# Patient Record
Sex: FEMALE | Race: WHITE | NOT HISPANIC OR LATINO | Employment: PART TIME | URBAN - METROPOLITAN AREA
[De-identification: names, ages, dates, MRNs, and addresses within clinical notes are randomized per-mention and may not be internally consistent; named-entity substitution may affect disease eponyms.]

---

## 2017-10-09 ENCOUNTER — HOSPITAL ENCOUNTER (OUTPATIENT)
Dept: MAMMOGRAPHY | Facility: HOSPITAL | Age: 77
Discharge: HOME | End: 2017-10-09

## 2017-10-09 DIAGNOSIS — Z12.31 VISIT FOR SCREENING MAMMOGRAM: ICD-10-CM

## 2019-01-12 ENCOUNTER — HOSPITAL ENCOUNTER (OUTPATIENT)
Dept: MAMMOGRAPHY | Facility: CLINIC | Age: 79
Discharge: HOME | End: 2019-01-12

## 2019-01-12 DIAGNOSIS — Z12.31 VISIT FOR SCREENING MAMMOGRAM: ICD-10-CM

## 2020-03-02 ENCOUNTER — HOSPITAL ENCOUNTER (OUTPATIENT)
Dept: MAMMOGRAPHY | Facility: CLINIC | Age: 80
Discharge: HOME | End: 2020-03-02

## 2020-03-02 DIAGNOSIS — Z12.31 VISIT FOR SCREENING MAMMOGRAM: ICD-10-CM

## 2021-04-08 ENCOUNTER — HOSPITAL ENCOUNTER (OUTPATIENT)
Dept: MAMMOGRAPHY | Facility: CLINIC | Age: 81
Discharge: HOME | End: 2021-04-08

## 2021-04-08 DIAGNOSIS — Z12.31 VISIT FOR SCREENING MAMMOGRAM: ICD-10-CM

## 2021-05-25 ENCOUNTER — RECORDS - HEALTHEAST (OUTPATIENT)
Dept: ADMINISTRATIVE | Facility: CLINIC | Age: 81
End: 2021-05-25

## 2021-05-26 ENCOUNTER — RECORDS - HEALTHEAST (OUTPATIENT)
Dept: ADMINISTRATIVE | Facility: CLINIC | Age: 81
End: 2021-05-26

## 2021-07-13 ENCOUNTER — RECORDS - HEALTHEAST (OUTPATIENT)
Dept: ADMINISTRATIVE | Facility: CLINIC | Age: 81
End: 2021-07-13

## 2021-07-21 ENCOUNTER — RECORDS - HEALTHEAST (OUTPATIENT)
Dept: ADMINISTRATIVE | Facility: CLINIC | Age: 81
End: 2021-07-21

## 2021-07-22 ENCOUNTER — RECORDS - HEALTHEAST (OUTPATIENT)
Dept: SCHEDULING | Facility: CLINIC | Age: 81
End: 2021-07-22

## 2021-07-22 DIAGNOSIS — Z12.31 OTHER SCREENING MAMMOGRAM: ICD-10-CM

## 2022-04-15 ENCOUNTER — ANCILLARY PROCEDURE (OUTPATIENT)
Dept: MAMMOGRAPHY | Facility: CLINIC | Age: 82
End: 2022-04-15
Attending: FAMILY MEDICINE
Payer: COMMERCIAL

## 2022-04-15 DIAGNOSIS — Z12.31 VISIT FOR SCREENING MAMMOGRAM: ICD-10-CM

## 2022-04-15 PROCEDURE — 77067 SCR MAMMO BI INCL CAD: CPT

## 2022-04-19 ENCOUNTER — ANCILLARY PROCEDURE (OUTPATIENT)
Dept: MAMMOGRAPHY | Facility: CLINIC | Age: 82
End: 2022-04-19
Attending: OBSTETRICS & GYNECOLOGY
Payer: COMMERCIAL

## 2022-04-19 DIAGNOSIS — N64.89 BREAST ASYMMETRY: ICD-10-CM

## 2022-04-19 PROCEDURE — 76642 ULTRASOUND BREAST LIMITED: CPT | Mod: RT

## 2022-04-19 PROCEDURE — 77061 BREAST TOMOSYNTHESIS UNI: CPT | Mod: RT

## 2022-04-20 ENCOUNTER — ANCILLARY PROCEDURE (OUTPATIENT)
Dept: MAMMOGRAPHY | Facility: CLINIC | Age: 82
End: 2022-04-20
Attending: OBSTETRICS & GYNECOLOGY
Payer: COMMERCIAL

## 2022-04-20 DIAGNOSIS — R92.8 OTHER ABNORMAL AND INCONCLUSIVE FINDINGS ON DIAGNOSTIC IMAGING OF BREAST: ICD-10-CM

## 2022-04-20 DIAGNOSIS — N63.10 BREAST MASS, RIGHT: ICD-10-CM

## 2022-04-20 DIAGNOSIS — N64.89 BREAST ASYMMETRY: ICD-10-CM

## 2022-04-20 PROCEDURE — 272N000431 US BREAST BIOPSY CORE NEEDLE RIGHT

## 2022-04-20 PROCEDURE — 999N000065 MA POST PROCEDURE RIGHT

## 2022-04-20 PROCEDURE — 88374 M/PHMTRC ALYS ISHQUANT/SEMIQ: CPT | Performed by: PATHOLOGY

## 2022-04-20 PROCEDURE — 88360 TUMOR IMMUNOHISTOCHEM/MANUAL: CPT | Mod: 26 | Performed by: PATHOLOGY

## 2022-04-20 PROCEDURE — 19083 BX BREAST 1ST LESION US IMAG: CPT | Mod: RT

## 2022-04-20 PROCEDURE — 88305 TISSUE EXAM BY PATHOLOGIST: CPT | Mod: TC | Performed by: OBSTETRICS & GYNECOLOGY

## 2022-04-20 PROCEDURE — 250N000009 HC RX 250: Performed by: OBSTETRICS & GYNECOLOGY

## 2022-04-20 PROCEDURE — 84999 UNLISTED CHEMISTRY PROCEDURE: CPT | Performed by: PATHOLOGY

## 2022-04-20 PROCEDURE — 88305 TISSUE EXAM BY PATHOLOGIST: CPT | Mod: 26 | Performed by: PATHOLOGY

## 2022-04-20 RX ADMIN — LIDOCAINE HYDROCHLORIDE 10 ML: 10 INJECTION, SOLUTION EPIDURAL; INFILTRATION; INTRACAUDAL; PERINEURAL at 14:32

## 2022-04-21 ENCOUNTER — TELEPHONE (OUTPATIENT)
Dept: MAMMOGRAPHY | Facility: CLINIC | Age: 82
End: 2022-04-21
Payer: COMMERCIAL

## 2022-04-21 NOTE — TELEPHONE ENCOUNTER
Pathology report was reviewed with our Breast Center Radiologist, Dr. Coats, who confirmed the recent breast imaging is concordant with the final surgical pathology results.    I phoned patient, confirmed her full name, date of birth, and notified patient of the following breast biopsy results:      BREAST, RIGHT, 9:00, 2 CM FROM NIPPLE, ULTRASOUND-GUIDED CORE BIOPSIES OF MASS:  1. INVASIVE DUCTAL CARCINOMA:  a. GRADE: STEPHANY GRADE I (of III)  b. SCORE: 5 (OF 9)  c. TUMOR LENGTH: AT LEAST 9 mm  2. DUCTAL CARCINOMA IN SITU (DCIS):        - NOT IDENTIFIED  3. ADDITIONAL FINDINGS: FIBROCYSTIC CHANGES  4. ER/[place]/HER2 IMMUNOHISTOCHEMISTRY IS PENDING AND WILL BE REPORTED IN AN ADDENDUM      Per Breast Center Radiologist, I have assisted scheduling patient for the following:    Breast Surgeon Consult:  Dr. Mitchell 4/25/22 @ 10:50   [facility]  [address], [address]   [place] [address] [phone]      Questions were answered and I explained my role as the Breast Center  Nurse Coordinator in assisting her with appointments and resources.  New diagnosis information packet will be available for patient at surgical consult.  She has my phone number if she has further questions.  Patient verbalized understanding and agrees with the plan of care.    Ordering provider  has been notified of the results, and recommendations of scheduled surgical consultation.       Eufemia Jay RN, BSN, PHN, CBCN  Breast [employer] [facility] Nurse Coordinator  [facility]  [phone]

## 2022-04-22 NOTE — H&P (VIEW-ONLY)
History:  Nataliya is a 81 year old female who I'm asked to see by Dr. Coats/Dr. Silverio for evaluation of a right breast cancer.  She presents by herself.  This was picked up by screening mammogram as a new asymmetry compared to mammograms from 2021 in 2020.  She denies any new breast symptoms including masses, skin changes, or nipple discharge.  She then underwent diagnostic work-up.  A needle biopsy was done which shows a grade I invasive ductal carcinoma.  It is estrogen receptor positive, progesterone receptor negative, and HER-2 pending FISH.  Since the needle biopsy, she has had a significant ecchymosis.    Past medical history:  HTN  Osteoporosis  Asthma    Past surgical history:  Left breast excisional biopsy  Open appendectomy    Medications:     vitamin D3 (CHOLECALCIFEROL) 10 MCG (400 UNIT) capsule, Take 1 capsule by mouth daily, Disp: , Rfl:      albuterol (PROAIR HFA/PROVENTIL HFA/VENTOLIN HFA) 108 (90 Base) MCG/ACT inhaler, INHALE 2 PUFFS BY MOUTH EVERY 4 HOURS AS NEEDED, Disp: , Rfl:      alendronate (FOSAMAX) 70 MG tablet, TAKE 1 TABLET BY MOUTH WEEKLY 30 MINUTES BEFORE FIRST FOOD/DRINK/MEDICATION. AVOID LYING DOWN FOR 30 MINUTES, Disp: , Rfl:      lisinopril (ZESTRIL) 5 MG tablet, Take 5 mg by mouth daily, Disp: , Rfl:      montelukast (SINGULAIR) 10 MG tablet, TAKE 1 TABLET BY MOUTH DAILY AT BEDTIME, Disp: , Rfl:     Allergies:  Amoxicillin, losartan, ACE inhibitors    Social History:  Has 5 daughters.  Occasionally consumes alcohol.  Denies tobacco and illicit drug use.  Continues to work part-time.  Occasionally watches her 3-year-old granddaughter.  Is extremely physically active including skiing, biking, and hiking.    Family History:  She has a daughter who had breast cancer in her 40s.  Genetic testing was negative.  Denies any other cancers in the family.    Review of systems:  General ROS: No complaints or constitutional symptoms  Skin: No complaints or symptoms    Hematologic/Lymphatic: No symptoms or complaints  Psychiatric: No symptoms or complaints  Endocrine: No excessive fatigue, no hypermetabolic symptoms reported  Respiratory ROS: No cough, shortness of breath, or wheezing  Cardiovascular ROS: No chest pain or dyspnea on exertion  Breast ROS: Ecchymosis after biopsy  Gastrointestinal ROS: No abdominal pain, nausea, diarrhea, or constipation  Musculoskeletal ROS: No recent injuries reported  Neurological ROS: No focal neurologic defects reported.      Physical exam:  Wt 57.2 kg (126 lb)   General: Alert, cooperative, appears stated age   Skin: Skin color, texture, turgor normal, no rashes or lesions   Lymphatic: No obvious adenopathy, no swelling   Eyes: No scleral icterus, pupils equal  HENT: No traumatic injury to the head or face, no gross abnormalities  Lungs: Normal respiratory effort, breath sounds equal bilaterally  Heart: Regular rate and rhythm  Breasts: Significant ecchymosis to the entire outer right breast.  Left side larger and slightly more pendulous.  No palpable masses or lymphadenopathy bilaterally.  Abdomen: Soft, non-distended and non-tender to palpation  Neurologic: Grossly intact    Imaging:  Pertinent images personally reviewed by myself and discussed with the patient.    Radiology reports:  EXAM: MA DIAGNOSTIC RIGHT W LAZARO, US BREAST RIGHT LIMITED 1-3 QUADRANTS  LOCATION: [facility]  DATE/TIME: 4/19/2022 9:09 AM     INDICATION:  Breast asymmetry  COMPARISON: 4/15/2022, 4/8/2021, 3/2/2020, 1/12/2019, 10/9/2017, 10/5/2016     MAMMOGRAPHIC FINDINGS: Right full-field digital diagnostic mammogram performed. The breasts are heterogeneously dense, which may obscure small masses.Breast tomosynthesis was used in interpretation. There are benign-appearing rodlike secretory calcifications. In the 9:00 position in the mid depth asymmetry and distortion are again noted.     ULTRASOUND FINDINGS: Targeted right breast ultrasound was  performed by both ultrasonographer and the radiologist. In the 9:00 position 2 cm from the nipple there was a hypoechoic mass with ill-defined margins and posterior acoustic shadowing measuring 7 x 8 x 7 mm correlating with the mammographic abnormality and highly suspicious for malignancy. The right axilla was examined as well with no evidence of lymphadenopathy.                                                                    IMPRESSION:  1.  Right breast mass is highly suspicious for malignancy both on mammography and ultrasound. Ultrasound-guided core biopsy for tissue diagnosis as well as surgical consultation are recommended. The findings and the recommendations were discussed with the patient at the time of exam. The biopsy will be performed tomorrow. We are helping her with scheduling an appointment with the [facility] breast surgeons.     ACR BI-RADS Category 5: Highly Suggestive of Malignancy.    My interpretation:  Irregular appearing mass seen on ultrasound in saved images    Pathology:  BREAST, RIGHT, 9:00, 2 CM FROM NIPPLE, ULTRASOUND-GUIDED CORE BIOPSIES OF MASS:  1. INVASIVE DUCTAL CARCINOMA:  a. GRADE: STEPHANY GRADE I (of III)  b. SCORE: 5 (OF 9)  c. TUMOR LENGTH: AT LEAST 9 mm  2. DUCTAL CARCINOMA IN SITU (DCIS):        - NOT IDENTIFIED  3. ADDITIONAL FINDINGS: FIBROCYSTIC CHANGES  HORMONE RECEPTOR ANDHER2/HENRI ANALYSIS BY IMMUNOHISTOCHEMISTRY, RIGHT BREAST TUMOR, CORE BIOPSIES:         1.  ESTROGEN RECEPTOR: STRONGLY POSITIVE (GREATER THAN 95%; 3+)         2.  PROGESTERONE RECEPTOR: NEGATIVE (0%)         3.  HER2/HENRI: EQUIVOCAL (1-2+); PARAFFIN-EMBEDDED TISSUE WILL BE SUBMITTED FOR REFLEX HER2/HENRI ANALYSIS               BY FISH  (SEE SUPPLEMENTAL REPORT)    IMPRESSION:  Right invasive ductal carcinoma    - Grade I, T1, N0, ER+, [place]-, HER2 pending FISH    PLAN:   Discussed the surgical options for treatment of breast cancer which generally are a lumpectomy (partial mastectomy)  combined with radiation versus a mastectomy.  Explained that the survival benefit is the same for both.  The difference is in local recurrence risk.  The patient is a good candidate for a lumpectomy.  It would require preoperative wire localization.  Discussed SLN biopsy.  The procedure and rationale were explained.   Discussed that at this point we do not know yet whether or not she will need chemotherapy and we may not know until we get all of the results of surgery back.  Sometimes the need for chemotherapy is dependent upon an Oncotype score.  Since the tumor is estrogen receptor positive, she will be a candidate for endocrine therapy.    After our discussion, all questions were answered to satisfaction.  She is interested in pursuing breast preservation therapy.  Therefore, we will plan to schedule a right wire localized lumpectomy with sentinel lymph node biopsy.  She has a camping and hiking trip coming up.  She would like for surgery to be after May 21.  This procedure is typically an outpatient procedure under local MAC anesthetic at the outpatient surgery center.  The risks and benefits of surgery were explained.  She will need a preoperative physical with her PCP.  We would schedule her for a preoperative physical.  Also talked about expected recovery time and restrictions.  She would then follow-up with myself about 1 week after surgery to review final pathology and next steps.    Nida Mitchell DO  General Surgeon  [facility]  Breast [facility]  [address] [place] [id number]  Office: [phone]  Employed by - [facility] [employer]

## 2022-04-22 NOTE — PROGRESS NOTES
History:  Nataliya is a 81 year old female who I'm asked to see by Dr. Coats/Dr. Silverio for evaluation of a right breast cancer.  She presents by herself.  This was picked up by screening mammogram as a new asymmetry compared to mammograms from 2021 in 2020.  She denies any new breast symptoms including masses, skin changes, or nipple discharge.  She then underwent diagnostic work-up.  A needle biopsy was done which shows a grade I invasive ductal carcinoma.  It is estrogen receptor positive, progesterone receptor negative, and HER-2 pending FISH.  Since the needle biopsy, she has had a significant ecchymosis.    Past medical history:  HTN  Osteoporosis  Asthma    Past surgical history:  Left breast excisional biopsy  Open appendectomy    Medications:     vitamin D3 (CHOLECALCIFEROL) 10 MCG (400 UNIT) capsule, Take 1 capsule by mouth daily, Disp: , Rfl:      albuterol (PROAIR HFA/PROVENTIL HFA/VENTOLIN HFA) 108 (90 Base) MCG/ACT inhaler, INHALE 2 PUFFS BY MOUTH EVERY 4 HOURS AS NEEDED, Disp: , Rfl:      alendronate (FOSAMAX) 70 MG tablet, TAKE 1 TABLET BY MOUTH WEEKLY 30 MINUTES BEFORE FIRST FOOD/DRINK/MEDICATION. AVOID LYING DOWN FOR 30 MINUTES, Disp: , Rfl:      lisinopril (ZESTRIL) 5 MG tablet, Take 5 mg by mouth daily, Disp: , Rfl:      montelukast (SINGULAIR) 10 MG tablet, TAKE 1 TABLET BY MOUTH DAILY AT BEDTIME, Disp: , Rfl:     Allergies:  Amoxicillin, losartan, ACE inhibitors    Social History:  Has 5 daughters.  Occasionally consumes alcohol.  Denies tobacco and illicit drug use.  Continues to work part-time.  Occasionally watches her 3-year-old granddaughter.  Is extremely physically active including skiing, biking, and hiking.    Family History:  She has a daughter who had breast cancer in her 40s.  Genetic testing was negative.  Denies any other cancers in the family.    Review of systems:  General ROS: No complaints or constitutional symptoms  Skin: No complaints or symptoms    Hematologic/Lymphatic: No symptoms or complaints  Psychiatric: No symptoms or complaints  Endocrine: No excessive fatigue, no hypermetabolic symptoms reported  Respiratory ROS: No cough, shortness of breath, or wheezing  Cardiovascular ROS: No chest pain or dyspnea on exertion  Breast ROS: Ecchymosis after biopsy  Gastrointestinal ROS: No abdominal pain, nausea, diarrhea, or constipation  Musculoskeletal ROS: No recent injuries reported  Neurological ROS: No focal neurologic defects reported.      Physical exam:  Wt 57.2 kg (126 lb)   General: Alert, cooperative, appears stated age   Skin: Skin color, texture, turgor normal, no rashes or lesions   Lymphatic: No obvious adenopathy, no swelling   Eyes: No scleral icterus, pupils equal  HENT: No traumatic injury to the head or face, no gross abnormalities  Lungs: Normal respiratory effort, breath sounds equal bilaterally  Heart: Regular rate and rhythm  Breasts: Significant ecchymosis to the entire outer right breast.  Left side larger and slightly more pendulous.  No palpable masses or lymphadenopathy bilaterally.  Abdomen: Soft, non-distended and non-tender to palpation  Neurologic: Grossly intact    Imaging:  Pertinent images personally reviewed by myself and discussed with the patient.    Radiology reports:  EXAM: MA DIAGNOSTIC RIGHT W LAZARO, US BREAST RIGHT LIMITED 1-3 QUADRANTS  LOCATION: [facility]  DATE/TIME: 4/19/2022 9:09 AM     INDICATION:  Breast asymmetry  COMPARISON: 4/15/2022, 4/8/2021, 3/2/2020, 1/12/2019, 10/9/2017, 10/5/2016     MAMMOGRAPHIC FINDINGS: Right full-field digital diagnostic mammogram performed. The breasts are heterogeneously dense, which may obscure small masses.Breast tomosynthesis was used in interpretation. There are benign-appearing rodlike secretory calcifications. In the 9:00 position in the mid depth asymmetry and distortion are again noted.     ULTRASOUND FINDINGS: Targeted right breast ultrasound was  performed by both ultrasonographer and the radiologist. In the 9:00 position 2 cm from the nipple there was a hypoechoic mass with ill-defined margins and posterior acoustic shadowing measuring 7 x 8 x 7 mm correlating with the mammographic abnormality and highly suspicious for malignancy. The right axilla was examined as well with no evidence of lymphadenopathy.                                                                    IMPRESSION:  1.  Right breast mass is highly suspicious for malignancy both on mammography and ultrasound. Ultrasound-guided core biopsy for tissue diagnosis as well as surgical consultation are recommended. The findings and the recommendations were discussed with the patient at the time of exam. The biopsy will be performed tomorrow. We are helping her with scheduling an appointment with the [facility] breast surgeons.     ACR BI-RADS Category 5: Highly Suggestive of Malignancy.    My interpretation:  Irregular appearing mass seen on ultrasound in saved images    Pathology:  BREAST, RIGHT, 9:00, 2 CM FROM NIPPLE, ULTRASOUND-GUIDED CORE BIOPSIES OF MASS:  1. INVASIVE DUCTAL CARCINOMA:  a. GRADE: STEPHANY GRADE I (of III)  b. SCORE: 5 (OF 9)  c. TUMOR LENGTH: AT LEAST 9 mm  2. DUCTAL CARCINOMA IN SITU (DCIS):        - NOT IDENTIFIED  3. ADDITIONAL FINDINGS: FIBROCYSTIC CHANGES  HORMONE RECEPTOR ANDHER2/HENRI ANALYSIS BY IMMUNOHISTOCHEMISTRY, RIGHT BREAST TUMOR, CORE BIOPSIES:         1.  ESTROGEN RECEPTOR: STRONGLY POSITIVE (GREATER THAN 95%; 3+)         2.  PROGESTERONE RECEPTOR: NEGATIVE (0%)         3.  HER2/HENRI: EQUIVOCAL (1-2+); PARAFFIN-EMBEDDED TISSUE WILL BE SUBMITTED FOR REFLEX HER2/HENRI ANALYSIS               BY FISH  (SEE SUPPLEMENTAL REPORT)    IMPRESSION:  Right invasive ductal carcinoma    - Grade I, T1, N0, ER+, [place]-, HER2 pending FISH    PLAN:   Discussed the surgical options for treatment of breast cancer which generally are a lumpectomy (partial mastectomy)  combined with radiation versus a mastectomy.  Explained that the survival benefit is the same for both.  The difference is in local recurrence risk.  The patient is a good candidate for a lumpectomy.  It would require preoperative wire localization.  Discussed SLN biopsy.  The procedure and rationale were explained.   Discussed that at this point we do not know yet whether or not she will need chemotherapy and we may not know until we get all of the results of surgery back.  Sometimes the need for chemotherapy is dependent upon an Oncotype score.  Since the tumor is estrogen receptor positive, she will be a candidate for endocrine therapy.    After our discussion, all questions were answered to satisfaction.  She is interested in pursuing breast preservation therapy.  Therefore, we will plan to schedule a right wire localized lumpectomy with sentinel lymph node biopsy.  She has a camping and hiking trip coming up.  She would like for surgery to be after May 21.  This procedure is typically an outpatient procedure under local MAC anesthetic at the outpatient surgery center.  The risks and benefits of surgery were explained.  She will need a preoperative physical with her PCP.  We would schedule her for a preoperative physical.  Also talked about expected recovery time and restrictions.  She would then follow-up with myself about 1 week after surgery to review final pathology and next steps.    Nida Mitchell DO  General Surgeon  [facility]  Breast [facility]  [address] [place] [id number]  Office: [phone]  Employed by - [employer]

## 2022-04-25 ENCOUNTER — OFFICE VISIT (OUTPATIENT)
Dept: SURGERY | Facility: CLINIC | Age: 82
End: 2022-04-25
Attending: FAMILY MEDICINE
Payer: COMMERCIAL

## 2022-04-25 VITALS — WEIGHT: 126 LBS

## 2022-04-25 DIAGNOSIS — C50.911 INVASIVE DUCTAL CARCINOMA OF BREAST, RIGHT (H): Primary | ICD-10-CM

## 2022-04-25 PROCEDURE — 99204 OFFICE O/P NEW MOD 45 MIN: CPT | Performed by: SURGERY

## 2022-04-25 RX ORDER — LISINOPRIL 5 MG/1
5 TABLET ORAL DAILY
COMMUNITY
Start: 2022-03-23

## 2022-04-25 RX ORDER — SWAB
1 SWAB, NON-MEDICATED MISCELLANEOUS DAILY
COMMUNITY

## 2022-04-25 RX ORDER — ALENDRONATE SODIUM 70 MG/1
TABLET ORAL
COMMUNITY
Start: 2022-04-18

## 2022-04-25 RX ORDER — MONTELUKAST SODIUM 10 MG/1
TABLET ORAL
COMMUNITY
Start: 2022-03-23

## 2022-04-25 RX ORDER — ALBUTEROL SULFATE 90 UG/1
AEROSOL, METERED RESPIRATORY (INHALATION)
COMMUNITY
Start: 2021-09-28

## 2022-04-25 NOTE — LETTER
4/25/2022         RE: Fang Estes  [address]  [facility] [address]        Dear Colleague,    Thank you for referring your patient, Fang Estes, to the [facility] [place]. Please see a copy of my visit note below.    History:  Nataliya is a 81 year old female who I'm asked to see by Dr. Coats/Dr. Silverio for evaluation of a right breast cancer.  She presents by herself.  This was picked up by screening mammogram as a new asymmetry compared to mammograms from 2021 in 2020.  She denies any new breast symptoms including masses, skin changes, or nipple discharge.  She then underwent diagnostic work-up.  A needle biopsy was done which shows a grade I invasive ductal carcinoma.  It is estrogen receptor positive, progesterone receptor negative, and HER-2 pending FISH.  Since the needle biopsy, she has had a significant ecchymosis.    Past medical history:  HTN  Osteoporosis  Asthma    Past surgical history:  Left breast excisional biopsy  Open appendectomy    Medications:     vitamin D3 (CHOLECALCIFEROL) 10 MCG (400 UNIT) capsule, Take 1 capsule by mouth daily, Disp: , Rfl:      albuterol (PROAIR HFA/PROVENTIL HFA/VENTOLIN HFA) 108 (90 Base) MCG/ACT inhaler, INHALE 2 PUFFS BY MOUTH EVERY 4 HOURS AS NEEDED, Disp: , Rfl:      alendronate (FOSAMAX) 70 MG tablet, TAKE 1 TABLET BY MOUTH WEEKLY 30 MINUTES BEFORE FIRST FOOD/DRINK/MEDICATION. AVOID LYING DOWN FOR 30 MINUTES, Disp: , Rfl:      lisinopril (ZESTRIL) 5 MG tablet, Take 5 mg by mouth daily, Disp: , Rfl:      montelukast (SINGULAIR) 10 MG tablet, TAKE 1 TABLET BY MOUTH DAILY AT BEDTIME, Disp: , Rfl:     Allergies:  Amoxicillin, losartan, ACE inhibitors    Social History:  Has 5 daughters.  Occasionally consumes alcohol.  Denies tobacco and illicit drug use.  Continues to work part-time.  Occasionally watches her 3-year-old granddaughter.  Is extremely physically active including skiing, biking, and hiking.    Family  History:  She has a daughter who had breast cancer in her 40s.  Genetic testing was negative.  Denies any other cancers in the family.    Review of systems:  General ROS: No complaints or constitutional symptoms  Skin: No complaints or symptoms   Hematologic/Lymphatic: No symptoms or complaints  Psychiatric: No symptoms or complaints  Endocrine: No excessive fatigue, no hypermetabolic symptoms reported  Respiratory ROS: No cough, shortness of breath, or wheezing  Cardiovascular ROS: No chest pain or dyspnea on exertion  Breast ROS: Ecchymosis after biopsy  Gastrointestinal ROS: No abdominal pain, nausea, diarrhea, or constipation  Musculoskeletal ROS: No recent injuries reported  Neurological ROS: No focal neurologic defects reported.      Physical exam:  Wt 57.2 kg (126 lb)   General: Alert, cooperative, appears stated age   Skin: Skin color, texture, turgor normal, no rashes or lesions   Lymphatic: No obvious adenopathy, no swelling   Eyes: No scleral icterus, pupils equal  HENT: No traumatic injury to the head or face, no gross abnormalities  Lungs: Normal respiratory effort, breath sounds equal bilaterally  Heart: Regular rate and rhythm  Breasts: Significant ecchymosis to the entire outer right breast.  Left side larger and slightly more pendulous.  No palpable masses or lymphadenopathy bilaterally.  Abdomen: Soft, non-distended and non-tender to palpation  Neurologic: Grossly intact    Imaging:  Pertinent images personally reviewed by myself and discussed with the patient.    Radiology reports:  EXAM: MA DIAGNOSTIC RIGHT W LAZARO, US BREAST RIGHT LIMITED 1-3 QUADRANTS  LOCATION: [facility]  DATE/TIME: 4/19/2022 9:09 AM     INDICATION:  Breast asymmetry  COMPARISON: 4/15/2022, 4/8/2021, 3/2/2020, 1/12/2019, 10/9/2017, 10/5/2016     MAMMOGRAPHIC FINDINGS: Right full-field digital diagnostic mammogram performed. The breasts are heterogeneously dense, which may obscure small masses.Breast  tomosynthesis was used in interpretation. There are benign-appearing rodlike secretory calcifications. In the 9:00 position in the mid depth asymmetry and distortion are again noted.     ULTRASOUND FINDINGS: Targeted right breast ultrasound was performed by both ultrasonographer and the radiologist. In the 9:00 position 2 cm from the nipple there was a hypoechoic mass with ill-defined margins and posterior acoustic shadowing measuring 7 x 8 x 7 mm correlating with the mammographic abnormality and highly suspicious for malignancy. The right axilla was examined as well with no evidence of lymphadenopathy.                                                                    IMPRESSION:  1.  Right breast mass is highly suspicious for malignancy both on mammography and ultrasound. Ultrasound-guided core biopsy for tissue diagnosis as well as surgical consultation are recommended. The findings and the recommendations were discussed with the patient at the time of exam. The biopsy will be performed tomorrow. We are helping her with scheduling an appointment with the [facility] breast surgeons.     ACR BI-RADS Category 5: Highly Suggestive of Malignancy.    My interpretation:  Irregular appearing mass seen on ultrasound in saved images    Pathology:  BREAST, RIGHT, 9:00, 2 CM FROM NIPPLE, ULTRASOUND-GUIDED CORE BIOPSIES OF MASS:  1. INVASIVE DUCTAL CARCINOMA:  a. GRADE: STEPHANY GRADE I (of III)  b. SCORE: 5 (OF 9)  c. TUMOR LENGTH: AT LEAST 9 mm  2. DUCTAL CARCINOMA IN SITU (DCIS):        - NOT IDENTIFIED  3. ADDITIONAL FINDINGS: FIBROCYSTIC CHANGES  HORMONE RECEPTOR ANDHER2/HENRI ANALYSIS BY IMMUNOHISTOCHEMISTRY, RIGHT BREAST TUMOR, CORE BIOPSIES:         1.  ESTROGEN RECEPTOR: STRONGLY POSITIVE (GREATER THAN 95%; 3+)         2.  PROGESTERONE RECEPTOR: NEGATIVE (0%)         3.  HER2/HENRI: EQUIVOCAL (1-2+); PARAFFIN-EMBEDDED TISSUE WILL BE SUBMITTED FOR REFLEX HER2/HENRI ANALYSIS               BY FISH  (SEE  SUPPLEMENTAL REPORT)    IMPRESSION:  Right invasive ductal carcinoma    - Grade I, T1, N0, ER+, AL-, HER2 pending FISH    PLAN:   Discussed the surgical options for treatment of breast cancer which generally are a lumpectomy (partial mastectomy) combined with radiation versus a mastectomy.  Explained that the survival benefit is the same for both.  The difference is in local recurrence risk.  The patient is a good candidate for a lumpectomy.  It would require preoperative wire localization.  Discussed SLN biopsy.  The procedure and rationale were explained.   Discussed that at this point we do not know yet whether or not she will need chemotherapy and we may not know until we get all of the results of surgery back.  Sometimes the need for chemotherapy is dependent upon an Oncotype score.  Since the tumor is estrogen receptor positive, she will be a candidate for endocrine therapy.    After our discussion, all questions were answered to satisfaction.  She is interested in pursuing breast preservation therapy.  Therefore, we will plan to schedule a right wire localized lumpectomy with sentinel lymph node biopsy.  She has a camping and hiking trip coming up.  She would like for surgery to be after May 21.  This procedure is typically an outpatient procedure under local MAC anesthetic at the outpatient surgery center.  The risks and benefits of surgery were explained.  She will need a preoperative physical with her PCP.  We would schedule her for a preoperative physical.  Also talked about expected recovery time and restrictions.  She would then follow-up with myself about 1 week after surgery to review final pathology and next steps.    Nida Mitchell DO  General Surgeon  [address] [address]  Office: [phone]  Employed by - [employer]        Fang is here for a new breast cancer right side. She is aware of her  pathology and is here for a surgical consult. She is otherwise feeling well. Carey RN, OCN, CBCN      Again, thank you for allowing me to participate in the care of your patient.        Sincerely,        Nida Mitchell, DO

## 2022-04-25 NOTE — PROGRESS NOTES
Fang is here for a new breast cancer right side. She is aware of her pathology and is here for a surgical consult. She is otherwise feeling well. TAYA Patino, OCN, CBCN

## 2022-04-28 DIAGNOSIS — Z11.59 ENCOUNTER FOR SCREENING FOR OTHER VIRAL DISEASES: Primary | ICD-10-CM

## 2022-04-28 PROBLEM — C50.911 INVASIVE DUCTAL CARCINOMA OF BREAST, RIGHT (H): Status: ACTIVE | Noted: 2022-04-28

## 2022-05-09 LAB
PATH REPORT.ADDENDUM SPEC: ABNORMAL
PATH REPORT.ADDENDUM SPEC: ABNORMAL
PATH REPORT.COMMENTS IMP SPEC: ABNORMAL
PATH REPORT.COMMENTS IMP SPEC: YES
PATH REPORT.FINAL DX SPEC: ABNORMAL
PATH REPORT.GROSS SPEC: ABNORMAL
PATH REPORT.MICROSCOPIC SPEC OTHER STN: ABNORMAL
PATH REPORT.RELEVANT HX SPEC: ABNORMAL
PATHOLOGY SYNOPTIC REPORT: ABNORMAL
PHOTO IMAGE: ABNORMAL
SPECIMEN STATUS: NORMAL

## 2022-05-10 ENCOUNTER — TELEPHONE (OUTPATIENT)
Dept: SURGERY | Facility: CLINIC | Age: 82
End: 2022-05-10
Payer: COMMERCIAL

## 2022-05-10 NOTE — TELEPHONE ENCOUNTER
Called patient with Her 2 by FISH, Negative, Results.  LMOM for her, invited calls with questions.

## 2022-05-20 ENCOUNTER — LAB (OUTPATIENT)
Dept: FAMILY MEDICINE | Facility: CLINIC | Age: 82
End: 2022-05-20
Payer: COMMERCIAL

## 2022-05-20 DIAGNOSIS — Z11.59 ENCOUNTER FOR SCREENING FOR OTHER VIRAL DISEASES: ICD-10-CM

## 2022-05-20 LAB — SARS-COV-2 RNA RESP QL NAA+PROBE: NEGATIVE

## 2022-05-20 PROCEDURE — U0003 INFECTIOUS AGENT DETECTION BY NUCLEIC ACID (DNA OR RNA); SEVERE ACUTE RESPIRATORY SYNDROME CORONAVIRUS 2 (SARS-COV-2) (CORONAVIRUS DISEASE [COVID-19]), AMPLIFIED PROBE TECHNIQUE, MAKING USE OF HIGH THROUGHPUT TECHNOLOGIES AS DESCRIBED BY CMS-2020-01-R: HCPCS

## 2022-05-20 PROCEDURE — U0005 INFEC AGEN DETEC AMPLI PROBE: HCPCS

## 2022-05-23 ENCOUNTER — ANESTHESIA EVENT (OUTPATIENT)
Dept: SURGERY | Facility: AMBULATORY SURGERY CENTER | Age: 82
End: 2022-05-23
Payer: COMMERCIAL

## 2022-05-23 RX ORDER — MAGNESIUM SULFATE HEPTAHYDRATE 40 MG/ML
4 INJECTION, SOLUTION INTRAVENOUS ONCE
Status: CANCELLED | OUTPATIENT
Start: 2022-05-23 | End: 2022-05-23

## 2022-05-24 ENCOUNTER — ANCILLARY PROCEDURE (OUTPATIENT)
Dept: MAMMOGRAPHY | Facility: CLINIC | Age: 82
End: 2022-05-24
Attending: SURGERY
Payer: COMMERCIAL

## 2022-05-24 ENCOUNTER — HOSPITAL ENCOUNTER (OUTPATIENT)
Facility: AMBULATORY SURGERY CENTER | Age: 82
Discharge: HOME | End: 2022-05-24
Attending: SURGERY
Payer: COMMERCIAL

## 2022-05-24 ENCOUNTER — HOSPITAL ENCOUNTER (OUTPATIENT)
Dept: MAMMOGRAPHY | Facility: CLINIC | Age: 82
Discharge: HOME | End: 2022-05-24
Attending: SURGERY
Payer: COMMERCIAL

## 2022-05-24 ENCOUNTER — ANESTHESIA (OUTPATIENT)
Dept: SURGERY | Facility: AMBULATORY SURGERY CENTER | Age: 82
End: 2022-05-24
Payer: COMMERCIAL

## 2022-05-24 VITALS
RESPIRATION RATE: 16 BRPM | TEMPERATURE: 97.5 F | SYSTOLIC BLOOD PRESSURE: 176 MMHG | HEART RATE: 62 BPM | OXYGEN SATURATION: 98 % | DIASTOLIC BLOOD PRESSURE: 91 MMHG | BODY MASS INDEX: 21.26 KG/M2 | WEIGHT: 120 LBS | HEIGHT: 63 IN

## 2022-05-24 DIAGNOSIS — C50.911 INVASIVE DUCTAL CARCINOMA OF BREAST, RIGHT (H): ICD-10-CM

## 2022-05-24 PROCEDURE — 19125 EXCISION BREAST LESION: CPT | Mod: RT | Performed by: SURGERY

## 2022-05-24 PROCEDURE — 76998 US GUIDE INTRAOP: CPT | Mod: 26 | Performed by: SURGERY

## 2022-05-24 PROCEDURE — 38525 BIOPSY/REMOVAL LYMPH NODES: CPT | Mod: RT | Performed by: SURGERY

## 2022-05-24 PROCEDURE — 250N000009 HC RX 250: Performed by: SURGERY

## 2022-05-24 PROCEDURE — A9520 TC99 TILMANOCEPT DIAG 0.5MCI: HCPCS | Performed by: SURGERY

## 2022-05-24 PROCEDURE — 343N000001 HC RX 343: Performed by: SURGERY

## 2022-05-24 PROCEDURE — 999N000065 MA POST PROCEDURE RIGHT

## 2022-05-24 PROCEDURE — 272N000431 IMAGE GUIDED BREAST LOCALIZATION W SENT NODE INJ RIGHT

## 2022-05-24 PROCEDURE — 76098 X-RAY EXAM SURGICAL SPECIMEN: CPT

## 2022-05-24 RX ORDER — OXYCODONE HYDROCHLORIDE 5 MG/1
5 TABLET ORAL EVERY 4 HOURS PRN
Status: DISCONTINUED | OUTPATIENT
Start: 2022-05-24 | End: 2022-05-25 | Stop reason: HOSPADM

## 2022-05-24 RX ORDER — SODIUM CHLORIDE, SODIUM LACTATE, POTASSIUM CHLORIDE, CALCIUM CHLORIDE 600; 310; 30; 20 MG/100ML; MG/100ML; MG/100ML; MG/100ML
INJECTION, SOLUTION INTRAVENOUS CONTINUOUS
Status: DISCONTINUED | OUTPATIENT
Start: 2022-05-24 | End: 2022-05-25 | Stop reason: HOSPADM

## 2022-05-24 RX ORDER — HYDROMORPHONE HCL IN WATER/PF 6 MG/30 ML
0.2 PATIENT CONTROLLED ANALGESIA SYRINGE INTRAVENOUS EVERY 5 MIN PRN
Status: DISCONTINUED | OUTPATIENT
Start: 2022-05-24 | End: 2022-05-25 | Stop reason: HOSPADM

## 2022-05-24 RX ORDER — FENTANYL CITRATE 50 UG/ML
INJECTION, SOLUTION INTRAMUSCULAR; INTRAVENOUS PRN
Status: DISCONTINUED | OUTPATIENT
Start: 2022-05-24 | End: 2022-05-24

## 2022-05-24 RX ORDER — PROPOFOL 10 MG/ML
INJECTION, EMULSION INTRAVENOUS CONTINUOUS PRN
Status: DISCONTINUED | OUTPATIENT
Start: 2022-05-24 | End: 2022-05-24

## 2022-05-24 RX ORDER — ONDANSETRON 2 MG/ML
INJECTION INTRAMUSCULAR; INTRAVENOUS PRN
Status: DISCONTINUED | OUTPATIENT
Start: 2022-05-24 | End: 2022-05-24

## 2022-05-24 RX ORDER — DEXAMETHASONE SODIUM PHOSPHATE 4 MG/ML
INJECTION, SOLUTION INTRA-ARTICULAR; INTRALESIONAL; INTRAMUSCULAR; INTRAVENOUS; SOFT TISSUE PRN
Status: DISCONTINUED | OUTPATIENT
Start: 2022-05-24 | End: 2022-05-24

## 2022-05-24 RX ORDER — ONDANSETRON 4 MG/1
4 TABLET, ORALLY DISINTEGRATING ORAL EVERY 30 MIN PRN
Status: DISCONTINUED | OUTPATIENT
Start: 2022-05-24 | End: 2022-05-25 | Stop reason: HOSPADM

## 2022-05-24 RX ORDER — PROPOFOL 10 MG/ML
INJECTION, EMULSION INTRAVENOUS PRN
Status: DISCONTINUED | OUTPATIENT
Start: 2022-05-24 | End: 2022-05-24

## 2022-05-24 RX ORDER — ONDANSETRON 2 MG/ML
4 INJECTION INTRAMUSCULAR; INTRAVENOUS EVERY 30 MIN PRN
Status: DISCONTINUED | OUTPATIENT
Start: 2022-05-24 | End: 2022-05-25 | Stop reason: HOSPADM

## 2022-05-24 RX ORDER — LIDOCAINE HYDROCHLORIDE 20 MG/ML
INJECTION, SOLUTION INFILTRATION; PERINEURAL PRN
Status: DISCONTINUED | OUTPATIENT
Start: 2022-05-24 | End: 2022-05-24

## 2022-05-24 RX ORDER — FENTANYL CITRATE 0.05 MG/ML
25 INJECTION, SOLUTION INTRAMUSCULAR; INTRAVENOUS EVERY 5 MIN PRN
Status: DISCONTINUED | OUTPATIENT
Start: 2022-05-24 | End: 2022-05-25 | Stop reason: HOSPADM

## 2022-05-24 RX ORDER — ACETAMINOPHEN 325 MG/1
975 TABLET ORAL ONCE
Status: COMPLETED | OUTPATIENT
Start: 2022-05-24 | End: 2022-05-24

## 2022-05-24 RX ORDER — FENTANYL CITRATE 0.05 MG/ML
25 INJECTION, SOLUTION INTRAMUSCULAR; INTRAVENOUS
Status: DISCONTINUED | OUTPATIENT
Start: 2022-05-24 | End: 2022-05-25 | Stop reason: HOSPADM

## 2022-05-24 RX ORDER — LIDOCAINE 40 MG/G
CREAM TOPICAL
Status: DISCONTINUED | OUTPATIENT
Start: 2022-05-24 | End: 2022-05-25 | Stop reason: HOSPADM

## 2022-05-24 RX ORDER — MEPERIDINE HYDROCHLORIDE 25 MG/ML
12.5 INJECTION INTRAMUSCULAR; INTRAVENOUS; SUBCUTANEOUS
Status: DISCONTINUED | OUTPATIENT
Start: 2022-05-24 | End: 2022-05-25 | Stop reason: HOSPADM

## 2022-05-24 RX ORDER — CEFAZOLIN SODIUM 2 G/100ML
2 INJECTION, SOLUTION INTRAVENOUS
Status: COMPLETED | OUTPATIENT
Start: 2022-05-24 | End: 2022-05-24

## 2022-05-24 RX ADMIN — DEXAMETHASONE SODIUM PHOSPHATE 4 MG: 4 INJECTION, SOLUTION INTRA-ARTICULAR; INTRALESIONAL; INTRAMUSCULAR; INTRAVENOUS; SOFT TISSUE at 09:29

## 2022-05-24 RX ADMIN — PROPOFOL 20 MG: 10 INJECTION, EMULSION INTRAVENOUS at 10:26

## 2022-05-24 RX ADMIN — TILMANOCEPT 0.56 MILLICURIE: KIT at 07:21

## 2022-05-24 RX ADMIN — CEFAZOLIN SODIUM 2 G: 2 INJECTION, SOLUTION INTRAVENOUS at 09:23

## 2022-05-24 RX ADMIN — LIDOCAINE HYDROCHLORIDE 60 MG: 20 INJECTION, SOLUTION INFILTRATION; PERINEURAL at 09:25

## 2022-05-24 RX ADMIN — LIDOCAINE HYDROCHLORIDE 10 ML: 10 INJECTION, SOLUTION EPIDURAL; INFILTRATION; INTRACAUDAL; PERINEURAL at 07:53

## 2022-05-24 RX ADMIN — ONDANSETRON 4 MG: 2 INJECTION INTRAMUSCULAR; INTRAVENOUS at 09:29

## 2022-05-24 RX ADMIN — FENTANYL CITRATE 25 MCG: 50 INJECTION, SOLUTION INTRAMUSCULAR; INTRAVENOUS at 09:26

## 2022-05-24 RX ADMIN — PROPOFOL 30 MG: 10 INJECTION, EMULSION INTRAVENOUS at 09:26

## 2022-05-24 RX ADMIN — SODIUM CHLORIDE, SODIUM LACTATE, POTASSIUM CHLORIDE, CALCIUM CHLORIDE: 600; 310; 30; 20 INJECTION, SOLUTION INTRAVENOUS at 08:55

## 2022-05-24 RX ADMIN — PROPOFOL 100 MCG/KG/MIN: 10 INJECTION, EMULSION INTRAVENOUS at 09:25

## 2022-05-24 RX ADMIN — PROPOFOL 30 MG: 10 INJECTION, EMULSION INTRAVENOUS at 09:51

## 2022-05-24 RX ADMIN — ACETAMINOPHEN 975 MG: 325 TABLET ORAL at 08:48

## 2022-05-24 RX ADMIN — Medication 100 MCG: at 10:08

## 2022-05-24 RX ADMIN — FENTANYL CITRATE 25 MCG: 50 INJECTION, SOLUTION INTRAMUSCULAR; INTRAVENOUS at 09:46

## 2022-05-24 NOTE — ANESTHESIA CARE TRANSFER NOTE
Patient: Fang Estes    Procedure: Procedure(s):  WIRE LOCALIZED LUMPECTOMY WITH SENTINEL LYMPH NODE BIOPSY       Diagnosis: Invasive ductal carcinoma of breast, right (H) [C50.911]  Diagnosis Additional Information: No value filed.    Anesthesia Type:   MAC     Note:    Oropharynx: oropharynx clear of all foreign objects and spontaneously breathing  Level of Consciousness: awake  Oxygen Supplementation: room air    Independent Airway: airway patency satisfactory and stable  Dentition: dentition unchanged  Vital Signs Stable: post-procedure vital signs reviewed and stable  Report to RN Given: handoff report given  Patient transferred to: Phase II    Handoff Report: Identifed the Patient, Identified the Reponsible Provider, Reviewed the pertinent medical history, Discussed the surgical course, Reviewed Intra-OP anesthesia mangement and issues during anesthesia, Set expectations for post-procedure period and Allowed opportunity for questions and acknowledgement of understanding      Vitals:  Vitals Value Taken Time   /72 05/24/22 1046   Temp 97.5  F (36.4  C) 05/24/22 1046   Pulse 60 05/24/22 1046   Resp 16 05/24/22 1046   SpO2 99 % 05/24/22 1046       Electronically Signed By: MIKE Rodrigez CRNA  May 24, 2022  10:47 AM

## 2022-05-24 NOTE — OP NOTE
Name:  Fang Estes  PCP:  Santiago Silverio  Procedure Date:  5/24/2022      RIGHT WIRE LOCALIZED LUMPECTOMY WITH SENTINEL LYMPH NODE BIOPSY       Pre-Procedure Diagnosis:  Invasive ductal carcinoma of breast, right    Post-Procedure Diagnosis:    Same    Anesthesia Type:    MAC with local    Estimated Blood Loss:   30 cc    Specimens:    Right lumpectomy  Right axillary sentinel lymph node    Complications:    None apparent    Findings:  Tumor and clip within excised tissue  Palpably normal lymph node x2    Indication for Procedure:  This is an 81-year-old female who recently had a screening mammogram.  A new asymmetry was identified within the right breast.  Diagnostic work-up revealed an invasive ductal carcinoma.  She has elected for breast preservation therapy for treatment.    Operative Report:    After informed consent was obtained, and the risks and benefits of the procedure were discussed, the patient was brought back to the operative suite and placed in the supine position.  Preoperatively, a localization wire was placed and the breast was injected with Lymphoseek.  MAC anesthesia was provided by the department of anesthesia.  Ultrasound was utilized by myself to visualize the tumor and clip within the breast parenchyma at 8:00 zone 2.  The right breast and axilla were prepped and draped in the usual sterile fashion.  After infiltration with a combination of 1% lidocaine and quarter percent Marcaine a curvilinear shaped incision was made over the lesion in the breast at 8:00 zone 2.  This was carried down through the subcutaneous tissues and then around the wire and mass widely with electrocautery.  Once removed, the tumor could be palpated within the tissue.  The specimen was marked for orientation and sent to radiology, who confirmed removal of the clip and tumor.  The specimen was then sent to pathology for permanent sectioning.  A curvilinear incision was then made in the lower portion of the  axilla, after local infiltration, and carried down deep into the axillary fat pad.  The clavipectoral fascia was incised and the axilla was palpated for abnormalities.  No abnormal lymph nodes were palpated.  Using the gamma probe I identified one sentinel lymph node grouping, with a count of 170.  These were removed with electrocautery and at least 2 lymph nodes were palpated within this tissue.  These were sent for permanent sectioning.  There was no significant remaining gamma activity.  Both wounds are inspected for hemostasis.  Significant undermining was performed within the lumpectomy cavity in order to minimize the defect.  A clip was left within the central aspect of the lumpectomy cavity.  Both incisions were then closed with 3-0 Vicryl to the subcutaneous tissues, followed by interrupted 4-0 Vicryl for the deep dermis, and a subcuticular stitch of 4-0 Monocryl to the skin.  Dermabond was then placed over the incisions.    Instrument, sponge, and needle counts were correct at closure and at the conclusion of the case.     Disposition:  The patient tolerated the procedure well.  They were transferred to the postanesthesia care unit in stable condition.      Nida Mitchell DO  General Surgeon  [facility]  Breast [address] [address]  Office: [phone]  Employed by - [facility]

## 2022-05-24 NOTE — ANESTHESIA POSTPROCEDURE EVALUATION
Patient: Fang Estes    Procedure: Procedure(s):  WIRE LOCALIZED LUMPECTOMY WITH SENTINEL LYMPH NODE BIOPSY       Anesthesia Type:  MAC    Note:  Disposition: Outpatient   Postop Pain Control: Uneventful            Sign Out: Well controlled pain   PONV: No   Neuro/Psych: Uneventful            Sign Out: Acceptable/Baseline neuro status   Airway/Respiratory: Uneventful            Sign Out: Acceptable/Baseline resp. status   CV/Hemodynamics: Uneventful            Sign Out: Acceptable CV status; No obvious hypovolemia; No obvious fluid overload   Other NRE: NONE   DID A NON-ROUTINE EVENT OCCUR? No           Last vitals:  Vitals Value Taken Time   /91 05/24/22 1103   Temp 97.5  F (36.4  C) 05/24/22 1046   Pulse 59 05/24/22 1108   Resp 16 05/24/22 1046   SpO2 100 % 05/24/22 1108   Vitals shown include unvalidated device data.    Electronically Signed By: Corwin Ocampo MD  May 24, 2022  11:17 AM

## 2022-05-24 NOTE — INTERVAL H&P NOTE
Patient seen in preop.  Denies new medical history since she was last seen.  Wire localization complete and imaging reviewed by myself.  All questions answered regarding procedure.  Consent obtained.  To the OR for right wire localized lumpectomy with sentinel lymph node biopsy for invasive ductal carcinoma.    Nida Mitchell DO  General Surgeon  [facility]  Breast [place] - [address] [address]?  Office: [phone]  Employed by - [facility]

## 2022-05-24 NOTE — ANESTHESIA PREPROCEDURE EVALUATION
Anesthesia Pre-Procedure Evaluation    Patient: Fang Estes   MRN: [id number] : 1940        Procedure : Procedure(s):  WIRE LOCALIZED LUMPECTOMY WITH SENTINEL LYMPH NODE BIOPSY          Past Medical History:   Diagnosis Date     Hypertension      Uncomplicated asthma       Past Surgical History:   Procedure Laterality Date     APPENDECTOMY       BREAST EXCISIONAL BIOPSY Left 1980      Allergies   Allergen Reactions     Ace Inhibitors Cough     Losartan Other (See Comments)     Shortness of breath, worsening cough(only very mild with lisinopril, much worse with this)     Other (Do Not Use) Other (See Comments)     Cats, horses, dust.     Amoxicillin Rash      Social History     Tobacco Use     Smoking status: Never Smoker     Smokeless tobacco: Never Used   Substance Use Topics     Alcohol use: Not on file      Wt Readings from Last 1 Encounters:   22 54.4 kg (120 lb)        Anesthesia Evaluation   Pt has had prior anesthetic. Type: General.    No history of anesthetic complications       ROS/MED HX  ENT/Pulmonary:  - neg pulmonary ROS   (+) Intermittent, asthma (seasonal, doing well recently) Treatment: Inhaler prn,      Neurologic:  - neg neurologic ROS     Cardiovascular:  - neg cardiovascular ROS   (+) hypertension-----    METS/Exercise Tolerance:     Hematologic:  - neg hematologic  ROS     Musculoskeletal:  - neg musculoskeletal ROS     GI/Hepatic:  - neg GI/hepatic ROS     Renal/Genitourinary:  - neg Renal ROS     Endo:  - neg endo ROS     Psychiatric/Substance Use:  - neg psychiatric ROS     Infectious Disease:  - neg infectious disease ROS     Malignancy:  - neg malignancy ROS (+) Malignancy, History of Breast.    Other:            Physical Exam    Airway        Mallampati: II   TM distance: > 3 FB   Neck ROM: full   Mouth opening: > 3 cm    Respiratory Devices and Support         Dental     Comment: Multiple bridges        Cardiovascular   cardiovascular exam normal       Rhythm and  rate: regular and normal     Pulmonary   pulmonary exam normal        breath sounds clear to auscultation           OUTSIDE LABS:  CBC: No results found for: WBC, HGB, HCT, PLT  BMP: No results found for: NA, POTASSIUM, CHLORIDE, CO2, BUN, CR, GLC  COAGS: No results found for: PTT, INR, FIBR  POC: No results found for: BGM, HCG, HCGS  HEPATIC: No results found for: ALBUMIN, PROTTOTAL, ALT, AST, GGT, ALKPHOS, BILITOTAL, BILIDIRECT, ANN  OTHER: No results found for: PH, LACT, A1C, LÁZARO, PHOS, MAG, LIPASE, AMYLASE, TSH, T4, T3, CRP, SED    Anesthesia Plan    ASA Status:  2   NPO Status:  NPO Appropriate    Anesthesia Type: MAC.     - Reason for MAC: straight local not clinically adequate, immobility needed   Induction: Intravenous, Propofol.   Maintenance: TIVA.        Consents    Anesthesia Plan(s) and associated risks, benefits, and realistic alternatives discussed. Questions answered and patient/representative(s) expressed understanding.    - Discussed:     - Discussed with:  Patient         Postoperative Care    Pain management: Multi-modal analgesia.   PONV prophylaxis: Ondansetron (or other 5HT-3), Dexamethasone or Solumedrol     Comments:    Other Comments: MAC - propofol gtt, versed/fentanyl/ketamine bolus PRN for pain  Decadron/Zofran for antiemesis  Convert to General with LMA if unable to maintain adequate SpO2 on reduced FiO2 (<30%)  Fire precautions  Reviewed anesthetic options and risks. Patient agrees to proceed.     Recent Results (from the past 120 hour(s))  -Asymptomatic COVID-19 Virus (Coronavirus) by PCR Nose:   Collection Time: 05/20/22  8:21 AM  Specimen: Nose; Swab       Result                                            Value                         Ref Range                       SARS CoV2 PCR                                     Negative                      Negative, Testing sent t*            Corwin Ocampo MD

## 2022-05-24 NOTE — DISCHARGE INSTRUCTIONS
You have received 975 mg of Acetaminophen (Tylenol) at 8:48AM. Please do not take an additional dose of Tylenol until after 2:48PM.     Do not exceed 4,000 mg of acetaminophen during a 24 hour period and keep in mind that acetaminophen can also be found in many over-the-counter cold medications as well as narcotics that may be given for pain.     If you have any questions or concerns regarding your procedure, please contact Dr. Nida Mitchell, her office number is [phone].

## 2022-05-28 ENCOUNTER — HEALTH MAINTENANCE LETTER (OUTPATIENT)
Age: 82
End: 2022-05-28

## 2022-06-01 ENCOUNTER — PATIENT OUTREACH (OUTPATIENT)
Dept: ONCOLOGY | Facility: CLINIC | Age: 82
End: 2022-06-01

## 2022-06-01 ENCOUNTER — OFFICE VISIT (OUTPATIENT)
Dept: SURGERY | Facility: CLINIC | Age: 82
End: 2022-06-01
Attending: FAMILY MEDICINE
Payer: COMMERCIAL

## 2022-06-01 DIAGNOSIS — C50.911 INVASIVE LOBULAR CARCINOMA OF BREAST, STAGE 2, RIGHT (H): Primary | ICD-10-CM

## 2022-06-01 PROCEDURE — G0463 HOSPITAL OUTPT CLINIC VISIT: HCPCS

## 2022-06-01 PROCEDURE — 99024 POSTOP FOLLOW-UP VISIT: CPT | Performed by: SURGERY

## 2022-06-01 NOTE — NURSING NOTE
Kristy presents to [facility] for a post op appointment with Dr. Mitchell .  She isaccompanied by herself for appointment.  She states she is doing well, minimal pain.  She has good ROM, reviewed ROM exercises with her.  Patient met with Dr. Mitchell .  See dictation for details of visit.  She will plan to be referred onto Medical Oncology and Radiation Oncology and will plan to follow up with Dr. Mitchell  in one year with bilateral mammograms.  Invited calls sooner if she has any questions.  RN time 12 mins.

## 2022-06-01 NOTE — PROGRESS NOTES
History:  Fang Estes is s/p right wire localized lumpectomy with sentinel lymph node biopsy on May 24.  She is doing well.  Did not need to take any medication for discomfort.  She has been spending time out in her yard and staying active.  She did develop some ecchymosis, and has some discomfort in the upper inner arm.    Physical exam:  BREAST: Right lower outer quadrant incision is healing well with overlying glue.  The entire outer breast is a bit swollen and there is ecchymosis that spreads toward the sternum.  Right axillary incision is healing well with overlying glue.  No underlying fluctuance to suggest seroma.    Pathology:  MICROSCOPIC AND DIAGNOSIS:   A) RIGHT BREAST, ORIENTED LUMPECTOMY:   1) INVASIVE LOBULAR CARCINOMA             a) Grade: [place] grade II (of III)             b) Size:  30 x 14 x 13 mm (measured and calculated in slides)             c) Margins: Uninvolved, but very close to, at 0.8 mm from the nearest anterior margin, and 2 mm from medial and posterior margins        2) DUCTAL CARCINOMA IN SITU: Not identified        3) ADDITIONAL FINDINGS:             Extensive scattered atypical lobular hyperplasia and multifocal in situ lobular carcinoma             Invasive lobular carcinoma with perineural and perivascular involvement.  Proliferative fibrocystic changes with duct ectasia and cyst formation,             apocrine metaplasia, columnar cell hyperplasia, and focal usual ductal hyperplasia        4) Previous biopsy site present, with cavity formation and organizing changes     B) RIGHT SENTINEL LYMPH NODE, BIOPSY:   - ONE LYMPH NODE (1/1) POSITIVE FOR MICROSCOPIC METASTATIC CARCINOMA,   APPROXIMATELY 4 mm IN SLIDE MEASUREMENT; NO EVIDENCE OF EXTRANODAL INVOLVEMENT        - METASTASIS CONFIRMED IN PANCYTOKERATIN IMMUNOSTAIN     PATHOLOGIC STAGE: pT2, (sn)pN1a, pM-Not applicable     ASSESSMENT:  Fang Estes is s/p right lumpectomy for invasive lobular carcinoma    - Grade II, T2,  N1, ER+, RI-, HER2-  --> pathologic prognostic stage IIB    PLAN:  Discussed typical healing norms.  Pathology was discussed.  Referrals were placed for medical and radiation oncology.    - Case will be discussed at breast tumor board next week.  Continue with yearly mammograms.  Return to the breast clinic in 1 year, or earlier as needed.    Nida Mitchell DO  General Surgeon  [facility]  Breast [address] [address]  Office: [phone]  Employed by - [facility]

## 2022-06-01 NOTE — PROGRESS NOTES
New Patient Oncology Nurse Navigator Note     Referring provider: Dr. Nida Mitchell     Referring Clinic/Organization: [facility]     Referred to (specialty:) Medical Oncology and Radiation Oncology      Date Referral Received: June 1, 2022     Evaluation for:  Breast cancer     Clinical History (per Nurse review of records provided):      Patient had a bilateral screening mammogram on 4/15/22 and an asymmetry with architectural distortion in the right breast at the 9 o'clock position, middle depth.  A right breast diagnostic mammogram and ultrasound followed on 4/19 and on mammography, there are benign-appearing rodlike secretory calcifications. In the 9:00 position in the mid depth asymmetry and distortion are again noted. On targeted right breast ultrasound, in the 9:00 position 2 cm from the nipple there was a hypoechoic mass with ill-defined margins and posterior acoustic shadowing measuring 7 x 8 x 7 mm correlating with the mammographic abnormality and highly suspicious for malignancy. The right axilla was examined as well with no evidence of lymphadenopathy.     4/20/22 -   BREAST, RIGHT, 9:00, 2 CM FROM NIPPLE, ULTRASOUND-GUIDED CORE BIOPSIES OF MASS:  1. INVASIVE DUCTAL CARCINOMA:  a. GRADE: STEPHANY GRADE I (of III)  1. ESTROGEN RECEPTOR: STRONGLY POSITIVE (GREATER THAN 95%; 3+)  2. PROGESTERONE RECEPTOR: NEGATIVE (0%)  HER2 by FISH = Negative    5/24/22 -   A) RIGHT BREAST, ORIENTED LUMPECTOMY:        1) INVASIVE LOBULAR CARCINOMA             a) Grade: [place] grade II (of III)             b) Size:  30 x 14 x 13 mm (measured and calculated in slides)             c) Margins: Uninvolved, but very close to, at 0.8 mm from the nearest anterior margin, and 2 mm from medial and posterior margins        2) DUCTAL CARCINOMA IN SITU: Not identified        3) ADDITIONAL FINDINGS:             Extensive scattered atypical lobular hyperplasia and multifocal in situ lobular carcinoma              Invasive lobular carcinoma with perineural and perivascular involvement. Proliferative fibrocystic changes with duct ectasia and cyst formation, apocrine metaplasia, columnar cell hyperplasia, and focal usual ductal hyperplasia        4) Previous biopsy site present, with cavity formation and organizing changes   B) RIGHT SENTINEL LYMPH NODE, BIOPSY:   - ONE LYMPH NODE (1/1) POSITIVE FOR MICROSCOPIC METASTATIC CARCINOMA, APPROXIMATELY 4 mm IN SLIDE MEASUREMENT; NO EVIDENCE OF EXTRANODAL INVOLVEMENT        - METASTASIS CONFIRMED IN PANCYTOKERATIN IMMUNOSTAIN   PATHOLOGIC STAGE: pT2, (sn)pN1a, pM-Not applicable      Records Location: See Bookmarked material     Writer received referral, reviewed for appropriate plan, and sent to New Patient Scheduling for completion.

## 2022-06-06 ENCOUNTER — DOCUMENTATION ONLY (OUTPATIENT)
Dept: SURGERY | Facility: CLINIC | Age: 82
End: 2022-06-06
Payer: COMMERCIAL

## 2022-06-06 NOTE — PROGRESS NOTES
RECORDS STATUS - BREAST    Action    Action Taken 6/6/2022 9:47M MACKENZIE     I pulled HE Images into PACS     RECORDS REQUESTED FROM:EPIC   DATE REQUESTED: 6/8/2022   NOTES DETAILS STATUS   OFFICE NOTE from referring provider Complete [facility]  referred by Nida Mitchell DO   OFFICE NOTE from surgeon Complete See Breast Biopsy in [facility]   OPERATIVE REPORT Complete See Breast Biopsy in [facility]   MEDICATION LIST Complete [facility]   CLINICAL TRIAL TREATMENTS TO DATE     LABS     REQUEST BLOCKS FOR ALL BREAST CANCER PTS     PATHOLOGY REPORTS  (Tissue diagnosis, Stage, ER/NC percentage positive and intensity of staining, HER2 IHC, FISH, and all biopsies from breast and any distant metastasis)                 Complete 5/24/2022   A) RIGHT BREAST, ORIENTED LUMPECTOMY:   1) INVASIVE LOBULAR CARCINOMA             a) Grade: [place] grade II (of III)             b) Size:  30 x 14 x 13 mm (measured and calculated in slides)   c) Margins: Uninvolved, but very close to, at 0.8 mm from the nearest   anterior margin, and 2 mm from medial and posterior margins        2) DUCTAL CARCINOMA IN SITU: Not identified        3) ADDITIONAL FINDINGS:             Extensive scattered atypical lobular hyperplasia and   multifocal in situ lobular carcinoma             Invasive lobular carcinoma with perineural and perivascular   involvement   Proliferative fibrocystic changes with duct ectasia and cyst formation,   apocrine metaplasia, columnar cell hyperplasia, and focal usual ductal   hyperplasia        4) Previous biopsy site present, with cavity formation and   organizing changes     B) RIGHT SENTINEL LYMPH NODE, BIOPSY:   - ONE LYMPH NODE (1/1) POSITIVE FOR MICROSCOPIC METASTATIC CARCINOMA,   APPROXIMATELY 4 mm IN SLIDE MEASUREMENT; NO EVIDENCE OF EXTRANODAL   INVOLVEMENT        - METASTASIS CONFIRMED IN PANCYTOKERATIN IMMUNOSTAIN    GENONOMIC TESTING     TYPE:   (Next Generation Sequencing, including Foundation One testing, and Oncotype score)      IMAGING (NEED IMAGES & REPORT)     CT SCANS     MRI     MAMMO Complete 5/24/2022 more in PACS   ULTRASOUND Complete US Breast 4/20/2022 more in PACS   PET     BONE SCAN     BRAIN MRI

## 2022-06-08 ENCOUNTER — PRE VISIT (OUTPATIENT)
Dept: ONCOLOGY | Facility: HOSPITAL | Age: 82
End: 2022-06-08

## 2022-06-08 ENCOUNTER — ONCOLOGY VISIT (OUTPATIENT)
Dept: ONCOLOGY | Facility: HOSPITAL | Age: 82
End: 2022-06-08
Attending: SURGERY
Payer: COMMERCIAL

## 2022-06-08 VITALS
OXYGEN SATURATION: 100 % | BODY MASS INDEX: 22.32 KG/M2 | WEIGHT: 126 LBS | RESPIRATION RATE: 16 BRPM | SYSTOLIC BLOOD PRESSURE: 131 MMHG | TEMPERATURE: 97.7 F | HEART RATE: 67 BPM | DIASTOLIC BLOOD PRESSURE: 87 MMHG

## 2022-06-08 DIAGNOSIS — C50.911 INVASIVE LOBULAR CARCINOMA OF BREAST, STAGE 2, RIGHT (H): ICD-10-CM

## 2022-06-08 PROCEDURE — G0463 HOSPITAL OUTPT CLINIC VISIT: HCPCS

## 2022-06-08 PROCEDURE — 99205 OFFICE O/P NEW HI 60 MIN: CPT | Performed by: INTERNAL MEDICINE

## 2022-06-08 ASSESSMENT — PAIN SCALES - GENERAL: PAINLEVEL: NO PAIN (0)

## 2022-06-08 NOTE — PROGRESS NOTES
MEDICAL RECORDS REQUEST   Radiation Oncology  [address] [facility] [place] [address]  PHONE: [phone]  Fax: [phone]        FUTURE VISIT INFORMATION                                                   Fang Estes, : 1940 scheduled for future visit at [facility] Radiation Oncology    APPOINTMENT INFORMATION:    Date: 6/10/2022    Provider:  Dr. Duron     Reason for Visit/Diagnosis: Invasive lobular carcinoma of breast, stage 2, right     REFERRAL INFORMATION:    Referring provider:  Ref by Dr. Mitchell    Specialty: [facility] GENERAL SURGERY BREAST    Referring providers clinic:      Clinic contact number:      RECORDS REQUESTED FOR VISIT                                                     SOFT TISSUE    C, MRI, PET/CT AND REPORTS yes     Breast images are in PACS   ANY RECENT LABS yes   SURGICAL REPORTS yes     2022   A) RIGHT BREAST, ORIENTED LUMPECTOMY:   1) INVASIVE LOBULAR CARCINOMA             a) Grade: [place] grade II (of III)             b) Size:  30 x 14 x 13 mm (measured and calculated in slides)   c) Margins: Uninvolved, but very close to, at 0.8 mm from the nearest   anterior margin, and 2 mm from medial and posterior margins        2) DUCTAL CARCINOMA IN SITU: Not identified        3) ADDITIONAL FINDINGS:             Extensive scattered atypical lobular hyperplasia and   multifocal in situ lobular carcinoma             Invasive lobular carcinoma with perineural and perivascular   involvement   Proliferative fibrocystic changes with duct ectasia and cyst formation,   apocrine metaplasia, columnar cell hyperplasia, and focal usual ductal   hyperplasia        4) Previous biopsy site present, with cavity formation and   organizing changes     B) RIGHT SENTINEL LYMPH NODE, BIOPSY:   - ONE LYMPH NODE () POSITIVE FOR MICROSCOPIC METASTATIC CARCINOMA,   APPROXIMATELY 4 mm IN SLIDE MEASUREMENT; NO EVIDENCE OF EXTRANODAL   INVOLVEMENT        - METASTASIS CONFIRMED IN PANCYTOKERATIN  IMMUNOSTAIN    PATHOLOGY REPORTS yes   CURRENT MEDICATION LIST yes   *Send all radiation Prior radiation records for both [facility] and [facility] Radiation Oncology patients to [facility] with  ATTN: DANI/Jose Dosi/Physics     PRE-VISIT CHECKLIST      Record collection complete Yes   Appointment appropriately scheduled           (right time/right provider)

## 2022-06-08 NOTE — PROGRESS NOTES
"Oncology Rooming Note    June 8, 2022 9:01 AM   Fang Estes is a 81 year old female who presents for:    Chief Complaint   Patient presents with     Oncology Clinic Visit     Initial Vitals: BP (!) 159/90   Pulse 67   Temp 97.7  F (36.5  C) (Oral)   Resp 16   Wt 57.2 kg (126 lb)   SpO2 100%   BMI 22.32 kg/m   Estimated body mass index is 22.32 kg/m  as calculated from the following:    Height as of 5/24/22: 1.6 m (5' 3\").    Weight as of this encounter: 57.2 kg (126 lb). Body surface area is 1.59 meters squared.  No Pain (0) Comment: Data Unavailable   No LMP recorded. Patient is postmenopausal.  Allergies reviewed: Yes  Medications reviewed: Yes    Medications: Medication refills not needed today.  Pharm[facility] entered into [id number] [address] [place] AT [facility] [address]    Clinical concerns: Fang is here for treatment options after surgery for a right breast cancer.       Sury Rosa RN            "

## 2022-06-08 NOTE — LETTER
"    6/8/2022         RE: Fang Estes  [address]  [place] [address]        Dear Colleague,    Thank you for referring your patient, Fang Estes, to the [facility] [place]. Please see a copy of my visit note below.    Oncology Rooming Note    June 8, 2022 9:01 AM   Fang Estes is a 81 year old female who presents for:    Chief Complaint   Patient presents with     Oncology Clinic Visit     Initial Vitals: BP (!) 159/90   Pulse 67   Temp 97.7  F (36.5  C) (Oral)   Resp 16   Wt 57.2 kg (126 lb)   SpO2 100%   BMI 22.32 kg/m   Estimated body mass index is 22.32 kg/m  as calculated from the following:    Height as of 5/24/22: 1.6 m (5' 3\").    Weight as of this encounter: 57.2 kg (126 lb). Body surface area is 1.59 meters squared.  No Pain (0) Comment: Data Unavailable   No LMP recorded. Patient is postmenopausal.  Allergies reviewed: Yes  Medications reviewed: Yes    Medications: Medication refills not needed today.  Pharmacy name entered into [employer]: [id number] - [place], [place] - [address] AT [facility] [address]    Clinical concerns: Fang is here for treatment options after surgery for a right breast cancer.       Sury Rosa RN              [facility] Hematology and Oncology Consult Note    Patient: Fang Estes  MRN: [id number]  Date of Service: 06/08/2022      Reason for Visit    Chief Complaint   Patient presents with     Oncology Clinic Visit         Assessment/Plan    Problem List Items Addressed This Visit        Other    Invasive lobular carcinoma of breast, stage 2, right (H)        Early-stage breast cancer  I reviewed the mammogram and ultrasound reports along with needle biopsy and surgical path report in detail with her today.  She has been getting yearly screening mammograms which showed no evidence of major abnormalities although she had heterogeneously dense breasts and small masses could have been obscured by.  " Recent mammogram done in April showed asymmetry in the right breast.  Confirmed by ultrasound.  Needle biopsy showed invasive ductal carcinoma, grade 1, ER positive, TN negative and HER2 negative by FISH.  Axillary staging was negative for any lymph nodes.  Underwent lumpectomy and surgical path showing invasive lobular carcinoma, grade 2, ER positive, TN negative and HER2 negative.  [place] lymph node biopsy was positive for macrometastasis (4 mm focus).  Pathologic prognostic stage IIb Oncotype DX has been sent and we are waiting for the results.      In this situation I reviewed adjuvant therapy with her in detail.  Whether she will benefit from adjuvant chemotherapy or not will depend upon Oncotype DX scoring.  Her advanced age is an issue but she is in a robust health.  There is data to suggest that adjuvant chemotherapy in older women does improve overall survival especially if they are able to handle chemotherapy.  She will definitely need endocrine therapy going forward.  She will also need adjuvant radiation and she is meeting with radiation oncology later this week.      I briefly discussed the potential side effects and complications associated with chemotherapy if she were to need it.  Fortunately she does not have major medical issues.  Potentially she could be a candidate for anthracycline-based chemotherapy if she has really high Oncotype DX score.  I will see her again in a couple weeks time to discuss treatment going forward.  If she were to have high Oncotype DX scoring then we will discuss the benefits and risks of recurrence with the chemotherapy versus not doing chemotherapy.  She is agreeable to the plan.    ECOG Performance    0 - Independent    Problem List    Patient Active Problem List   Diagnosis     Invasive ductal carcinoma of breast, right (H)     Invasive lobular carcinoma of breast, stage 2, right (H)      ______________________________________________________________________________    Staging History    Cancer Staging  Invasive lobular carcinoma of breast, stage 2, right (H)  Staging form: Breast, AJCC 8th Edition  - Pathologic: Stage IIB (pT2, pN1a(sn), cM0, G2, ER+, OK-, HER2-) - Signed by Reji Suresh MD on 6/8/2022        History of presenting illness:  Fang Estes is an 81-year-old female with past medical history significant for hypertension and uncomplicated asthma with a new diagnosis of early-stage breast cancer status postlumpectomy is here to discuss further management of same.    Routine screening mammogram done in April showed some asymmetry compared to her prior mammograms.  She has been known to have heterogeneously dense breasts.  Following this she underwent bilateral diagnostic mammogram which showed right breast mass at 9 o'clock position 2 cm from the nipple with ill-defined margins highly suspicious for malignancy.  It was measured at 7 x 8 x 7 mm by ultrasound.  She underwent needle biopsy of this which came back positive for invasive ductal carcinoma strongly ER positive.  OK negative and HER2 by FISH negative.  Axillary ultrasound showed no evidence of lymphadenopathy.  She underwent lumpectomy with sentinel node biopsy on 5/24/2022.  Surgical path has come back showing invasive lobular carcinoma measuring 3 x 1.4 x 1.3 cm, [place] grade 2, with perivascular and perineural involvement.  Lymph node was positive for 4 mm focus of metastatic carcinoma.  Final pathologic staging was pT2 pN1a.  Clinically no evidence of metastatic disease.  Oncotype DX is still pending.  She is here to discuss further management of the same.  She does have family history of breast cancer in her daughter.  She was tested for germline mutations and was found to be negative.  She herself is in a robust health.  She is physically very active.  Rides bikes and runs regularly.  For for hypertension and  uncomplicated asthma no other past medical history.  No diabetes or cardiovascular disease history.    Never smoker.  Drinks alcohol very occasionally.      Past History    Past Medical History:   Diagnosis Date     Hypertension      Uncomplicated asthma     Family History   Problem Relation Age of Onset     Breast Cancer Daughter 45.00      Past Surgical History:   Procedure Laterality Date     APPENDECTOMY       BREAST EXCISIONAL BIOPSY Left 01/01/1980     LUMPECTOMY BREAST Right 5/24/2022    Procedure: WIRE LOCALIZED LUMPECTOMY WITH SENTINEL LYMPH NODE BIOPSY;  Surgeon: Nida Mitchell DO;  Location: [facility]    Social History     Socioeconomic History     Marital status:      Spouse name: Not on file     Number of children: Not on file     Years of education: Not on file     Highest education level: Not on file   Occupational History     Not on file   Tobacco Use     Smoking status: Never Smoker     Smokeless tobacco: Never Used   Substance and Sexual Activity     Alcohol use: Not on file     Drug use: Never     Sexual activity: Not on file   Other Topics Concern     Not on file   Social History Narrative     Not on file     Social Determinants of Health     Financial Resource Strain: Not on file   Food Insecurity: Not on file   Transportation Needs: Not on file   Physical Activity: Not on file   Stress: Not on file   Social Connections: Not on file   Intimate Partner Violence: Not on file   Housing Stability: Not on file        Allergies    Allergies   Allergen Reactions     Ace Inhibitors Cough     Losartan Other (See Comments)     Shortness of breath, worsening cough(only very mild with lisinopril, much worse with this)     Other (Do Not Use) Other (See Comments)     Cats, horses, dust.     Amoxicillin Rash       Review of Systems    Pertinent items are noted in HPI.      Physical Exam    Oncology Vitals 6/8/2022   Height -   Weight -   BSA (m2) -   /87   Pulse -   Temp -   Temp src -    SpO2 -   Pain Score -   Some recent data might be hidden       General: Alert, cooperative, in no distress  HEENT: Atraumatic and normocephalic  Lungs: Clear to auscultation bilaterally  CVS: Regular rate and rhythm, S1-S2 normal, no murmurs  Abdomen: Soft, nontender, no organomegaly  Lymphatic system: Questionable lymphadenopathy in the left axilla probably secondary to recent COVID-19 vaccination, no other adenopathy elsewhere  Skin: No rashes or bruises  Breast: Complete examination was not done today  Neuro: Alert, oriented x3    Lab Results    No results found for this or any previous visit (from the past 168 hour(s)).    Imaging Results    MA Breast Specimen Right    Result Date: 5/24/2022  INDICATION: Pre-operative localization of invasive ductal carcinoma at 9 o'clock, 2 cm from the nipple. PROCEDURE: Informed consent was obtained from the patient. The breast was cleansed with ChloraPrep. Lidocaine was used for local anesthesia. A -gauge needle was then advanced to the area of abnormality. A localization wire was then deployed.  558uCi 99m Tc Lymphoseekwas injected subdermally along the upper outer aspect of the areolar margin. Post-procedure mammograms demonstrate the localization wire in appropriate position. The previously placed marker was visualized. The patient tolerated this well. IMPRESSION: Sonographically guided  wire localization. A specimen was sent for radiography. Specimen radiograph demonstrates the area of abnormality and the localization wire to be included in the specimen.     MA Post Procedure Right    Result Date: 5/24/2022  INDICATION: Pre-operative localization of invasive ductal carcinoma at 9 o'clock, 2 cm from the nipple. PROCEDURE: Informed consent was obtained from the patient. The breast was cleansed with ChloraPrep. Lidocaine was used for local anesthesia. A -gauge needle was then advanced to the area of abnormality. A localization wire was then deployed.  558uCi 99m Tc  Lymphoseekwas injected subdermally along the upper outer aspect of the areolar margin. Post-procedure mammograms demonstrate the localization wire in appropriate position. The previously placed marker was visualized. The patient tolerated this well. IMPRESSION: Sonographically guided  wire localization. A specimen was sent for radiography. Specimen radiograph demonstrates the area of abnormality and the localization wire to be included in the specimen.     Image Guided Breast Loc w [place] Node Inj Right    Result Date: 5/24/2022  INDICATION: Pre-operative localization of invasive ductal carcinoma at 9 o'clock, 2 cm from the nipple. PROCEDURE: Informed consent was obtained from the patient. The breast was cleansed with ChloraPrep. Lidocaine was used for local anesthesia. A -gauge needle was then advanced to the area of abnormality. A localization wire was then deployed.  558uCi 99m Tc Lymphoseekwas injected subdermally along the upper outer aspect of the areolar margin. Post-procedure mammograms demonstrate the localization wire in appropriate position. The previously placed marker was visualized. The patient tolerated this well. IMPRESSION: Sonographically guided  wire localization. A specimen was sent for radiography. Specimen radiograph demonstrates the area of abnormality and the localization wire to be included in the specimen.       A total of 60 minutes was spent today on this visit including face to face conversation with the patient, EMR review (labs, imaging studies, pathology reports and outside records), counseling and care co-ordination and documentation.    Signed by: Reji Suresh MD        Again, thank you for allowing me to participate in the care of your patient.        Sincerely,        Reji Suresh MD

## 2022-06-08 NOTE — PROGRESS NOTES
[facility] Hematology and Oncology Consult Note    Patient: Fang Estes  MRN: [id number]  Date of Service: 06/08/2022      Reason for Visit    Chief Complaint   Patient presents with     Oncology Clinic Visit         Assessment/Plan    Problem List Items Addressed This Visit        Other    Invasive lobular carcinoma of breast, stage 2, right (H)        Early-stage breast cancer  I reviewed the mammogram and ultrasound reports along with needle biopsy and surgical path report in detail with her today.  She has been getting yearly screening mammograms which showed no evidence of major abnormalities although she had heterogeneously dense breasts and small masses could have been obscured by.  Recent mammogram done in April showed asymmetry in the right breast.  Confirmed by ultrasound.  Needle biopsy showed invasive ductal carcinoma, grade 1, ER positive, WA negative and HER2 negative by FISH.  Axillary staging was negative for any lymph nodes.  Underwent lumpectomy and surgical path showing invasive lobular carcinoma, grade 2, ER positive, WA negative and HER2 negative.  [place] lymph node biopsy was positive for macrometastasis (4 mm focus).  Pathologic prognostic stage IIb Oncotype DX has been sent and we are waiting for the results.      In this situation I reviewed adjuvant therapy with her in detail.  Whether she will benefit from adjuvant chemotherapy or not will depend upon Oncotype DX scoring.  Her advanced age is an issue but she is in a robust health.  There is data to suggest that adjuvant chemotherapy in older women does improve overall survival especially if they are able to handle chemotherapy.  She will definitely need endocrine therapy going forward.  She will also need adjuvant radiation and she is meeting with radiation oncology later this week.      I briefly discussed the potential side effects and complications associated with chemotherapy if she were to need it.  Fortunately she does  not have major medical issues.  Potentially she could be a candidate for anthracycline-based chemotherapy if she has really high Oncotype DX score.  I will see her again in a couple weeks time to discuss treatment going forward.  If she were to have high Oncotype DX scoring then we will discuss the benefits and risks of recurrence with the chemotherapy versus not doing chemotherapy.  She is agreeable to the plan.    ECOG Performance    0 - Independent    Problem List    Patient Active Problem List   Diagnosis     Invasive ductal carcinoma of breast, right (H)     Invasive lobular carcinoma of breast, stage 2, right (H)     ______________________________________________________________________________    Staging History    Cancer Staging  Invasive lobular carcinoma of breast, stage 2, right (H)  Staging form: Breast, AJCC 8th Edition  - Pathologic: Stage IIB (pT2, pN1a(sn), cM0, G2, ER+, OH-, HER2-) - Signed by Reji Suresh MD on 6/8/2022        History of presenting illness:  Fang Estes is an 81-year-old female with past medical history significant for hypertension and uncomplicated asthma with a new diagnosis of early-stage breast cancer status postlumpectomy is here to discuss further management of same.    Routine screening mammogram done in April showed some asymmetry compared to her prior mammograms.  She has been known to have heterogeneously dense breasts.  Following this she underwent bilateral diagnostic mammogram which showed right breast mass at 9 o'clock position 2 cm from the nipple with ill-defined margins highly suspicious for malignancy.  It was measured at 7 x 8 x 7 mm by ultrasound.  She underwent needle biopsy of this which came back positive for invasive ductal carcinoma strongly ER positive.  OH negative and HER2 by FISH negative.  Axillary ultrasound showed no evidence of lymphadenopathy.  She underwent lumpectomy with sentinel node biopsy on 5/24/2022.  Surgical path has come back  showing invasive lobular carcinoma measuring 3 x 1.4 x 1.3 cm, [place] grade 2, with perivascular and perineural involvement.  Lymph node was positive for 4 mm focus of metastatic carcinoma.  Final pathologic staging was pT2 pN1a.  Clinically no evidence of metastatic disease.  Oncotype DX is still pending.  She is here to discuss further management of the same.  She does have family history of breast cancer in her daughter.  She was tested for germline mutations and was found to be negative.  She herself is in a robust health.  She is physically very active.  Rides bikes and runs regularly.  For for hypertension and uncomplicated asthma no other past medical history.  No diabetes or cardiovascular disease history.    Never smoker.  Drinks alcohol very occasionally.      Past History    Past Medical History:   Diagnosis Date     Hypertension      Uncomplicated asthma     Family History   Problem Relation Age of Onset     Breast Cancer Daughter 45.00      Past Surgical History:   Procedure Laterality Date     APPENDECTOMY       BREAST EXCISIONAL BIOPSY Left 01/01/1980     LUMPECTOMY BREAST Right 5/24/2022    Procedure: WIRE LOCALIZED LUMPECTOMY WITH SENTINEL LYMPH NODE BIOPSY;  Surgeon: Nida Mitchell DO;  Location: [facility]    Social History     Socioeconomic History     Marital status:      Spouse name: Not on file     Number of children: Not on file     Years of education: Not on file     Highest education level: Not on file   Occupational History     Not on file   Tobacco Use     Smoking status: Never Smoker     Smokeless tobacco: Never Used   Substance and Sexual Activity     Alcohol use: Not on file     Drug use: Never     Sexual activity: Not on file   Other Topics Concern     Not on file   Social History Narrative     Not on file     Social Determinants of Health     Financial Resource Strain: Not on file   Food Insecurity: Not on file   Transportation Needs: Not on file   Physical  Activity: Not on file   Stress: Not on file   Social Connections: Not on file   Intimate Partner Violence: Not on file   Housing Stability: Not on file        Allergies    Allergies   Allergen Reactions     Ace Inhibitors Cough     Losartan Other (See Comments)     Shortness of breath, worsening cough(only very mild with lisinopril, much worse with this)     Other (Do Not Use) Other (See Comments)     Cats, horses, dust.     Amoxicillin Rash       Review of Systems    Pertinent items are noted in HPI.      Physical Exam    Oncology Vitals 6/8/2022   Height -   Weight -   BSA (m2) -   /87   Pulse -   Temp -   Temp src -   SpO2 -   Pain Score -   Some recent data might be hidden       General: Alert, cooperative, in no distress  HEENT: Atraumatic and normocephalic  Lungs: Clear to auscultation bilaterally  CVS: Regular rate and rhythm, S1-S2 normal, no murmurs  Abdomen: Soft, nontender, no organomegaly  Lymphatic system: Questionable lymphadenopathy in the left axilla probably secondary to recent COVID-19 vaccination, no other adenopathy elsewhere  Skin: No rashes or bruises  Breast: Complete examination was not done today  Neuro: Alert, oriented x3    Lab Results    No results found for this or any previous visit (from the past 168 hour(s)).    Imaging Results    MA Breast Specimen Right    Result Date: 5/24/2022  INDICATION: Pre-operative localization of invasive ductal carcinoma at 9 o'clock, 2 cm from the nipple. PROCEDURE: Informed consent was obtained from the patient. The breast was cleansed with ChloraPrep. Lidocaine was used for local anesthesia. A -gauge needle was then advanced to the area of abnormality. A localization wire was then deployed.  558uCi 99m Tc Lymphoseekwas injected subdermally along the upper outer aspect of the areolar margin. Post-procedure mammograms demonstrate the localization wire in appropriate position. The previously placed marker was visualized. The patient tolerated this  well. IMPRESSION: Sonographically guided  wire localization. A specimen was sent for radiography. Specimen radiograph demonstrates the area of abnormality and the localization wire to be included in the specimen.     MA Post Procedure Right    Result Date: 5/24/2022  INDICATION: Pre-operative localization of invasive ductal carcinoma at 9 o'clock, 2 cm from the nipple. PROCEDURE: Informed consent was obtained from the patient. The breast was cleansed with ChloraPrep. Lidocaine was used for local anesthesia. A -gauge needle was then advanced to the area of abnormality. A localization wire was then deployed.  558uCi 99m Tc Lymphoseekwas injected subdermally along the upper outer aspect of the areolar margin. Post-procedure mammograms demonstrate the localization wire in appropriate position. The previously placed marker was visualized. The patient tolerated this well. IMPRESSION: Sonographically guided  wire localization. A specimen was sent for radiography. Specimen radiograph demonstrates the area of abnormality and the localization wire to be included in the specimen.     Image Guided Breast Loc w [place] Node Inj Right    Result Date: 5/24/2022  INDICATION: Pre-operative localization of invasive ductal carcinoma at 9 o'clock, 2 cm from the nipple. PROCEDURE: Informed consent was obtained from the patient. The breast was cleansed with ChloraPrep. Lidocaine was used for local anesthesia. A -gauge needle was then advanced to the area of abnormality. A localization wire was then deployed.  558uCi 99m Tc Lymphoseekwas injected subdermally along the upper outer aspect of the areolar margin. Post-procedure mammograms demonstrate the localization wire in appropriate position. The previously placed marker was visualized. The patient tolerated this well. IMPRESSION: Sonographically guided  wire localization. A specimen was sent for radiography. Specimen radiograph demonstrates the area of abnormality and the localization  wire to be included in the specimen.       A total of 60 minutes was spent today on this visit including face to face conversation with the patient, EMR review (labs, imaging studies, pathology reports and outside records), counseling and care co-ordination and documentation.    Signed by: Reji Suresh MD

## 2022-06-10 ENCOUNTER — OFFICE VISIT (OUTPATIENT)
Dept: RADIATION ONCOLOGY | Facility: HOSPITAL | Age: 82
End: 2022-06-10
Attending: RADIOLOGY
Payer: COMMERCIAL

## 2022-06-10 ENCOUNTER — PRE VISIT (OUTPATIENT)
Dept: RADIATION ONCOLOGY | Facility: HOSPITAL | Age: 82
End: 2022-06-10

## 2022-06-10 VITALS
HEART RATE: 72 BPM | SYSTOLIC BLOOD PRESSURE: 140 MMHG | OXYGEN SATURATION: 99 % | TEMPERATURE: 97.9 F | RESPIRATION RATE: 16 BRPM | DIASTOLIC BLOOD PRESSURE: 88 MMHG

## 2022-06-10 DIAGNOSIS — C50.411 MALIGNANT NEOPLASM OF UPPER-OUTER QUADRANT OF RIGHT BREAST IN FEMALE, ESTROGEN RECEPTOR POSITIVE (H): Primary | ICD-10-CM

## 2022-06-10 DIAGNOSIS — Z17.0 MALIGNANT NEOPLASM OF UPPER-OUTER QUADRANT OF RIGHT BREAST IN FEMALE, ESTROGEN RECEPTOR POSITIVE (H): Primary | ICD-10-CM

## 2022-06-10 DIAGNOSIS — C50.911 INVASIVE LOBULAR CARCINOMA OF BREAST, STAGE 2, RIGHT (H): ICD-10-CM

## 2022-06-10 PROCEDURE — G0463 HOSPITAL OUTPT CLINIC VISIT: HCPCS

## 2022-06-10 PROCEDURE — 77263 THER RADIOLOGY TX PLNG CPLX: CPT | Performed by: RADIOLOGY

## 2022-06-10 PROCEDURE — 99205 OFFICE O/P NEW HI 60 MIN: CPT | Mod: 25 | Performed by: RADIOLOGY

## 2022-06-10 ASSESSMENT — PAIN SCALES - GENERAL: PAINLEVEL: NO PAIN (0)

## 2022-06-10 NOTE — PROGRESS NOTES
"Oncology Rooming Note    Thuy 10, 2022 9:09 AM   Fang Estes is a 81 year old female who presents for:    Chief Complaint   Patient presents with     Oncology Clinic Visit     Breast Cancer     Rad Onc consultation     Initial Vitals: BP (!) 140/88   Pulse 72   Temp 97.9  F (36.6  C)   Resp 16   SpO2 99%  Estimated body mass index is 22.32 kg/m  as calculated from the following:    Height as of 5/24/22: 1.6 m (5' 3\").    Weight as of 6/8/22: 57.2 kg (126 lb). There is no height or weight on file to calculate BSA.  No Pain (0) Comment: Data Unavailable   No LMP recorded. Patient is postmenopausal.  Allergies reviewed: No  Medications reviewed: No, done two days ago in [place], pt states no changes.     Medications: Medication refills not needed today.  Pharmacy name entered into [facility]: [id number] - [address] AT [facility] [address]    Clinical concerns: None, feeling well, good ROM, no prior RT, no ICD, post-menopausal.     Radiation Therapy Patient Education    Person involved with teaching: Patient    Patient educational needs for self management of treatment-related side effects assessment completed.  [facility] Patient Ed tab contains Patient Learning Assessment    Education Materials Given  Radiation Therapy and You, Understanding Radiation Therapy, Radiation Therapy Team, Radiation Therapy Treatment, Radiation Therapy: Managing Short-Term Side Effects, Skin Care During Radiation Therapy, Radiation Therapy: Your Daily Life, Radiation Therapy to the Breast Guidelines, Welcome Letter and Insurance PA Information    Educational Topics Discussed  Side effects expected, Skin care and Activity    Response To Teaching  Verbalizes understanding    GYN Only  Vaginal Dilator-given and educated: N/A    Referrals sent: None    Chemotherapy?  Unknown, awaiting Oncotype score. Dr. Suresh is [place].           Cris Lindsey RN              "

## 2022-06-10 NOTE — LETTER
6/10/2022         RE: Fang Estes  [address]  [facility] [address]        Dear Colleague,    Thank you for referring your patient, Fang Estes, to the [facility] RADIATION ONCOLOGY [place]. Please see a copy of my visit note below.          [facility] Radiation Oncology Consult Note      Patient: Fang Estes  MRN: [id number]  Date of Service: 06/10/2022    Assessment:       ICD-10-CM    1. 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 ER/LA pos, HER-2 negatve by FISH. Excellent health, Oncotype Dx pending.     1. This is a pleasant 81 year old female, with excellent performance status for age, who was diagnosed with invasive lobular carcinoma. Final pathology measured 30mm, ER/LA pos, with 1/1 SLN pos for micrometastasis measuring 4mm. She was discussed at breast tumor board.Oncotype Dx has been ordered. Whether or not she receives chemotherapy, she will require radiation therapy. Recommending 5240 cGy over 20 fractions to right breast.    2.  Three common misconceptions of radiation were addressed:              1) there is no transfer of heat, so no burns in traditional sense. Skin erythema from irritation.              2) there is no radioactivity at anytime during or after completion of each treatment.Treatment is just energy passing through target.               3) generally radiation does not cause immunosuppression.     3. Common side effects to expect are fatigue and skin reaction which typically occur 1-2 weeks after start of radiation therapy.  Symptoms typically worsen throughout treatment, peak 1-2 weeks after completion of radiation, and then resolve over the next several weeks. We  briefly discussed skin cares but will expand on this further in clinic. The patient voiced understanding of the information discussed and wishes to proceed forward with radiation therapy. We will await her Oncotype Dx to begin radiation therapy.      4. Discussed ROM exercises to minimize RUE injury.     Intent of Therapy: Curative  Patient on concurrent Herceptin No  Adjuvant hormonal therapy: Yes Agent: TBD  Start: Following radiation  Chemotherapy: TBD   Intended fractionation schedule:  Hypofractionation with boost    Breast cancer risk factors:       We recommend adjuvant radiation as part of her breast conserving therapy to decrease her chance of local recurrence to the single digits. ROM stretches and skin care were discussed with the patient. She understands that these maneuvers need to continue for 6 months following completion of radiation as skin and muscle fibrosis continue to form for weeks to months following completion of therapy.      Side effects that may occur during or within weeks after radiation therapy      Fatigue and general weakness    Darkening, irritation, itchiness, redness, dryness, erythema, peeling, scabbing, ulceration and contraction of the skin of the breast and chest    Swelling of the breast    Loss of armpit hair    Lung irritation    Decrease in appetite    Side effects that may occur months or years after radiation therapy      Development of another tumor or cancer    Thickening, telangiectasias (development of spider like blood vessels in the skin) and ulceration of the skin of the breast and chest    Firming, fibrosis (scar tissue), fat necrosis, and distortion of the breast    Poor healing after a trauma or surgery in the irradiated area    Nerve damage resulting in loss of arm strength and sensation    Lung inflammation of fibrosis causing cough, fever and shortness of breath    Fracture of the ribs    Swellingof an arm and hand    The risks, benefits and  alternatives to radiation therapy were outlined with the patient. All questions were answered and a consent was signed.      Subjective:   This patient was referred to myself by Dr. Mitchell for consideration of radiation therapy.    HPI:  Fang Estes is a 81 year old female with invasive lobular carcinoma of the right breast.     The patient had a screening mammogram performed on 4/15/2022 which showed an asymmetry with architectural distotion in the right breast at 9:00. Further evaluation with right breast diagnoatic mammogram + US on 4/19/2022 confirmed a suspicious right breast mass. A needle core biopsy was performed on 4/20/2022, pathology returned with ILC grade II, ER/DE pos, HER-2 negative by FISH.     She had a right breast lumpectomy performed on 5/24/2022, final pathology showed ILC grade II, measuring 30 x 14 x 13 mm, negative margins >0.8mm anteriorly, no DCIS, perineural and perivascular involvement, 1/1 SLN positive for micrometastatic carcinoma measuring 4mm.     The patient presents today to discuss radiation therapy. She has no complaints and is feeling well.     Past Medical History:   Diagnosis Date     Hypertension      Uncomplicated asthma      Past Surgical History:   Procedure Laterality Date     APPENDECTOMY       BREAST EXCISIONAL BIOPSY Left 01/01/1980     LUMPECTOMY BREAST Right 5/24/2022    Procedure: WIRE LOCALIZED LUMPECTOMY WITH SENTINEL LYMPH NODE BIOPSY;  Surgeon: Nida Mitchell DO;  Location: [facility] OR     Current Outpatient Medications   Medication     albuterol (PROAIR HFA/PROVENTIL HFA/VENTOLIN HFA) 108 (90 Base) MCG/ACT inhaler     alendronate (FOSAMAX) 70 MG tablet     lisinopril (ZESTRIL) 5 MG tablet     montelukast (SINGULAIR) 10 MG tablet     vitamin D3 (CHOLECALCIFEROL) 10 MCG (400 UNIT) capsule     No current facility-administered medications for this visit.     Ace inhibitors, Losartan, Other (do not use), and Amoxicillin  Social History     Socioeconomic  History     Marital status:      Spouse name: Not on file     Number of children: Not on file     Years of education: Not on file     Highest education level: Not on file   Occupational History     Not on file   Tobacco Use     Smoking status: Never Smoker     Smokeless tobacco: Never Used   Substance and Sexual Activity     Alcohol use: Not on file     Drug use: Never     Sexual activity: Not on file   Other Topics Concern     Not on file   Social History Narrative     Not on file     Social Determinants of Health     Financial Resource Strain: Not on file   Food Insecurity: Not on file   Transportation Needs: Not on file   Physical Activity: Not on file   Stress: Not on file   Social Connections: Not on file   Intimate Partner Violence: Not on file   Housing Stability: Not on file     , retired. Never smoker, no alcohol consumption.     Family History   Problem Relation Age of Onset     Breast Cancer Daughter 45.00     Daughter with history of breast cancer.     ROS:  Reviewed with Fang hernandez.      General  Constitutional  Constitutional (WDL): All constitutional elements are within defined limits  EENT  Eye Disorders  Eye Disorder (WDL): All eye disorder elements are within defined limits  Ear Disorders  Ear Disorder (WDL): All ear disorder elements are within defined limits  Respiratory    Respiratory  Respiratory (WDL): All respiratory elements are within defined limits  Cardiovascular  Cardiovascular  Cardiovascular (WDL): All cardiovascular elements are within defined limits  Gastrointestinal  Gastrointestinal  Gastrointestinal (WDL): All gastrointestinal elements are within defined limits  Musculoskeletal  Musculoskeletal and Connective Tissue Disorders  Musculoskeletal & Connective (WDL): All musculoskeletal & connective elements are within defined limits  Integumentary              Integumentary  Integumentary (WDL): All integumentary elements are within defined  limits  Neurological  Neurosensory  Neurosensory (WDL): All neurosensory elements are within defined limits  Genitourinary/Reproductive  Genitourinary  Genitourinary (WDL): All genitourinary elements are within defined limits  Lymphatic   Lymph System Disorders  Lymph (WDL): All lymph elements are within defined limits  Patient Coping  Patient Coping: Accepting;Open/discussion  Pain   Pain Score: No Pain (0)   AUA Assessment                                                                         Accompanied by  Accompanied By: self only        Objective:        Exam:    Vitals:    06/10/22 0907   BP: (!) 140/88   Pulse: 72   Resp: 16   Temp: 97.9  F (36.6  C)   SpO2: 99%   PainSc: No Pain (0)       GENERAL: No acute distress. Cooperative in conversation. Mask on.   Breasts:  expected post operative changes, no masses skin changes suggestive of recurrence or infection.   RESP: Normal respiratory effort.   ABD: Soft, nontender.  NEURO: Non focal. Alert and oriented x3.   PSYCH: Within normal limits. No depression or anxiety.  SKIN: Warm dry intact.       Recent Labs: No results found for this or any previous visit (from the past 168 hour(s)).    Imaging: Imaging results 30 days: MA Breast Specimen Right    Result Date: 5/24/2022  INDICATION: Pre-operative localization of invasive ductal carcinoma at 9 o'clock, 2 cm from the nipple. PROCEDURE: Informed consent was obtained from the patient. The breast was cleansed with ChloraPrep. Lidocaine was used for local anesthesia. A -gauge needle was then advanced to the area of abnormality. A localization wire was then deployed.  558uCi 99m Tc Lymphoseekwas injected subdermally along the upper outer aspect of the areolar margin. Post-procedure mammograms demonstrate the localization wire in appropriate position. The previously placed marker was visualized. The patient tolerated this well. IMPRESSION: Sonographically guided  wire localization. A specimen was sent for  radiography. Specimen radiograph demonstrates the area of abnormality and the localization wire to be included in the specimen.     MA Post Procedure Right    Result Date: 5/24/2022  INDICATION: Pre-operative localization of invasive ductal carcinoma at 9 o'clock, 2 cm from the nipple. PROCEDURE: Informed consent was obtained from the patient. The breast was cleansed with ChloraPrep. Lidocaine was used for local anesthesia. A -gauge needle was then advanced to the area of abnormality. A localization wire was then deployed.  558uCi 99m Tc Lymphoseekwas injected subdermally along the upper outer aspect of the areolar margin. Post-procedure mammograms demonstrate the localization wire in appropriate position. The previously placed marker was visualized. The patient tolerated this well. IMPRESSION: Sonographically guided  wire localization. A specimen was sent for radiography. Specimen radiograph demonstrates the area of abnormality and the localization wire to be included in the specimen.     Image Guided Breast Loc w [place] Node Inj Right    Result Date: 5/24/2022  INDICATION: Pre-operative localization of invasive ductal carcinoma at 9 o'clock, 2 cm from the nipple. PROCEDURE: Informed consent was obtained from the patient. The breast was cleansed with ChloraPrep. Lidocaine was used for local anesthesia. A -gauge needle was then advanced to the area of abnormality. A localization wire was then deployed.  558uCi 99m Tc Lymphoseekwas injected subdermally along the upper outer aspect of the areolar margin. Post-procedure mammograms demonstrate the localization wire in appropriate position. The previously placed marker was visualized. The patient tolerated this well. IMPRESSION: Sonographically guided  wire localization. A specimen was sent for radiography. Specimen radiograph demonstrates the area of abnormality and the localization wire to be included in the specimen.       Pathology:   No results found for this or  any previous visit (from the past 8760 hour(s)).  Pathology Results        Malignant - Lumpectomy, Right - 5/24/2022      Pathology Code Malignancy Type    Invasive lobular carcinoma Invasive    Atypical lobular hyperplasia     Lobular carcinoma in situ (LCIS)     Axillary josie with metastatic carcinoma Other          Additional Information            Concordance: Not Entered Primary Tumor: T2      Regional Lymph Nodes: N1    Stage: 2B      Histology Grade: G2 Margin Status: Uninvolved      Posterior Margin: 2 mm    Nodes Biopsied: [place] Margin: 10 mm    Removed: 1 Inferior Margin: 10 mm    Positive: 1 Medial Margin: 2 mm    Extracapsular Extension: Absent Lateral margin: 3 mm      Closest Margin: Anterior    Invasive: 30 mm Closest Margin Distance: .8 mm          Had Neoadjuvant Chemotherapy: No     Completely Submitted for Microscopic Analysis: Yes     External: No              Malignant - Ultrasound Guided Biopsy, Right - 4/20/2022      Pathology Code Malignancy Type    Invasive ductal carcinoma Invasive    Fibrocystic change           Additional Information            Concordance: Concordant Estrogen: Positive      Progesterone: Negative    Histology Grade: G1     HER2/lois FISH: Negative           External: No                   60 minutes spent on the date of the encounter doing chart review, review of outside records, review of test results, interpretation of tests, patient visit, documentation, discussion with other providers. .      I, Deisy Duron MD personally performed the services described in this documentation, as scribed by Vasyl Vazquez in my presence, and it is both accurate and complete.    Signed by: Deisy Duron MD, MPH           Oncology Rooming Note    Thuy 10, 2022 9:09 AM   Fang Estes is a 81 year old female who presents for:    Chief Complaint   Patient presents with     Oncology Clinic Visit     Breast Cancer     Rad Onc consultation     Initial Vitals: BP  "(!) 140/88   Pulse 72   Temp 97.9  F (36.6  C)   Resp 16   SpO2 99%  Estimated body mass index is 22.32 kg/m  as calculated from the following:    Height as of 5/24/22: 1.6 m (5' 3\").    Weight as of 6/8/22: 57.2 kg (126 lb). There is no height or weight on file to calculate BSA.  No Pain (0) Comment: Data Unavailable   No LMP recorded. Patient is postmenopausal.  Allergies reviewed: No  Medications reviewed: No, done two days ago in [place], pt states no changes.     Medications: Medication refills not needed today.  Pharmacy name entered into [facility]: [id number] - [place] [address] [address] [facility] [address]    Clinical concerns: None, feeling well, good ROM, no prior RT, no ICD, post-menopausal.     Radiation Therapy Patient Education    Person involved with teaching: Patient    Patient educational needs for self management of treatment-related side effects assessment completed.  [facility] Patient Ed tab contains Patient Learning Assessment    Education Materials Given  Radiation Therapy and You, Understanding Radiation Therapy, Radiation Therapy Team, Radiation Therapy Treatment, Radiation Therapy: Managing Short-Term Side Effects, Skin Care During Radiation Therapy, Radiation Therapy: Your Daily Life, Radiation Therapy to the Breast Guidelines, Welcome Letter and Insurance PA Information    Educational Topics Discussed  Side effects expected, Skin care and Activity    Response To Teaching  Verbalizes understanding    GYN Only  Vaginal Dilator-given and educated: N/A    Referrals sent: None    Chemotherapy?  Unknown, awaiting Oncotype score. Dr. Suresh is [place].           Cris Lindsey RN                  Again, thank you for allowing me to participate in the care of your patient.        Sincerely,        Deisy Duron MD    "

## 2022-06-10 NOTE — PROGRESS NOTES
[facility] Radiation Oncology Consult Note      Patient: Fang Estes  MRN: [id number]  Date of Service: 06/10/2022    Assessment:   (C50.411,  Z17.0) Malignant neoplasm of upper-outer quadrant of right breast in female, estrogen receptor positive (H)  (primary encounter diagnosis)           Impression/Plan:   ILC grade II, measuring 30 x 14 x 13 mm, negative margins >0.8mm anteriorly, no DCIS, perineural and perivascular involvement, 1/1 SLN positive for micrometastatic carcinoma measuring 4mm. ER/OR pos, HER-2 negatve by FISH. Excellent health, Oncotype Dx pending.     1. This is a pleasant 81 year old female, with excellent performance status for age, who was diagnosed with invasive lobular carcinoma. Final pathology measured 30mm, ER/OR pos, with 1/1 SLN pos for micrometastasis measuring 4mm. She was discussed at breast tumor board.Oncotype Dx has been ordered. Whether or not she receives chemotherapy, she will require radiation therapy. Recommending 5240 cGy over 20 fractions to right breast.    2.  Three common misconceptions of radiation were addressed:              1) there is no transfer of heat, so no burns in traditional sense. Skin erythema from irritation.              2) there is no radioactivity at anytime during or after completion of each treatment.Treatment is just energy passing through target.               3) generally radiation does not cause immunosuppression.     3. Common side effects to expect are fatigue and skin reaction which typically occur 1-2 weeks after start of radiation therapy.  Symptoms typically worsen throughout treatment, peak 1-2 weeks after completion of radiation, and then resolve over the next several weeks. We briefly discussed skin cares but will expand on this further in clinic. The patient voiced understanding of the information discussed and wishes to proceed forward with radiation therapy. We will await her Oncotype Dx to begin radiation therapy.       4. Discussed ROM exercises to minimize RUE injury.     Intent of Therapy: Curative  Patient on concurrent Herceptin No  Adjuvant hormonal therapy: Yes Agent: TBD  Start: Following radiation  Chemotherapy: TBD   Intended fractionation schedule:  Hypofractionation with boost    Breast cancer risk factors:       We recommend adjuvant radiation as part of her breast conserving therapy to decrease her chance of local recurrence to the single digits. ROM stretches and skin care were discussed with the patient. She understands that these maneuvers need to continue for 6 months following completion of radiation as skin and muscle fibrosis continue to form for weeks to months following completion of therapy.      Side effects that may occur during or within weeks after radiation therapy      Fatigue and general weakness    Darkening, irritation, itchiness, redness, dryness, erythema, peeling, scabbing, ulceration and contraction of the skin of the breast and chest    Swelling of the breast    Loss of armpit hair    Lung irritation    Decrease in appetite    Side effects that may occur months or years after radiation therapy      Development of another tumor or cancer    Thickening, telangiectasias (development of spider like blood vessels in the skin) and ulceration of the skin of the breast and chest    Firming, fibrosis (scar tissue), fat necrosis, and distortion of the breast    Poor healing after a trauma or surgery in the irradiated area    Nerve damage resulting in loss of arm strength and sensation    Lung inflammation of fibrosis causing cough, fever and shortness of breath    Fracture of the ribs    Swellingof an arm and hand    The risks, benefits and alternatives to radiation therapy were outlined with the patient. All questions were answered and a consent was signed.      Subjective:   This patient was referred to myself by Dr. Mitchell for consideration of radiation therapy.    HPI:  Fang Estes is a 81  year old female with invasive lobular carcinoma of the right breast.     The patient had a screening mammogram performed on 4/15/2022 which showed an asymmetry with architectural distotion in the right breast at 9:00. Further evaluation with right breast diagnoatic mammogram + US on 4/19/2022 confirmed a suspicious right breast mass. A needle core biopsy was performed on 4/20/2022, pathology returned with ILC grade II, ER/SD pos, HER-2 negative by FISH.     She had a right breast lumpectomy performed on 5/24/2022, final pathology showed ILC grade II, measuring 30 x 14 x 13 mm, negative margins >0.8mm anteriorly, no DCIS, perineural and perivascular involvement, 1/1 SLN positive for micrometastatic carcinoma measuring 4mm.     The patient presents today to discuss radiation therapy. She has no complaints and is feeling well.     Past Medical History:   Diagnosis Date     Hypertension      Uncomplicated asthma      Past Surgical History:   Procedure Laterality Date     APPENDECTOMY       BREAST EXCISIONAL BIOPSY Left 01/01/1980     LUMPECTOMY BREAST Right 5/24/2022    Procedure: WIRE LOCALIZED LUMPECTOMY WITH SENTINEL LYMPH NODE BIOPSY;  Surgeon: Nida Mitchell DO;  Location: [facility] OR     Current Outpatient Medications   Medication     albuterol (PROAIR HFA/PROVENTIL HFA/VENTOLIN HFA) 108 (90 Base) MCG/ACT inhaler     alendronate (FOSAMAX) 70 MG tablet     lisinopril (ZESTRIL) 5 MG tablet     montelukast (SINGULAIR) 10 MG tablet     vitamin D3 (CHOLECALCIFEROL) 10 MCG (400 UNIT) capsule     No current facility-administered medications for this visit.     Ace inhibitors, Losartan, Other (do not use), and Amoxicillin  Social History     Socioeconomic History     Marital status:      Spouse name: Not on file     Number of children: Not on file     Years of education: Not on file     Highest education level: Not on file   Occupational History     Not on file   Tobacco Use     Smoking status: Never Smoker      Smokeless tobacco: Never Used   Substance and Sexual Activity     Alcohol use: Not on file     Drug use: Never     Sexual activity: Not on file   Other Topics Concern     Not on file   Social History Narrative     Not on file     Social Determinants of Health     Financial Resource Strain: Not on file   Food Insecurity: Not on file   Transportation Needs: Not on file   Physical Activity: Not on file   Stress: Not on file   Social Connections: Not on file   Intimate Partner Violence: Not on file   Housing Stability: Not on file     , retired. Never smoker, no alcohol consumption.     Family History   Problem Relation Age of Onset     Breast Cancer Daughter 45.00     Daughter with history of breast cancer.     ROS:  Reviewed with Fang hernandez.      General  Constitutional  Constitutional (WDL): All constitutional elements are within defined limits  EENT  Eye Disorders  Eye Disorder (WDL): All eye disorder elements are within defined limits  Ear Disorders  Ear Disorder (WDL): All ear disorder elements are within defined limits  Respiratory    Respiratory  Respiratory (WDL): All respiratory elements are within defined limits  Cardiovascular  Cardiovascular  Cardiovascular (WDL): All cardiovascular elements are within defined limits  Gastrointestinal  Gastrointestinal  Gastrointestinal (WDL): All gastrointestinal elements are within defined limits  Musculoskeletal  Musculoskeletal and Connective Tissue Disorders  Musculoskeletal & Connective (WDL): All musculoskeletal & connective elements are within defined limits  Integumentary              Integumentary  Integumentary (WDL): All integumentary elements are within defined limits  Neurological  Neurosensory  Neurosensory (WDL): All neurosensory elements are within defined limits  Genitourinary/Reproductive  Genitourinary  Genitourinary (WDL): All genitourinary elements are within defined limits  Lymphatic   Lymph System Disorders  Lymph (WDL): All lymph  elements are within defined limits  Patient Coping  Patient Coping: Accepting;Open/discussion  Pain   Pain Score: No Pain (0)   AUA Assessment                                                                         Accompanied by  Accompanied By: self only        Objective:        Exam:    Vitals:    06/10/22 0907   BP: (!) 140/88   Pulse: 72   Resp: 16   Temp: 97.9  F (36.6  C)   SpO2: 99%   PainSc: No Pain (0)       GENERAL: No acute distress. Cooperative in conversation. Mask on.   Breasts:  expected post operative changes, no masses skin changes suggestive of recurrence or infection.   RESP: Normal respiratory effort.   ABD: Soft, nontender.  NEURO: Non focal. Alert and oriented x3.   PSYCH: Within normal limits. No depression or anxiety.  SKIN: Warm dry intact.       Recent Labs: No results found for this or any previous visit (from the past 168 hour(s)).    Imaging: Imaging results 30 days: MA Breast Specimen Right    Result Date: 5/24/2022  INDICATION: Pre-operative localization of invasive ductal carcinoma at 9 o'clock, 2 cm from the nipple. PROCEDURE: Informed consent was obtained from the patient. The breast was cleansed with ChloraPrep. Lidocaine was used for local anesthesia. A -gauge needle was then advanced to the area of abnormality. A localization wire was then deployed.  558uCi 99m Tc Lymphoseekwas injected subdermally along the upper outer aspect of the areolar margin. Post-procedure mammograms demonstrate the localization wire in appropriate position. The previously placed marker was visualized. The patient tolerated this well. IMPRESSION: Sonographically guided  wire localization. A specimen was sent for radiography. Specimen radiograph demonstrates the area of abnormality and the localization wire to be included in the specimen.     MA Post Procedure Right    Result Date: 5/24/2022  INDICATION: Pre-operative localization of invasive ductal carcinoma at 9 o'clock, 2 cm from the nipple. PROCEDURE:  Informed consent was obtained from the patient. The breast was cleansed with ChloraPrep. Lidocaine was used for local anesthesia. A -gauge needle was then advanced to the area of abnormality. A localization wire was then deployed.  558uCi 99m Tc Lymphoseekwas injected subdermally along the upper outer aspect of the areolar margin. Post-procedure mammograms demonstrate the localization wire in appropriate position. The previously placed marker was visualized. The patient tolerated this well. IMPRESSION: Sonographically guided  wire localization. A specimen was sent for radiography. Specimen radiograph demonstrates the area of abnormality and the localization wire to be included in the specimen.     Image Guided Breast Loc w [place] Node Inj Right    Result Date: 5/24/2022  INDICATION: Pre-operative localization of invasive ductal carcinoma at 9 o'clock, 2 cm from the nipple. PROCEDURE: Informed consent was obtained from the patient. The breast was cleansed with ChloraPrep. Lidocaine was used for local anesthesia. A -gauge needle was then advanced to the area of abnormality. A localization wire was then deployed.  558uCi 99m Tc Lymphoseekwas injected subdermally along the upper outer aspect of the areolar margin. Post-procedure mammograms demonstrate the localization wire in appropriate position. The previously placed marker was visualized. The patient tolerated this well. IMPRESSION: Sonographically guided  wire localization. A specimen was sent for radiography. Specimen radiograph demonstrates the area of abnormality and the localization wire to be included in the specimen.       Pathology:   No results found for this or any previous visit (from the past 8760 hour(s)).  Pathology Results        Malignant - Lumpectomy, Right - 5/24/2022      Pathology Code Malignancy Type    Invasive lobular carcinoma Invasive    Atypical lobular hyperplasia     Lobular carcinoma in situ (LCIS)     Axillary josie with metastatic  carcinoma Other          Additional Information            Concordance: Not Entered Primary Tumor: T2      Regional Lymph Nodes: N1    Stage: 2B      Histology Grade: G2 Margin Status: Uninvolved      Posterior Margin: 2 mm    Nodes Biopsied: [place] Margin: 10 mm    Removed: 1 Inferior Margin: 10 mm    Positive: 1 Medial Margin: 2 mm    Extracapsular Extension: Absent Lateral margin: 3 mm      Closest Margin: Anterior    Invasive: 30 mm Closest Margin Distance: .8 mm          Had Neoadjuvant Chemotherapy: No     Completely Submitted for Microscopic Analysis: Yes     External: No              Malignant - Ultrasound Guided Biopsy, Right - 4/20/2022      Pathology Code Malignancy Type    Invasive ductal carcinoma Invasive    Fibrocystic change           Additional Information            Concordance: Concordant Estrogen: Positive      Progesterone: Negative    Histology Grade: G1     HER2/lois FISH: Negative           External: No                   60 minutes spent on the date of the encounter doing chart review, review of outside records, review of test results, interpretation of tests, patient visit, documentation, discussion with other providers. .      I, Deisy Duron MD personally performed the services described in this documentation, as scribed by Vasyl Vazquez in my presence, and it is both accurate and complete.    Signed by: Deisy Duron MD, MPH

## 2022-06-14 ENCOUNTER — TRANSFERRED RECORDS (OUTPATIENT)
Dept: HEALTH INFORMATION MANAGEMENT | Facility: CLINIC | Age: 82
End: 2022-06-14
Payer: COMMERCIAL

## 2022-06-22 ENCOUNTER — ONCOLOGY VISIT (OUTPATIENT)
Dept: ONCOLOGY | Facility: HOSPITAL | Age: 82
End: 2022-06-22
Attending: INTERNAL MEDICINE
Payer: COMMERCIAL

## 2022-06-22 VITALS
DIASTOLIC BLOOD PRESSURE: 76 MMHG | SYSTOLIC BLOOD PRESSURE: 159 MMHG | TEMPERATURE: 98.3 F | WEIGHT: 124.7 LBS | BODY MASS INDEX: 22.09 KG/M2 | OXYGEN SATURATION: 96 % | RESPIRATION RATE: 16 BRPM | HEART RATE: 70 BPM

## 2022-06-22 DIAGNOSIS — M81.8 OSTEOPOROSIS DUE TO AROMATASE INHIBITOR: Primary | ICD-10-CM

## 2022-06-22 DIAGNOSIS — C50.911 INVASIVE DUCTAL CARCINOMA OF BREAST, RIGHT (H): ICD-10-CM

## 2022-06-22 DIAGNOSIS — T38.6X5A OSTEOPOROSIS DUE TO AROMATASE INHIBITOR: Primary | ICD-10-CM

## 2022-06-22 PROCEDURE — 99214 OFFICE O/P EST MOD 30 MIN: CPT | Performed by: INTERNAL MEDICINE

## 2022-06-22 PROCEDURE — G0463 HOSPITAL OUTPT CLINIC VISIT: HCPCS

## 2022-06-22 RX ORDER — ANASTROZOLE 1 MG/1
1 TABLET ORAL DAILY
Qty: 90 TABLET | Refills: 3 | Status: SHIPPED | OUTPATIENT
Start: 2022-06-22 | End: 2023-04-28

## 2022-06-22 ASSESSMENT — PAIN SCALES - GENERAL: PAINLEVEL: NO PAIN (0)

## 2022-06-22 NOTE — LETTER
6/22/2022         RE: Fang Estes  [address]  [place] [address]        Dear Colleague,    Thank you for referring your patient, Fang Estes, to the [facility] [place]. Please see a copy of my visit note below.    [facility] Hematology and Oncology Progress Note    Patient: Fang Estes  MRN: [id number]  Date of Service: 06/22/2022        Reason for Visit    Chief Complaint   Patient presents with     Oncology Clinic Visit     Invasive lobular carcinoma of breast, stage 2, right (H)         Problem List Items Addressed This Visit        Other    Invasive ductal carcinoma of breast, right (H)    Relevant Medications    anastrozole (ARIMIDEX) 1 MG tablet      Other Visit Diagnoses     Osteoporosis due to aromatase inhibitor    -  Primary    Relevant Orders    DX Hip/Pelvis/Spine            Assessment and Plan    Invasive lobular carcinoma, hormone positive, HER2 negative  Status post lumpectomy.  Surgical path showing a T2, N1 disease.  Oncotype DX came back showing a score of 22.  I reviewed the report with her in detail today.  I reviewed the benefit of endocrine therapy with and without systemic chemotherapy.  With recurrence score of 22 the absolute benefit, in terms of overall survival or breast cancer risk reduction, from adding chemotherapy to endocrine therapy is minimal.  Current NCCN guidelines recommend adjuvant endocrine therapy only for patients with T1-3 and N1 (1-3 lymph nodes ) disease, age above 50 and recurrence score less than 26.  I reviewed the rationale behind this.  She will get adjuvant radiation therapy in the near future.  We will start her on aromatase inhibitor after finishing radiation therapy.  I reviewed different options including anastrozole, letrozole and other AI's.  Explained to her that medically there is no major difference between different AI's in terms of efficacy but patients may have different side effects.  My plan is to  start her on Arimidex 1 mg daily.  I think you have eligible for extended therapy beyond 5 years due to node positive status.  I again reviewed the rationale behind this too.  She is agreeable with the plan.  She will meet with radiation oncology today after the visit.  I will order a DEXA scan before AI.  I asked her to continue vitamin D and calcium supplementation.  I will see her in the early part of the August for follow-up.    Cancer Staging  Invasive lobular carcinoma of breast, stage 2, right (H)  Staging form: Breast, AJCC 8th Edition  - Pathologic: Stage IIB (pT2, pN1a(sn), cM0, G2, ER+, TN-, HER2-) - Signed by Reji Suresh MD on 6/8/2022      ECOG Performance    0 - Independent         Problem List    Patient Active Problem List   Diagnosis     Invasive ductal carcinoma of breast, right (H)     Invasive lobular carcinoma of breast, stage 2, right (H)        Oncology history      Interval History   Fang Estes is a 81 year old female with recent diagnosis of invasive lobular carcinoma status post lumpectomy and sentinel lymph node biopsy who is here to discuss further management of the same.    Previously I saw her with the above diagnosis.  She had 1 lymph node positive for metastasis.  Surgical path came back showing lobular invasive carcinoma which was strongly ER +,  TN negative.  HER2 negative by FISH.  I recommended getting Oncotype DX testing to see whether she will benefit from adjuvant chemotherapy or not.  She is here to discuss results and make further follow-up plans.  Overall doing well without any major issues.  She has healed well from surgery.  She also seen radiation oncology.        Review of Systems  A comprehensive review of systems was negative except for what is noted in the interval history    Current Outpatient Medications   Medication     albuterol (PROAIR HFA/PROVENTIL HFA/VENTOLIN HFA) 108 (90 Base) MCG/ACT inhaler     alendronate (FOSAMAX) 70 MG tablet     anastrozole  (ARIMIDEX) 1 MG tablet     lisinopril (ZESTRIL) 5 MG tablet     montelukast (SINGULAIR) 10 MG tablet     vitamin D3 (CHOLECALCIFEROL) 10 MCG (400 UNIT) capsule     No current facility-administered medications for this visit.        Physical Exam    No flowsheet data found.    General: alert and cooperative  HEENT: Head: Normal, normocephalic, atraumatic.  Eye: Normal external eye, conjunctiva, lids cornea, JAH.  CNS: Alert and oriented x3      Lab Results    No results found for this or any previous visit (from the past 168 hour(s)).    Imaging    MA Breast Specimen Right    Result Date: 5/24/2022  INDICATION: Pre-operative localization of invasive ductal carcinoma at 9 o'clock, 2 cm from the nipple. PROCEDURE: Informed consent was obtained from the patient. The breast was cleansed with ChloraPrep. Lidocaine was used for local anesthesia. A -gauge needle was then advanced to the area of abnormality. A localization wire was then deployed.  558uCi 99m Tc Lymphoseekwas injected subdermally along the upper outer aspect of the areolar margin. Post-procedure mammograms demonstrate the localization wire in appropriate position. The previously placed marker was visualized. The patient tolerated this well. IMPRESSION: Sonographically guided  wire localization. A specimen was sent for radiography. Specimen radiograph demonstrates the area of abnormality and the localization wire to be included in the specimen.     MA Post Procedure Right    Result Date: 5/24/2022  INDICATION: Pre-operative localization of invasive ductal carcinoma at 9 o'clock, 2 cm from the nipple. PROCEDURE: Informed consent was obtained from the patient. The breast was cleansed with ChloraPrep. Lidocaine was used for local anesthesia. A -gauge needle was then advanced to the area of abnormality. A localization wire was then deployed.  558uCi 99m Tc Lymphoseekwas injected subdermally along the upper outer aspect of the areolar margin. Post-procedure  mammograms demonstrate the localization wire in appropriate position. The previously placed marker was visualized. The patient tolerated this well. IMPRESSION: Sonographically guided  wire localization. A specimen was sent for radiography. Specimen radiograph demonstrates the area of abnormality and the localization wire to be included in the specimen.     Image Guided Breast Loc w Sarah Node Inj Right    Result Date: 5/24/2022  INDICATION: Pre-operative localization of invasive ductal carcinoma at 9 o'clock, 2 cm from the nipple. PROCEDURE: Informed consent was obtained from the patient. The breast was cleansed with ChloraPrep. Lidocaine was used for local anesthesia. A -gauge needle was then advanced to the area of abnormality. A localization wire was then deployed.  558uCi 99m Tc Lymphoseekwas injected subdermally along the upper outer aspect of the areolar margin. Post-procedure mammograms demonstrate the localization wire in appropriate position. The previously placed marker was visualized. The patient tolerated this well. IMPRESSION: Sonographically guided  wire localization. A specimen was sent for radiography. Specimen radiograph demonstrates the area of abnormality and the localization wire to be included in the specimen.     A total of 30 min were spent today on this visit which included face to face conversation with the patient, EMR review, counseling and co-ordination of care and medical documentation.      Signed by: Reji Suresh MD      Oncology Rooming Note    June 22, 2022 10:03 AM   Fang Estes is a 81 year old female who presents for:    Chief Complaint   Patient presents with     Oncology Clinic Visit     Invasive lobular carcinoma of breast, stage 2, right (H)     Initial Vitals: BP (!) 159/76   Pulse 70   Temp 98.3  F (36.8  C)   Resp 16   Wt 56.6 kg (124 lb 11.2 oz)   SpO2 96%   BMI 22.09 kg/m   Estimated body mass index is 22.09 kg/m  as calculated from the following:     "Height as of 5/24/22: 1.6 m (5' 3\").    Weight as of this encounter: 56.6 kg (124 lb 11.2 oz). Body surface area is 1.59 meters squared.  No Pain (0) Comment: Data Unavailable   No LMP recorded. Patient is postmenopausal.  Allergies reviewed: Yes  Medications reviewed: Yes    Medications: Medication refills not needed today.  Pharmacy name entered into [facility]: [id number] [place] [address] AT [facility] [address]    Clinical concerns: None       Jocelyne Moore LPN                Again, thank you for allowing me to participate in the care of your patient.        Sincerely,        Reji Suresh MD    "

## 2022-06-22 NOTE — PROGRESS NOTES
[facility] Hematology and Oncology Progress Note    Patient: Fang Estes  MRN: [id number]  Date of Service: 06/22/2022        Reason for Visit    Chief Complaint   Patient presents with     Oncology Clinic Visit     Invasive lobular carcinoma of breast, stage 2, right (H)         Problem List Items Addressed This Visit        Other    Invasive ductal carcinoma of breast, right (H)    Relevant Medications    anastrozole (ARIMIDEX) 1 MG tablet      Other Visit Diagnoses     Osteoporosis due to aromatase inhibitor    -  Primary    Relevant Orders    DX Hip/Pelvis/Spine            Assessment and Plan    Invasive lobular carcinoma, hormone positive, HER2 negative  Status post lumpectomy.  Surgical path showing a T2, N1 disease.  Oncotype DX came back showing a score of 22.  I reviewed the report with her in detail today.  I reviewed the benefit of endocrine therapy with and without systemic chemotherapy.  With recurrence score of 22 the absolute benefit, in terms of overall survival or breast cancer risk reduction, from adding chemotherapy to endocrine therapy is minimal.  Current NCCN guidelines recommend adjuvant endocrine therapy only for patients with T1-3 and N1 (1-3 lymph nodes ) disease, age above 50 and recurrence score less than 26.  I reviewed the rationale behind this.  She will get adjuvant radiation therapy in the near future.  We will start her on aromatase inhibitor after finishing radiation therapy.  I reviewed different options including anastrozole, letrozole and other AI's.  Explained to her that medically there is no major difference between different AI's in terms of efficacy but patients may have different side effects.  My plan is to start her on Arimidex 1 mg daily.  I think you have eligible for extended therapy beyond 5 years due to node positive status.  I again reviewed the rationale behind this too.  She is agreeable with the plan.  She will meet with radiation oncology today after  the visit.  I will order a DEXA scan before AI.  I asked her to continue vitamin D and calcium supplementation.  I will see her in the early part of the August for follow-up.    Cancer Staging  Invasive lobular carcinoma of breast, stage 2, right (H)  Staging form: Breast, AJCC 8th Edition  - Pathologic: Stage IIB (pT2, pN1a(sn), cM0, G2, ER+, [place]-, HER2-) - Signed by Reji Suresh MD on 6/8/2022      ECOG Performance    0 - Independent         Problem List    Patient Active Problem List   Diagnosis     Invasive ductal carcinoma of breast, right (H)     Invasive lobular carcinoma of breast, stage 2, right (H)        Oncology history      Interval History   Fang Estes is a 81 year old female with recent diagnosis of invasive lobular carcinoma status post lumpectomy and sentinel lymph node biopsy who is here to discuss further management of the same.    Previously I saw her with the above diagnosis.  She had 1 lymph node positive for metastasis.  Surgical path came back showing lobular invasive carcinoma which was strongly ER +,  WV negative.  HER2 negative by FISH.  I recommended getting Oncotype DX testing to see whether she will benefit from adjuvant chemotherapy or not.  She is here to discuss results and make further follow-up plans.  Overall doing well without any major issues.  She has healed well from surgery.  She also seen radiation oncology.        Review of Systems  A comprehensive review of systems was negative except for what is noted in the interval history    Current Outpatient Medications   Medication     albuterol (PROAIR HFA/PROVENTIL HFA/VENTOLIN HFA) 108 (90 Base) MCG/ACT inhaler     alendronate (FOSAMAX) 70 MG tablet     anastrozole (ARIMIDEX) 1 MG tablet     lisinopril (ZESTRIL) 5 MG tablet     montelukast (SINGULAIR) 10 MG tablet     vitamin D3 (CHOLECALCIFEROL) 10 MCG (400 UNIT) capsule     No current facility-administered medications for this visit.        Physical Exam    No  flowsheet data found.    General: alert and cooperative  HEENT: Head: Normal, normocephalic, atraumatic.  Eye: Normal external eye, conjunctiva, lids cornea, JAH.  CNS: Alert and oriented x3      Lab Results    No results found for this or any previous visit (from the past 168 hour(s)).    Imaging    MA Breast Specimen Right    Result Date: 5/24/2022  INDICATION: Pre-operative localization of invasive ductal carcinoma at 9 o'clock, 2 cm from the nipple. PROCEDURE: Informed consent was obtained from the patient. The breast was cleansed with ChloraPrep. Lidocaine was used for local anesthesia. A -gauge needle was then advanced to the area of abnormality. A localization wire was then deployed.  558uCi 99m Tc Lymphoseekwas injected subdermally along the upper outer aspect of the areolar margin. Post-procedure mammograms demonstrate the localization wire in appropriate position. The previously placed marker was visualized. The patient tolerated this well. IMPRESSION: Sonographically guided  wire localization. A specimen was sent for radiography. Specimen radiograph demonstrates the area of abnormality and the localization wire to be included in the specimen.     MA Post Procedure Right    Result Date: 5/24/2022  INDICATION: Pre-operative localization of invasive ductal carcinoma at 9 o'clock, 2 cm from the nipple. PROCEDURE: Informed consent was obtained from the patient. The breast was cleansed with ChloraPrep. Lidocaine was used for local anesthesia. A -gauge needle was then advanced to the area of abnormality. A localization wire was then deployed.  558uCi 99m Tc Lymphoseekwas injected subdermally along the upper outer aspect of the areolar margin. Post-procedure mammograms demonstrate the localization wire in appropriate position. The previously placed marker was visualized. The patient tolerated this well. IMPRESSION: Sonographically guided  wire localization. A specimen was sent for radiography. Specimen  radiograph demonstrates the area of abnormality and the localization wire to be included in the specimen.     Image Guided Breast Loc w [place] Node Inj Right    Result Date: 5/24/2022  INDICATION: Pre-operative localization of invasive ductal carcinoma at 9 o'clock, 2 cm from the nipple. PROCEDURE: Informed consent was obtained from the patient. The breast was cleansed with ChloraPrep. Lidocaine was used for local anesthesia. A -gauge needle was then advanced to the area of abnormality. A localization wire was then deployed.  558uCi 99m Tc Lymphoseekwas injected subdermally along the upper outer aspect of the areolar margin. Post-procedure mammograms demonstrate the localization wire in appropriate position. The previously placed marker was visualized. The patient tolerated this well. IMPRESSION: Sonographically guided  wire localization. A specimen was sent for radiography. Specimen radiograph demonstrates the area of abnormality and the localization wire to be included in the specimen.     A total of 30 min were spent today on this visit which included face to face conversation with the patient, EMR review, counseling and co-ordination of care and medical documentation.      Signed by: Reji Suresh MD

## 2022-06-22 NOTE — PROGRESS NOTES
"Oncology Rooming Note    June 22, 2022 10:03 AM   Fang Estes is a 81 year old female who presents for:    Chief Complaint   Patient presents with     Oncology Clinic Visit     Invasive lobular carcinoma of breast, stage 2, right (H)     Initial Vitals: BP (!) 159/76   Pulse 70   Temp 98.3  F (36.8  C)   Resp 16   Wt 56.6 kg (124 lb 11.2 oz)   SpO2 96%   BMI 22.09 kg/m   Estimated body mass index is 22.09 kg/m  as calculated from the following:    Height as of 5/24/22: 1.6 m (5' 3\").    Weight as of this encounter: 56.6 kg (124 lb 11.2 oz). Body surface area is 1.59 meters squared.  No Pain (0) Comment: Data Unavailable   No LMP recorded. Patient is postmenopausal.  Allergies reviewed: Yes  Medications reviewed: Yes    Medications: Medication refills not needed today.  Pharmacy name entered into Gear[employer]: [id number] - [place] [id number] AT [facility] [address]    Clinical concerns: None       Jocelyne Moore LPN            "

## 2022-06-24 ENCOUNTER — APPOINTMENT (OUTPATIENT)
Dept: RADIATION ONCOLOGY | Facility: HOSPITAL | Age: 82
End: 2022-06-24
Attending: RADIOLOGY
Payer: COMMERCIAL

## 2022-06-24 PROCEDURE — 77290 THER RAD SIMULAJ FIELD CPLX: CPT | Mod: 26 | Performed by: RADIOLOGY

## 2022-06-24 PROCEDURE — 77290 THER RAD SIMULAJ FIELD CPLX: CPT | Performed by: RADIOLOGY

## 2022-06-24 PROCEDURE — 77334 RADIATION TREATMENT AID(S): CPT | Performed by: RADIOLOGY

## 2022-06-24 PROCEDURE — 77334 RADIATION TREATMENT AID(S): CPT | Mod: 26 | Performed by: RADIOLOGY

## 2022-06-29 ENCOUNTER — APPOINTMENT (OUTPATIENT)
Dept: RADIATION ONCOLOGY | Facility: HOSPITAL | Age: 82
End: 2022-06-29
Payer: COMMERCIAL

## 2022-06-29 PROCEDURE — 77300 RADIATION THERAPY DOSE PLAN: CPT | Mod: 26 | Performed by: RADIOLOGY

## 2022-06-29 PROCEDURE — 77334 RADIATION TREATMENT AID(S): CPT | Performed by: RADIOLOGY

## 2022-06-29 PROCEDURE — 77334 RADIATION TREATMENT AID(S): CPT | Mod: 26 | Performed by: RADIOLOGY

## 2022-06-29 PROCEDURE — 77295 3-D RADIOTHERAPY PLAN: CPT | Mod: 26 | Performed by: RADIOLOGY

## 2022-06-29 PROCEDURE — 77295 3-D RADIOTHERAPY PLAN: CPT | Performed by: RADIOLOGY

## 2022-06-29 PROCEDURE — 77300 RADIATION THERAPY DOSE PLAN: CPT | Performed by: RADIOLOGY

## 2022-07-05 ENCOUNTER — APPOINTMENT (OUTPATIENT)
Dept: RADIATION ONCOLOGY | Facility: HOSPITAL | Age: 82
End: 2022-07-05
Attending: RADIOLOGY
Payer: COMMERCIAL

## 2022-07-05 PROCEDURE — 77280 THER RAD SIMULAJ FIELD SMPL: CPT | Performed by: RADIOLOGY

## 2022-07-05 PROCEDURE — 77387 GUIDANCE FOR RADJ TX DLVR: CPT | Performed by: RADIOLOGY

## 2022-07-05 PROCEDURE — 77280 THER RAD SIMULAJ FIELD SMPL: CPT | Mod: 26 | Performed by: RADIOLOGY

## 2022-07-05 PROCEDURE — 77412 RADIATION TX DELIVERY LVL 3: CPT | Performed by: RADIOLOGY

## 2022-07-06 ENCOUNTER — APPOINTMENT (OUTPATIENT)
Dept: RADIATION ONCOLOGY | Facility: HOSPITAL | Age: 82
End: 2022-07-06
Attending: RADIOLOGY
Payer: COMMERCIAL

## 2022-07-06 VITALS
BODY MASS INDEX: 21.84 KG/M2 | HEART RATE: 74 BPM | OXYGEN SATURATION: 97 % | TEMPERATURE: 97.9 F | DIASTOLIC BLOOD PRESSURE: 91 MMHG | RESPIRATION RATE: 16 BRPM | WEIGHT: 123.3 LBS | SYSTOLIC BLOOD PRESSURE: 145 MMHG

## 2022-07-06 DIAGNOSIS — C50.411 MALIGNANT NEOPLASM OF UPPER-OUTER QUADRANT OF RIGHT BREAST IN FEMALE, ESTROGEN RECEPTOR POSITIVE (H): Primary | ICD-10-CM

## 2022-07-06 DIAGNOSIS — Z17.0 MALIGNANT NEOPLASM OF UPPER-OUTER QUADRANT OF RIGHT BREAST IN FEMALE, ESTROGEN RECEPTOR POSITIVE (H): Primary | ICD-10-CM

## 2022-07-06 PROCEDURE — 77387 GUIDANCE FOR RADJ TX DLVR: CPT | Performed by: RADIOLOGY

## 2022-07-06 PROCEDURE — 77412 RADIATION TX DELIVERY LVL 3: CPT | Performed by: RADIOLOGY

## 2022-07-06 ASSESSMENT — PAIN SCALES - GENERAL: PAINLEVEL: NO PAIN (0)

## 2022-07-06 NOTE — PROGRESS NOTES
RADIATION ONCOLOGY WEEKLY TREATMENT VISIT NOTE    Assessment:       ICD-10-CM    1. Malignant neoplasm of upper-outer quadrant of right breast in female, estrogen receptor positive (H)  C50.411     Z17.0         Impression/Plan:   81 year old female with right ILC grade II, measuring 30 x 14 x 13 mm, negative margins >0.8mm anteriorly, no DCIS, perineural and perivascular involvement, 1/1 SLN positive for micrometastatic carcinoma measuring 4mm. ER/MI pos, HER-2 negatve by FISH. Excellent health, Oncotype Dx is 22.     1. Continue radiation therapy as prescribed.     2. Provided with Radiaplex and instructed where to apply on right breast.     3. F/U in 4-6 weeks after completion of radiation therapy due to cording     4. ROM exercises for next 5-6 months.     5. Obvious cording in RUE to level of forearm, so to maintain ROM will provide referral to PT.     Radiation: Site: R breast/axilla  Stereotactic Radiosurgery: No  Concurrent Therapy: No  Today's Dose: 538.5  Total Dose for Breast: 5240  Today's Fraction/Total Fraction Breast:       Subjective:     HPI: Fang Estes is a 81 year old female with Malignant neoplasm of upper-outer quadrant of right breast in female, estrogen receptor positive (H)      The following portions of the patient's history were reviewed and updated as appropriate: allergies, current medications, past family history, past medical history, past social history, past surgical history and problem list.    Assessment   Body Site:  Breast                           Site: R breast/axilla  Stereotactic Radiosurgery: No  Concurrent Therapy: No  Today's Dose: 538.5  Total Dose for Breast: 5240  Today's Fraction/Total Fraction Breast:   Drainage: 0: Absent                    Emotional Alteration    Copin: Effective  Comfort Alteration   KPS: 100 % Normal, no complaints  Fatigue (ONS scale): 0: No Fatigue  Pain Location: denies   Nutrition Alteration   Anorexia: 0: None  Nausea: 0:  None  Vomitin: None  Weight: 55.9 kg (123 lb 4.8 oz)  Skin Alteration   Skin Sensation: 0: No problem  Skin Reaction: 0: None (Radiaplex given with written and verbal instructions)  AUA Assessment                                           Accompanied by       Objective:     Exam:     Vitals:    22 0944   BP: (!) 145/91   Pulse: 74   Resp: 16   Temp: 97.9  F (36.6  C)   SpO2: 97%   Weight: 55.9 kg (123 lb 4.8 oz)   PainSc: No Pain (0)       Wt Readings from Last 8 Encounters:   22 55.9 kg (123 lb 4.8 oz)   22 56.6 kg (124 lb 11.2 oz)   22 57.2 kg (126 lb)   22 54.4 kg (120 lb)   22 57.2 kg (126 lb)       General: Alert and oriented. Sitting comfortably.   Patient non acute appearing, asymptomatic. Mask on. No cough, no respiratory distress.   Fang has no erythema.  Cording present RUE.     Treatment Summary to Date    Aria chart and setup information reviewed    I, Deisy Duron MD personally performed the services described in this documentation, as scribed by Vasyl Vazquez in my presence, and it is both accurate and complete.    Signed by: Deisy Duron MD, MPH

## 2022-07-06 NOTE — LETTER
7/6/2022         RE: Fang Estes  [address]  [place] [address]        Dear Colleague,    Thank you for referring your patient, Fang Estes, to the [facility] RADIATION ONCOLOGY [place]. Please see a copy of my visit note below.         RADIATION ONCOLOGY WEEKLY TREATMENT VISIT NOTE    Assessment:       ICD-10-CM    1. Malignant neoplasm of upper-outer quadrant of right breast in female, estrogen receptor positive (H)  C50.411     Z17.0         Impression/Plan:   81 year old female with right ILC grade II, measuring 30 x 14 x 13 mm, negative margins >0.8mm anteriorly, no DCIS, perineural and perivascular involvement, 1/1 SLN positive for micrometastatic carcinoma measuring 4mm. ER/KS pos, HER-2 negatve by FISH. Excellent health, Oncotype Dx is 22.     1. Continue radiation therapy as prescribed.     2. Provided with Radiaplex and instructed where to apply on right breast.     3. F/U in 4-6 weeks after completion of radiation therapy due to cording     4. ROM exercises for next 5-6 months.     5. Obvious cording in RUE to level of forearm, so to maintain ROM will provide referral to PT.     Radiation: Site: R breast/axilla  Stereotactic Radiosurgery: No  Concurrent Therapy: No  Today's Dose: 538.5  Total Dose for Breast: 5240  Today's Fraction/Total Fraction Breast: 2/20      Subjective:     HPI: Fang Estes is a 81 year old female with Malignant neoplasm of upper-outer quadrant of right breast in female, estrogen receptor positive (H)      The following portions of the patient's history were reviewed and updated as appropriate: allergies, current medications, past family history, past medical history, past social history, past surgical history and problem list.    Assessment   Body Site:  Breast                           Site: R breast/axilla  Stereotactic Radiosurgery: No  Concurrent Therapy: No  Today's Dose: 538.5  Total Dose for Breast: 5240  Today's Fraction/Total Fraction Breast:    Drainage: 0: Absent                    Emotional Alteration    Copin: Effective  Comfort Alteration   KPS: 100 % Normal, no complaints  Fatigue (ONS scale): 0: No Fatigue  Pain Location: denies   Nutrition Alteration   Anorexia: 0: None  Nausea: 0: None  Vomitin: None  Weight: 55.9 kg (123 lb 4.8 oz)  Skin Alteration   Skin Sensation: 0: No problem  Skin Reaction: 0: None (Radiaplex given with written and verbal instructions)  AUA Assessment                                           Accompanied by       Objective:     Exam:     Vitals:    22 0944   BP: (!) 145/91   Pulse: 74   Resp: 16   Temp: 97.9  F (36.6  C)   SpO2: 97%   Weight: 55.9 kg (123 lb 4.8 oz)   PainSc: No Pain (0)       Wt Readings from Last 8 Encounters:   22 55.9 kg (123 lb 4.8 oz)   22 56.6 kg (124 lb 11.2 oz)   22 57.2 kg (126 lb)   22 54.4 kg (120 lb)   22 57.2 kg (126 lb)       General: Alert and oriented. Sitting comfortably.   Patient non acute appearing, asymptomatic. Mask on. No cough, no respiratory distress.   Fang has no erythema.  Cording present RUE.     Treatment Summary to Date    Aria chart and setup information reviewed    IDeisy MD personally performed the services described in this documentation, as scribed by Vasyl Vazquez in my presence, and it is both accurate and complete.    Signed by: Deisy Duron MD, MPH         Again, thank you for allowing me to participate in the care of your patient.        Sincerely,        Deisy Druon MD

## 2022-07-07 ENCOUNTER — APPOINTMENT (OUTPATIENT)
Dept: RADIATION ONCOLOGY | Facility: HOSPITAL | Age: 82
End: 2022-07-07
Attending: RADIOLOGY
Payer: COMMERCIAL

## 2022-07-08 ENCOUNTER — APPOINTMENT (OUTPATIENT)
Dept: RADIATION ONCOLOGY | Facility: HOSPITAL | Age: 82
End: 2022-07-08
Attending: RADIOLOGY
Payer: COMMERCIAL

## 2022-07-08 PROCEDURE — 77387 GUIDANCE FOR RADJ TX DLVR: CPT | Performed by: RADIOLOGY

## 2022-07-08 PROCEDURE — 77412 RADIATION TX DELIVERY LVL 3: CPT | Performed by: RADIOLOGY

## 2022-07-11 ENCOUNTER — APPOINTMENT (OUTPATIENT)
Dept: RADIATION ONCOLOGY | Facility: HOSPITAL | Age: 82
End: 2022-07-11
Attending: RADIOLOGY
Payer: COMMERCIAL

## 2022-07-11 PROCEDURE — 77387 GUIDANCE FOR RADJ TX DLVR: CPT | Performed by: RADIOLOGY

## 2022-07-11 PROCEDURE — 77427 RADIATION TX MANAGEMENT X5: CPT | Performed by: RADIOLOGY

## 2022-07-11 PROCEDURE — 77336 RADIATION PHYSICS CONSULT: CPT | Performed by: RADIOLOGY

## 2022-07-11 PROCEDURE — 77412 RADIATION TX DELIVERY LVL 3: CPT | Performed by: RADIOLOGY

## 2022-07-12 ENCOUNTER — APPOINTMENT (OUTPATIENT)
Dept: RADIATION ONCOLOGY | Facility: HOSPITAL | Age: 82
End: 2022-07-12
Attending: RADIOLOGY
Payer: COMMERCIAL

## 2022-07-12 PROCEDURE — 77387 GUIDANCE FOR RADJ TX DLVR: CPT | Performed by: STUDENT IN AN ORGANIZED HEALTH CARE EDUCATION/TRAINING PROGRAM

## 2022-07-12 PROCEDURE — 77412 RADIATION TX DELIVERY LVL 3: CPT | Performed by: STUDENT IN AN ORGANIZED HEALTH CARE EDUCATION/TRAINING PROGRAM

## 2022-07-13 ENCOUNTER — APPOINTMENT (OUTPATIENT)
Dept: RADIATION ONCOLOGY | Facility: HOSPITAL | Age: 82
End: 2022-07-13
Attending: RADIOLOGY
Payer: COMMERCIAL

## 2022-07-13 VITALS
DIASTOLIC BLOOD PRESSURE: 78 MMHG | SYSTOLIC BLOOD PRESSURE: 138 MMHG | BODY MASS INDEX: 22.21 KG/M2 | TEMPERATURE: 98.6 F | OXYGEN SATURATION: 99 % | RESPIRATION RATE: 16 BRPM | HEART RATE: 72 BPM | WEIGHT: 125.4 LBS

## 2022-07-13 DIAGNOSIS — C50.911 INVASIVE LOBULAR CARCINOMA OF BREAST, STAGE 2, RIGHT (H): Primary | ICD-10-CM

## 2022-07-13 DIAGNOSIS — C50.911 INVASIVE DUCTAL CARCINOMA OF BREAST, RIGHT (H): ICD-10-CM

## 2022-07-13 PROCEDURE — 77412 RADIATION TX DELIVERY LVL 3: CPT | Performed by: STUDENT IN AN ORGANIZED HEALTH CARE EDUCATION/TRAINING PROGRAM

## 2022-07-13 PROCEDURE — 77387 GUIDANCE FOR RADJ TX DLVR: CPT | Performed by: STUDENT IN AN ORGANIZED HEALTH CARE EDUCATION/TRAINING PROGRAM

## 2022-07-13 ASSESSMENT — PAIN SCALES - GENERAL: PAINLEVEL: NO PAIN (0)

## 2022-07-13 NOTE — PROGRESS NOTES
RADIATION ONCOLOGY WEEKLY TREATMENT VISIT NOTE      Assessment / Impression     Invasive lobular carcinoma of breast, stage 2, right (H) [C50.911]    Cancer Staging  Invasive lobular carcinoma of breast, stage 2, right (H)  Staging form: Breast, AJCC 8th Edition  - Pathologic: Stage IIB (pT2, pN1a(sn), cM0, G2, ER+, [place]-, HER2-) - Signed by Reji Suresh MD on 2022       Tolerating radiation therapy well.  All questions and concerns addressed.    No acute side effects    Plan:     Continue radiation treatment as prescribed.  Radiation:   Site: R breast/axilla  Stereotactic Radiosurgery: No  Concurrent Therapy: No  Today's Dose: 1855  Total Dose for Breast: 5240  Today's Fraction/Total Fraction Breast:     Continue current skin care    Subjective:      HPI: Fang Estes is a 81 year old female with  Invasive lobular carcinoma of breast, stage 2, right (H) [C50.911]    She is tolerating radiation therapy well with no acute effects.  She reports no skin irritation.  She continues to use creams daily.  No swelling upper extremity.  She has some slight numbness of the posterior lateral axillary area with extension onto the arm since surgery.  No restriction range of motion.  Energy is good.    The following portions of the patient's history were reviewed and updated as appropriate: allergies, current medications, past family history, past medical history, past social history, past surgical history and problem list.    Assessment                  Body Site:  Breast                           Site: R breast/axilla  Stereotactic Radiosurgery: No  Concurrent Therapy: No  Today's Dose: 1855  Total Dose for Breast: 5240  Today's Fraction/Total Fraction Breast:   Drainage: 0: Absent                                     Sexuality Alteration                    Emotional Alteration    Copin: Effective  Comfort Alteration   KPS: 100 % Normal, no complaints  Fatigue (ONS scale): 0: No Fatigue  Pain  Location: denies   Nutrition Alteration   Anorexia: 0: None  Nausea: 0: None  Vomitin: None  Weight: 56.9 kg (125 lb 6.4 oz)  Skin Alteration   Skin Sensation: 0: No problem  Skin Reaction: 0: None  AUA Assessment                                           Accompanied by       Objective:     Exam:     Vitals:    22 0936   BP: 138/78   Pulse: 72   Resp: 16   Temp: 98.6  F (37  C)   SpO2: 99%   Weight: 56.9 kg (125 lb 6.4 oz)   PainSc: No Pain (0)       Wt Readings from Last 8 Encounters:   22 56.9 kg (125 lb 6.4 oz)   22 55.9 kg (123 lb 4.8 oz)   22 56.6 kg (124 lb 11.2 oz)   22 57.2 kg (126 lb)   22 54.4 kg (120 lb)   22 57.2 kg (126 lb)       General: Alert and oriented, in no acute distress  Fang has no Erythema.  No upper extremity lymphedema.    Treatment Summary to Date    Aria chart and setup information reviewed    Deisy Edmonds MD

## 2022-07-13 NOTE — LETTER
7/13/2022         RE: Fang Estes  [address]  [place] [address]        Dear Colleague,    Thank you for referring your patient, Fang Estes, to the [facility] RADIATION ONCOLOGY [place]. Please see a copy of my visit note below.      RADIATION ONCOLOGY WEEKLY TREATMENT VISIT NOTE      Assessment / Impression     Invasive lobular carcinoma of breast, stage 2, right (H) [C50.911]    Cancer Staging  Invasive lobular carcinoma of breast, stage 2, right (H)  Staging form: Breast, AJCC 8th Edition  - Pathologic: Stage IIB (pT2, pN1a(sn), cM0, G2, ER+, [place]-, HER2-) - Signed by Reji Suresh MD on 6/8/2022       Tolerating radiation therapy well.  All questions and concerns addressed.    No acute side effects    Plan:     Continue radiation treatment as prescribed.  Radiation:   Site: R breast/axilla  Stereotactic Radiosurgery: No  Concurrent Therapy: No  Today's Dose: 1855  Total Dose for Breast: 5240  Today's Fraction/Total Fraction Breast: 7/20    Continue current skin care    Subjective:      HPI: Fang Estes is a 81 year old female with  Invasive lobular carcinoma of breast, stage 2, right (H) [C50.911]    She is tolerating radiation therapy well with no acute effects.  She reports no skin irritation.  She continues to use creams daily.  No swelling upper extremity.  She has some slight numbness of the posterior lateral axillary area with extension onto the arm since surgery.  No restriction range of motion.  Energy is good.    The following portions of the patient's history were reviewed and updated as appropriate: allergies, current medications, past family history, past medical history, past social history, past surgical history and problem list.    Assessment                  Body Site:  Breast                           Site: R breast/axilla  Stereotactic Radiosurgery: No  Concurrent Therapy: No  Today's Dose: 1855  Total Dose for Breast: 5240  Today's Fraction/Total Fraction  Breast:   Drainage: 0: Absent                                     Sexuality Alteration                    Emotional Alteration    Copin: Effective  Comfort Alteration   KPS: 100 % Normal, no complaints  Fatigue (ONS scale): 0: No Fatigue  Pain Location: denies   Nutrition Alteration   Anorexia: 0: None  Nausea: 0: None  Vomitin: None  Weight: 56.9 kg (125 lb 6.4 oz)  Skin Alteration   Skin Sensation: 0: No problem  Skin Reaction: 0: None  AUA Assessment                                           Accompanied by       Objective:     Exam:     Vitals:    22 0936   BP: 138/78   Pulse: 72   Resp: 16   Temp: 98.6  F (37  C)   SpO2: 99%   Weight: 56.9 kg (125 lb 6.4 oz)   PainSc: No Pain (0)       Wt Readings from Last 8 Encounters:   22 56.9 kg (125 lb 6.4 oz)   22 55.9 kg (123 lb 4.8 oz)   22 56.6 kg (124 lb 11.2 oz)   22 57.2 kg (126 lb)   22 54.4 kg (120 lb)   22 57.2 kg (126 lb)       General: Alert and oriented, in no acute distress  Fang has no Erythema.  No upper extremity lymphedema.    Treatment Summary to Date    Aria chart and setup information reviewed    Deisy Edmonds MD      Again, thank you for allowing me to participate in the care of your patient.        Sincerely,        Deisy Edmonds MD

## 2022-07-14 ENCOUNTER — APPOINTMENT (OUTPATIENT)
Dept: RADIATION ONCOLOGY | Facility: HOSPITAL | Age: 82
End: 2022-07-14
Attending: RADIOLOGY
Payer: COMMERCIAL

## 2022-07-14 PROCEDURE — 77387 GUIDANCE FOR RADJ TX DLVR: CPT | Performed by: RADIOLOGY

## 2022-07-14 PROCEDURE — 77412 RADIATION TX DELIVERY LVL 3: CPT | Performed by: RADIOLOGY

## 2022-07-15 ENCOUNTER — APPOINTMENT (OUTPATIENT)
Dept: RADIATION ONCOLOGY | Facility: HOSPITAL | Age: 82
End: 2022-07-15
Attending: RADIOLOGY
Payer: COMMERCIAL

## 2022-07-15 PROCEDURE — 77412 RADIATION TX DELIVERY LVL 3: CPT | Performed by: RADIOLOGY

## 2022-07-15 PROCEDURE — 77387 GUIDANCE FOR RADJ TX DLVR: CPT | Performed by: RADIOLOGY

## 2022-07-18 ENCOUNTER — APPOINTMENT (OUTPATIENT)
Dept: RADIATION ONCOLOGY | Facility: HOSPITAL | Age: 82
End: 2022-07-18
Attending: RADIOLOGY
Payer: COMMERCIAL

## 2022-07-18 PROCEDURE — 77387 GUIDANCE FOR RADJ TX DLVR: CPT | Performed by: RADIOLOGY

## 2022-07-18 PROCEDURE — 77427 RADIATION TX MANAGEMENT X5: CPT | Performed by: STUDENT IN AN ORGANIZED HEALTH CARE EDUCATION/TRAINING PROGRAM

## 2022-07-18 PROCEDURE — 77412 RADIATION TX DELIVERY LVL 3: CPT | Performed by: RADIOLOGY

## 2022-07-18 PROCEDURE — 77336 RADIATION PHYSICS CONSULT: CPT | Performed by: STUDENT IN AN ORGANIZED HEALTH CARE EDUCATION/TRAINING PROGRAM

## 2022-07-19 ENCOUNTER — APPOINTMENT (OUTPATIENT)
Dept: RADIATION ONCOLOGY | Facility: HOSPITAL | Age: 82
End: 2022-07-19
Attending: RADIOLOGY
Payer: COMMERCIAL

## 2022-07-19 PROCEDURE — 77412 RADIATION TX DELIVERY LVL 3: CPT | Performed by: RADIOLOGY

## 2022-07-19 PROCEDURE — 77387 GUIDANCE FOR RADJ TX DLVR: CPT | Performed by: RADIOLOGY

## 2022-07-20 ENCOUNTER — APPOINTMENT (OUTPATIENT)
Dept: RADIATION ONCOLOGY | Facility: HOSPITAL | Age: 82
End: 2022-07-20
Attending: RADIOLOGY
Payer: COMMERCIAL

## 2022-07-20 ENCOUNTER — OFFICE VISIT (OUTPATIENT)
Dept: RADIATION ONCOLOGY | Facility: HOSPITAL | Age: 82
End: 2022-07-20
Attending: STUDENT IN AN ORGANIZED HEALTH CARE EDUCATION/TRAINING PROGRAM
Payer: COMMERCIAL

## 2022-07-20 VITALS
RESPIRATION RATE: 16 BRPM | BODY MASS INDEX: 21.89 KG/M2 | OXYGEN SATURATION: 99 % | SYSTOLIC BLOOD PRESSURE: 132 MMHG | WEIGHT: 123.6 LBS | HEART RATE: 68 BPM | DIASTOLIC BLOOD PRESSURE: 86 MMHG

## 2022-07-20 DIAGNOSIS — C50.911 INVASIVE DUCTAL CARCINOMA OF BREAST, RIGHT (H): Primary | ICD-10-CM

## 2022-07-20 ASSESSMENT — PAIN SCALES - GENERAL: PAINLEVEL: NO PAIN (0)

## 2022-07-20 NOTE — PROGRESS NOTES
RADIATION ONCOLOGY WEEKLY TREATMENT VISIT NOTE      Assessment / Impression     Invasive ductal carcinoma of breast, right (H) [C50.911]    Cancer Staging  Invasive lobular carcinoma of breast, stage 2, right (H)  Staging form: Breast, AJCC 8th Edition  - Pathologic: Stage IIB (pT2, pN1a(sn), cM0, G2, ER+, [place]-, HER2-) - Signed by Reji Suresh MD on 2022       Tolerating radiation therapy well.  All questions and concerns addressed.    Grade 1 radiation dermatitis    Plan:     Continue radiation treatment as prescribed.  Radiation:   Site: R breast/axilla  Stereotactic Radiosurgery: No  Concurrent Therapy: No  Today's Dose: 3180  Total Dose for Breast: 5240  Today's Fraction/Total Fraction Breast:     Continue current skin care    Subjective:      HPI: Fang Estes is a 81 year old female with  Invasive ductal carcinoma of breast, right (H) [C50.911]    She is tolerating radiation therapy well.  She continues to bike long distances.  No significant skin irritation.  Using cream.  No swelling upper extremity.    The following portions of the patient's history were reviewed and updated as appropriate: allergies, current medications, past family history, past medical history, past social history, past surgical history and problem list.    Assessment                  Body Site:  Breast                           Site: R breast/axilla  Stereotactic Radiosurgery: No  Concurrent Therapy: No  Today's Dose: 3180  Total Dose for Breast: 5240  Today's Fraction/Total Fraction Breast:   Drainage: 0: Absent                                     Sexuality Alteration                    Emotional Alteration    Copin: Effective  Comfort Alteration   KPS: 100 % Normal, no complaints  Fatigue (ONS scale): 0: No Fatigue  Pain Location: denies   Nutrition Alteration   Anorexia: 0: None  Nausea: 0: None  Vomitin: None  Weight: 56.1 kg (123 lb 9.6 oz)  Skin Alteration   Skin Sensation: 0: No  problem  Skin Reaction: 0: None  AUA Assessment                                           Accompanied by       Objective:     Exam:     Vitals:    07/20/22 0933   BP: 132/86   Pulse: 68   Resp: 16   SpO2: 99%   Weight: 56.1 kg (123 lb 9.6 oz)   PainSc: No Pain (0)       Wt Readings from Last 8 Encounters:   07/20/22 56.1 kg (123 lb 9.6 oz)   07/13/22 56.9 kg (125 lb 6.4 oz)   07/06/22 55.9 kg (123 lb 4.8 oz)   06/22/22 56.6 kg (124 lb 11.2 oz)   06/08/22 57.2 kg (126 lb)   05/24/22 54.4 kg (120 lb)   04/25/22 57.2 kg (126 lb)       General: Alert and oriented, in no acute distress  Fang has mild Erythema.    Treatment Summary to Date    Aria chart and setup information reviewed    Deisy Edmonds MD

## 2022-07-20 NOTE — LETTER
2022         RE: Fang Estes  [address]  [facility] [address]        Dear Colleague,    Thank you for referring your patient, Fang Estes, to the [facility] RADIATION ONCOLOGY [place]. Please see a copy of my visit note below.      RADIATION ONCOLOGY WEEKLY TREATMENT VISIT NOTE      Assessment / Impression     Invasive ductal carcinoma of breast, right (H) [C50.911]    Cancer Staging  Invasive lobular carcinoma of breast, stage 2, right (H)  Staging form: Breast, AJCC 8th Edition  - Pathologic: Stage IIB (pT2, pN1a(sn), cM0, G2, ER+, OK-, HER2-) - Signed by Reji Suresh MD on 2022       Tolerating radiation therapy well.  All questions and concerns addressed.    Grade 1 radiation dermatitis    Plan:     Continue radiation treatment as prescribed.  Radiation:   Site: R breast/axilla  Stereotactic Radiosurgery: No  Concurrent Therapy: No  Today's Dose: 3180  Total Dose for Breast: 5240  Today's Fraction/Total Fraction Breast:     Continue current skin care    Subjective:      HPI: Fang Estes is a 81 year old female with  Invasive ductal carcinoma of breast, right (H) [C50.911]    She is tolerating radiation therapy well.  She continues to bike long distances.  No significant skin irritation.  Using cream.  No swelling upper extremity.    The following portions of the patient's history were reviewed and updated as appropriate: allergies, current medications, past family history, past medical history, past social history, past surgical history and problem list.    Assessment                  Body Site:  Breast                           Site: R breast/axilla  Stereotactic Radiosurgery: No  Concurrent Therapy: No  Today's Dose: 3180  Total Dose for Breast: 5240  Today's Fraction/Total Fraction Breast:   Drainage: 0: Absent                                     Sexuality Alteration                    Emotional Alteration    Copin: Effective  Comfort  Alteration   KPS: 100 % Normal, no complaints  Fatigue (ONS scale): 0: No Fatigue  Pain Location: denies   Nutrition Alteration   Anorexia: 0: None  Nausea: 0: None  Vomitin: None  Weight: 56.1 kg (123 lb 9.6 oz)  Skin Alteration   Skin Sensation: 0: No problem  Skin Reaction: 0: None  AUA Assessment                                           Accompanied by       Objective:     Exam:     Vitals:    22 0933   BP: 132/86   Pulse: 68   Resp: 16   SpO2: 99%   Weight: 56.1 kg (123 lb 9.6 oz)   PainSc: No Pain (0)       Wt Readings from Last 8 Encounters:   22 56.1 kg (123 lb 9.6 oz)   22 56.9 kg (125 lb 6.4 oz)   22 55.9 kg (123 lb 4.8 oz)   22 56.6 kg (124 lb 11.2 oz)   22 57.2 kg (126 lb)   22 54.4 kg (120 lb)   22 57.2 kg (126 lb)       General: Alert and oriented, in no acute distress  Fang has mild Erythema.    Treatment Summary to Date    Aria chart and setup information reviewed    Deisy Edmonds MD      Again, thank you for allowing me to participate in the care of your patient.        Sincerely,        Deisy Edmonds MD

## 2022-07-21 ENCOUNTER — APPOINTMENT (OUTPATIENT)
Dept: RADIATION ONCOLOGY | Facility: HOSPITAL | Age: 82
End: 2022-07-21
Attending: RADIOLOGY
Payer: COMMERCIAL

## 2022-07-21 PROCEDURE — 77412 RADIATION TX DELIVERY LVL 3: CPT | Performed by: RADIOLOGY

## 2022-07-21 PROCEDURE — 77387 GUIDANCE FOR RADJ TX DLVR: CPT | Performed by: RADIOLOGY

## 2022-07-22 ENCOUNTER — APPOINTMENT (OUTPATIENT)
Dept: RADIATION ONCOLOGY | Facility: HOSPITAL | Age: 82
End: 2022-07-22
Attending: RADIOLOGY
Payer: COMMERCIAL

## 2022-07-22 ENCOUNTER — HOSPITAL ENCOUNTER (OUTPATIENT)
Dept: PHYSICAL THERAPY | Facility: REHABILITATION | Age: 82
Discharge: HOME | End: 2022-07-22
Attending: RADIOLOGY
Payer: COMMERCIAL

## 2022-07-22 DIAGNOSIS — C50.411 MALIGNANT NEOPLASM OF UPPER-OUTER QUADRANT OF RIGHT BREAST IN FEMALE, ESTROGEN RECEPTOR POSITIVE (H): ICD-10-CM

## 2022-07-22 DIAGNOSIS — Z17.0 MALIGNANT NEOPLASM OF UPPER-OUTER QUADRANT OF RIGHT BREAST IN FEMALE, ESTROGEN RECEPTOR POSITIVE (H): ICD-10-CM

## 2022-07-22 PROCEDURE — 77387 GUIDANCE FOR RADJ TX DLVR: CPT | Performed by: RADIOLOGY

## 2022-07-22 PROCEDURE — 97110 THERAPEUTIC EXERCISES: CPT | Mod: GP | Performed by: PHYSICAL THERAPIST

## 2022-07-22 PROCEDURE — 77412 RADIATION TX DELIVERY LVL 3: CPT | Performed by: RADIOLOGY

## 2022-07-22 PROCEDURE — 97161 PT EVAL LOW COMPLEX 20 MIN: CPT | Mod: GP | Performed by: PHYSICAL THERAPIST

## 2022-07-22 NOTE — PROGRESS NOTES
[facility]        OUTPATIENT PHYSICAL THERAPY EDEMA EVALUATION  PLAN OF TREATMENT FOR OUTPATIENT REHABILITATION  (COMPLETE FOR INITIAL CLAIMS ONLY)  Patient's Last Name, First Name, PHAMSHWETA  FranklynFang                           Provider s Name:   [facility] Medical Record No.  [id number]     Start of Care Date:  07/22/22   Onset Date:   5/24/22   Type:  PT   Medical Diagnosis:  Deisy Duron MD   Therapy Diagnosis:  right axillary cording, decreased ROM R shoulder Visits from SOC:  1                                     __________________________________________________________________________________   Plan of Treatment/Functional Goals:    Myofascial release, Soft tissue mobilization, Home management program development, Manual therapy        GOALS  1. Goal description: Pt will be independent in HEP and self-management of condition including self-massage techniques to reduce cording       Target date: 07/22/22  2.            3.            4.            5.            6.               7.             8.              Treatment Frequency:  (evaluation only)   Treatment duration: 1 visit    Adrienne Bass, PT                                    I CERTIFY THE NEED FOR THESE SERVICES FURNISHED UNDER        THIS PLAN OF TREATMENT AND WHILE UNDER MY CARE     (Physician co-signature of this document indicates review and certification of the therapy plan).                   Certification date from: 07/22/22       Certification date to: 07/22/22           Referring physician: Deisy Duron MD   Initial Assessment  See Epic Evaluation- Start of care: 07/22/22 07/22/22 1000   Quick Adds   Quick Adds Certification   Rehab Discipline   Discipline PT   Type of Visit   Type of visit Initial Edema Evaluation   General Information   Start of care  07/22/22   Referring physician Deisy Duron MD   Medical diagnosis Malignant neoplasm of upper-outer quadrant of right breast in female, estrogen receptor positive (H)   Pertinent history of current problem (PT: include personal factors and/or comorbidities that impact the POC; OT: include additional occupational profile info) Pt reports cording that she can feel into her arm and tightness. It does not keep her from doing anything. She has slight tightness with reaching. She denies any pain. She has completed 14/20 radiation treatments.  Pt had a lumpectomy with sentinel node biopsy on 5/24/2022.   Surgical / medical history reviewed Yes   Fall Risk Screen   Fall screen completed by PT   Have you fallen 2 or more times in the past year? No   Have you fallen and had an injury in the past year? No   Is patient a fall risk? No   Abuse Screen (yes response referral indicated)   Feels Unsafe at Home or Work/School no   Feels Threatened by Someone no   Does Anyone Try to Keep You From Having Contact with Others or Doing Things Outside Your Home? no   Physical Signs of Abuse Present no   Patient needs abuse support services and resources No   System Outcome Measures   Outcome Measures Cancer Rehab   FACIT Fatigue Subscale (score out of 52). The higher the score, the better the QOL. 52   Pain   Patient currently in pain No   Cognitive Status   Orientation Orientation to person, place and time   Level of consciousness Alert   Girth Measurements   Girth Measurements   (Not needed as there is no swelling)   Range of Motion   ROM comments Shoulder AROM: flexion: R 140 deg, L 158 deg, Abd: WNL home, Functional ER: to C2 home, Functional IR: To T10 home   Strength   Strength comments UE strength: 5/5 home   Posture   Posture Normal   Palpation   Palpation slightly tender and able to follow axillary cording to just distal to medial epicondyle on R UE   Muscle Tone   Muscle tone No deficits were identified   Planned Edema  Interventions   Planned edema interventions Myofascial release;Soft tissue mobilization;Home management program development;Manual therapy   Clinical Impression   Criteria for skilled therapeutic intervention met Yes   Therapy diagnosis right axillary cording, decreased ROM R shoulder   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 She denies any pain and overall is doing well. She was instructed on gentle CFM/STM to help to loosen cording and exercises today. She would like to work on this independently and will only follow up with PT if not improving within 1 month.   Goals   Edema Eval Goals 1   Goal 1   Goal identifier HEP   Goal description Pt will be independent in HEP and self-management of condition including self-massage techniques to reduce cording   Target date 07/22/22   Date met 07/22/22   Total Evaluation Time   PT Eval, Low Complexity Minutes (40134) 15   Certification   Certification date from 07/22/22   Certification date to 07/22/22   Medical Diagnosis Deisy Duron MD   Certification I certify the need for these services furnished under this plan of treatment and while under my care.  (Physician co-signature of this document indicates review and certification of the therapy plan).

## 2022-07-25 ENCOUNTER — APPOINTMENT (OUTPATIENT)
Dept: RADIATION ONCOLOGY | Facility: HOSPITAL | Age: 82
End: 2022-07-25
Attending: RADIOLOGY
Payer: COMMERCIAL

## 2022-07-25 PROCEDURE — 77412 RADIATION TX DELIVERY LVL 3: CPT | Performed by: RADIOLOGY

## 2022-07-25 PROCEDURE — 77387 GUIDANCE FOR RADJ TX DLVR: CPT | Performed by: RADIOLOGY

## 2022-07-25 PROCEDURE — 77427 RADIATION TX MANAGEMENT X5: CPT | Performed by: STUDENT IN AN ORGANIZED HEALTH CARE EDUCATION/TRAINING PROGRAM

## 2022-07-25 PROCEDURE — 77336 RADIATION PHYSICS CONSULT: CPT | Performed by: STUDENT IN AN ORGANIZED HEALTH CARE EDUCATION/TRAINING PROGRAM

## 2022-07-26 ENCOUNTER — APPOINTMENT (OUTPATIENT)
Dept: RADIATION ONCOLOGY | Facility: HOSPITAL | Age: 82
End: 2022-07-26
Attending: RADIOLOGY
Payer: COMMERCIAL

## 2022-07-26 PROCEDURE — 77412 RADIATION TX DELIVERY LVL 3: CPT | Performed by: RADIOLOGY

## 2022-07-26 PROCEDURE — 77387 GUIDANCE FOR RADJ TX DLVR: CPT | Performed by: RADIOLOGY

## 2022-07-27 ENCOUNTER — APPOINTMENT (OUTPATIENT)
Dept: RADIATION ONCOLOGY | Facility: HOSPITAL | Age: 82
End: 2022-07-27
Attending: RADIOLOGY
Payer: COMMERCIAL

## 2022-07-27 VITALS
BODY MASS INDEX: 22 KG/M2 | HEART RATE: 66 BPM | SYSTOLIC BLOOD PRESSURE: 198 MMHG | OXYGEN SATURATION: 98 % | DIASTOLIC BLOOD PRESSURE: 95 MMHG | RESPIRATION RATE: 16 BRPM | WEIGHT: 124.2 LBS

## 2022-07-27 DIAGNOSIS — C50.911 INVASIVE DUCTAL CARCINOMA OF BREAST, RIGHT (H): Primary | ICD-10-CM

## 2022-07-27 PROCEDURE — 77280 THER RAD SIMULAJ FIELD SMPL: CPT | Performed by: RADIOLOGY

## 2022-07-27 PROCEDURE — 77412 RADIATION TX DELIVERY LVL 3: CPT | Performed by: RADIOLOGY

## 2022-07-27 PROCEDURE — 77387 GUIDANCE FOR RADJ TX DLVR: CPT | Performed by: RADIOLOGY

## 2022-07-27 PROCEDURE — 77280 THER RAD SIMULAJ FIELD SMPL: CPT | Mod: 26 | Performed by: RADIOLOGY

## 2022-07-27 ASSESSMENT — PAIN SCALES - GENERAL: PAINLEVEL: NO PAIN (0)

## 2022-07-27 NOTE — LETTER
2022         RE: Fang Estes  [address]  [facility] [address]        Dear Colleague,    Thank you for referring your patient, Fang Estes, to the [facility] RADIATION ONCOLOGY [place]. Please see a copy of my visit note below.      RADIATION ONCOLOGY WEEKLY TREATMENT VISIT NOTE      Assessment / Impression     Invasive ductal carcinoma of breast, right (H) [C50.911]     Tolerating radiation therapy well.  All questions and concerns addressed.    Plan:     Continue radiation treatment as prescribed.    Discussed with the patient about skin care.    Subjective:      HPI: Fang Estes is a 81 year old female with  Invasive ductal carcinoma of breast, right (H) [C50.911]    The following portions of the patient's history were reviewed and updated as appropriate: allergies, current medications, past family history, past medical history, past social history, past surgical history and problem list.    Assessment                  Body Site:  Breast                           Site: R breast/axilla  Stereotactic Radiosurgery: No  Concurrent Therapy: No  Today's Dose: 4490  Total Dose for Breast: 5240  Today's Fraction/Total Fraction Breast:   Drainage: 0: Absent                                     Sexuality Alteration                    Emotional Alteration    Copin: Effective  Comfort Alteration   KPS: 100 % Normal, no complaints  Fatigue (ONS scale): 0: No Fatigue  Pain Location: denies   Nutrition Alteration   Anorexia: 0: None  Nausea: 0: None  Vomitin: None  Weight: 56.3 kg (124 lb 3.2 oz)  Skin Alteration   Skin Sensation: 1:Pruitis (using mometasone)  Skin Reaction: 2: Bright erythema  AUA Assessment                                           Accompanied by       Objective:     Exam: moderate Erythema.    Vitals:    22 0924   BP: (!) 198/95   Pulse: 66   Resp: 16   SpO2: 98%   Weight: 56.3 kg (124 lb 3.2 oz)   PainSc: No Pain (0)       Wt Readings from Last 8  Encounters:   07/27/22 56.3 kg (124 lb 3.2 oz)   07/20/22 56.1 kg (123 lb 9.6 oz)   07/13/22 56.9 kg (125 lb 6.4 oz)   07/06/22 55.9 kg (123 lb 4.8 oz)   06/22/22 56.6 kg (124 lb 11.2 oz)   06/08/22 57.2 kg (126 lb)   05/24/22 54.4 kg (120 lb)   04/25/22 57.2 kg (126 lb)       General: Alert and oriented, in no acute distress  Fang has moderate Erythema.  Aria chart and setup information reviewed    Radha Gill MD        Again, thank you for allowing me to participate in the care of your patient.        Sincerely,        Radha Gill MD

## 2022-07-27 NOTE — PROGRESS NOTES
RADIATION ONCOLOGY WEEKLY TREATMENT VISIT NOTE      Assessment / Impression     Invasive ductal carcinoma of breast, right (H) [C50.911]     Tolerating radiation therapy well.  All questions and concerns addressed.    Plan:     Continue radiation treatment as prescribed.    Discussed with the patient about skin care.    Subjective:      HPI: Fang Estes is a 81 year old female with  Invasive ductal carcinoma of breast, right (H) [C50.911]    The following portions of the patient's history were reviewed and updated as appropriate: allergies, current medications, past family history, past medical history, past social history, past surgical history and problem list.    Assessment                  Body Site:  Breast                           Site: R breast/axilla  Stereotactic Radiosurgery: No  Concurrent Therapy: No  Today's Dose: 4490  Total Dose for Breast: 5240  Today's Fraction/Total Fraction Breast:   Drainage: 0: Absent                                     Sexuality Alteration                    Emotional Alteration    Copin: Effective  Comfort Alteration   KPS: 100 % Normal, no complaints  Fatigue (ONS scale): 0: No Fatigue  Pain Location: denies   Nutrition Alteration   Anorexia: 0: None  Nausea: 0: None  Vomitin: None  Weight: 56.3 kg (124 lb 3.2 oz)  Skin Alteration   Skin Sensation: 1:Pruitis (using mometasone)  Skin Reaction: 2: Bright erythema  AUA Assessment                                           Accompanied by       Objective:     Exam: moderate Erythema.    Vitals:    22 0924   BP: (!) 198/95   Pulse: 66   Resp: 16   SpO2: 98%   Weight: 56.3 kg (124 lb 3.2 oz)   PainSc: No Pain (0)       Wt Readings from Last 8 Encounters:   22 56.3 kg (124 lb 3.2 oz)   22 56.1 kg (123 lb 9.6 oz)   22 56.9 kg (125 lb 6.4 oz)   22 55.9 kg (123 lb 4.8 oz)   22 56.6 kg (124 lb 11.2 oz)   22 57.2 kg (126 lb)   22 54.4 kg (120 lb)   22 57.2  kg (126 lb)       General: Alert and oriented, in no acute distress  Fang has moderate Erythema.  Aria chart and setup information reviewed    Radha Gill MD

## 2022-07-28 ENCOUNTER — APPOINTMENT (OUTPATIENT)
Dept: RADIATION ONCOLOGY | Facility: HOSPITAL | Age: 82
End: 2022-07-28
Attending: RADIOLOGY
Payer: COMMERCIAL

## 2022-07-28 PROCEDURE — 77412 RADIATION TX DELIVERY LVL 3: CPT | Performed by: STUDENT IN AN ORGANIZED HEALTH CARE EDUCATION/TRAINING PROGRAM

## 2022-07-28 PROCEDURE — 77387 GUIDANCE FOR RADJ TX DLVR: CPT | Performed by: STUDENT IN AN ORGANIZED HEALTH CARE EDUCATION/TRAINING PROGRAM

## 2022-07-29 ENCOUNTER — APPOINTMENT (OUTPATIENT)
Dept: RADIATION ONCOLOGY | Facility: HOSPITAL | Age: 82
End: 2022-07-29
Attending: RADIOLOGY
Payer: COMMERCIAL

## 2022-07-29 PROCEDURE — 77387 GUIDANCE FOR RADJ TX DLVR: CPT | Performed by: STUDENT IN AN ORGANIZED HEALTH CARE EDUCATION/TRAINING PROGRAM

## 2022-07-29 PROCEDURE — 77427 RADIATION TX MANAGEMENT X5: CPT | Performed by: RADIOLOGY

## 2022-07-29 PROCEDURE — 77336 RADIATION PHYSICS CONSULT: CPT | Performed by: RADIOLOGY

## 2022-07-29 PROCEDURE — 77412 RADIATION TX DELIVERY LVL 3: CPT | Performed by: STUDENT IN AN ORGANIZED HEALTH CARE EDUCATION/TRAINING PROGRAM

## 2022-07-29 NOTE — ADDENDUM NOTE
Encounter addended by: Adrienne Bass, PT on: 7/29/2022 3:06 PM   Actions taken: Clinical Note Signed

## 2022-08-01 ENCOUNTER — ALLIED HEALTH/NURSE VISIT (OUTPATIENT)
Dept: RADIATION ONCOLOGY | Facility: HOSPITAL | Age: 82
End: 2022-08-01
Attending: RADIOLOGY
Payer: COMMERCIAL

## 2022-08-01 PROCEDURE — 77387 GUIDANCE FOR RADJ TX DLVR: CPT | Performed by: RADIOLOGY

## 2022-08-01 PROCEDURE — 77412 RADIATION TX DELIVERY LVL 3: CPT | Performed by: RADIOLOGY

## 2022-08-01 NOTE — PROGRESS NOTES
Pt here for their final radiation treatment. DC instructions given verbally and in writing, pt verbalized their understanding. Encouraged pt to make 4-6 week f/u apt on their way out today. Gave her a tube of Aquaphor ointment for her sternal dry red rash.

## 2022-08-08 ENCOUNTER — TELEPHONE (OUTPATIENT)
Dept: RADIATION ONCOLOGY | Facility: HOSPITAL | Age: 82
End: 2022-08-08

## 2022-08-12 ENCOUNTER — ONCOLOGY VISIT (OUTPATIENT)
Dept: ONCOLOGY | Facility: HOSPITAL | Age: 82
End: 2022-08-12
Attending: INTERNAL MEDICINE
Payer: COMMERCIAL

## 2022-08-12 VITALS
TEMPERATURE: 97.9 F | HEART RATE: 70 BPM | RESPIRATION RATE: 16 BRPM | DIASTOLIC BLOOD PRESSURE: 76 MMHG | SYSTOLIC BLOOD PRESSURE: 155 MMHG | OXYGEN SATURATION: 98 % | WEIGHT: 123.8 LBS | BODY MASS INDEX: 21.93 KG/M2

## 2022-08-12 DIAGNOSIS — C50.911 INVASIVE DUCTAL CARCINOMA OF BREAST, RIGHT (H): Primary | ICD-10-CM

## 2022-08-12 PROCEDURE — G0463 HOSPITAL OUTPT CLINIC VISIT: HCPCS

## 2022-08-12 PROCEDURE — 99213 OFFICE O/P EST LOW 20 MIN: CPT | Performed by: INTERNAL MEDICINE

## 2022-08-12 ASSESSMENT — PAIN SCALES - GENERAL: PAINLEVEL: NO PAIN (0)

## 2022-08-12 NOTE — PROGRESS NOTES
"Oncology Rooming Note    08/12/22 2:35 PM     Fang Estes is a 81 year old female who presents for:    Chief Complaint   Patient presents with     Oncology Clinic Visit       Initial Vitals: BP (!) 155/76 (BP Location: Left arm, Patient Position: Sitting)   Pulse 70   Temp 97.9  F (36.6  C) (Oral)   Resp 16   Wt 56.2 kg (123 lb 12.8 oz)   SpO2 98%   BMI 21.93 kg/m   Estimated body mass index is 21.93 kg/m  as calculated from the following:    Height as of 5/24/22: 1.6 m (5' 3\").    Weight as of this encounter: 56.2 kg (123 lb 12.8 oz). Body surface area is 1.58 meters squared.    No Pain (0) Comment: Denies any pain right now     No LMP recorded. Patient is postmenopausal.    Allergies reviewed: Yes  Medications reviewed: Yes    Medications: Medication refills not needed today.  Pharmacy [facility] entered into [employer]: [id number] - [place] [address] [address] [facility] [address]    Clinical concerns:  no concerns    Accompanied by: Self    Shabnam Alcantara  RN Care Coordinator  [facility]  "

## 2022-08-12 NOTE — LETTER
"    8/12/2022         RE: Fang Estes  [address]  [facility] [address]        Dear Colleague,    Thank you for referring your patient, Fang Estes, to the [facility]. Please see a copy of my visit note below.    Oncology Rooming Note    08/12/22 2:35 PM     Fang Estes is a 81 year old female who presents for:    Chief Complaint   Patient presents with     Oncology Clinic Visit       Initial Vitals: BP (!) 155/76 (BP Location: Left arm, Patient Position: Sitting)   Pulse 70   Temp 97.9  F (36.6  C) (Oral)   Resp 16   Wt 56.2 kg (123 lb 12.8 oz)   SpO2 98%   BMI 21.93 kg/m   Estimated body mass index is 21.93 kg/m  as calculated from the following:    Height as of 5/24/22: 1.6 m (5' 3\").    Weight as of this encounter: 56.2 kg (123 lb 12.8 oz). Body surface area is 1.58 meters squared.    No Pain (0) Comment: Denies any pain right now     No LMP recorded. Patient is postmenopausal.    Allergies reviewed: Yes  Medications reviewed: Yes    Medications: Medication refills not needed today.  Pharmacy name entered into [employer]: [id number] - [address] AT [facility] [address]    Clinical concerns:  no concerns    Accompanied by: Self    Shabnam Alcantara RN Care Coordinator  [facility] Progress Note    Patient: Fang Estes  MRN: [id number]  Date of Service: 06/22/2022        Reason for Visit    Chief Complaint   Patient presents with     Oncology Clinic Visit         Problem List Items Addressed This Visit        Other    Invasive ductal carcinoma of breast, right (H) - Primary    Relevant Orders    CBC with platelets and differential    Comprehensive metabolic panel (BMP + Alb, Alk Phos, ALT, AST, Total. Bili, TP)            Assessment and Plan    Invasive lobular carcinoma, hormone positive, HER2 negative  Status post lumpectomy.  Surgical " path showing a T2, N1 disease.  Oncotype DX came back showing a score of 22.  Has finished radiation.  No major side effects from that.  Will start Arimidex.  Again discussed potential side effects and complications with Arimidex.  The plan is to do a DEXA scan in the near future to get a baseline evaluation of her bone.  I will see her back in 3 months with labs.    Is agreeable to the plan.    Cancer Staging  Invasive lobular carcinoma of breast, stage 2, right (H)  Staging form: Breast, AJCC 8th Edition  - Pathologic: Stage IIB (pT2, pN1a(sn), cM0, G2, ER+, NE-, HER2-) - Signed by Reji Suresh MD on 6/8/2022      ECOG Performance    0 - Independent         Problem List    Patient Active Problem List   Diagnosis     Invasive ductal carcinoma of breast, right (H)     Invasive lobular carcinoma of breast, stage 2, right (H)            Interval History   Fang Estes is a 81 year old female with recent diagnosis of invasive lobular carcinoma status post lumpectomy and sentinel lymph node biopsy who is currently on adjuvant Arimidex.  Here for follow-up.    Has finished radiation.  No major side effects.  She will start systemic adjuvant endocrine therapy with Arimidex.      Review of Systems  A comprehensive review of systems was negative except for what is noted in the interval history    Current Outpatient Medications   Medication     albuterol (PROAIR HFA/PROVENTIL HFA/VENTOLIN HFA) 108 (90 Base) MCG/ACT inhaler     alendronate (FOSAMAX) 70 MG tablet     lisinopril (ZESTRIL) 5 MG tablet     montelukast (SINGULAIR) 10 MG tablet     vitamin D3 (CHOLECALCIFEROL) 10 MCG (400 UNIT) capsule     anastrozole (ARIMIDEX) 1 MG tablet     No current facility-administered medications for this visit.        Physical Exam    No flowsheet data found.    General: alert and cooperative  HEENT: Head: Normal, normocephalic, atraumatic.  Eye: Normal external eye, conjunctiva, lids cornea, JAH.  CNS: Alert and oriented  x3      Lab Results    No results found for this or any previous visit (from the past 168 hour(s)).    Imaging    No results found.     A total of 20 min were spent today on this visit which included face to face conversation with the patient, EMR review, counseling and co-ordination of care and medical documentation.      Signed by: Reji Suresh MD      Again, thank you for allowing me to participate in the care of your patient.        Sincerely,        Reji Suresh MD

## 2022-09-06 ENCOUNTER — OFFICE VISIT (OUTPATIENT)
Dept: RADIATION ONCOLOGY | Facility: HOSPITAL | Age: 82
End: 2022-09-06
Attending: RADIOLOGY
Payer: COMMERCIAL

## 2022-09-06 VITALS
DIASTOLIC BLOOD PRESSURE: 87 MMHG | WEIGHT: 124 LBS | BODY MASS INDEX: 21.97 KG/M2 | SYSTOLIC BLOOD PRESSURE: 170 MMHG | OXYGEN SATURATION: 99 % | HEART RATE: 75 BPM | RESPIRATION RATE: 16 BRPM

## 2022-09-06 DIAGNOSIS — C50.911 INVASIVE LOBULAR CARCINOMA OF BREAST, STAGE 2, RIGHT (H): Primary | ICD-10-CM

## 2022-09-06 PROCEDURE — G0463 HOSPITAL OUTPT CLINIC VISIT: HCPCS

## 2022-09-06 ASSESSMENT — PAIN SCALES - GENERAL: PAINLEVEL: NO PAIN (0)

## 2022-09-06 NOTE — LETTER
"    9/6/2022         RE: Fang Estes  [address]  [facility] [address]        Dear Colleague,    Thank you for referring your patient, Fang Estes, to the [facility] RADIATION [facility]COL[facility] [place]. Please see a copy of my visit note below.    Oncology Rooming Note    September 6, 2022 8:35 AM   Fang Estes is a 81 year old female who presents for:    Chief Complaint   Patient presents with     Oncology Clinic Visit     Breast Cancer     Initial Vitals: BP (!) 170/87   Pulse 75   Resp 16   Wt 56.2 kg (124 lb)   SpO2 99%   BMI 21.97 kg/m   Estimated body mass index is 21.97 kg/m  as calculated from the following:    Height as of 5/24/22: 1.6 m (5' 3\").    Weight as of this encounter: 56.2 kg (124 lb). Body surface area is 1.58 meters squared.  No Pain (0) Comment: Data Unavailable   No LMP recorded. Patient is postmenopausal.  Allergies reviewed: Yes  Medications reviewed: Yes    Medications: Medication refills not needed today.  Pharmacy name entered into [employer]: [id number] - [place] [id number] [facility] AT [facility] [address]    Clinical concerns: Feeling well, no fatigue, no erythema, cording in right arm is much improved post-PT.  Dr. Duron was notified.      Cris Lindsey RN                        [facility] Radiation Oncology Follow Up Note    Patient: Fang Estes  MRN: [id number]  Date of Service: 09/06/2022    Assessment:       ICD-10-CM    1. Invasive lobular carcinoma of breast, stage 2, right (H)  C50.911         Impression/Plan:   81 year old female with ILC grade II, measuring 30 x 14 x 13 mm, negative margins >0.8mm anteriorly, no DCIS, perineural and perivascular involvement, 1/1 SLN positive for micrometastatic carcinoma measuring 4mm. ER/NJ pos, HER-2 negatve by FISH. Excellent health. Oncotype Dx is 22.     1. Continue ROM exercises for next 4-5 months.     2. Return to myself PRN.    3. Long term follow up with medical oncology. "     Subjective:     HPI:  Fang Estes is a 81 year old female with invasive lobular carcinoma of the right breast.      The patient had a screening mammogram performed on 4/15/2022 which showed an asymmetry with architectural distotion in the right breast at 9:00. Further evaluation with right breast diagnoatic mammogram + US on 4/19/2022 confirmed a suspicious right breast mass. A needle core biopsy was performed on 4/20/2022, pathology returned with ILC grade II, ER/MS pos, HER-2 negative by FISH.      She had a right breast lumpectomy performed on 5/24/2022, final pathology showed ILC grade II, measuring 30 x 14 x 13 mm, negative margins >0.8mm anteriorly, no DCIS, perineural and perivascular involvement, 1/1 SLN positive for micrometastatic carcinoma measuring 4mm. Oncotype Dx is 22.     SITE TREATED: right breast  TOTAL DOSE: 5240 cGy  NUMBER OF FRACTIONS: 20  DATES COMPLETED: 7/05/2022 - 8/1/2022  CONCURRENT CHEMOTHERAPY: No  ADJUVANT THERAPY:Yes    She tolerated the treatment without unexpected side effects.     The patient presents today for routine follow up. No complaints today.     Past Medical History:   Diagnosis Date     Hypertension      Uncomplicated asthma      Past Surgical History:   Procedure Laterality Date     APPENDECTOMY       BREAST EXCISIONAL BIOPSY Left 01/01/1980     LUMPECTOMY BREAST Right 5/24/2022    Procedure: WIRE LOCALIZED LUMPECTOMY WITH SENTINEL LYMPH NODE BIOPSY;  Surgeon: Nida Mitchell DO;  Location: [facility] OR     Current Outpatient Medications   Medication     albuterol (PROAIR HFA/PROVENTIL HFA/VENTOLIN HFA) 108 (90 Base) MCG/ACT inhaler     alendronate (FOSAMAX) 70 MG tablet     anastrozole (ARIMIDEX) 1 MG tablet     lisinopril (ZESTRIL) 5 MG tablet     montelukast (SINGULAIR) 10 MG tablet     vitamin D3 (CHOLECALCIFEROL) 10 MCG (400 UNIT) capsule     No current facility-administered medications for this visit.     Ace inhibitors, Losartan, Other (do not use), and  Amoxicillin  Social History     Socioeconomic History     Marital status:      Spouse name: Not on file     Number of children: Not on file     Years of education: Not on file     Highest education level: Not on file   Occupational History     Not on file   Tobacco Use     Smoking status: Never Smoker     Smokeless tobacco: Never Used   Substance and Sexual Activity     Alcohol use: Not on file     Drug use: Never     Sexual activity: Not on file   Other Topics Concern     Not on file   Social History Narrative     Not on file     Social Determinants of Health     Financial Resource Strain: Not on file   Food Insecurity: Not on file   Transportation Needs: Not on file   Physical Activity: Not on file   Stress: Not on file   Social Connections: Not on file   Intimate Partner Violence: Not on file   Housing Stability: Not on file       ROS:  Reviewed with Fang Estes today.      General     EENT     Respiratory       Cardiovascular     Gastrointestinal     Musculoskeletal     Integumentary                 Neurological     Genitourinary/Reproductive     Lymphatic      Patient Coping     Pain   Pain Score: No Pain (0)   AUA Assessment                                                                         Accompanied by       Objective:        Exam:    Vitals:    09/06/22 0828   BP: (!) 170/87   Pulse: 75   Resp: 16   SpO2: 99%   Weight: 56.2 kg (124 lb)   PainSc: No Pain (0)       GENERAL: No acute distress. Cooperative in conversation. Mask on.   RESP: Normal respiratory effort.   ABD: Soft, nontender.  NEURO: Non focal. Alert and oriented x3.   MSK: Full ROM. Cording no longer present along RUE.   PSYCH: Within normal limits. No depression or anxiety.  SKIN: Warm dry intact.       Recent Labs: No results found for this or any previous visit (from the past 168 hour(s)).    Imaging: Imaging results 30 days: No results found.    Pathology:   No results found for this or any previous visit (from the past 8760  hour(s)).  Pathology Results        Malignant - Lumpectomy, Right - 5/24/2022      Pathology Code Malignancy Type    Invasive lobular carcinoma Invasive    Atypical lobular hyperplasia     Lobular carcinoma in situ (LCIS)     Axillary josie with metastatic carcinoma Other          Additional Information            Concordance: Not Entered Primary Tumor: T2      Regional Lymph Nodes: N1    Stage: 2B      Histology Grade: G2 Margin Status: Uninvolved      Posterior Margin: 2 mm    Nodes Biopsied: [place] Superior Margin: 10 mm    Removed: 1 Inferior Margin: 10 mm    Positive: 1 Medial Margin: 2 mm    Extracapsular Extension: Absent Lateral margin: 3 mm      Closest Margin: Anterior    Invasive: 30 mm Closest Margin Distance: .8 mm          Had Neoadjuvant Chemotherapy: No     Completely Submitted for Microscopic Analysis: Yes     External: No              Malignant - Ultrasound Guided Biopsy, Right - 4/20/2022      Pathology Code Malignancy Type    Invasive ductal carcinoma Invasive    Fibrocystic change           Additional Information            Concordance: Concordant Estrogen: Positive      Progesterone: Negative    Histology Grade: G1     HER2/lois FISH: Negative           External: No                   25 minutes spent on the date of the encounter doing chart review, review of outside records, review of test results, interpretation of tests, patient visit, documentation.       I, Deisy Duron MD personally performed the services described in this documentation, as scribed by Vasyl Vazquez in my presence, and it is both accurate and complete.    Signed by: Deisy Duron MD, MPH        Again, thank you for allowing me to participate in the care of your patient.        Sincerely,        Deisy Duron MD

## 2022-09-06 NOTE — PROGRESS NOTES
[facility] Hematology and Oncology Progress Note    Patient: Fang Estes  MRN: [id number]  Date of Service: 06/22/2022        Reason for Visit    Chief Complaint   Patient presents with     Oncology Clinic Visit         Problem List Items Addressed This Visit        Other    Invasive ductal carcinoma of breast, right (H) - Primary    Relevant Orders    CBC with platelets and differential    Comprehensive metabolic panel (BMP + Alb, Alk Phos, ALT, AST, Total. Bili, TP)            Assessment and Plan    Invasive lobular carcinoma, hormone positive, HER2 negative  Status post lumpectomy.  Surgical path showing a T2, N1 disease.  Oncotype DX came back showing a score of 22.  Has finished radiation.  No major side effects from that.  Will start Arimidex.  Again discussed potential side effects and complications with Arimidex.  The plan is to do a DEXA scan in the near future to get a baseline evaluation of her bone.  I will see her back in 3 months with labs.    Is agreeable to the plan.    Cancer Staging  Invasive lobular carcinoma of breast, stage 2, right (H)  Staging form: Breast, AJCC 8th Edition  - Pathologic: Stage IIB (pT2, pN1a(sn), cM0, G2, ER+, CT-, HER2-) - Signed by Reji Suresh MD on 6/8/2022      ECOG Performance    0 - Independent         Problem List    Patient Active Problem List   Diagnosis     Invasive ductal carcinoma of breast, right (H)     Invasive lobular carcinoma of breast, stage 2, right (H)            Interval History   Fang Estes is a 81 year old female with recent diagnosis of invasive lobular carcinoma status post lumpectomy and sentinel lymph node biopsy who is currently on adjuvant Arimidex.  Here for follow-up.    Has finished radiation.  No major side effects.  She will start systemic adjuvant endocrine therapy with Arimidex.      Review of Systems  A comprehensive review of systems was negative except for what is noted in the interval history    Current Outpatient  Medications   Medication     albuterol (PROAIR HFA/PROVENTIL HFA/VENTOLIN HFA) 108 (90 Base) MCG/ACT inhaler     alendronate (FOSAMAX) 70 MG tablet     lisinopril (ZESTRIL) 5 MG tablet     montelukast (SINGULAIR) 10 MG tablet     vitamin D3 (CHOLECALCIFEROL) 10 MCG (400 UNIT) capsule     anastrozole (ARIMIDEX) 1 MG tablet     No current facility-administered medications for this visit.        Physical Exam    No flowsheet data found.    General: alert and cooperative  HEENT: Head: Normal, normocephalic, atraumatic.  Eye: Normal external eye, conjunctiva, lids cornea, JAH.  CNS: Alert and oriented x3      Lab Results    No results found for this or any previous visit (from the past 168 hour(s)).    Imaging    No results found.     A total of 20 min were spent today on this visit which included face to face conversation with the patient, EMR review, counseling and co-ordination of care and medical documentation.      Signed by: Reji Suresh MD

## 2022-09-06 NOTE — PROGRESS NOTES
[facility] Radiation Oncology Follow Up Note    Patient: Fang Estes  MRN: [id number]  Date of Service: 09/06/2022    Assessment:       ICD-10-CM    1. Invasive lobular carcinoma of breast, stage 2, right (H)  C50.911         Impression/Plan:   81 year old female with ILC grade II, measuring 30 x 14 x 13 mm, negative margins >0.8mm anteriorly, no DCIS, perineural and perivascular involvement, 1/1 SLN positive for micrometastatic carcinoma measuring 4mm. ER/SC pos, HER-2 negatve by FISH. Excellent health. Oncotype Dx is 22.     1. Continue ROM exercises for next 4-5 months.     2. Return to myself PRN.    3. Long term follow up with medical oncology.     Subjective:     HPI:  Fang Estes is a 81 year old female with invasive lobular carcinoma of the right breast.      The patient had a screening mammogram performed on 4/15/2022 which showed an asymmetry with architectural distotion in the right breast at 9:00. Further evaluation with right breast diagnoatic mammogram + US on 4/19/2022 confirmed a suspicious right breast mass. A needle core biopsy was performed on 4/20/2022, pathology returned with ILC grade II, ER/SC pos, HER-2 negative by FISH.      She had a right breast lumpectomy performed on 5/24/2022, final pathology showed ILC grade II, measuring 30 x 14 x 13 mm, negative margins >0.8mm anteriorly, no DCIS, perineural and perivascular involvement, 1/1 SLN positive for micrometastatic carcinoma measuring 4mm. Oncotype Dx is 22.     SITE TREATED: right breast  TOTAL DOSE: 5240 cGy  NUMBER OF FRACTIONS: 20  DATES COMPLETED: 7/05/2022 - 8/1/2022  CONCURRENT CHEMOTHERAPY: No  ADJUVANT THERAPY:Yes    She tolerated the treatment without unexpected side effects.     The patient presents today for routine follow up. No complaints today.     Past Medical History:   Diagnosis Date     Hypertension      Uncomplicated asthma      Past Surgical History:   Procedure Laterality Date     APPENDECTOMY        BREAST EXCISIONAL BIOPSY Left 01/01/1980     LUMPECTOMY BREAST Right 5/24/2022    Procedure: WIRE LOCALIZED LUMPECTOMY WITH SENTINEL LYMPH NODE BIOPSY;  Surgeon: Nida Mitchell DO;  Location: [facility]     Current Outpatient Medications   Medication     albuterol (PROAIR HFA/PROVENTIL HFA/VENTOLIN HFA) 108 (90 Base) MCG/ACT inhaler     alendronate (FOSAMAX) 70 MG tablet     anastrozole (ARIMIDEX) 1 MG tablet     lisinopril (ZESTRIL) 5 MG tablet     montelukast (SINGULAIR) 10 MG tablet     vitamin D3 (CHOLECALCIFEROL) 10 MCG (400 UNIT) capsule     No current facility-administered medications for this visit.     Ace inhibitors, Losartan, Other (do not use), and Amoxicillin  Social History     Socioeconomic History     Marital status:      Spouse name: Not on file     Number of children: Not on file     Years of education: Not on file     Highest education level: Not on file   Occupational History     Not on file   Tobacco Use     Smoking status: Never Smoker     Smokeless tobacco: Never Used   Substance and Sexual Activity     Alcohol use: Not on file     Drug use: Never     Sexual activity: Not on file   Other Topics Concern     Not on file   Social History Narrative     Not on file     Social Determinants of Health     Financial Resource Strain: Not on file   Food Insecurity: Not on file   Transportation Needs: Not on file   Physical Activity: Not on file   Stress: Not on file   Social Connections: Not on file   Intimate Partner Violence: Not on file   Housing Stability: Not on file       ROS:  Reviewed with Fang hernandez.      General     EENT     Respiratory       Cardiovascular     Gastrointestinal     Musculoskeletal     Integumentary                 Neurological     Genitourinary/Reproductive     Lymphatic      Patient Coping     Pain   Pain Score: No Pain (0)   AUA Assessment                                                                         Accompanied by       Objective:         Exam:    Vitals:    09/06/22 0828   BP: (!) 170/87   Pulse: 75   Resp: 16   SpO2: 99%   Weight: 56.2 kg (124 lb)   PainSc: No Pain (0)       GENERAL: No acute distress. Cooperative in conversation. Mask on.   RESP: Normal respiratory effort.   ABD: Soft, nontender.  NEURO: Non focal. Alert and oriented x3.   MSK: Full ROM. Cording no longer present along RUE.   PSYCH: Within normal limits. No depression or anxiety.  SKIN: Warm dry intact.       Recent Labs: No results found for this or any previous visit (from the past 168 hour(s)).    Imaging: Imaging results 30 days: No results found.    Pathology:   No results found for this or any previous visit (from the past 8760 hour(s)).  Pathology Results        Malignant - Lumpectomy, Right - 5/24/2022      Pathology Code Malignancy Type    Invasive lobular carcinoma Invasive    Atypical lobular hyperplasia     Lobular carcinoma in situ (LCIS)     Axillary josie with metastatic carcinoma Other          Additional Information            Concordance: Not Entered Primary Tumor: T2      Regional Lymph Nodes: N1    Stage: 2B      Histology Grade: G2 Margin Status: Uninvolved      Posterior Margin: 2 mm    Nodes Biopsied: [place] Superior Margin: 10 mm    Removed: 1 Inferior Margin: 10 mm    Positive: 1 Medial Margin: 2 mm    Extracapsular Extension: Absent Lateral margin: 3 mm      Closest Margin: Anterior    Invasive: 30 mm Closest Margin Distance: .8 mm          Had Neoadjuvant Chemotherapy: No     Completely Submitted for Microscopic Analysis: Yes     External: No              Malignant - Ultrasound Guided Biopsy, Right - 4/20/2022      Pathology Code Malignancy Type    Invasive ductal carcinoma Invasive    Fibrocystic change           Additional Information            Concordance: Concordant Estrogen: Positive      Progesterone: Negative    Histology Grade: G1     HER2/lois FISH: Negative           External: No                   25 minutes spent on the date of the  encounter doing chart review, review of outside records, review of test results, interpretation of tests, patient visit, documentation.       I, Deisy Duron MD personally performed the services described in this documentation, as scribed by Vasyl Vazquez in my presence, and it is both accurate and complete.    Signed by: Deisy Druon MD, MPH

## 2022-09-28 ENCOUNTER — HOSPITAL ENCOUNTER (OUTPATIENT)
Dept: BONE DENSITY | Facility: HOSPITAL | Age: 82
Discharge: HOME | End: 2022-09-28
Attending: INTERNAL MEDICINE | Admitting: INTERNAL MEDICINE
Payer: COMMERCIAL

## 2022-09-28 DIAGNOSIS — T38.6X5A OSTEOPOROSIS DUE TO AROMATASE INHIBITOR: ICD-10-CM

## 2022-09-28 DIAGNOSIS — M81.8 OSTEOPOROSIS DUE TO AROMATASE INHIBITOR: ICD-10-CM

## 2022-09-28 PROCEDURE — 77080 DXA BONE DENSITY AXIAL: CPT

## 2022-12-02 ENCOUNTER — ONCOLOGY VISIT (OUTPATIENT)
Dept: ONCOLOGY | Facility: HOSPITAL | Age: 82
End: 2022-12-02
Attending: INTERNAL MEDICINE
Payer: COMMERCIAL

## 2022-12-02 ENCOUNTER — LAB (OUTPATIENT)
Dept: INFUSION THERAPY | Facility: HOSPITAL | Age: 82
End: 2022-12-02
Attending: INTERNAL MEDICINE
Payer: COMMERCIAL

## 2022-12-02 VITALS
HEART RATE: 63 BPM | RESPIRATION RATE: 14 BRPM | OXYGEN SATURATION: 99 % | BODY MASS INDEX: 22 KG/M2 | WEIGHT: 124.2 LBS | TEMPERATURE: 97.5 F

## 2022-12-02 DIAGNOSIS — C50.911 INVASIVE LOBULAR CARCINOMA OF BREAST, STAGE 2, RIGHT (H): Primary | ICD-10-CM

## 2022-12-02 DIAGNOSIS — M81.0 AGE-RELATED OSTEOPOROSIS WITHOUT CURRENT PATHOLOGICAL FRACTURE: ICD-10-CM

## 2022-12-02 PROCEDURE — G0463 HOSPITAL OUTPT CLINIC VISIT: HCPCS

## 2022-12-02 PROCEDURE — 99213 OFFICE O/P EST LOW 20 MIN: CPT | Performed by: INTERNAL MEDICINE

## 2022-12-02 RX ORDER — DIPHENHYDRAMINE HCL 25 MG
25 TABLET ORAL
COMMUNITY

## 2022-12-02 ASSESSMENT — PAIN SCALES - GENERAL: PAINLEVEL: NO PAIN (0)

## 2022-12-02 NOTE — LETTER
"    12/2/2022         RE: Fang Estes  [address]  [facility] [address]        Dear Colleague,    Thank you for referring your patient, Fang Estes, to the [facility] [place]. Please see a copy of my visit note below.    Oncology Rooming Note    December 2, 2022 10:02 AM   Fang Estes is a 81 year old female who presents for:    Chief Complaint   Patient presents with     Oncology Clinic Visit     3 month follow up with labs related to Invasive ductal carcinoma of breast, right     Initial Vitals: Pulse 63   Temp 97.5  F (36.4  C) (Tympanic)   Resp 14   Wt 56.3 kg (124 lb 3.2 oz)   SpO2 99%   BMI 22.00 kg/m   Estimated body mass index is 22 kg/m  as calculated from the following:    Height as of 5/24/22: 1.6 m (5' 3\").    Weight as of this encounter: 56.3 kg (124 lb 3.2 oz). Body surface area is 1.58 meters squared.  No Pain (0) Comment: Data Unavailable   No LMP recorded. Patient is postmenopausal.  Allergies reviewed: Yes  Medications reviewed: Yes    Medications: Medication refills not needed today.  Pharmacy name entered into [employer]: [facility] #[id number] - [place] [address] AT [facility] [address]    Clinical concerns: Unable to get patient blood pressure. Vitals machine re-pumped 3 times. Once systolic pumped to 300, I stopped the machine and informed pt we would check BP manually. Dr. Suresh stated he would check patient's BP.     3 month follow up with labs related to Invasive ductal carcinoma of breast, right      LOUIE SHARMA CMA              [facility] Hematology and Oncology Progress Note    Patient: Fang Estes  MRN: [id number]  Date of Service: 06/22/2022        Reason for Visit    Chief Complaint   Patient presents with     Oncology Clinic Visit     3 month follow up with labs related to Invasive ductal carcinoma of breast, right         Problem List Items Addressed This Visit        Other    Invasive lobular carcinoma " of breast, stage 2, right (H) - Primary   Other Visit Diagnoses     Age-related osteoporosis without current pathological fracture                Assessment and Plan    Invasive lobular carcinoma, hormone positive, HER2 negative  Status post lumpectomy.  Surgical path showing a T2, N1 disease.  Oncotype DX came back showing a score of 22.  On Arimidex.  Started about 3 months ago.  Tolerating it well.  Some hot flashes which are getting better.  DEXA scan in September showed osteoporosis.  Currently on Fosamax.  Recommended to take calcium and vitamin D.  Other labs look good today.  Clinical exam is unremarkable.  Lumpectomy scar has healed well.    Her systolic pressure was in 160s to 170s.  She is asymptomatic.  She is a bit anxious today due to her daughter being diagnosed with COVID also she is pregnant.  I asked her to monitor her blood pressure at home.    RTC in 4 months     Cancer Staging   Invasive lobular carcinoma of breast, stage 2, right (H)  Staging form: Breast, AJCC 8th Edition  - Pathologic: Stage IIB (pT2, pN1a(sn), cM0, G2, ER+, FL-, HER2-) - Signed by Reji Suresh MD on 6/8/2022      ECOG Performance    0 - Independent         Problem List    Patient Active Problem List   Diagnosis     Invasive ductal carcinoma of breast, right (H)     Invasive lobular carcinoma of breast, stage 2, right (H)            Interval History   Fang Estes is a 81 year old female with recent diagnosis of invasive lobular carcinoma status post lumpectomy and sentinel lymph node biopsy who is currently on adjuvant Arimidex.  Here for follow-up.    Doing well.  No major issues since last visit.  Some hot flashes.  No other major side effects.  No new bone pain.  No new lumps reported.      Review of Systems  A comprehensive review of systems was negative except for what is noted in the interval history    Current Outpatient Medications   Medication     albuterol (PROAIR HFA/PROVENTIL HFA/VENTOLIN HFA) 108 (90  Base) MCG/ACT inhaler     alendronate (FOSAMAX) 70 MG tablet     anastrozole (ARIMIDEX) 1 MG tablet     diphenhydrAMINE (BENADRYL) 25 MG tablet     lisinopril (ZESTRIL) 5 MG tablet     montelukast (SINGULAIR) 10 MG tablet     vitamin D3 (CHOLECALCIFEROL) 10 MCG (400 UNIT) capsule     No current facility-administered medications for this visit.        Physical Exam    No flowsheet data found.    General: alert and cooperative  HEENT: Head: Normal, normocephalic, atraumatic.  Eye: Normal external eye, conjunctiva, lids cornea, JAH.  Breast: Lumpectomy scar on the right breast noted.  No new masses.  No bilateral axillary adenopathy.  Left breast is normal without any masses  CNS: Alert and oriented x3      Lab Results    Recent Results (from the past 168 hour(s))   CBC with platelets and differential   Result Value Ref Range    WBC Count 6.0 4.0 - 11.0 10e3/uL    RBC Count 4.82 3.80 - 5.20 10e6/uL    Hemoglobin 14.2 11.7 - 15.7 g/dL    Hematocrit 43.9 35.0 - 47.0 %    MCV 91 78 - 100 fL    MCH 29.5 26.5 - 33.0 pg    MCHC 32.3 31.5 - 36.5 g/dL    RDW 12.3 10.0 - 15.0 %    Platelet Count 211 150 - 450 10e3/uL    % Neutrophils 55 %    % Lymphocytes 28 %    % Monocytes 10 %    % Eosinophils 5 %    % Basophils 2 %    % Immature Granulocytes 0 %    NRBCs per 100 WBC 0 <1 /100    Absolute Neutrophils 3.4 1.6 - 8.3 10e3/uL    Absolute Lymphocytes 1.7 0.8 - 5.3 10e3/uL    Absolute Monocytes 0.6 0.0 - 1.3 10e3/uL    Absolute Eosinophils 0.3 0.0 - 0.7 10e3/uL    Absolute Basophils 0.1 0.0 - 0.2 10e3/uL    Absolute Immature Granulocytes 0.0 <=0.4 10e3/uL    Absolute NRBCs 0.0 10e3/uL       Imaging    No results found.     A total of 20 min were spent today on this visit which included face to face conversation with the patient, EMR review, counseling and co-ordination of care and medical documentation.      Signed by: Reji Suresh MD      Again, thank you for allowing me to participate in the care of your patient.         Sincerely,        Reji Suresh MD

## 2022-12-02 NOTE — PROGRESS NOTES
[facility] Hematology and Oncology Progress Note    Patient: Fang Estes  MRN: [id number]  Date of Service: 06/22/2022        Reason for Visit    Chief Complaint   Patient presents with     Oncology Clinic Visit     3 month follow up with labs related to Invasive ductal carcinoma of breast, right         Problem List Items Addressed This Visit        Other    Invasive lobular carcinoma of breast, stage 2, right (H) - Primary   Other Visit Diagnoses     Age-related osteoporosis without current pathological fracture                Assessment and Plan    Invasive lobular carcinoma, hormone positive, HER2 negative  Status post lumpectomy.  Surgical path showing a T2, N1 disease.  Oncotype DX came back showing a score of 22.  On Arimidex.  Started about 3 months ago.  Tolerating it well.  Some hot flashes which are getting better.  DEXA scan in September showed osteoporosis.  Currently on Fosamax.  Recommended to take calcium and vitamin D.  Other labs look good today.  Clinical exam is unremarkable.  Lumpectomy scar has healed well.    Her systolic pressure was in 160s to 170s.  She is asymptomatic.  She is a bit anxious today due to her daughter being diagnosed with COVID also she is pregnant.  I asked her to monitor her blood pressure at home.    RTC in 4 months     Cancer Staging   Invasive lobular carcinoma of breast, stage 2, right (H)  Staging form: Breast, AJCC 8th Edition  - Pathologic: Stage IIB (pT2, pN1a(sn), cM0, G2, ER+, CA-, HER2-) - Signed by Reji Suresh MD on 6/8/2022      ECOG Performance    0 - Independent         Problem List    Patient Active Problem List   Diagnosis     Invasive ductal carcinoma of breast, right (H)     Invasive lobular carcinoma of breast, stage 2, right (H)            Interval History   Fang Estes is a 81 year old female with recent diagnosis of invasive lobular carcinoma status post lumpectomy and sentinel lymph node biopsy who is currently on adjuvant  Arimidex.  Here for follow-up.    Doing well.  No major issues since last visit.  Some hot flashes.  No other major side effects.  No new bone pain.  No new lumps reported.      Review of Systems  A comprehensive review of systems was negative except for what is noted in the interval history    Current Outpatient Medications   Medication     albuterol (PROAIR HFA/PROVENTIL HFA/VENTOLIN HFA) 108 (90 Base) MCG/ACT inhaler     alendronate (FOSAMAX) 70 MG tablet     anastrozole (ARIMIDEX) 1 MG tablet     diphenhydrAMINE (BENADRYL) 25 MG tablet     lisinopril (ZESTRIL) 5 MG tablet     montelukast (SINGULAIR) 10 MG tablet     vitamin D3 (CHOLECALCIFEROL) 10 MCG (400 UNIT) capsule     No current facility-administered medications for this visit.        Physical Exam    No flowsheet data found.    General: alert and cooperative  HEENT: Head: Normal, normocephalic, atraumatic.  Eye: Normal external eye, conjunctiva, lids cornea, JAH.  Breast: Lumpectomy scar on the right breast noted.  No new masses.  No bilateral axillary adenopathy.  Left breast is normal without any masses  CNS: Alert and oriented x3      Lab Results    Recent Results (from the past 168 hour(s))   CBC with platelets and differential   Result Value Ref Range    WBC Count 6.0 4.0 - 11.0 10e3/uL    RBC Count 4.82 3.80 - 5.20 10e6/uL    Hemoglobin 14.2 11.7 - 15.7 g/dL    Hematocrit 43.9 35.0 - 47.0 %    MCV 91 78 - 100 fL    MCH 29.5 26.5 - 33.0 pg    MCHC 32.3 31.5 - 36.5 g/dL    RDW 12.3 10.0 - 15.0 %    Platelet Count 211 150 - 450 10e3/uL    % Neutrophils 55 %    % Lymphocytes 28 %    % Monocytes 10 %    % Eosinophils 5 %    % Basophils 2 %    % Immature Granulocytes 0 %    NRBCs per 100 WBC 0 <1 /100    Absolute Neutrophils 3.4 1.6 - 8.3 10e3/uL    Absolute Lymphocytes 1.7 0.8 - 5.3 10e3/uL    Absolute Monocytes 0.6 0.0 - 1.3 10e3/uL    Absolute Eosinophils 0.3 0.0 - 0.7 10e3/uL    Absolute Basophils 0.1 0.0 - 0.2 10e3/uL    Absolute Immature  Granulocytes 0.0 <=0.4 10e3/uL    Absolute NRBCs 0.0 10e3/uL       Imaging    No results found.     A total of 20 min were spent today on this visit which included face to face conversation with the patient, EMR review, counseling and co-ordination of care and medical documentation.      Signed by: Reji Suresh MD

## 2022-12-02 NOTE — PROGRESS NOTES
"Oncology Rooming Note    December 2, 2022 10:02 AM   Fang Estes is a 81 year old female who presents for:    Chief Complaint   Patient presents with     Oncology Clinic Visit     3 month follow up with labs related to Invasive ductal carcinoma of breast, right     Initial Vitals: Pulse 63   Temp 97.5  F (36.4  C) (Tympanic)   Resp 14   Wt 56.3 kg (124 lb 3.2 oz)   SpO2 99%   BMI 22.00 kg/m   Estimated body mass index is 22 kg/m  as calculated from the following:    Height as of 5/24/22: 1.6 m (5' 3\").    Weight as of this encounter: 56.3 kg (124 lb 3.2 oz). Body surface area is 1.58 meters squared.  No Pain (0) Comment: Data Unavailable   No LMP recorded. Patient is postmenopausal.  Allergies reviewed: Yes  Medications reviewed: Yes    Medications: Medication refills not needed today.  Pharmacy name entered into [employer]: [id number] - [place] [id number] AT [facility] [address]    Clinical concerns: Unable to get patient blood pressure. Vitals machine re-pumped 3 times. Once systolic pumped to 300, I stopped the machine and informed pt we would check BP manually. Dr. Suresh stated he would check patient's BP.     3 month follow up with labs related to Invasive ductal carcinoma of breast, right      LOUIE SHARMA CMA            "

## 2023-04-28 ENCOUNTER — ONCOLOGY VISIT (OUTPATIENT)
Dept: ONCOLOGY | Facility: HOSPITAL | Age: 83
End: 2023-04-28
Attending: INTERNAL MEDICINE
Payer: COMMERCIAL

## 2023-04-28 VITALS
DIASTOLIC BLOOD PRESSURE: 81 MMHG | HEART RATE: 77 BPM | RESPIRATION RATE: 18 BRPM | WEIGHT: 123 LBS | TEMPERATURE: 98.2 F | BODY MASS INDEX: 21 KG/M2 | OXYGEN SATURATION: 98 % | SYSTOLIC BLOOD PRESSURE: 169 MMHG | HEIGHT: 64 IN

## 2023-04-28 DIAGNOSIS — C50.911 INVASIVE DUCTAL CARCINOMA OF BREAST, RIGHT (H): ICD-10-CM

## 2023-04-28 DIAGNOSIS — Z12.31 ENCOUNTER FOR SCREENING MAMMOGRAM FOR BREAST CANCER: Primary | ICD-10-CM

## 2023-04-28 PROCEDURE — G0463 HOSPITAL OUTPT CLINIC VISIT: HCPCS | Performed by: INTERNAL MEDICINE

## 2023-04-28 PROCEDURE — 99213 OFFICE O/P EST LOW 20 MIN: CPT | Performed by: INTERNAL MEDICINE

## 2023-04-28 RX ORDER — ANASTROZOLE 1 MG/1
1 TABLET ORAL DAILY
Qty: 90 TABLET | Refills: 3 | Status: SHIPPED | OUTPATIENT
Start: 2023-04-28 | End: 2024-05-06

## 2023-04-28 ASSESSMENT — PAIN SCALES - GENERAL: PAINLEVEL: NO PAIN (0)

## 2023-04-28 NOTE — PROGRESS NOTES
"Oncology Rooming Note    April 28, 2023 2:11 PM   Fang Estes is a 82 year old female who presents for:    Chief Complaint   Patient presents with     Oncology Clinic Visit     4 month return Invasive lobular carcinoma of breast, stage 2, right,      Initial Vitals: BP (!) 169/81 (BP Location: Left arm, Patient Position: Sitting, Cuff Size: Adult Regular)   Pulse 77   Temp 98.2  F (36.8  C) (Oral)   Resp 18   Ht 1.613 m (5' 3.5\")   Wt 55.8 kg (123 lb)   SpO2 98%   BMI 21.45 kg/m   Estimated body mass index is 21.45 kg/m  as calculated from the following:    Height as of this encounter: 1.613 m (5' 3.5\").    Weight as of this encounter: 55.8 kg (123 lb). Body surface area is 1.58 meters squared.  No Pain (0) Comment: Data Unavailable   No LMP recorded. Patient is postmenopausal.  Allergies reviewed: Yes  Medications reviewed: Yes    Medications: Medication refills not needed today.  Pharmacy name entered into [employer]: [facility] #[id number] - [address] [place] AT [employer] [address]    Clinical concerns: 4 month return Invasive lobular carcinoma of breast, stage 2, right.     Kamryn Reddy CMA            "

## 2023-04-28 NOTE — PROGRESS NOTES
[facility] Hematology and Oncology Progress Note    Patient: Fang Estes  MRN: [id number]  Date of Service: 06/22/2022        Reason for Visit    Chief Complaint   Patient presents with     Oncology Clinic Visit     4 month return Invasive lobular carcinoma of breast, stage 2, right,          Problem List Items Addressed This Visit        Other    Invasive ductal carcinoma of breast, right (H)    Relevant Medications    anastrozole (ARIMIDEX) 1 MG tablet   Other Visit Diagnoses     Encounter for screening mammogram for breast cancer    -  Primary    Relevant Orders    MA Screen Bilateral w/Shaheen (Completed)            Assessment and Plan    Invasive lobular carcinoma, hormone positive, HER2 negative  Status post lumpectomy.  Surgical path showing a T2, N1 disease.  Oncotype DX came back showing a score of 22.  On Arimidex.  Doing well on Arimidex tolerating medical side effects.  Some hot flashes but they are tolerable.  She is due for a mammogram now.  We will get this scheduled.    Osteoporosis  On Fosamax.  Continue vitamin D and calcium.    RTC in 4 months     Cancer Staging   Invasive lobular carcinoma of breast, stage 2, right (H)  Staging form: Breast, AJCC 8th Edition  - Pathologic: Stage IIB (pT2, pN1a(sn), cM0, G2, ER+, ND-, HER2-) - Signed by Reji Suresh MD on 6/8/2022      ECOG Performance    0 - Independent         Problem List    Patient Active Problem List   Diagnosis     Invasive ductal carcinoma of breast, right (H)     Invasive lobular carcinoma of breast, stage 2, right (H)      Oncologic history:  Routine screening mammogram done in April showed some asymmetry compared to her prior mammograms.  She has been known to have heterogeneously dense breasts.  Following this she underwent bilateral diagnostic mammogram which showed right breast mass at 9 o'clock position 2 cm from the nipple with ill-defined margins highly suspicious for malignancy.  It was measured at 7 x 8 x 7 mm by  ultrasound.  She underwent needle biopsy of this which came back positive for invasive ductal carcinoma strongly ER positive.  SC negative and HER2 by FISH negative.  Axillary ultrasound showed no evidence of lymphadenopathy.  She underwent lumpectomy with sentinel node biopsy on 5/24/2022.  Surgical path has come back showing invasive lobular carcinoma measuring 3 x 1.4 x 1.3 cm, [place] grade 2, with perivascular and perineural involvement.  Lymph node was positive for 4 mm focus of metastatic carcinoma.  Final pathologic staging was pT2 pN1a.  Clinically no evidence of metastatic disease    Oncotype DX score was 22.  No benefit from systemic chemotherapy.  She proceeded with adjuvant radiation and is currently on Arimidex.    Interval History   Fang Estes is a 81 year old female with recent diagnosis of invasive lobular carcinoma status post lumpectomy and sentinel lymph node biopsy who is currently on adjuvant Arimidex.  Here for follow-up.    Doing really well on Arimidex without any major side effect.  Some mild hot flashes but nothing significant.  Recently sprained her ankle.  Currently on a brace.  She has osteoporosis and is on Fosamax.  No fractures.      Review of Systems  A comprehensive review of systems was negative except for what is noted in the interval history    Current Outpatient Medications   Medication     albuterol (PROAIR HFA/PROVENTIL HFA/VENTOLIN HFA) 108 (90 Base) MCG/ACT inhaler     alendronate (FOSAMAX) 70 MG tablet     anastrozole (ARIMIDEX) 1 MG tablet     diphenhydrAMINE (BENADRYL) 25 MG tablet     lisinopril (ZESTRIL) 5 MG tablet     montelukast (SINGULAIR) 10 MG tablet     vitamin D3 (CHOLECALCIFEROL) 10 MCG (400 UNIT) capsule     No current facility-administered medications for this visit.        Physical Exam    No flowsheet data found.    General: alert and cooperative  HEENT: Head: Normal, normocephalic, atraumatic.  Eye: Normal external eye, conjunctiva, lids cornea,  JAH.  Breast: No bilateral axilla adenopathy.  Comprehensive exam was not done today.  CNS: Alert and oriented x3      Lab Results    No results found for this or any previous visit (from the past 168 hour(s)).    Imaging    MA Screen Bilateral w/Shaheen    Result Date: 5/10/2023  BILATERAL FULL FIELD DIGITAL SCREENING MAMMOGRAM WITH TOMOSYNTHESIS Performed on: 5/10/23 Compared to: 05/24/2022, 05/24/2022, 04/19/2022, 04/15/2022, 04/08/2021, and 03/02/2020 Technique:  This study was evaluated with the assistance of Computer-Aided Detection.  Breast Tomosynthesis was used in interpretation. Findings: The breasts are heterogeneously dense, which may obscure small masses.  There are breast conservation changes in the right breast., There are benign appearing calcifications in both breasts. There is no radiographic evidence of malignancy.     IMPRESSION: ACR BI-RADS Category 2: Benign RECOMMENDED FOLLOW-UP: Annual routine screening mammogram The results and recommendations of this examination will be communicated to the patient. Valentine Coats MD        A total of 20 min were spent today on this visit which included face to face conversation with the patient, EMR review, counseling and co-ordination of care and medical documentation.      Signed by: Reji Suresh MD

## 2023-04-28 NOTE — LETTER
"    4/28/2023         RE: Fang Estes  [address]  [place] [address]        Dear Colleague,    Thank you for referring your patient, Fang Estes, to the [facility] CANCER [facility]. Please see a copy of my visit note below.    Oncology Rooming Note    April 28, 2023 2:11 PM   Fang Estes is a 82 year old female who presents for:    Chief Complaint   Patient presents with     Oncology Clinic Visit     4 month return Invasive lobular carcinoma of breast, stage 2, right,      Initial Vitals: BP (!) 169/81 (BP Location: Left arm, Patient Position: Sitting, Cuff Size: Adult Regular)   Pulse 77   Temp 98.2  F (36.8  C) (Oral)   Resp 18   Ht 1.613 m (5' 3.5\")   Wt 55.8 kg (123 lb)   SpO2 98%   BMI 21.45 kg/m   Estimated body mass index is 21.45 kg/m  as calculated from the following:    Height as of this encounter: 1.613 m (5' 3.5\").    Weight as of this encounter: 55.8 kg (123 lb). Body surface area is 1.58 meters squared.  No Pain (0) Comment: Data Unavailable   No LMP recorded. Patient is postmenopausal.  Allergies reviewed: Yes  Medications reviewed: Yes    Medications: Medication refills not needed today.  Pharmacy name entered into [employer]: [facility] #[id number] - [place] [id number] [facility] AT [facility] [address]    Clinical concerns: 4 month return Invasive lobular carcinoma of breast, stage 2, right.     Kamryn Reddy [facility] Hematology and Oncology Progress Note    Patient: Fang Estes  MRN: [id number]  Date of Service: 06/22/2022        Reason for Visit    Chief Complaint   Patient presents with     Oncology Clinic Visit     4 month return Invasive lobular carcinoma of breast, stage 2, right,          Problem List Items Addressed This Visit        Other    Invasive ductal carcinoma of breast, right (H)    Relevant Medications    anastrozole (ARIMIDEX) 1 MG tablet   Other Visit Diagnoses     Encounter for screening " mammogram for breast cancer    -  Primary    Relevant Orders    MA Screen Bilateral w/Shaheen (Completed)            Assessment and Plan    Invasive lobular carcinoma, hormone positive, HER2 negative  Status post lumpectomy.  Surgical path showing a T2, N1 disease.  Oncotype DX came back showing a score of 22.  On Arimidex.  Doing well on Arimidex tolerating medical side effects.  Some hot flashes but they are tolerable.  She is due for a mammogram now.  We will get this scheduled.    Osteoporosis  On Fosamax.  Continue vitamin D and calcium.    RTC in 4 months     Cancer Staging   Invasive lobular carcinoma of breast, stage 2, right (H)  Staging form: Breast, AJCC 8th Edition  - Pathologic: Stage IIB (pT2, pN1a(sn), cM0, G2, ER+, IA-, HER2-) - Signed by Reji Sruesh MD on 6/8/2022      ECOG Performance    0 - Independent         Problem List    Patient Active Problem List   Diagnosis     Invasive ductal carcinoma of breast, right (H)     Invasive lobular carcinoma of breast, stage 2, right (H)      Oncologic history:  Routine screening mammogram done in April showed some asymmetry compared to her prior mammograms.  She has been known to have heterogeneously dense breasts.  Following this she underwent bilateral diagnostic mammogram which showed right breast mass at 9 o'clock position 2 cm from the nipple with ill-defined margins highly suspicious for malignancy.  It was measured at 7 x 8 x 7 mm by ultrasound.  She underwent needle biopsy of this which came back positive for invasive ductal carcinoma strongly ER positive.  IA negative and HER2 by FISH negative.  Axillary ultrasound showed no evidence of lymphadenopathy.  She underwent lumpectomy with sentinel node biopsy on 5/24/2022.  Surgical path has come back showing invasive lobular carcinoma measuring 3 x 1.4 x 1.3 cm, [place] grade 2, with perivascular and perineural involvement.  Lymph node was positive for 4 mm focus of metastatic carcinoma.  Final  pathologic staging was pT2 pN1a.  Clinically no evidence of metastatic disease    Oncotype DX score was 22.  No benefit from systemic chemotherapy.  She proceeded with adjuvant radiation and is currently on Arimidex.    Interval History   Fang Estes is a 81 year old female with recent diagnosis of invasive lobular carcinoma status post lumpectomy and sentinel lymph node biopsy who is currently on adjuvant Arimidex.  Here for follow-up.    Doing really well on Arimidex without any major side effect.  Some mild hot flashes but nothing significant.  Recently sprained her ankle.  Currently on a brace.  She has osteoporosis and is on Fosamax.  No fractures.      Review of Systems  A comprehensive review of systems was negative except for what is noted in the interval history    Current Outpatient Medications   Medication     albuterol (PROAIR HFA/PROVENTIL HFA/VENTOLIN HFA) 108 (90 Base) MCG/ACT inhaler     alendronate (FOSAMAX) 70 MG tablet     anastrozole (ARIMIDEX) 1 MG tablet     diphenhydrAMINE (BENADRYL) 25 MG tablet     lisinopril (ZESTRIL) 5 MG tablet     montelukast (SINGULAIR) 10 MG tablet     vitamin D3 (CHOLECALCIFEROL) 10 MCG (400 UNIT) capsule     No current facility-administered medications for this visit.        Physical Exam    No flowsheet data found.    General: alert and cooperative  HEENT: Head: Normal, normocephalic, atraumatic.  Eye: Normal external eye, conjunctiva, lids cornea, JAH.  Breast: No bilateral axilla adenopathy.  Comprehensive exam was not done today.  CNS: Alert and oriented x3      Lab Results    No results found for this or any previous visit (from the past 168 hour(s)).    Imaging    MA Screen Bilateral w/Shaheen    Result Date: 5/10/2023  BILATERAL FULL FIELD DIGITAL SCREENING MAMMOGRAM WITH TOMOSYNTHESIS Performed on: 5/10/23 Compared to: 05/24/2022, 05/24/2022, 04/19/2022, 04/15/2022, 04/08/2021, and 03/02/2020 Technique:  This study was evaluated with the assistance of  Computer-Aided Detection.  Breast Tomosynthesis was used in interpretation. Findings: The breasts are heterogeneously dense, which may obscure small masses.  There are breast conservation changes in the right breast., There are benign appearing calcifications in both breasts. There is no radiographic evidence of malignancy.     IMPRESSION: ACR BI-RADS Category 2: Benign RECOMMENDED FOLLOW-UP: Annual routine screening mammogram The results and recommendations of this examination will be communicated to the patient. Valentine Coats MD        A total of 20 min were spent today on this visit which included face to face conversation with the patient, EMR review, counseling and co-ordination of care and medical documentation.      Signed by: Reji Suresh MD      Again, thank you for allowing me to participate in the care of your patient.        Sincerely,        Reji Suresh MD

## 2023-05-10 ENCOUNTER — ANCILLARY PROCEDURE (OUTPATIENT)
Dept: MAMMOGRAPHY | Facility: CLINIC | Age: 83
End: 2023-05-10
Attending: INTERNAL MEDICINE
Payer: COMMERCIAL

## 2023-05-10 DIAGNOSIS — Z12.31 ENCOUNTER FOR SCREENING MAMMOGRAM FOR BREAST CANCER: ICD-10-CM

## 2023-05-10 PROCEDURE — 77067 SCR MAMMO BI INCL CAD: CPT

## 2023-05-16 NOTE — PROGRESS NOTES
History:  Fang Estes presents for one year follow-up of breast cancer.  She is s/p right lumpectomy with sentinel lymph node biopsy in May 2022.  This was followed by radiation and initiation of endocrine therapy.  She is doing well.  Follows with Dr. Suresh and is taking Arimidex.  She can tell that her right breast is now smaller than her left.  She is not having any issues with bras fitting.  She broke her right ankle skiing in April.  She needed a surgery and is currently wearing a boot.  She is looking forward to the boot coming off because she has a bike trip to [facility] coming up.    Physical exam:  BREAST: Skin darker on the right compared to the left.  The right breast is overall a bit smaller compared to the left.  Right lower outer quadrant incision has palpable scar tissue deep to it.  No suspicious lumps or abnormalities.    Imaging:  Pertinent images personally reviewed by myself and discussed with the patient.  Radiology reports:  BILATERAL FULL FIELD DIGITAL SCREENING MAMMOGRAM WITH TOMOSYNTHESIS  Performed on: 5/10/23  Compared to: 05/24/2022, 05/24/2022, 04/19/2022, 04/15/2022, 04/08/2021, and 03/02/2020  Technique:  This study was evaluated with the assistance of Computer-Aided Detection.  Breast Tomosynthesis was used in interpretation.     Findings: The breasts are heterogeneously dense, which may obscure small masses.  There are breast conservation changes in the right breast., There are benign appearing calcifications in both breasts.  There is no radiographic evidence of malignancy.                                                                    IMPRESSION: ACR BI-RADS Category 2: Benign     RECOMMENDED FOLLOW-UP: Annual routine screening mammogram    My impression:  Radiation changes to the right breast.  Right breast is smaller than the left.  Surgical clip in the central aspect of the breast.    ASSESSMENT:  Fang Estes is s/p right lumpectomy for invasive lobular carcinoma    -  Grade II, T2, N1, ER+, DC-, HER2-  --> pathologic prognostic stage IIB    PLAN:  Most recent imaging reviewed by myself and shown to the patient  Repeat mammogram in 1 year  Return to the breast clinic as needed    Nida Mitchell DO  General Surgeon  [facility]  Breast [place] - [address] [address]  Office: [phone]  Employed by - [facility]

## 2023-05-17 ENCOUNTER — OFFICE VISIT (OUTPATIENT)
Dept: SURGERY | Facility: CLINIC | Age: 83
End: 2023-05-17
Attending: FAMILY MEDICINE
Payer: COMMERCIAL

## 2023-05-17 DIAGNOSIS — Z85.3 HX: BREAST CANCER: Primary | ICD-10-CM

## 2023-05-17 PROCEDURE — 99212 OFFICE O/P EST SF 10 MIN: CPT | Performed by: SURGERY

## 2023-05-17 PROCEDURE — G0463 HOSPITAL OUTPT CLINIC VISIT: HCPCS | Performed by: SURGERY

## 2023-05-17 NOTE — LETTER
5/17/2023         RE: Fang Estes  [address]  [place] [address]        Dear Colleague,    Thank you for referring your patient, Fang Estes, to the [facility] BREAST CLINIC [place]. Please see a copy of my visit note below.    History:  Fang Estes presents for one year follow-up of breast cancer.  She is s/p right lumpectomy with sentinel lymph node biopsy in May 2022.  This was followed by radiation and initiation of endocrine therapy.  She is doing well.  Follows with Dr. Suresh and is taking Arimidex.  She can tell that her right breast is now smaller than her left.  She is not having any issues with bras fitting.  She broke her right ankle skiing in April.  She needed a surgery and is currently wearing a boot.  She is looking forward to the boot coming off because she has a bike trip to [facility] coming up.    Physical exam:  BREAST: Skin darker on the right compared to the left.  The right breast is overall a bit smaller compared to the left.  Right lower outer quadrant incision has palpable scar tissue deep to it.  No suspicious lumps or abnormalities.    Imaging:  Pertinent images personally reviewed by myself and discussed with the patient.  Radiology reports:  BILATERAL FULL FIELD DIGITAL SCREENING MAMMOGRAM WITH TOMOSYNTHESIS  Performed on: 5/10/23  Compared to: 05/24/2022, 05/24/2022, 04/19/2022, 04/15/2022, 04/08/2021, and 03/02/2020  Technique:  This study was evaluated with the assistance of Computer-Aided Detection.  Breast Tomosynthesis was used in interpretation.     Findings: The breasts are heterogeneously dense, which may obscure small masses.  There are breast conservation changes in the right breast., There are benign appearing calcifications in both breasts.  There is no radiographic evidence of malignancy.                                                                    IMPRESSION: ACR BI-RADS Category 2: Benign     RECOMMENDED FOLLOW-UP: Annual routine  screening mammogram    My impression:  Radiation changes to the right breast.  Right breast is smaller than the left.  Surgical clip in the central aspect of the breast.    ASSESSMENT:  Fang US Franklyn is s/p right lumpectomy for invasive lobular carcinoma    - Grade II, T2, N1, ER+, MD-, HER2-  --> pathologic prognostic stage IIB    PLAN:  Most recent imaging reviewed by myself and shown to the patient  Repeat mammogram in 1 year  Return to the breast clinic as needed    Nida Mitchell DO  General Surgeon  [facility]  Breast [address] [address]  Office: [phone]  Employed by - [facility] [employer] [facility] [employer]        Again, thank you for allowing me to participate in the care of your patient.        Sincerely,        Nida Mitchell DO

## 2023-05-17 NOTE — NURSING NOTE
Nataliya presents to [facility] for a follow up appointment with Dr. Mitchell .Patient notes no new breast concerns.  Patient had mammogram done 5-10-23, see report for details.  She is following with Dr. Suresh for medical oncology and is taking endocrine therapy, tolerating it well.  RN assessment and EMRupdate.  Patient met with Dr. Mitchell .  See dictation for details of visit and follow up plan.  Support provided, invited calls.  RN time 12 mins.

## 2023-06-04 ENCOUNTER — HEALTH MAINTENANCE LETTER (OUTPATIENT)
Age: 83
End: 2023-06-04

## 2023-06-26 ENCOUNTER — HOSPITAL ENCOUNTER (EMERGENCY)
Facility: HOSPITAL | Age: 83
Discharge: HOME | End: 2023-06-26
Attending: EMERGENCY MEDICINE | Admitting: EMERGENCY MEDICINE
Payer: COMMERCIAL

## 2023-06-26 VITALS
DIASTOLIC BLOOD PRESSURE: 69 MMHG | TEMPERATURE: 98.4 F | HEIGHT: 63 IN | HEART RATE: 76 BPM | BODY MASS INDEX: 22.13 KG/M2 | RESPIRATION RATE: 16 BRPM | OXYGEN SATURATION: 98 % | SYSTOLIC BLOOD PRESSURE: 151 MMHG | WEIGHT: 124.9 LBS

## 2023-06-26 DIAGNOSIS — L03.115 CELLULITIS OF RIGHT LOWER LEG: ICD-10-CM

## 2023-06-26 PROCEDURE — 99283 EMERGENCY DEPT VISIT LOW MDM: CPT

## 2023-06-26 PROCEDURE — 250N000013 HC RX MED GY IP 250 OP 250 PS 637: Performed by: EMERGENCY MEDICINE

## 2023-06-26 RX ORDER — CEPHALEXIN 500 MG/1
500 CAPSULE ORAL ONCE
Status: COMPLETED | OUTPATIENT
Start: 2023-06-26 | End: 2023-06-26

## 2023-06-26 RX ORDER — CEPHALEXIN 500 MG/1
500 CAPSULE ORAL 3 TIMES DAILY
Qty: 21 CAPSULE | Refills: 0 | Status: SHIPPED | OUTPATIENT
Start: 2023-06-26 | End: 2023-07-03

## 2023-06-26 RX ADMIN — CEPHALEXIN 500 MG: 500 CAPSULE ORAL at 21:33

## 2023-06-26 ASSESSMENT — ENCOUNTER SYMPTOMS
CHILLS: 1
FEVER: 1
WOUND: 1

## 2023-06-26 ASSESSMENT — ACTIVITIES OF DAILY LIVING (ADL): ADLS_ACUITY_SCORE: 35

## 2023-06-27 NOTE — ED PROVIDER NOTES
EMERGENCY DEPARTMENT ENCOUNTER      NAME: Fang Estes  AGE: 82 year old female  YOB: 1940  MRN: [id number]  EVALUATION DATE & TIME: 2023  8:09 PM    PCP: Santiago Silverio    ED PROVIDER: Violeta Mueller DO      Chief Complaint   Patient presents with     Wound Check         FINAL IMPRESSION:  1. Cellulitis of right lower leg          ED COURSE & MEDICAL DECISION MAKIN-year-old female presented to the ED for evaluation of multiple wounds on her right lower leg that she [place] while [place] 1 [place].  Here in the ED the patient was hypertensive upon arrival.  She was otherwise hemodynamically stable.  The patient did not appear to be in any obvious distress or discomfort at the time of her initial evaluation.  On exam the patient was noted to have 3 distinct areas of erythema, warmth, and minimal tenderness palpation.  Although one of the lesions had a central shallow ulceration there was no associated crepitus or fluctuant areas noted with any of the 3 lesions.  Patient did not have any diffuse calf swelling, tenderness ovation, or warmth.  No neurovascular deficits were noted in the right lower extremity.    Following initial evaluation the patient was informed that her findings appear consistent with cellulitis.  I do not suspect that the patient's symptoms related to a DVT based upon her physical exam.  After educating and reassuring the patient she was given a dose of Keflex here in the ED to treat the cellulitis.  The patient was then sent home with a prescription for Keflex to treat the cellulitis.  The patient was informed that the redness/swelling/pain should subside after she has been on the antibiotics for at least 48 hours.  The patient was instructed to follow-up with her primary care provider for reevaluation or to return back to ED sooner for any worsening symptoms especially if they are occurring after she has been on the antibiotics for 48  hours.    Pertinent Labs & Imaging studies reviewed. (See chart for details)  8:34 PM I met with the patient to gather history and to perform my initial exam. We discussed plans for the ED course, including diagnostic testing and treatment.   9:13 PM We discussed plans for discharge.     At the conclusion of the encounter I discussed the results of all of the tests and the disposition. The questions were answered. The patient or family acknowledged understanding and was agreeable with the care plan.     Medical Decision Making    History:    Supplemental history from: Documented in chart, if applicable    External Record(s) reviewed: Documented in chart, if applicable.    Work Up:    Chart documentation includes differential considered and any EKGs or imaging independently interpreted by provider, where specified.    In additional to work up documented, I considered the following work up: Documented in chart, if applicable.    External consultation:    Discussion of management with another provider: Documented in chart, if applicable    Complicating factors:    Care impacted by chronic illness: N/A    Care affected by social determinants of health: N/A    Disposition considerations: Discharge. I prescribed additional prescription strength medication(s) as charted. N/A.    PPE worn: n95 mask, goggles    MEDICATIONS GIVEN IN THE EMERGENCY:  Medications   cephALEXin (KEFLEX) capsule 500 mg (500 mg Oral $Given 6/26/23 2133)       NEW PRESCRIPTIONS STARTED AT TODAY'S ER VISIT  Discharge Medication List as of 6/26/2023 10:02 PM      START taking these medications    Details   cephALEXin (KEFLEX) 500 MG capsule Take 1 capsule (500 mg) by mouth 3 times daily for 7 days, Disp-21 capsule, R-0, E-Prescribe                =================================================================    HPI    Patient information was obtained from: Patient     Use of : N/A       Fang MAGEN Franklyn is a 82 year old female with a  pertinent history of hypertension, asthma, stage 3 CKD, who presents to this ED via walk in for evaluation of wound check.     The patient went on an international bike ride where she rode 220 miles. On 06/20, she notice a scrap/bug bite on back of right lower calf. She reports that the wound open and starting gushing pus. Then on Thursday, she notice a scrap/bite on the front of her right lower extremity. She also has 2 little one on the left lower extremity. There are mild swelling right ankle but she reports having ankle surgery in April. Currently, she notes feeling warm and cold. She denies any other wound, or any other medical concerns.     REVIEW OF SYSTEMS   Review of Systems   Constitutional: Positive for chills and fever.   Skin: Positive for wound (right and left lower extremity).   All other systems reviewed and are negative.       PAST MEDICAL HISTORY:  Past Medical History:   Diagnosis Date     Hypertension      Uncomplicated asthma        PAST SURGICAL HISTORY:  Past Surgical History:   Procedure Laterality Date     APPENDECTOMY       BREAST EXCISIONAL BIOPSY Left 01/01/1980     LUMPECTOMY BREAST Right 5/24/2022    Procedure: WIRE LOCALIZED LUMPECTOMY WITH SENTINEL LYMPH NODE BIOPSY;  Surgeon: Nida Mitchell DO;  Location: [facility]           CURRENT MEDICATIONS:    cephALEXin (KEFLEX) 500 MG capsule  albuterol (PROAIR HFA/PROVENTIL HFA/VENTOLIN HFA) 108 (90 Base) MCG/ACT inhaler  alendronate (FOSAMAX) 70 MG tablet  anastrozole (ARIMIDEX) 1 MG tablet  diphenhydrAMINE (BENADRYL) 25 MG tablet  lisinopril (ZESTRIL) 5 MG tablet  montelukast (SINGULAIR) 10 MG tablet  vitamin D3 (CHOLECALCIFEROL) 10 MCG (400 UNIT) capsule        ALLERGIES:  Allergies   Allergen Reactions     Ace Inhibitors Cough     Losartan Other (See Comments)     Shortness of breath, worsening cough(only very mild with lisinopril, much worse with this)     Other (Do Not Use) Other (See Comments)     Cats, horses, dust.      "Amoxicillin Rash       FAMILY HISTORY:  Family History   Problem Relation Age of Onset     Breast Cancer Daughter 45.00       SOCIAL HISTORY:   Social History     Socioeconomic History     Marital status:      Spouse name: None     Number of children: None     Years of education: None     Highest education level: None   Tobacco Use     Smoking status: Never     Smokeless tobacco: Never   Substance and Sexual Activity     Drug use: Never       VITALS:  BP (!) 151/69   Pulse 76   Temp 98.4  F (36.9  C) (Oral)   Resp 16   Ht 1.6 m (5' 3\")   Wt 56.7 kg (124 lb 14.4 oz)   SpO2 98%   BMI 22.13 kg/m      PHYSICAL EXAM    General presentation: Alert, Vital signs reviewed. No apparent distress.   HENT: ENT inspection is normal. Oropharynx is moist and clear.   Eye: Pupils are equal and reactive to light. EOMI  Neck: The neck is supple.  Circulatory: Peripheral pulses are strong and equal in the bilateral upper lower extremities.   Neurologic: Alert, oriented to person, place, and time. No gross motor or sensory deficits noted in the upper or lower extremities. Cranial nerves II through XII are grossly intact.  Musculoskeletal: No extremity tenderness. Full range of motion in all extremities. No extremity edema.   Skin: Skin color is normal. Warm. Dry to touch. 3 small distinctive area erythema and warmth noted right lower leg. 2 anterior and 1 posterior upper calf. There is a shallow ulceration of lesion of back of right calf, no crepitus or fluctuant. No calf swelling to palpation.             I, Lucia Shen, am serving as a scribe to document services personally performed by Violeta Mueller DO based on my observation and the provider's statements to me. I, Violeta Mueller, attest that Lucia Shen is acting in a scribe capacity, has observed my performance of the services and has documented them in accordance with my direction.    Violeta Mueller DO  Emergency Medicine  [facility] EMERGENCY " DEPARTMENT  [address] [place] [id number]  [phone]        Violeta Mueller DO  06/27/23 0106

## 2023-06-27 NOTE — DISCHARGE INSTRUCTIONS
Your skin findings appear consistent with cellulitis.  Take the prescribed antibiotic as directed for the next 7 days to treat the infection.  Your redness, swelling, and pain should subside after you have been on the antibiotics for at least 48 hours.  Follow-up with your primary care provider for routine reevaluation or return back to ED sooner for any new or worsening symptoms.

## 2023-06-27 NOTE — ED TRIAGE NOTES
Patient just got back from an international bike ride where she rode 220miles. Notices some scraps or bites on right calf. On the flight home, she noticed it is red and hot and painful tonight.      Triage Assessment     Row Name 06/26/23 2007       Triage Assessment (Adult)    Airway WDL WDL       Respiratory WDL    Respiratory WDL WDL       Skin Circulation/Temperature WDL    Skin Circulation/Temperature WDL X  wounds to right calf       Cardiac WDL    Cardiac WDL WDL       Peripheral/Neurovascular WDL    Peripheral Neurovascular WDL WDL       Cognitive/Neuro/Behavioral WDL    Cognitive/Neuro/Behavioral WDL WDL

## 2023-07-19 ENCOUNTER — PATIENT OUTREACH (OUTPATIENT)
Dept: ONCOLOGY | Facility: CLINIC | Age: 83
End: 2023-07-19
Payer: COMMERCIAL

## 2023-07-19 NOTE — PROGRESS NOTES
[facility]: Cancer Care                                                                                            Attempted to call patient today to discuss the cancer treatment summary that was prepared and sent via [employer]. Left a generic voice message requesting a call back at my direct phone number: [phone].    Liz Hughes RN BSN  Cancer Survivorship Coordinator

## 2023-07-25 NOTE — PROGRESS NOTES
[facility]: Cancer Care                                                                                            Attempted to reach patient two separate times to review survivorship care plan. Patient was not able to be reached at this time. A call back number was left for the patient if they have any future survivorship needs. No additional attempts will be made.    Liz Hughes RN BSN  Cancer Survivorship Coordinator

## 2023-08-23 ENCOUNTER — ONCOLOGY VISIT (OUTPATIENT)
Dept: ONCOLOGY | Facility: HOSPITAL | Age: 83
End: 2023-08-23
Attending: INTERNAL MEDICINE
Payer: COMMERCIAL

## 2023-08-23 VITALS
WEIGHT: 122.3 LBS | HEART RATE: 62 BPM | RESPIRATION RATE: 16 BRPM | DIASTOLIC BLOOD PRESSURE: 86 MMHG | OXYGEN SATURATION: 98 % | SYSTOLIC BLOOD PRESSURE: 172 MMHG | BODY MASS INDEX: 21.66 KG/M2 | TEMPERATURE: 98.4 F

## 2023-08-23 DIAGNOSIS — C50.911 INVASIVE LOBULAR CARCINOMA OF BREAST, STAGE 2, RIGHT (H): Primary | ICD-10-CM

## 2023-08-23 PROCEDURE — 99213 OFFICE O/P EST LOW 20 MIN: CPT | Performed by: INTERNAL MEDICINE

## 2023-08-23 PROCEDURE — G0463 HOSPITAL OUTPT CLINIC VISIT: HCPCS | Performed by: INTERNAL MEDICINE

## 2023-08-23 ASSESSMENT — PAIN SCALES - GENERAL: PAINLEVEL: NO PAIN (0)

## 2023-08-23 NOTE — PROGRESS NOTES
[facility] Hematology and Oncology Progress Note    Patient: Fang Estes  MRN: [id number]  Date of Service: 06/22/2022        Reason for Visit    Chief Complaint   Patient presents with    Oncology Clinic Visit     Follow up         Problem List Items Addressed This Visit          Other    Invasive lobular carcinoma of breast, stage 2, right (H) - Primary           Assessment and Plan    Invasive lobular carcinoma, hormone positive, HER2 negative  Status post lumpectomy.  Surgical path showing a T2, N1 disease.  Oncotype DX came back showing a score of 22.  On Arimidex.  Doing well on Arimidex tolerating medical side effects.  Some hot flashes but they are tolerable.  Mammogram from May was unremarkable.  Clinical exam unremarkable today.  Continue Arimidex.  We will see her back in 4 to 6 months.    Osteoporosis  On Fosamax.  Continue vitamin D and calcium.           Cancer Staging   Invasive lobular carcinoma of breast, stage 2, right (H)  Staging form: Breast, AJCC 8th Edition  - Pathologic: Stage IIB (pT2, pN1a(sn), cM0, G2, ER+, OH-, HER2-) - Signed by Reji Suresh MD on 6/8/2022      ECOG Performance    0 - Independent         Problem List    Patient Active Problem List   Diagnosis    Invasive ductal carcinoma of breast, right (H)    Invasive lobular carcinoma of breast, stage 2, right (H)      Oncologic history:  Routine screening mammogram done in April showed some asymmetry compared to her prior mammograms.  She has been known to have heterogeneously dense breasts.  Following this she underwent bilateral diagnostic mammogram which showed right breast mass at 9 o'clock position 2 cm from the nipple with ill-defined margins highly suspicious for malignancy.  It was measured at 7 x 8 x 7 mm by ultrasound.  She underwent needle biopsy of this which came back positive for invasive ductal carcinoma strongly ER positive.  OH negative and HER2 by FISH negative.  Axillary ultrasound showed no  evidence of lymphadenopathy.  She underwent lumpectomy with sentinel node biopsy on 5/24/2022.  Surgical path has come back showing invasive lobular carcinoma measuring 3 x 1.4 x 1.3 cm, [place] grade 2, with perivascular and perineural involvement.  Lymph node was positive for 4 mm focus of metastatic carcinoma.  Final pathologic staging was pT2 pN1a.  Clinically no evidence of metastatic disease    Oncotype DX score was 22.  No benefit from systemic chemotherapy.  She proceeded with adjuvant radiation and is currently on Arimidex.    Interval History   Fang Estes is a 81 year old female with recent diagnosis of invasive lobular carcinoma status post lumpectomy and sentinel lymph node biopsy who is currently on adjuvant Arimidex.  Here for follow-up.    She continues to do well on Arimidex.  No major side effects.  She was seen recently in the ER for right lower extremity wounds.  Was diagnosed with cellulitis.  Treated with Keflex.  Symptoms have completely resolved.      Review of Systems  A comprehensive review of systems was negative except for what is noted in the interval history    Current Outpatient Medications   Medication    albuterol (PROAIR HFA/PROVENTIL HFA/VENTOLIN HFA) 108 (90 Base) MCG/ACT inhaler    alendronate (FOSAMAX) 70 MG tablet    anastrozole (ARIMIDEX) 1 MG tablet    diphenhydrAMINE (BENADRYL) 25 MG tablet    lisinopril (ZESTRIL) 5 MG tablet    montelukast (SINGULAIR) 10 MG tablet    vitamin D3 (CHOLECALCIFEROL) 10 MCG (400 UNIT) capsule     No current facility-administered medications for this visit.        Physical Exam        General: alert and cooperative  HEENT: Head: Normal, normocephalic, atraumatic.  Eye: Normal external eye, conjunctiva, lids cornea, JAH.  Breast: No bilateral axilla adenopathy.  Status post right lumpectomy  CNS: Alert and oriented x3      Lab Results    No results found for this or any previous visit (from the past 168 hour(s)).    Imaging    No results  found.         Signed by: Reji Suresh MD

## 2023-08-23 NOTE — PROGRESS NOTES
"Oncology Rooming Note    August 23, 2023 1:57 PM   Fang Estes is a 82 year old female who presents for:    Chief Complaint   Patient presents with    Oncology Clinic Visit     Follow up     Initial Vitals: BP (!) 172/86 (BP Location: Left arm, Patient Position: Sitting)   Pulse 62   Temp 98.4  F (36.9  C)   Resp 16   Wt 55.5 kg (122 lb 4.8 oz)   SpO2 98%   BMI 21.66 kg/m   Estimated body mass index is 21.66 kg/m  as calculated from the following:    Height as of 6/26/23: 1.6 m (5' 3\").    Weight as of this encounter: 55.5 kg (122 lb 4.8 oz). Body surface area is 1.57 meters squared.  No Pain (0) Comment: Data Unavailable   No LMP recorded. Patient is postmenopausal.  Allergies reviewed: Yes  Medications reviewed: Yes    Medications: Medication refills not needed today.  Pharmacy name entered into [employer]: [facility] #[id number] - [place] [address] AT [facility] [address]    Clinical concerns: Follow up, no concerns  Dr. Suresh was notified.      Elvira Yan RN              "

## 2023-08-23 NOTE — LETTER
8/23/2023         RE: Fang Estes  [address]  [facility] [address]        Dear Colleague,    Thank you for referring your patient, Fang Estes, to the [facility]. Please see a copy of my visit note below.    [facility] Hematology and Oncology Progress Note    Patient: Fang Estes  MRN: [id number]  Date of Service: 06/22/2022        Reason for Visit    Chief Complaint   Patient presents with     Oncology Clinic Visit     Follow up         Problem List Items Addressed This Visit          Other    Invasive lobular carcinoma of breast, stage 2, right (H) - Primary           Assessment and Plan    Invasive lobular carcinoma, hormone positive, HER2 negative  Status post lumpectomy.  Surgical path showing a T2, N1 disease.  Oncotype DX came back showing a score of 22.  On Arimidex.  Doing well on Arimidex tolerating medical side effects.  Some hot flashes but they are tolerable.  Mammogram from May was unremarkable.  Clinical exam unremarkable today.  Continue Arimidex.  We will see her back in 4 to 6 months.    Osteoporosis  On Fosamax.  Continue vitamin D and calcium.           Cancer Staging   Invasive lobular carcinoma of breast, stage 2, right (H)  Staging form: Breast, AJCC 8th Edition  - Pathologic: Stage IIB (pT2, pN1a(sn), cM0, G2, ER+, NM-, HER2-) - Signed by Reji Suresh MD on 6/8/2022      ECOG Performance    0 - Independent         Problem List    Patient Active Problem List   Diagnosis     Invasive ductal carcinoma of breast, right (H)     Invasive lobular carcinoma of breast, stage 2, right (H)      Oncologic history:  Routine screening mammogram done in April showed some asymmetry compared to her prior mammograms.  She has been known to have heterogeneously dense breasts.  Following this she underwent bilateral diagnostic mammogram which showed right breast mass at 9 o'clock position 2 cm from the nipple with ill-defined margins highly  suspicious for malignancy.  It was measured at 7 x 8 x 7 mm by ultrasound.  She underwent needle biopsy of this which came back positive for invasive ductal carcinoma strongly ER positive.  [place] negative and HER2 by FISH negative.  Axillary ultrasound showed no evidence of lymphadenopathy.  She underwent lumpectomy with sentinel node biopsy on 5/24/2022.  Surgical path has come back showing invasive lobular carcinoma measuring 3 x 1.4 x 1.3 cm, [place] grade 2, with perivascular and perineural involvement.  Lymph node was positive for 4 mm focus of metastatic carcinoma.  Final pathologic staging was pT2 pN1a.  Clinically no evidence of metastatic disease    Oncotype DX score was 22.  No benefit from systemic chemotherapy.  She proceeded with adjuvant radiation and is currently on Arimidex.    Interval History   Fang Estes is a 81 year old female with recent diagnosis of invasive lobular carcinoma status post lumpectomy and sentinel lymph node biopsy who is currently on adjuvant Arimidex.  Here for follow-up.    She continues to do well on Arimidex.  No major side effects.  She was seen recently in the ER for right lower extremity wounds.  Was diagnosed with cellulitis.  Treated with Keflex.  Symptoms have completely resolved.      Review of Systems  A comprehensive review of systems was negative except for what is noted in the interval history    Current Outpatient Medications   Medication     albuterol (PROAIR HFA/PROVENTIL HFA/VENTOLIN HFA) 108 (90 Base) MCG/ACT inhaler     alendronate (FOSAMAX) 70 MG tablet     anastrozole (ARIMIDEX) 1 MG tablet     diphenhydrAMINE (BENADRYL) 25 MG tablet     lisinopril (ZESTRIL) 5 MG tablet     montelukast (SINGULAIR) 10 MG tablet     vitamin D3 (CHOLECALCIFEROL) 10 MCG (400 UNIT) capsule     No current facility-administered medications for this visit.        Physical Exam        General: alert and cooperative  HEENT: Head: Normal, normocephalic, atraumatic.  Eye: Normal  "external eye, conjunctiva, lids cornea, JAH.  Breast: No bilateral axilla adenopathy.  Status post right lumpectomy  CNS: Alert and oriented x3      Lab Results    No results found for this or any previous visit (from the past 168 hour(s)).    Imaging    No results found.         Signed by: Reji Suresh MD    Oncology Rooming Note    August 23, 2023 1:57 PM   Fang Estes is a 82 year old female who presents for:    Chief Complaint   Patient presents with     Oncology Clinic Visit     Follow up     Initial Vitals: BP (!) 172/86 (BP Location: Left arm, Patient Position: Sitting)   Pulse 62   Temp 98.4  F (36.9  C)   Resp 16   Wt 55.5 kg (122 lb 4.8 oz)   SpO2 98%   BMI 21.66 kg/m   Estimated body mass index is 21.66 kg/m  as calculated from the following:    Height as of 6/26/23: 1.6 m (5' 3\").    Weight as of this encounter: 55.5 kg (122 lb 4.8 oz). Body surface area is 1.57 meters squared.  No Pain (0) Comment: Data Unavailable   No LMP recorded. Patient is postmenopausal.  Allergies reviewed: Yes  Medications reviewed: Yes    Medications: Medication refills not needed today.  Pharmacy name entered into [employer]: [id number] - [address] AT [facility] [address]    Clinical concerns: Follow up, no concerns  Dr. Suresh was notified.      Elvira Yan RN                Again, thank you for allowing me to participate in the care of your patient.        Sincerely,        Reji Suresh MD  "

## 2023-12-19 NOTE — ADDENDUM NOTE
Encounter addended by: Adrienne Bass, PT on: 12/19/2023 2:25 PM   Actions taken: Episode resolved, Clinical Note Signed, Flowsheet accepted

## 2023-12-19 NOTE — PROGRESS NOTES
DISCHARGE  Reason for Discharge: Patient has met all goals. Pt to follow up in 1 month if further PT needed arose. Pt did not return.    Equipment Issued: none    Discharge Plan: Patient to continue home program.    Referring Provider:  Deisy Duron     07/22/22 1000   Appointment Info   Signing clinician's name / credentials Adrienne Bass, PT, DPT, CLT-LATOSHA   Visits Used 1/1   Treatment Interventions (PT)   Interventions Therapeutic Procedure/Exercise;Self Care/Home Management   Therapeutic Procedure/Exercise   Therapeutic Procedures: strength, endurance, ROM, flexibillity minutes (63902) 10   Skilled Intervention Initiation of HEP for thoracic and shoulder mobility to improve ROM limitations   Patient Response/Progress well tolerated   Self Care/home Management   ADL/Home Mgmt Training (04163) 5   Patient Response/Progress verbalized understanding   Treatment Detail Education on CFM/MFR to cording on R arm, education on strength training to reduce likelihood of any swelling occuring, education on how to return to PT if needed   Eval/Assessments   PT Eval, Low Complexity Minutes (78769) 15   Plan   Plan for next session pt to follow up within 30 days if further PT is needed   Medicare Claim Information   Medical diagnosis Malignant neoplasm of upper-outer quadrant of right breast in female, estrogen receptor positive (H)   Therapy diagnosis right axillary cording, decreased ROM R shoulder   Start of care 07/22/22   Certification date from 07/22/22   Goal 1   Goal identifier HEP   Goal description Pt will be independent in HEP and self-management of condition including self-massage techniques to reduce cording   Target date 07/22/22   Date met 07/22/22

## 2024-02-16 ENCOUNTER — ONCOLOGY VISIT (OUTPATIENT)
Dept: ONCOLOGY | Facility: HOSPITAL | Age: 84
End: 2024-02-16
Attending: INTERNAL MEDICINE
Payer: COMMERCIAL

## 2024-02-16 VITALS
OXYGEN SATURATION: 99 % | RESPIRATION RATE: 20 BRPM | SYSTOLIC BLOOD PRESSURE: 174 MMHG | WEIGHT: 125 LBS | BODY MASS INDEX: 22.15 KG/M2 | DIASTOLIC BLOOD PRESSURE: 87 MMHG | HEART RATE: 74 BPM | TEMPERATURE: 97.7 F | HEIGHT: 63 IN

## 2024-02-16 DIAGNOSIS — Z12.31 ENCOUNTER FOR SCREENING MAMMOGRAM FOR BREAST CANCER: Primary | ICD-10-CM

## 2024-02-16 DIAGNOSIS — C50.911 INVASIVE LOBULAR CARCINOMA OF BREAST, STAGE 2, RIGHT (H): ICD-10-CM

## 2024-02-16 PROCEDURE — G0463 HOSPITAL OUTPT CLINIC VISIT: HCPCS | Performed by: INTERNAL MEDICINE

## 2024-02-16 PROCEDURE — 99214 OFFICE O/P EST MOD 30 MIN: CPT | Performed by: INTERNAL MEDICINE

## 2024-02-16 RX ORDER — LISINOPRIL 10 MG/1
TABLET ORAL
COMMUNITY
Start: 2024-02-07

## 2024-02-16 ASSESSMENT — PAIN SCALES - GENERAL: PAINLEVEL: NO PAIN (0)

## 2024-02-16 NOTE — PROGRESS NOTES
[facility] Hematology and Oncology Progress Note    Patient: Fang Estes  MRN: [id number]  Date of Service: 06/22/2022        Reason for Visit    Chief Complaint   Patient presents with    Oncology Clinic Visit     5 month return Invasive lobular carcinoma of breast, stage 2, right,          Problem List Items Addressed This Visit          Other    Invasive lobular carcinoma of breast, stage 2, right (H)     Other Visit Diagnoses       Encounter for screening mammogram for breast cancer    -  Primary    Relevant Orders    MA Screen Bilateral w/Shaheen                  Assessment and Plan    Invasive lobular carcinoma, hormone positive, HER2 negative  Status post lumpectomy.  Surgical path showing a T2, N1 disease.  Oncotype DX came back showing a score of 22.  On Arimidex.      Doing well.  No significant issues.  Denies any new lumps or bumps.  Due for mammogram in May.  Continue Arimidex for now.  Will see her back after her mammogram in May.    Osteoporosis  On Fosamax.  Continue vitamin D and calcium.           Cancer Staging   Invasive lobular carcinoma of breast, stage 2, right (H)  Staging form: Breast, AJCC 8th Edition  - Pathologic: Stage IIB (pT2, pN1a(sn), cM0, G2, ER+, UT-, HER2-) - Signed by Reji Suresh MD on 6/8/2022      ECOG Performance    0 - Independent         Problem List    Patient Active Problem List   Diagnosis    Invasive ductal carcinoma of breast, right (H)    Invasive lobular carcinoma of breast, stage 2, right (H)      Oncologic history:  Routine screening mammogram done in April showed some asymmetry compared to her prior mammograms.  She has been known to have heterogeneously dense breasts.  Following this she underwent bilateral diagnostic mammogram which showed right breast mass at 9 o'clock position 2 cm from the nipple with ill-defined margins highly suspicious for malignancy.  It was measured at 7 x 8 x 7 mm by ultrasound.  She underwent needle biopsy of this which  came back positive for invasive ductal carcinoma strongly ER positive.  SC negative and HER2 by FISH negative.  Axillary ultrasound showed no evidence of lymphadenopathy.  She underwent lumpectomy with sentinel node biopsy on 5/24/2022.  Surgical path has come back showing invasive lobular carcinoma measuring 3 x 1.4 x 1.3 cm, Merari grade 2, with perivascular and perineural involvement.  Lymph node was positive for 4 mm focus of metastatic carcinoma.  Final pathologic staging was pT2 pN1a.  Clinically no evidence of metastatic disease    Oncotype DX score was 22.  No benefit from systemic chemotherapy.  She proceeded with adjuvant radiation and is currently on Arimidex.    Interval History   Fang Estes is a 81 year old female with recent diagnosis of invasive lobular carcinoma status post lumpectomy and sentinel lymph node biopsy who is currently on adjuvant Arimidex.  Here for follow-up.    Doing well.  Denies any new issues today.  No fever new lumps or bumps reported.      Review of Systems  A comprehensive review of systems was negative except for what is noted in the interval history    Current Outpatient Medications   Medication    albuterol (PROAIR HFA/PROVENTIL HFA/VENTOLIN HFA) 108 (90 Base) MCG/ACT inhaler    alendronate (FOSAMAX) 70 MG tablet    anastrozole (ARIMIDEX) 1 MG tablet    diphenhydrAMINE (BENADRYL) 25 MG tablet    lisinopril (ZESTRIL) 10 MG tablet    montelukast (SINGULAIR) 10 MG tablet    vitamin D3 (CHOLECALCIFEROL) 10 MCG (400 UNIT) capsule    lisinopril (ZESTRIL) 5 MG tablet     No current facility-administered medications for this visit.        Physical Exam        General: alert and cooperative  HEENT: Head: Normal, normocephalic, atraumatic.  Eye: Normal external eye, conjunctiva, lids cornea, JAH.  Breast: No bilateral axilla adenopathy.  Status post right lumpectomy  CNS: Alert and oriented x3      Lab Results    No results found for this or any previous visit (from the past  168 hour(s)).    Imaging    No results found.         Signed by: Reji Suresh MD

## 2024-02-16 NOTE — PROGRESS NOTES
"Oncology Rooming Note    February 16, 2024 2:17 PM   Fang Estes is a 83 year old female who presents for:    Chief Complaint   Patient presents with    Oncology Clinic Visit     5 month return Invasive lobular carcinoma of breast, stage 2, right,      Initial Vitals: BP (!) 174/87 (BP Location: Left arm, Patient Position: Sitting, Cuff Size: Adult Regular)   Pulse 74   Temp 97.7  F (36.5  C) (Oral)   Resp 20   Ht 1.6 m (5' 3\")   Wt 56.7 kg (125 lb)   SpO2 99%   BMI 22.14 kg/m   Estimated body mass index is 22.14 kg/m  as calculated from the following:    Height as of this encounter: 1.6 m (5' 3\").    Weight as of this encounter: 56.7 kg (125 lb). Body surface area is 1.59 meters squared.  No Pain (0) Comment: Data Unavailable   No LMP recorded. Patient is postmenopausal.  Allergies reviewed: Yes  Medications reviewed: Yes    Medications: Medication refills not needed today.  Pharmacy name entered into [employer]: [facility] #[id number] - [address] AT [facility] & [facility]    Frailty Screening:   Is the patient here for a new oncology consult visit in cancer care? 1. Yes. Over the past month, have you experienced difficulty or required a caregiver to assist with:   1. Balance, walking or general mobility (including any falls)? NO  2. Completion of self-care tasks such as bathing, dressing, toileting, grooming/hygiene?  NO  3. Concentration or memory that affects your daily life?  NO       Clinical concerns:  5 month return Invasive lobular carcinoma of breast, stage 2, right,       Kamryn Reddy CMA              "

## 2024-02-16 NOTE — LETTER
2/16/2024         RE: Fang Estes  [address]  [place] [address]        Dear Colleague,    Thank you for referring your patient, Fang Estes, to the [facility] [place]. Please see a copy of my visit note below.    [facility] Hematology and Oncology Progress Note    Patient: Fang Estes  MRN: [id number]  Date of Service: 06/22/2022        Reason for Visit    Chief Complaint   Patient presents with     Oncology Clinic Visit     5 month return Invasive lobular carcinoma of breast, stage 2, right,          Problem List Items Addressed This Visit          Other    Invasive lobular carcinoma of breast, stage 2, right (H)     Other Visit Diagnoses       Encounter for screening mammogram for breast cancer    -  Primary    Relevant Orders    MA Screen Bilateral w/Shaheen                  Assessment and Plan    Invasive lobular carcinoma, hormone positive, HER2 negative  Status post lumpectomy.  Surgical path showing a T2, N1 disease.  Oncotype DX came back showing a score of 22.  On Arimidex.      Doing well.  No significant issues.  Denies any new lumps or bumps.  Due for mammogram in May.  Continue Arimidex for now.  Will see her back after her mammogram in May.    Osteoporosis  On Fosamax.  Continue vitamin D and calcium.           Cancer Staging   Invasive lobular carcinoma of breast, stage 2, right (H)  Staging form: Breast, AJCC 8th Edition  - Pathologic: Stage IIB (pT2, pN1a(sn), cM0, G2, ER+, NV-, HER2-) - Signed by Reji Suresh MD on 6/8/2022      ECOG Performance    0 - Independent         Problem List    Patient Active Problem List   Diagnosis     Invasive ductal carcinoma of breast, right (H)     Invasive lobular carcinoma of breast, stage 2, right (H)      Oncologic history:  Routine screening mammogram done in April showed some asymmetry compared to her prior mammograms.  She has been known to have heterogeneously dense breasts.  Following this she  underwent bilateral diagnostic mammogram which showed right breast mass at 9 o'clock position 2 cm from the nipple with ill-defined margins highly suspicious for malignancy.  It was measured at 7 x 8 x 7 mm by ultrasound.  She underwent needle biopsy of this which came back positive for invasive ductal carcinoma strongly ER positive.  IA negative and HER2 by FISH negative.  Axillary ultrasound showed no evidence of lymphadenopathy.  She underwent lumpectomy with sentinel node biopsy on 5/24/2022.  Surgical path has come back showing invasive lobular carcinoma measuring 3 x 1.4 x 1.3 cm, [place] grade 2, with perivascular and perineural involvement.  Lymph node was positive for 4 mm focus of metastatic carcinoma.  Final pathologic staging was pT2 pN1a.  Clinically no evidence of metastatic disease    Oncotype DX score was 22.  No benefit from systemic chemotherapy.  She proceeded with adjuvant radiation and is currently on Arimidex.    Interval History   Fang Estes is a 81 year old female with recent diagnosis of invasive lobular carcinoma status post lumpectomy and sentinel lymph node biopsy who is currently on adjuvant Arimidex.  Here for follow-up.    Doing well.  Denies any new issues today.  No fever new lumps or bumps reported.      Review of Systems  A comprehensive review of systems was negative except for what is noted in the interval history    Current Outpatient Medications   Medication     albuterol (PROAIR HFA/PROVENTIL HFA/VENTOLIN HFA) 108 (90 Base) MCG/ACT inhaler     alendronate (FOSAMAX) 70 MG tablet     anastrozole (ARIMIDEX) 1 MG tablet     diphenhydrAMINE (BENADRYL) 25 MG tablet     lisinopril (ZESTRIL) 10 MG tablet     montelukast (SINGULAIR) 10 MG tablet     vitamin D3 (CHOLECALCIFEROL) 10 MCG (400 UNIT) capsule     lisinopril (ZESTRIL) 5 MG tablet     No current facility-administered medications for this visit.        Physical Exam        General: alert and cooperative  HEENT: Head:  "Normal, normocephalic, atraumatic.  Eye: Normal external eye, conjunctiva, lids cornea, JAH.  Breast: No bilateral axilla adenopathy.  Status post right lumpectomy  CNS: Alert and oriented x3      Lab Results    No results found for this or any previous visit (from the past 168 hour(s)).    Imaging    No results found.         Signed by: Reji Suresh MD    Oncology Rooming Note    February 16, 2024 2:17 PM   Fang Estes is a 83 year old female who presents for:    Chief Complaint   Patient presents with     Oncology Clinic Visit     5 month return Invasive lobular carcinoma of breast, stage 2, right,      Initial Vitals: BP (!) 174/87 (BP Location: Left arm, Patient Position: Sitting, Cuff Size: Adult Regular)   Pulse 74   Temp 97.7  F (36.5  C) (Oral)   Resp 20   Ht 1.6 m (5' 3\")   Wt 56.7 kg (125 lb)   SpO2 99%   BMI 22.14 kg/m   Estimated body mass index is 22.14 kg/m  as calculated from the following:    Height as of this encounter: 1.6 m (5' 3\").    Weight as of this encounter: 56.7 kg (125 lb). Body surface area is 1.59 meters squared.  No Pain (0) Comment: Data Unavailable   No LMP recorded. Patient is postmenopausal.  Allergies reviewed: Yes  Medications reviewed: Yes    Medications: Medication refills not needed today.  Pharmacy name entered into [facility]: [facility] #[id number] - [address] AT [facility] &  [address]    Frailty Screening:   Is the patient here for a new oncology consult visit in cancer care? 1. Yes. Over the past month, have you experienced difficulty or required a caregiver to assist with:   1. Balance, walking or general mobility (including any falls)? NO  2. Completion of self-care tasks such as bathing, dressing, toileting, grooming/hygiene?  NO  3. Concentration or memory that affects your daily life?  NO       Clinical concerns:  5 month return Invasive lobular carcinoma of breast, stage 2, right,       Kamryn Reddy, " CMA                Again, thank you for allowing me to participate in the care of your patient.        Sincerely,        Reji Suresh MD

## 2024-05-06 DIAGNOSIS — C50.911 INVASIVE DUCTAL CARCINOMA OF BREAST, RIGHT (H): ICD-10-CM

## 2024-05-06 RX ORDER — ANASTROZOLE 1 MG/1
1 TABLET ORAL DAILY
Qty: 90 TABLET | Refills: 0 | Status: SHIPPED | OUTPATIENT
Start: 2024-05-06 | End: 2024-08-09

## 2024-05-14 ENCOUNTER — ANCILLARY PROCEDURE (OUTPATIENT)
Dept: MAMMOGRAPHY | Facility: HOSPITAL | Age: 84
End: 2024-05-14
Attending: INTERNAL MEDICINE
Payer: COMMERCIAL

## 2024-05-14 DIAGNOSIS — Z12.31 ENCOUNTER FOR SCREENING MAMMOGRAM FOR BREAST CANCER: ICD-10-CM

## 2024-05-14 PROCEDURE — 77063 BREAST TOMOSYNTHESIS BI: CPT

## 2024-05-17 ENCOUNTER — ONCOLOGY VISIT (OUTPATIENT)
Dept: ONCOLOGY | Facility: HOSPITAL | Age: 84
End: 2024-05-17
Payer: COMMERCIAL

## 2024-05-17 ENCOUNTER — PATIENT OUTREACH (OUTPATIENT)
Dept: ONCOLOGY | Facility: HOSPITAL | Age: 84
End: 2024-05-17
Payer: COMMERCIAL

## 2024-05-17 VITALS
OXYGEN SATURATION: 98 % | TEMPERATURE: 98.7 F | DIASTOLIC BLOOD PRESSURE: 86 MMHG | WEIGHT: 123.8 LBS | BODY MASS INDEX: 21.93 KG/M2 | HEIGHT: 63 IN | HEART RATE: 74 BPM | SYSTOLIC BLOOD PRESSURE: 147 MMHG | RESPIRATION RATE: 16 BRPM

## 2024-05-17 DIAGNOSIS — M81.0 AGE-RELATED OSTEOPOROSIS WITHOUT CURRENT PATHOLOGICAL FRACTURE: Primary | ICD-10-CM

## 2024-05-17 DIAGNOSIS — C50.911 INVASIVE LOBULAR CARCINOMA OF BREAST, STAGE 2, RIGHT (H): ICD-10-CM

## 2024-05-17 PROCEDURE — G0463 HOSPITAL OUTPT CLINIC VISIT: HCPCS | Performed by: INTERNAL MEDICINE

## 2024-05-17 PROCEDURE — G2211 COMPLEX E/M VISIT ADD ON: HCPCS | Performed by: INTERNAL MEDICINE

## 2024-05-17 PROCEDURE — 99214 OFFICE O/P EST MOD 30 MIN: CPT | Performed by: INTERNAL MEDICINE

## 2024-05-17 ASSESSMENT — PAIN SCALES - GENERAL: PAINLEVEL: NO PAIN (0)

## 2024-05-17 NOTE — LETTER
"    5/17/2024         RE: Fang Estes  [address]  [place] [address]        Dear Colleague,    Thank you for referring your patient, Fang Estes, to the [facility] [place]. Please see a copy of my visit note below.    Oncology Rooming Note    May 17, 2024 12:19 PM   Fang Estes is a 83 year old female who presents for:    Chief Complaint   Patient presents with     Oncology Clinic Visit     3 month return visit related to Invasive lobular carcinoma of breast, stage 2, right.     Initial Vitals: BP (!) 147/86 (BP Location: Left arm, Patient Position: Sitting, Cuff Size: Adult Regular)   Pulse 74   Temp 98.7  F (37.1  C) (Tympanic)   Resp 16   Ht 1.6 m (5' 3\")   Wt 56.2 kg (123 lb 12.8 oz)   SpO2 98%   BMI 21.93 kg/m   Estimated body mass index is 21.93 kg/m  as calculated from the following:    Height as of this encounter: 1.6 m (5' 3\").    Weight as of this encounter: 56.2 kg (123 lb 12.8 oz). Body surface area is 1.58 meters squared.  No Pain (0) Comment: Data Unavailable   No LMP recorded. Patient is postmenopausal.  Allergies reviewed: Yes  Medications reviewed: Yes    Medications: Medication refills not needed today.  Pharmacy name entered into [facility]: [id number] - [facility] [address] [facility] AT [facility] [address]    Frailty Screening:   Is the patient here for a new oncology consult visit in cancer care? 2. No      Clinical concerns: none.      Nery Price, [facility] Hematology and Oncology Progress Note    Patient: Fang Estes  MRN: [id number]  Date of Service: 06/22/2022        Reason for Visit    Chief Complaint   Patient presents with     Oncology Clinic Visit     3 month return visit related to Invasive lobular carcinoma of breast, stage 2, right.         Problem List Items Addressed This Visit          Other    Invasive lobular carcinoma of breast, stage 2, right (H)     Other Visit Diagnoses  "      Age-related osteoporosis without current pathological fracture    -  Primary    Relevant Orders    DX Bone Density                  Assessment and Plan    Invasive lobular carcinoma, hormone positive, HER2 negative  Status post lumpectomy.  Surgical path showing a T2, N1 disease.  Oncotype DX came back showing a score of 22.  On Arimidex.      Overall doing well.  No concerns for any recurrent malignancy at this point in time.  Recent mammogram report reviewed.  No abnormalities.  Clinical exam unremarkable today.  Continue Arimidex.  I will see her back in 6 months.  He is almost 2 years out from diagnosis and treatment.    Osteoporosis  On Fosamax.  Continue vitamin D and calcium.  Needs a DEXA scan.     Cancer Staging   Invasive lobular carcinoma of breast, stage 2, right (H)  Staging form: Breast, AJCC 8th Edition  - Pathologic: Stage IIB (pT2, pN1a(sn), cM0, G2, ER+, MD-, HER2-) - Signed by Reji Suresh MD on 6/8/2022      ECOG Performance    0 - Independent         Problem List    Patient Active Problem List   Diagnosis     Invasive ductal carcinoma of breast, right (H)     Invasive lobular carcinoma of breast, stage 2, right (H)      Oncologic history:  Routine screening mammogram done in April showed some asymmetry compared to her prior mammograms.  She has been known to have heterogeneously dense breasts.  Following this she underwent bilateral diagnostic mammogram which showed right breast mass at 9 o'clock position 2 cm from the nipple with ill-defined margins highly suspicious for malignancy.  It was measured at 7 x 8 x 7 mm by ultrasound.  She underwent needle biopsy of this which came back positive for invasive ductal carcinoma strongly ER positive.  MD negative and HER2 by FISH negative.  Axillary ultrasound showed no evidence of lymphadenopathy.  She underwent lumpectomy with sentinel node biopsy on 5/24/2022.  Surgical path has come back showing invasive lobular carcinoma measuring 3 x  1.4 x 1.3 cm, [place] grade 2, with perivascular and perineural involvement.  Lymph node was positive for 4 mm focus of metastatic carcinoma.  Final pathologic staging was pT2 pN1a.  Clinically no evidence of metastatic disease    Oncotype DX score was 22.  No benefit from systemic chemotherapy.  She proceeded with adjuvant radiation and is currently on Arimidex.    Interval History   Fang Estes is a 81 year old female with recent diagnosis of invasive lobular carcinoma status post lumpectomy and sentinel lymph node biopsy who is currently on adjuvant Arimidex.  Here for follow-up.    Doing well.  Denies any new issues today.  No fever new lumps or bumps reported.  No significant side effects from anastrozole.  Had a mammogram recently.      Review of Systems  A comprehensive review of systems was negative except for what is noted in the interval history    Current Outpatient Medications   Medication Sig Dispense Refill     albuterol (PROAIR HFA/PROVENTIL HFA/VENTOLIN HFA) 108 (90 Base) MCG/ACT inhaler INHALE 2 PUFFS BY MOUTH EVERY 4 HOURS AS NEEDED       alendronate (FOSAMAX) 70 MG tablet TAKE 1 TABLET BY MOUTH WEEKLY 30 MINUTES BEFORE FIRST FOOD/DRINK/MEDICATION. AVOID LYING DOWN FOR 30 MINUTES       anastrozole (ARIMIDEX) 1 MG tablet Take 1 tablet (1 mg) by mouth daily 90 tablet 0     diphenhydrAMINE (BENADRYL) 25 MG tablet Take 25 mg by mouth nightly as needed for sleep       lisinopril (ZESTRIL) 10 MG tablet        montelukast (SINGULAIR) 10 MG tablet TAKE 1 TABLET BY MOUTH DAILY AT BEDTIME       vitamin D3 (CHOLECALCIFEROL) 10 MCG (400 UNIT) capsule Take 1 capsule by mouth daily       lisinopril (ZESTRIL) 5 MG tablet Take 5 mg by mouth daily (Patient not taking: Reported on 5/17/2024)       No current facility-administered medications for this visit.        Physical Exam    Failed to redirect to the Timeline version of the [facility] SmartLink.    General: alert and cooperative  HEENT: Head: Normal,  normocephalic, atraumatic.  Eye: Normal external eye, conjunctiva, lids cornea, JAH.  Breast: No bilateral axilla adenopathy.  Status post right lumpectomy  CNS: Alert and oriented x3      Lab Results    No results found for this or any previous visit (from the past 168 hour(s)).    Imaging    MA Screen Bilateral w/Shaheen    Result Date: 5/14/2024  BILATERAL FULL FIELD DIGITAL SCREENING MAMMOGRAM WITH TOMOSYNTHESIS Performed on: 5/14/24 Compared to: 05/10/2023, 04/15/2022, and 04/08/2021 Technique:  This study was evaluated with the assistance of Computer-Aided Detection.  Breast Tomosynthesis was used in interpretation. Findings: The breasts are heterogeneously dense, which may obscure small masses.  There are breast conservation changes in the right breast. There is no radiographic evidence of malignancy.     IMPRESSION: ACR BI-RADS Category 2: Benign BREAST CANCER SCREENING RECOMMENDATION: Routine yearly mammography beginning at age 40 or as discussed with your provider. The results and recommendations of this examination will be communicated to the patient. Hector Banuelos       The longitudinal plan of care for the diagnosis(es)/condition(s) as documented were addressed during this visit. Due to the added complexity in care, I will continue to support Nataliya in the subsequent management and with ongoing continuity of care.      Signed by: Reji Suresh MD      Again, thank you for allowing me to participate in the care of your patient.        Sincerely,        Reji Suresh MD

## 2024-05-27 NOTE — PROGRESS NOTES
[facility] Hematology and Oncology Progress Note    Patient: Fang Estes  MRN: [id number]  Date of Service: 06/22/2022        Reason for Visit    Chief Complaint   Patient presents with    Oncology Clinic Visit     3 month return visit related to Invasive lobular carcinoma of breast, stage 2, right.         Problem List Items Addressed This Visit          Other    Invasive lobular carcinoma of breast, stage 2, right (H)     Other Visit Diagnoses       Age-related osteoporosis without current pathological fracture    -  Primary    Relevant Orders    DX Bone Density                  Assessment and Plan    Invasive lobular carcinoma, hormone positive, HER2 negative  Status post lumpectomy.  Surgical path showing a T2, N1 disease.  Oncotype DX came back showing a score of 22.  On Arimidex.      Overall doing well.  No concerns for any recurrent malignancy at this point in time.  Recent mammogram report reviewed.  No abnormalities.  Clinical exam unremarkable today.  Continue Arimidex.  I will see her back in 6 months.  He is almost 2 years out from diagnosis and treatment.    Osteoporosis  On Fosamax.  Continue vitamin D and calcium.  Needs a DEXA scan.     Cancer Staging   Invasive lobular carcinoma of breast, stage 2, right (H)  Staging form: Breast, AJCC 8th Edition  - Pathologic: Stage IIB (pT2, pN1a(sn), cM0, G2, ER+, CA-, HER2-) - Signed by Reji Suresh MD on 6/8/2022      ECOG Performance    0 - Independent         Problem List    Patient Active Problem List   Diagnosis    Invasive ductal carcinoma of breast, right (H)    Invasive lobular carcinoma of breast, stage 2, right (H)      Oncologic history:  Routine screening mammogram done in April showed some asymmetry compared to her prior mammograms.  She has been known to have heterogeneously dense breasts.  Following this she underwent bilateral diagnostic mammogram which showed right breast mass at 9 o'clock position 2 cm from the nipple with  ill-defined margins highly suspicious for malignancy.  It was measured at 7 x 8 x 7 mm by ultrasound.  She underwent needle biopsy of this which came back positive for invasive ductal carcinoma strongly ER positive.  IN negative and HER2 by FISH negative.  Axillary ultrasound showed no evidence of lymphadenopathy.  She underwent lumpectomy with sentinel node biopsy on 5/24/2022.  Surgical path has come back showing invasive lobular carcinoma measuring 3 x 1.4 x 1.3 cm, [place] grade 2, with perivascular and perineural involvement.  Lymph node was positive for 4 mm focus of metastatic carcinoma.  Final pathologic staging was pT2 pN1a.  Clinically no evidence of metastatic disease    Oncotype DX score was 22.  No benefit from systemic chemotherapy.  She proceeded with adjuvant radiation and is currently on Arimidex.    Interval History   Fang Estes is a 81 year old female with recent diagnosis of invasive lobular carcinoma status post lumpectomy and sentinel lymph node biopsy who is currently on adjuvant Arimidex.  Here for follow-up.    Doing well.  Denies any new issues today.  No fever new lumps or bumps reported.  No significant side effects from anastrozole.  Had a mammogram recently.      Review of Systems  A comprehensive review of systems was negative except for what is noted in the interval history    Current Outpatient Medications   Medication Sig Dispense Refill    albuterol (PROAIR HFA/PROVENTIL HFA/VENTOLIN HFA) 108 (90 Base) MCG/ACT inhaler INHALE 2 PUFFS BY MOUTH EVERY 4 HOURS AS NEEDED      alendronate (FOSAMAX) 70 MG tablet TAKE 1 TABLET BY MOUTH WEEKLY 30 MINUTES BEFORE FIRST FOOD/DRINK/MEDICATION. AVOID LYING DOWN FOR 30 MINUTES      anastrozole (ARIMIDEX) 1 MG tablet Take 1 tablet (1 mg) by mouth daily 90 tablet 0    diphenhydrAMINE (BENADRYL) 25 MG tablet Take 25 mg by mouth nightly as needed for sleep      lisinopril (ZESTRIL) 10 MG tablet       montelukast (SINGULAIR) 10 MG tablet TAKE 1  TABLET BY MOUTH DAILY AT BEDTIME      vitamin D3 (CHOLECALCIFEROL) 10 MCG (400 UNIT) capsule Take 1 capsule by mouth daily      lisinopril (ZESTRIL) 5 MG tablet Take 5 mg by mouth daily (Patient not taking: Reported on 5/17/2024)       No current facility-administered medications for this visit.        Physical Exam    Failed to redirect to the Timeline version of the [facility] SmartLink.    General: alert and cooperative  HEENT: Head: Normal, normocephalic, atraumatic.  Eye: Normal external eye, conjunctiva, lids cornea, JAH.  Breast: No bilateral axilla adenopathy.  Status post right lumpectomy  CNS: Alert and oriented x3      Lab Results    No results found for this or any previous visit (from the past 168 hour(s)).    Imaging    MA Screen Bilateral w/Shaheen    Result Date: 5/14/2024  BILATERAL FULL FIELD DIGITAL SCREENING MAMMOGRAM WITH TOMOSYNTHESIS Performed on: 5/14/24 Compared to: 05/10/2023, 04/15/2022, and 04/08/2021 Technique:  This study was evaluated with the assistance of Computer-Aided Detection.  Breast Tomosynthesis was used in interpretation. Findings: The breasts are heterogeneously dense, which may obscure small masses.  There are breast conservation changes in the right breast. There is no radiographic evidence of malignancy.     IMPRESSION: ACR BI-RADS Category 2: Benign BREAST CANCER SCREENING RECOMMENDATION: Routine yearly mammography beginning at age 40 or as discussed with your provider. The results and recommendations of this examination will be communicated to the patient. Hector Banuelos       The longitudinal plan of care for the diagnosis(es)/condition(s) as documented were addressed during this visit. Due to the added complexity in care, I will continue to support Nataliya in the subsequent management and with ongoing continuity of care.      Signed by: Reji Suresh MD

## 2024-07-21 ENCOUNTER — HEALTH MAINTENANCE LETTER (OUTPATIENT)
Age: 84
End: 2024-07-21

## 2024-08-09 DIAGNOSIS — C50.911 INVASIVE DUCTAL CARCINOMA OF BREAST, RIGHT (H): ICD-10-CM

## 2024-08-09 RX ORDER — ANASTROZOLE 1 MG/1
1 TABLET ORAL DAILY
Qty: 90 TABLET | Refills: 0 | Status: SHIPPED | OUTPATIENT
Start: 2024-08-09

## 2024-08-09 NOTE — TELEPHONE ENCOUNTER
Faxed refill request received today from [facility] requesting a refill of patient's anastrozole. Refill sent.     Liz Hughes RN on 8/9/2024 at 9:01 AM

## 2024-09-10 ENCOUNTER — TELEPHONE (OUTPATIENT)
Dept: ONCOLOGY | Facility: HOSPITAL | Age: 84
End: 2024-09-10
Payer: COMMERCIAL

## 2024-09-10 NOTE — TELEPHONE ENCOUNTER
Nataliya called and left a message on the  phone requesting a refill of her anastrozole.  I called the pharmacy to double check that they received the 90-day prescription sent over on 8/9.  I spoke to a pharmacist who states they did not receive this.  He did take a verbal order over the phone.  I called Nataliya back to let her know that they have the prescription and will be getting it filled for her.  She has no other questions at this time.    Liz Hughes RN on 9/10/2024 at 10:18 AM

## 2024-09-30 ENCOUNTER — HOSPITAL ENCOUNTER (OUTPATIENT)
Dept: BONE DENSITY | Facility: HOSPITAL | Age: 84
Discharge: HOME | End: 2024-09-30
Attending: INTERNAL MEDICINE | Admitting: INTERNAL MEDICINE
Payer: COMMERCIAL

## 2024-09-30 DIAGNOSIS — M81.0 AGE-RELATED OSTEOPOROSIS WITHOUT CURRENT PATHOLOGICAL FRACTURE: ICD-10-CM

## 2024-09-30 PROCEDURE — 77080 DXA BONE DENSITY AXIAL: CPT

## 2024-10-07 NOTE — PROGRESS NOTES
"Oncology Rooming Note    September 6, 2022 8:35 AM   Fang Estes is a 81 year old female who presents for:    Chief Complaint   Patient presents with     Oncology Clinic Visit     Breast Cancer     Initial Vitals: BP (!) 170/87   Pulse 75   Resp 16   Wt 56.2 kg (124 lb)   SpO2 99%   BMI 21.97 kg/m   Estimated body mass index is 21.97 kg/m  as calculated from the following:    Height as of 5/24/22: 1.6 m (5' 3\").    Weight as of this encounter: 56.2 kg (124 lb). Body surface area is 1.58 meters squared.  No Pain (0) Comment: Data Unavailable   No LMP recorded. Patient is postmenopausal.  Allergies reviewed: Yes  Medications reviewed: Yes    Medications: Medication refills not needed today.  Pharmacy name entered into [employer] [facility]: [id number] [address] AT [facility] [address]    Clinical concerns: Feeling well, no fatigue, no erythema, cording in right arm is much improved post-PT.  Dr. Duron was notified.      Cris Lindsey RN              " An angiography was performed of the infrarenal abdominal aorta

## 2024-11-22 PROBLEM — M81.0 AGE-RELATED OSTEOPOROSIS WITHOUT CURRENT PATHOLOGICAL FRACTURE: Status: ACTIVE | Noted: 2024-11-22

## 2024-12-09 DIAGNOSIS — C50.911 INVASIVE DUCTAL CARCINOMA OF BREAST, RIGHT (H): ICD-10-CM

## 2024-12-09 RX ORDER — ANASTROZOLE 1 MG/1
1 TABLET ORAL DAILY
Qty: 90 TABLET | Refills: 0 | Status: SHIPPED | OUTPATIENT
Start: 2024-12-09

## 2024-12-09 NOTE — TELEPHONE ENCOUNTER
I received a phone call today from Nataliya.  She is calling to request a refill of her anastrozole.  This was last filled 3 months ago.  Per Dr. Suresh's note from 11/22/2024, patient is doing well on Arimidex and will continue Arimidex as long as she is tolerating it.  Refill sent.  Patient aware.    Liz Hughes RN on 12/9/2024 at 8:51 AM

## 2025-03-03 ENCOUNTER — APPOINTMENT (OUTPATIENT)
Dept: CT IMAGING | Facility: HOSPITAL | Age: 85
DRG: 065 | End: 2025-03-03
Attending: STUDENT IN AN ORGANIZED HEALTH CARE EDUCATION/TRAINING PROGRAM
Payer: COMMERCIAL

## 2025-03-03 ENCOUNTER — HOSPITAL ENCOUNTER (INPATIENT)
Facility: HOSPITAL | Age: 85
LOS: 3 days | Discharge: NURSING, REHAB OR LONG-TERM CARE | End: 2025-03-06
Attending: STUDENT IN AN ORGANIZED HEALTH CARE EDUCATION/TRAINING PROGRAM
Payer: COMMERCIAL

## 2025-03-03 ENCOUNTER — APPOINTMENT (OUTPATIENT)
Dept: MRI IMAGING | Facility: HOSPITAL | Age: 85
DRG: 065 | End: 2025-03-03
Attending: STUDENT IN AN ORGANIZED HEALTH CARE EDUCATION/TRAINING PROGRAM
Payer: COMMERCIAL

## 2025-03-03 DIAGNOSIS — I63.9 LEFT PONTINE CVA (H): Primary | ICD-10-CM

## 2025-03-03 DIAGNOSIS — R47.01 APHASIA: ICD-10-CM

## 2025-03-03 DIAGNOSIS — I63.9 ACUTE CVA (CEREBROVASCULAR ACCIDENT) (H): ICD-10-CM

## 2025-03-03 PROBLEM — N18.30 CHRONIC KIDNEY DISEASE, STAGE III (MODERATE) (H): Status: ACTIVE | Noted: 2025-03-03

## 2025-03-03 PROBLEM — Q78.2 OSTEOPETROSIS: Status: ACTIVE | Noted: 2025-03-03

## 2025-03-03 PROBLEM — J45.909 ASTHMA: Status: ACTIVE | Noted: 2025-03-03

## 2025-03-03 PROBLEM — I10 BENIGN ESSENTIAL HYPERTENSION: Status: ACTIVE | Noted: 2025-03-03

## 2025-03-03 LAB
ANION GAP SERPL CALCULATED.3IONS-SCNC: 11 MMOL/L (ref 7–15)
APTT PPP: 28 SECONDS (ref 22–38)
BASOPHILS # BLD AUTO: 0.1 10E3/UL (ref 0–0.2)
BASOPHILS NFR BLD AUTO: 2 %
BUN SERPL-MCNC: 26.9 MG/DL (ref 8–23)
CALCIUM SERPL-MCNC: 9.7 MG/DL (ref 8.8–10.4)
CHLORIDE SERPL-SCNC: 100 MMOL/L (ref 98–107)
CHOLEST SERPL-MCNC: 234 MG/DL
CREAT SERPL-MCNC: 1.11 MG/DL (ref 0.51–0.95)
EGFRCR SERPLBLD CKD-EPI 2021: 49 ML/MIN/1.73M2
EOSINOPHIL # BLD AUTO: 0.4 10E3/UL (ref 0–0.7)
EOSINOPHIL NFR BLD AUTO: 6 %
ERYTHROCYTE [DISTWIDTH] IN BLOOD BY AUTOMATED COUNT: 12.4 % (ref 10–15)
EST. AVERAGE GLUCOSE BLD GHB EST-MCNC: 134 MG/DL
GLUCOSE SERPL-MCNC: 121 MG/DL (ref 70–99)
HBA1C MFR BLD: 6.3 %
HCO3 SERPL-SCNC: 27 MMOL/L (ref 22–29)
HCT VFR BLD AUTO: 41.3 % (ref 35–47)
HDLC SERPL-MCNC: 52 MG/DL
HGB BLD-MCNC: 13.6 G/DL (ref 11.7–15.7)
IMM GRANULOCYTES # BLD: 0 10E3/UL
IMM GRANULOCYTES NFR BLD: 0 %
INR PPP: 0.95 (ref 0.85–1.15)
LDLC SERPL CALC-MCNC: 153 MG/DL
LYMPHOCYTES # BLD AUTO: 1.8 10E3/UL (ref 0.8–5.3)
LYMPHOCYTES NFR BLD AUTO: 23 %
MCH RBC QN AUTO: 28.8 PG (ref 26.5–33)
MCHC RBC AUTO-ENTMCNC: 32.9 G/DL (ref 31.5–36.5)
MCV RBC AUTO: 87 FL (ref 78–100)
MONOCYTES # BLD AUTO: 0.6 10E3/UL (ref 0–1.3)
MONOCYTES NFR BLD AUTO: 8 %
NEUTROPHILS # BLD AUTO: 4.6 10E3/UL (ref 1.6–8.3)
NEUTROPHILS NFR BLD AUTO: 61 %
NONHDLC SERPL-MCNC: 182 MG/DL
NRBC # BLD AUTO: 0 10E3/UL
NRBC BLD AUTO-RTO: 0 /100
PLATELET # BLD AUTO: 225 10E3/UL (ref 150–450)
POTASSIUM SERPL-SCNC: 4.1 MMOL/L (ref 3.4–5.3)
RBC # BLD AUTO: 4.73 10E6/UL (ref 3.8–5.2)
SODIUM SERPL-SCNC: 138 MMOL/L (ref 135–145)
TRIGL SERPL-MCNC: 146 MG/DL
TROPONIN T SERPL HS-MCNC: 15 NG/L
WBC # BLD AUTO: 7.6 10E3/UL (ref 4–11)

## 2025-03-03 PROCEDURE — 36415 COLL VENOUS BLD VENIPUNCTURE: CPT | Performed by: INTERNAL MEDICINE

## 2025-03-03 PROCEDURE — 99223 1ST HOSP IP/OBS HIGH 75: CPT | Performed by: INTERNAL MEDICINE

## 2025-03-03 PROCEDURE — 70496 CT ANGIOGRAPHY HEAD: CPT

## 2025-03-03 PROCEDURE — 36415 COLL VENOUS BLD VENIPUNCTURE: CPT | Performed by: STUDENT IN AN ORGANIZED HEALTH CARE EDUCATION/TRAINING PROGRAM

## 2025-03-03 PROCEDURE — 82465 ASSAY BLD/SERUM CHOLESTEROL: CPT | Performed by: INTERNAL MEDICINE

## 2025-03-03 PROCEDURE — 250N000011 HC RX IP 250 OP 636: Performed by: STUDENT IN AN ORGANIZED HEALTH CARE EDUCATION/TRAINING PROGRAM

## 2025-03-03 PROCEDURE — 258N000003 HC RX IP 258 OP 636: Performed by: STUDENT IN AN ORGANIZED HEALTH CARE EDUCATION/TRAINING PROGRAM

## 2025-03-03 PROCEDURE — A9585 GADOBUTROL INJECTION: HCPCS | Performed by: STUDENT IN AN ORGANIZED HEALTH CARE EDUCATION/TRAINING PROGRAM

## 2025-03-03 PROCEDURE — 80048 BASIC METABOLIC PNL TOTAL CA: CPT | Performed by: STUDENT IN AN ORGANIZED HEALTH CARE EDUCATION/TRAINING PROGRAM

## 2025-03-03 PROCEDURE — 255N000002 HC RX 255 OP 636: Performed by: STUDENT IN AN ORGANIZED HEALTH CARE EDUCATION/TRAINING PROGRAM

## 2025-03-03 PROCEDURE — 85025 COMPLETE CBC W/AUTO DIFF WBC: CPT | Performed by: STUDENT IN AN ORGANIZED HEALTH CARE EDUCATION/TRAINING PROGRAM

## 2025-03-03 PROCEDURE — 85014 HEMATOCRIT: CPT | Performed by: STUDENT IN AN ORGANIZED HEALTH CARE EDUCATION/TRAINING PROGRAM

## 2025-03-03 PROCEDURE — 99291 CRITICAL CARE FIRST HOUR: CPT | Mod: 25

## 2025-03-03 PROCEDURE — 70553 MRI BRAIN STEM W/O & W/DYE: CPT

## 2025-03-03 PROCEDURE — 85610 PROTHROMBIN TIME: CPT | Performed by: STUDENT IN AN ORGANIZED HEALTH CARE EDUCATION/TRAINING PROGRAM

## 2025-03-03 PROCEDURE — 84484 ASSAY OF TROPONIN QUANT: CPT | Performed by: INTERNAL MEDICINE

## 2025-03-03 PROCEDURE — 250N000013 HC RX MED GY IP 250 OP 250 PS 637: Performed by: INTERNAL MEDICINE

## 2025-03-03 PROCEDURE — 250N000013 HC RX MED GY IP 250 OP 250 PS 637: Performed by: STUDENT IN AN ORGANIZED HEALTH CARE EDUCATION/TRAINING PROGRAM

## 2025-03-03 PROCEDURE — 93005 ELECTROCARDIOGRAM TRACING: CPT | Performed by: STUDENT IN AN ORGANIZED HEALTH CARE EDUCATION/TRAINING PROGRAM

## 2025-03-03 PROCEDURE — 120N000001 HC R&B MED SURG/OB

## 2025-03-03 PROCEDURE — 83036 HEMOGLOBIN GLYCOSYLATED A1C: CPT | Performed by: INTERNAL MEDICINE

## 2025-03-03 PROCEDURE — 250N000013 HC RX MED GY IP 250 OP 250 PS 637: Performed by: PSYCHIATRY & NEUROLOGY

## 2025-03-03 PROCEDURE — 84484 ASSAY OF TROPONIN QUANT: CPT | Performed by: STUDENT IN AN ORGANIZED HEALTH CARE EDUCATION/TRAINING PROGRAM

## 2025-03-03 PROCEDURE — 85730 THROMBOPLASTIN TIME PARTIAL: CPT | Performed by: STUDENT IN AN ORGANIZED HEALTH CARE EDUCATION/TRAINING PROGRAM

## 2025-03-03 RX ORDER — ALBUTEROL SULFATE 90 UG/1
1-2 INHALANT RESPIRATORY (INHALATION) EVERY 4 HOURS PRN
Status: DISCONTINUED | OUTPATIENT
Start: 2025-03-03 | End: 2025-03-06 | Stop reason: HOSPADM

## 2025-03-03 RX ORDER — ASPIRIN 81 MG/1
81 TABLET ORAL DAILY
Status: DISCONTINUED | OUTPATIENT
Start: 2025-03-04 | End: 2025-03-06 | Stop reason: HOSPADM

## 2025-03-03 RX ORDER — GADOBUTROL 604.72 MG/ML
6 INJECTION INTRAVENOUS ONCE
Status: COMPLETED | OUTPATIENT
Start: 2025-03-03 | End: 2025-03-03

## 2025-03-03 RX ORDER — ASPIRIN 325 MG
325 TABLET ORAL ONCE
Status: COMPLETED | OUTPATIENT
Start: 2025-03-03 | End: 2025-03-03

## 2025-03-03 RX ORDER — MONTELUKAST SODIUM 10 MG/1
10 TABLET ORAL AT BEDTIME
Status: DISCONTINUED | OUTPATIENT
Start: 2025-03-03 | End: 2025-03-06 | Stop reason: HOSPADM

## 2025-03-03 RX ORDER — IOPAMIDOL 755 MG/ML
67 INJECTION, SOLUTION INTRAVASCULAR ONCE
Status: COMPLETED | OUTPATIENT
Start: 2025-03-03 | End: 2025-03-03

## 2025-03-03 RX ORDER — CLOPIDOGREL BISULFATE 75 MG/1
300 TABLET ORAL ONCE
Status: COMPLETED | OUTPATIENT
Start: 2025-03-03 | End: 2025-03-03

## 2025-03-03 RX ORDER — ANASTROZOLE 1 MG/1
1 TABLET ORAL EVERY MORNING
Status: ON HOLD | COMMUNITY

## 2025-03-03 RX ORDER — ONDANSETRON 2 MG/ML
4 INJECTION INTRAMUSCULAR; INTRAVENOUS EVERY 6 HOURS PRN
Status: DISCONTINUED | OUTPATIENT
Start: 2025-03-03 | End: 2025-03-06 | Stop reason: HOSPADM

## 2025-03-03 RX ORDER — CLOPIDOGREL BISULFATE 75 MG/1
75 TABLET ORAL DAILY
Status: DISCONTINUED | OUTPATIENT
Start: 2025-03-04 | End: 2025-03-05

## 2025-03-03 RX ORDER — ASPIRIN 81 MG/1
81 TABLET, CHEWABLE ORAL DAILY
Status: DISCONTINUED | OUTPATIENT
Start: 2025-03-04 | End: 2025-03-06 | Stop reason: HOSPADM

## 2025-03-03 RX ORDER — ONDANSETRON 4 MG/1
4 TABLET, ORALLY DISINTEGRATING ORAL EVERY 6 HOURS PRN
Status: DISCONTINUED | OUTPATIENT
Start: 2025-03-03 | End: 2025-03-06 | Stop reason: HOSPADM

## 2025-03-03 RX ORDER — ASPIRIN 300 MG/1
300 SUPPOSITORY RECTAL ONCE
Status: COMPLETED | OUTPATIENT
Start: 2025-03-03 | End: 2025-03-03

## 2025-03-03 RX ORDER — LIDOCAINE 40 MG/G
CREAM TOPICAL
Status: DISCONTINUED | OUTPATIENT
Start: 2025-03-03 | End: 2025-03-06 | Stop reason: HOSPADM

## 2025-03-03 RX ORDER — ANASTROZOLE 1 MG/1
1 TABLET ORAL EVERY MORNING
Status: DISCONTINUED | OUTPATIENT
Start: 2025-03-04 | End: 2025-03-06 | Stop reason: HOSPADM

## 2025-03-03 RX ORDER — ASPIRIN 325 MG
325 TABLET ORAL ONCE
Status: DISCONTINUED | OUTPATIENT
Start: 2025-03-03 | End: 2025-03-03

## 2025-03-03 RX ORDER — CHOLECALCIFEROL (VITAMIN D3) 50 MCG
1 TABLET ORAL EVERY MORNING
Status: ON HOLD | COMMUNITY

## 2025-03-03 RX ADMIN — ASPIRIN 325 MG: 325 TABLET ORAL at 19:43

## 2025-03-03 RX ADMIN — SODIUM CHLORIDE 1000 ML: 9 INJECTION, SOLUTION INTRAVENOUS at 21:22

## 2025-03-03 RX ADMIN — GADOBUTROL 6 ML: 604.72 INJECTION INTRAVENOUS at 20:34

## 2025-03-03 RX ADMIN — IOPAMIDOL 67 ML: 755 INJECTION, SOLUTION INTRAVENOUS at 19:12

## 2025-03-03 RX ADMIN — MONTELUKAST 10 MG: 10 TABLET, FILM COATED ORAL at 22:44

## 2025-03-03 RX ADMIN — CLOPIDOGREL BISULFATE 300 MG: 75 TABLET ORAL at 19:44

## 2025-03-03 ASSESSMENT — ACTIVITIES OF DAILY LIVING (ADL)
ADLS_ACUITY_SCORE: 41

## 2025-03-03 ASSESSMENT — COLUMBIA-SUICIDE SEVERITY RATING SCALE - C-SSRS
2. HAVE YOU ACTUALLY HAD ANY THOUGHTS OF KILLING YOURSELF IN THE PAST MONTH?: NO
6. HAVE YOU EVER DONE ANYTHING, STARTED TO DO ANYTHING, OR PREPARED TO DO ANYTHING TO END YOUR LIFE?: NO
1. IN THE PAST MONTH, HAVE YOU WISHED YOU WERE DEAD OR WISHED YOU COULD GO TO SLEEP AND NOT WAKE UP?: NO

## 2025-03-04 ENCOUNTER — APPOINTMENT (OUTPATIENT)
Dept: SPEECH THERAPY | Facility: HOSPITAL | Age: 85
DRG: 065 | End: 2025-03-04
Attending: INTERNAL MEDICINE
Payer: COMMERCIAL

## 2025-03-04 ENCOUNTER — APPOINTMENT (OUTPATIENT)
Dept: OCCUPATIONAL THERAPY | Facility: HOSPITAL | Age: 85
DRG: 065 | End: 2025-03-04
Attending: INTERNAL MEDICINE
Payer: COMMERCIAL

## 2025-03-04 ENCOUNTER — APPOINTMENT (OUTPATIENT)
Dept: PHYSICAL THERAPY | Facility: HOSPITAL | Age: 85
DRG: 065 | End: 2025-03-04
Attending: INTERNAL MEDICINE
Payer: COMMERCIAL

## 2025-03-04 ENCOUNTER — APPOINTMENT (OUTPATIENT)
Dept: CARDIOLOGY | Facility: HOSPITAL | Age: 85
DRG: 065 | End: 2025-03-04
Attending: INTERNAL MEDICINE
Payer: COMMERCIAL

## 2025-03-04 PROBLEM — R73.03 PREDIABETES: Status: ACTIVE | Noted: 2025-03-04

## 2025-03-04 PROBLEM — I63.9 LEFT PONTINE CVA (H): Status: ACTIVE | Noted: 2025-03-04

## 2025-03-04 LAB
ANION GAP SERPL CALCULATED.3IONS-SCNC: 14 MMOL/L (ref 7–15)
BUN SERPL-MCNC: 21.9 MG/DL (ref 8–23)
CALCIUM SERPL-MCNC: 9.6 MG/DL (ref 8.8–10.4)
CHLORIDE SERPL-SCNC: 102 MMOL/L (ref 98–107)
CREAT SERPL-MCNC: 1.03 MG/DL (ref 0.51–0.95)
EGFRCR SERPLBLD CKD-EPI 2021: 54 ML/MIN/1.73M2
ERYTHROCYTE [DISTWIDTH] IN BLOOD BY AUTOMATED COUNT: 12.5 % (ref 10–15)
GLUCOSE BLDC GLUCOMTR-MCNC: 116 MG/DL (ref 70–99)
GLUCOSE BLDC GLUCOMTR-MCNC: 121 MG/DL (ref 70–99)
GLUCOSE BLDC GLUCOMTR-MCNC: 124 MG/DL (ref 70–99)
GLUCOSE BLDC GLUCOMTR-MCNC: 128 MG/DL (ref 70–99)
GLUCOSE SERPL-MCNC: 149 MG/DL (ref 70–99)
HCO3 SERPL-SCNC: 23 MMOL/L (ref 22–29)
HCT VFR BLD AUTO: 43.7 % (ref 35–47)
HGB BLD-MCNC: 14.7 G/DL (ref 11.7–15.7)
LVEF ECHO: NORMAL
MCH RBC QN AUTO: 29.2 PG (ref 26.5–33)
MCHC RBC AUTO-ENTMCNC: 33.6 G/DL (ref 31.5–36.5)
MCV RBC AUTO: 87 FL (ref 78–100)
PLATELET # BLD AUTO: 243 10E3/UL (ref 150–450)
POTASSIUM SERPL-SCNC: 4.3 MMOL/L (ref 3.4–5.3)
RBC # BLD AUTO: 5.04 10E6/UL (ref 3.8–5.2)
SODIUM SERPL-SCNC: 139 MMOL/L (ref 135–145)
TROPONIN T SERPL HS-MCNC: 11 NG/L
TROPONIN T SERPL HS-MCNC: 12 NG/L
TROPONIN T SERPL HS-MCNC: 12 NG/L
TROPONIN T SERPL HS-MCNC: 14 NG/L
WBC # BLD AUTO: 7.5 10E3/UL (ref 4–11)

## 2025-03-04 PROCEDURE — 92507 TX SP LANG VOICE COMM INDIV: CPT | Mod: GN

## 2025-03-04 PROCEDURE — 250N000013 HC RX MED GY IP 250 OP 250 PS 637: Performed by: HOSPITALIST

## 2025-03-04 PROCEDURE — 92610 EVALUATE SWALLOWING FUNCTION: CPT | Mod: GN

## 2025-03-04 PROCEDURE — 97530 THERAPEUTIC ACTIVITIES: CPT | Mod: GP

## 2025-03-04 PROCEDURE — 99233 SBSQ HOSP IP/OBS HIGH 50: CPT | Performed by: HOSPITALIST

## 2025-03-04 PROCEDURE — 36415 COLL VENOUS BLD VENIPUNCTURE: CPT | Performed by: INTERNAL MEDICINE

## 2025-03-04 PROCEDURE — 250N000013 HC RX MED GY IP 250 OP 250 PS 637: Performed by: INTERNAL MEDICINE

## 2025-03-04 PROCEDURE — 93306 TTE W/DOPPLER COMPLETE: CPT

## 2025-03-04 PROCEDURE — 97535 SELF CARE MNGMENT TRAINING: CPT | Mod: GO

## 2025-03-04 PROCEDURE — 93306 TTE W/DOPPLER COMPLETE: CPT | Mod: 26 | Performed by: INTERNAL MEDICINE

## 2025-03-04 PROCEDURE — 97162 PT EVAL MOD COMPLEX 30 MIN: CPT | Mod: GP

## 2025-03-04 PROCEDURE — 97166 OT EVAL MOD COMPLEX 45 MIN: CPT | Mod: GO

## 2025-03-04 PROCEDURE — 84484 ASSAY OF TROPONIN QUANT: CPT | Performed by: INTERNAL MEDICINE

## 2025-03-04 PROCEDURE — 97110 THERAPEUTIC EXERCISES: CPT | Mod: GP

## 2025-03-04 PROCEDURE — 120N000001 HC R&B MED SURG/OB

## 2025-03-04 PROCEDURE — 99223 1ST HOSP IP/OBS HIGH 75: CPT | Performed by: PSYCHIATRY & NEUROLOGY

## 2025-03-04 PROCEDURE — 97110 THERAPEUTIC EXERCISES: CPT | Mod: GO

## 2025-03-04 PROCEDURE — 80048 BASIC METABOLIC PNL TOTAL CA: CPT | Performed by: INTERNAL MEDICINE

## 2025-03-04 PROCEDURE — 92523 SPEECH SOUND LANG COMPREHEN: CPT | Mod: GN

## 2025-03-04 PROCEDURE — 85018 HEMOGLOBIN: CPT | Performed by: INTERNAL MEDICINE

## 2025-03-04 PROCEDURE — 82310 ASSAY OF CALCIUM: CPT | Performed by: INTERNAL MEDICINE

## 2025-03-04 RX ORDER — LABETALOL HYDROCHLORIDE 5 MG/ML
10 INJECTION, SOLUTION INTRAVENOUS EVERY 10 MIN PRN
Status: DISCONTINUED | OUTPATIENT
Start: 2025-03-04 | End: 2025-03-06 | Stop reason: HOSPADM

## 2025-03-04 RX ORDER — HYDRALAZINE HYDROCHLORIDE 20 MG/ML
10 INJECTION INTRAMUSCULAR; INTRAVENOUS EVERY 30 MIN PRN
Status: DISCONTINUED | OUTPATIENT
Start: 2025-03-04 | End: 2025-03-06 | Stop reason: HOSPADM

## 2025-03-04 RX ORDER — ATORVASTATIN CALCIUM 40 MG/1
80 TABLET, FILM COATED ORAL EVERY EVENING
Status: DISCONTINUED | OUTPATIENT
Start: 2025-03-04 | End: 2025-03-06 | Stop reason: HOSPADM

## 2025-03-04 RX ADMIN — MONTELUKAST 10 MG: 10 TABLET, FILM COATED ORAL at 20:26

## 2025-03-04 RX ADMIN — ANASTROZOLE 1 MG: 1 TABLET ORAL at 08:40

## 2025-03-04 RX ADMIN — ASPIRIN 81 MG: 81 TABLET, COATED ORAL at 08:40

## 2025-03-04 RX ADMIN — ATORVASTATIN CALCIUM 80 MG: 40 TABLET, FILM COATED ORAL at 20:26

## 2025-03-04 RX ADMIN — CLOPIDOGREL BISULFATE 75 MG: 75 TABLET ORAL at 08:40

## 2025-03-04 ASSESSMENT — ACTIVITIES OF DAILY LIVING (ADL)
ADLS_ACUITY_SCORE: 52
ADLS_ACUITY_SCORE: 28
ADLS_ACUITY_SCORE: 18
ADLS_ACUITY_SCORE: 28
ADLS_ACUITY_SCORE: 32
ADLS_ACUITY_SCORE: 18
ADLS_ACUITY_SCORE: 28
ADLS_ACUITY_SCORE: 28
ADLS_ACUITY_SCORE: 52
ADLS_ACUITY_SCORE: 36
ADLS_ACUITY_SCORE: 41
ADLS_ACUITY_SCORE: 18
ADLS_ACUITY_SCORE: 41
ADLS_ACUITY_SCORE: 32
ADLS_ACUITY_SCORE: 36
ADLS_ACUITY_SCORE: 36
DEPENDENT_IADLS:: INDEPENDENT
ADLS_ACUITY_SCORE: 32
ADLS_ACUITY_SCORE: 28
ADLS_ACUITY_SCORE: 41
ADLS_ACUITY_SCORE: 52
ADLS_ACUITY_SCORE: 32
ADLS_ACUITY_SCORE: 28
ADLS_ACUITY_SCORE: 28

## 2025-03-04 NOTE — ED NOTES
"  [facility]    Stroke Telephone Note    I was called by Nile Malone on 03/03/25 regarding patient Fang Estes. The patient is a 84 year old female with past medical history of HTN presents with stuttering speech since 1700. In ED, she continues to have stuttering speech and no longer had R facial droop.     Vitals  BP: (!) 189/125   Pulse: 77   Resp: 20   Temp: 98.3  F (36.8  C)   Weight: 55.8 kg (123 lb)    Stroke Code Data (for stroke code without tele)  Stroke code activated 03/03/25 1857   Stroke provider first response 03/03/25 1857   Last known normal 03/03/25 1700      Time of discovery (or onset of symptoms) 03/03/25 1700   Head CT read by Stroke Neuro Provider 03/03/25 1936   Was stroke code de-escalated? Yes  03/03/25 1937     Imaging Findings  CT head:  no hemorrhage   CTA head/neck: L MCA distal stenosis     Intravenous Thrombolysis  Not given due to:   - minor/isolated/quickly resolving symptoms    Endovascular Treatment  Not initiated due to absence of proximal vessel occlusion    Impression  Stuttering speech  L MCA stenosis     Recommendations   Aspirin 325mg po/300 pr now, Plavix 300mg now  Lay flat, IVF  MRI Brain w/wo con   Permissive HTN SBP goal<220      My recommendations are based on the information provided over the phone by Fang Estes's in-person providers. They are not intended to replace the clinical judgment of her in-person providers. I was not requested to personally see or examine the patient at this time.     The Stroke Staff is Dr. Bustos.    Emory Marie MD  Vascular Neurology Fellow    To page me or covering stroke neurology team member, click here: [facility]  Choose \"On Call\" tab at top, then select \"NEUROLOGY/ALL SITES\" from middle drop-down box, press Enter, then look for \"stroke\" or \"telestroke\" for your site.   "

## 2025-03-04 NOTE — ED PROVIDER NOTES
EMERGENCY DEPARTMENT ENCOUNTER      NAME: Fang Estes  AGE: 84 year old female  YOB: 1940  MRN: [id number]  EVALUATION DATE & TIME: No admission date for patient encounter.    PCP: Santiago Silverio    ED PROVIDER: Nile Malone MD      Chief Complaint   Patient presents with    Stroke Symptoms         FINAL IMPRESSION:  1. Aphasia    2. Acute CVA (cerebrovascular accident) (H)          ED COURSE & MEDICAL DECISION MAKING:    Pertinent Labs & Imaging studies reviewed. (See chart for details)  84 year old female presents to the Emergency Department for evaluation of difficulty speaking    ED Course as of 03/04/25 1113   Mon Mar 03, 2025   1857 Patient is an 84-year-old female with history of hypertension, lobular carcinoma stage II of the breast presents emergency department with relatively abrupt onset changes in speech around 5 PM this evening.  She is with her  and they both note that she seemed to have difficulty forming and getting words out.  No clear dysarthria but just slow speech and hesitancy initiating speech.  Feels like she is unstable with walking and like she is a little bit weak on the right side.  Denies any anticoagulation.  No headache or neck pain.  Denies any chest pain.      Given proximity  in time of symptom onset patient made a tier 1 stroke alert.  I medial evaluated in triage room and she does seem to have some slow stuttering speech which is possibly some word finding difficulty.  Questionably a very small amount of droop at the corner of the right side of the mouth.   1944 No itraCranial hemorrhage on CT scan.  Pleasant multifocal intracranial artery stenosis with only a thin wisp of enhancement at the proximal left M2 branch pulmonary.    Also incidentally noted to have a 2 mm possible aneurysm at the anterior communicating artery,    Discussed with stroke team this point in time no plan for thrombolytics as patient symptoms seem to be improving and potential  risks without outweigh benefits at this time.  Will give loading dose of Plavix and aspirin.  Proceed with MRI.   2005 Repeat evaluation patient's overall stuttering speech seems to have improved slightly but still having some difficulty with full fluidity of speech.       MRI does show a Linear focus of diffusion restriction in the left Vaughn harish consistent with acute stroke no evidence of hemorrhagic conversion.    Updated patient and family with findings.  She does seem to have slowly improving speech although still has some hesitancy slower to respond on occasions.    This point in time will require admission to the hospital for ongoing evaluation and treatment.  Discussed with the hospitalist agrees admit the patient this point in time for ongoing management.        6:51 PM I met and evaluated the patient.   6:54 PM Tier 1 stroke code called  7:35 PM I spoke with the stroke team.   7:41 PM Tier 1 stroke code de-escalated.       Medical Decision Making  Obtained supplemental history:Supplemental history obtained?: Family Member/Significant Other  Reviewed external records: External records reviewed?: Documented in chart  Care impacted by chronic illness:Cancer/Chemotherapy, Chronic Kidney Disease, and Hypertension  Care significantly affected by social determinants of health:N/A  Did you consider but not order tests?: Work up considered but not performed and documented in chart, if applicable  Did you interpret images independently?: Independent interpretation of ECG and images noted in documentation, when applicable.  Consultation discussion with other provider:Did you involve another provider (consultant, , pharmacy, etc.)?: I discussed the care with another health care provider, see documentation for details.  Admit.  Not Applicable          At the conclusion of the encounter I discussed the results of all of the tests and the disposition. The questions were answered. The patient or family acknowledged  understanding and was agreeable with the care plan.     31 minutes of critical care time     MEDICATIONS GIVEN IN THE EMERGENCY:  Medications   iopamidol (ISOVUE-370) solution 67 mL (67 mLs Intravenous $Given 3/3/25 1912)     And   sodium chloride 0.9 % bag for CT scan flush use ( As instructed Canceled Entry 3/3/25 2242)   albuterol (PROVENTIL HFA/VENTOLIN HFA) inhaler (has no administration in time range)   anastrozole (ARIMIDEX) tablet 1 mg (1 mg Oral $Given 3/4/25 0840)   montelukast (SINGULAIR) tablet 10 mg (10 mg Oral $Given 3/3/25 2244)   lidocaine 1 % 0.1-1 mL (has no administration in time range)   lidocaine (LMX4) cream (has no administration in time range)   sodium chloride (PF) 0.9% PF flush 3 mL (3 mLs Intracatheter Not Given 3/4/25 0618)   sodium chloride (PF) 0.9% PF flush 3 mL (has no administration in time range)   aspirin EC tablet 81 mg (81 mg Oral $Given 3/4/25 0840)     Or   aspirin (ASA) chewable tablet 81 mg ( Oral or NG Tube See Alternative 3/4/25 0840)   clopidogrel (PLAVIX) tablet 75 mg (75 mg Oral or NG Tube $Given 3/4/25 0840)   ondansetron (ZOFRAN ODT) ODT tab 4 mg (has no administration in time range)     Or   ondansetron (ZOFRAN) injection 4 mg (has no administration in time range)   labetalol (NORMODYNE/TRANDATE) injection 10 mg (has no administration in time range)   hydrALAZINE (APRESOLINE) injection 10 mg (has no administration in time range)   clopidogrel (PLAVIX) tablet 300 mg (300 mg Oral $Given 3/3/25 1944)   aspirin (ASA) tablet 325 mg (325 mg Oral $Given 3/3/25 1943)     Or   aspirin (ASA) Suppository 300 mg ( Rectal See Alternative 3/3/25 1943)   sodium chloride 0.9% BOLUS 1,000 mL (0 mLs Intravenous Stopped 3/3/25 2242)   gadobutrol (GADAVIST) injection 6 mL (6 mLs Intravenous $Given 3/3/25 2034)       NEW PRESCRIPTIONS STARTED AT TODAY'S ER VISIT  Current Discharge Medication List              =================================================================    [facility]    Patient information was obtained from: Patient and     Use of : N/A         Fang Estes is a 84 year old female with a pertinent history of DM2, hypertension, and stage 2 breast cancer who presents to this ED by private vehicle for evaluation of stroke symptoms.    At around 5 PM today, patient began having changes to speech including stuttering and word-finding difficulty. She also is having some right-sided difficulties with walking. Patient was fine prior to 5 PM. Patient's  reports she went cross country skiing this morning.     Patient denies headache, chest pain, neck pain, blood thinning medication usage, or any other complaints at this time.      REVIEW OF SYSTEMS   Refer to the [facility]    PAST MEDICAL HISTORY:  Past Medical History:   Diagnosis Date    Hypertension     Uncomplicated asthma        PAST SURGICAL HISTORY:  Past Surgical History:   Procedure Laterality Date    APPENDECTOMY      BREAST EXCISIONAL BIOPSY Left 01/01/1980    LUMPECTOMY BREAST Right 5/24/2022    Procedure: WIRE LOCALIZED LUMPECTOMY WITH SENTINEL LYMPH NODE BIOPSY;  Surgeon: Nida Mitchell DO;  Location: [facility]           CURRENT MEDICATIONS:    No current outpatient medications on file.      ALLERGIES:  Allergies   Allergen Reactions    Ace Inhibitors Cough    Losartan Other (See Comments)     Shortness of breath, worsening cough(only very mild with lisinopril, much worse with this)    No Clinical Screening - See Comments Other (See Comments)     Cats, horses, dust.    Other (Do Not Use) Other (See Comments)     Cats, horses, dust.    Amoxicillin Rash       FAMILY HISTORY:  Family History   Problem Relation Age of Onset    Breast Cancer Daughter 45.00       SOCIAL HISTORY:   Social History     Socioeconomic History    Marital status:    Tobacco Use    Smoking status: Never    Smokeless tobacco: Never   Substance  and Sexual Activity    Drug use: Never       VITALS:  BP (!) 189/87 (BP Location: Right arm)   Pulse 68   Temp 98.9  F (37.2  C) (Oral)   Resp 17   Wt 55.8 kg (123 lb)   SpO2 95%   BMI 21.79 kg/m      PHYSICAL EXAM    Constitutional: Well developed, Well nourished, NAD  HENT: Normocephalic, Atraumatic,  mucous membranes moist,   Neck- trachea midline, No stridor.    Eyes:EOMI, Conjunctiva normal, No discharge.   Respiratory: Normal breath sounds, No respiratory distress, No wheezing.    Cardiovascular: Normal heart rate, Regular rhythm, No murmurs   Abdominal: Soft, No tenderness, No rebound or guarding.     Musculoskeletal: no deformity or malalignment  Integument: Warm, Dry, No erythema  Neurologic: Slow, stuttering speech,   National Institutes of Health Stroke Scale  Exam Interval: Baseline   Score    Level of consciousness: (0)   Alert, keenly responsive    LOC questions: (0)   Answers both questions correctly    LOC commands: (0)   Performs both tasks correctly    Best gaze: (0)   Normal    Visual: (0)   No visual loss    Facial palsy: (1)   Minor paralysis (flat nasolabial fold, smile asymmetry)    Motor arm (left): (0)   No drift    Motor arm (right): (0)   No drift    Motor leg (left): (0)   No drift    Motor leg (right): (0)   No drift    Limb ataxia: (0)   Absent    Sensory: (0)   Normal- no sensory loss    Best language: (1)   Mild to moderate aphasia    Dysarthria: (0)   Normal    Extinction and inattention: (0)   No abnormality        Total Score:  2      Psychiatric: Affect normal, Cooperative.     LAB:  All pertinent labs reviewed and interpreted.  Results for orders placed or performed during the hospital encounter of 03/03/25   CTA Head Neck with Contrast    Impression    IMPRESSION:   HEAD CT:  1.  No acute intracranial hemorrhage identified.    2.  No CT acute infarct. Aspect score 10.    3.  Right cerebellopontine angle small extra-axial lesion nonspecific possibly schwannoma or  meningioma. No significant mass effect on the brainstem.    4.  Left basal ganglia chronic infarct.    5.  Age-related changes described above.    Dr Nile Malone was notified by Dr Joss Townsend at  7:15 PM 3/3/2025 CST/CDT.      HEAD CTA:   1.  No intracranial arterial large vessel occlusion.    2.  Multifocal intracranial arterial stenoses described above. Some of these intracranial arterial stenoses are focal high-grade severe stenoses with only a thin wisp of enhancement (including but not limited to a left proximal M2 branch artery).     3.  Findings likely reflect atherosclerotic vascular disease given age. Expanded differential includes reversible cerebral venous constrictive syndrome, drug-induced vasospasm, vasculitis. Please see above for discussion.    4.  Anterior communicating artery possible tiny focal outpouching measuring 2 mm concerning for aneurysm.      NECK CTA:  1.  Atherosclerotic plaque results in less than 50% stenosis in the right carotid bulb.    2.  No rate limiting stenosis or occlusion. No cervical artery dissection.      Dr Nile Malone was notified by Dr Joss Townsend at  7:40 PM 3/3/2025 CST/CDT.   MR Brain w/o & w Contrast    Impression    IMPRESSION:    1.  Linear focus of diffusion restriction within the left armando-harish is likely indicative of a hyperacute infarction. No evidence of hemorrhagic conversion.  2.  Incidental presumable meningioma or schwannoma along the undersurface of the right tentorial leaflet.     These findings were communicated to Dr. Moreno over the phone at 9:08 PM on 03/03/2025 by Jeyson Garces.   Basic metabolic panel   Result Value Ref Range    Sodium 138 135 - 145 mmol/L    Potassium 4.1 3.4 - 5.3 mmol/L    Chloride 100 98 - 107 mmol/L    Carbon Dioxide (CO2) 27 22 - 29 mmol/L    Anion Gap 11 7 - 15 mmol/L    Urea Nitrogen 26.9 (H) 8.0 - 23.0 mg/dL    Creatinine 1.11 (H) 0.51 - 0.95 mg/dL    GFR Estimate 49 (L) >60 mL/min/1.73m2    Calcium 9.7 8.8 - 10.4 mg/dL     Glucose 121 (H) 70 - 99 mg/dL   Result Value Ref Range    INR 0.95 0.85 - 1.15   Partial thromboplastin time   Result Value Ref Range    aPTT 28 22 - 38 Seconds   Result Value Ref Range    Troponin T, High Sensitivity 15 (H) <=14 ng/L   CBC with platelets and differential   Result Value Ref Range    WBC Count 7.6 4.0 - 11.0 10e3/uL    RBC Count 4.73 3.80 - 5.20 10e6/uL    Hemoglobin 13.6 11.7 - 15.7 g/dL    Hematocrit 41.3 35.0 - 47.0 %    MCV 87 78 - 100 fL    MCH 28.8 26.5 - 33.0 pg    MCHC 32.9 31.5 - 36.5 g/dL    RDW 12.4 10.0 - 15.0 %    Platelet Count 225 150 - 450 10e3/uL    % Neutrophils 61 %    % Lymphocytes 23 %    % Monocytes 8 %    % Eosinophils 6 %    % Basophils 2 %    % Immature Granulocytes 0 %    NRBCs per 100 WBC 0 <1 /100    Absolute Neutrophils 4.6 1.6 - 8.3 10e3/uL    Absolute Lymphocytes 1.8 0.8 - 5.3 10e3/uL    Absolute Monocytes 0.6 0.0 - 1.3 10e3/uL    Absolute Eosinophils 0.4 0.0 - 0.7 10e3/uL    Absolute Basophils 0.1 0.0 - 0.2 10e3/uL    Absolute Immature Granulocytes 0.0 <=0.4 10e3/uL    Absolute NRBCs 0.0 10e3/uL   Hemoglobin A1c   Result Value Ref Range    Estimated Average Glucose 134 (H) <117 mg/dL    Hemoglobin A1C 6.3 (H) <5.7 %   Lipid panel reflex to direct LDL: Non-fasting   Result Value Ref Range    Cholesterol 234 (H) <200 mg/dL    Triglycerides 146 <150 mg/dL    Direct Measure HDL 52 >=50 mg/dL    LDL Cholesterol Calculated 153 (H) <100 mg/dL    Non HDL Cholesterol 182 (H) <130 mg/dL   Result Value Ref Range    Troponin T, High Sensitivity 11 <=14 ng/L   CBC with platelets   Result Value Ref Range    WBC Count 7.5 4.0 - 11.0 10e3/uL    RBC Count 5.04 3.80 - 5.20 10e6/uL    Hemoglobin 14.7 11.7 - 15.7 g/dL    Hematocrit 43.7 35.0 - 47.0 %    MCV 87 78 - 100 fL    MCH 29.2 26.5 - 33.0 pg    MCHC 33.6 31.5 - 36.5 g/dL    RDW 12.5 10.0 - 15.0 %    Platelet Count 243 150 - 450 10e3/uL   Basic metabolic panel   Result Value Ref Range    Sodium 139 135 - 145 mmol/L    Potassium  4.3 3.4 - 5.3 mmol/L    Chloride 102 98 - 107 mmol/L    Carbon Dioxide (CO2) 23 22 - 29 mmol/L    Anion Gap 14 7 - 15 mmol/L    Urea Nitrogen 21.9 8.0 - 23.0 mg/dL    Creatinine 1.03 (H) 0.51 - 0.95 mg/dL    GFR Estimate 54 (L) >60 mL/min/1.73m2    Calcium 9.6 8.8 - 10.4 mg/dL    Glucose 149 (H) 70 - 99 mg/dL   Result Value Ref Range    Troponin T, High Sensitivity 14 <=14 ng/L   Result Value Ref Range    Troponin T, High Sensitivity 12 <=14 ng/L   Glucose by meter   Result Value Ref Range    GLUCOSE BY METER POCT 128 (H) 70 - 99 mg/dL   Glucose by meter   Result Value Ref Range    GLUCOSE BY METER POCT 121 (H) 70 - 99 mg/dL   Result Value Ref Range    Troponin T, High Sensitivity 12 <=14 ng/L       RADIOLOGY:  Reviewed all pertinent imaging. Please see official radiology report.  MR Brain w/o & w Contrast   Final Result   IMPRESSION:      1.  Linear focus of diffusion restriction within the left armando-harish is likely indicative of a hyperacute infarction. No evidence of hemorrhagic conversion.   2.  Incidental presumable meningioma or schwannoma along the undersurface of the right tentorial leaflet.       These findings were communicated to Dr. Moreno over the phone at 9:08 PM on 03/03/2025 by Jeyson Garces.      CTA Head Neck with Contrast   Final Result   IMPRESSION:    HEAD CT:   1.  No acute intracranial hemorrhage identified.      2.  No CT acute infarct. Aspect score 10.      3.  Right cerebellopontine angle small extra-axial lesion nonspecific possibly schwannoma or meningioma. No significant mass effect on the brainstem.      4.  Left basal ganglia chronic infarct.      5.  Age-related changes described above.      Dr Nile Malone was notified by Dr Joss Townsend at  7:15 PM 3/3/2025 CST/CDT.         HEAD CTA:    1.  No intracranial arterial large vessel occlusion.      2.  Multifocal intracranial arterial stenoses described above. Some of these intracranial arterial stenoses are focal high-grade severe  stenoses with only a thin wisp of enhancement (including but not limited to a left proximal M2 branch artery).       3.  Findings likely reflect atherosclerotic vascular disease given age. Expanded differential includes reversible cerebral venous constrictive syndrome, drug-induced vasospasm, vasculitis. Please see above for discussion.      4.  Anterior communicating artery possible tiny focal outpouching measuring 2 mm concerning for aneurysm.         NECK CTA:   1.  Atherosclerotic plaque results in less than 50% stenosis in the right carotid bulb.      2.  No rate limiting stenosis or occlusion. No cervical artery dissection.         Dr Nile Malone was notified by Dr Joss Townsend at  7:40 PM 3/3/2025 CST/CDT.      Echocardiogram Complete - For age > 60 yrs    (Results Pending)       EKG:    Performed at:     Impression: EKG shows a sinus rhythm with first-degree AV block NC interval 230 ms, narrow QRS normal QTc, no ST elevations or acute ischemic T wave changes.        I have independently reviewed and interpreted the EKG(s) documented above.    PROCEDURES:         [facility] Documentation:   CMS Diagnoses: The patient has stroke symptoms:         ED Stroke specific documentation           NIHSS PDF     Patient last known well time: 5pm  ED Provider first to bedside at: 1857  CT Results received at: 1930    Thrombolytics:   Not given due to:   - minor/isolated/quickly resolving symptoms    If treating with thrombolytics: Ensure SBP<180 and DBP<105 prior to treatment with thrombolytics.  Administering thrombolytics after treatment with IV labetalol, hydralazine, or nicardipine is reasonable once BP control is established.    Endovascular Retrieval:  Not initiated due to absence of proximal vessel occlusion    National Institutes of Health Stroke Scale (Baseline)  Time Performed: 1857     Score    Level of consciousness: (0)   Alert, keenly responsive    LOC questions: (0)   Answers both questions  correctly    LOC commands: (0)   Performs both tasks correctly    Best gaze: (0)   Normal    Visual: (0)   No visual loss    Facial palsy: (1)   Minor paralysis (flat nasolabial fold, smile asymmetry)    Motor arm (left): (0)   No drift    Motor arm (right): (0)   No drift    Motor leg (left): (0)   No drift    Motor leg (right): (0)   No drift    Limb ataxia: (0)   Absent    Sensory: (0)   Normal- no sensory loss    Best language: (1)   Mild to moderate aphasia    Dysarthria: (0)   Normal    Extinction and inattention: (0)   No abnormality        Total Score:  2        Stroke Mimics were considered (including migraine headache, seizure disorder, hypoglycemia (or hyperglycemia), head or spinal trauma, CNS infection, Toxin ingestion and shock state (e.g. sepsis) .                     I, Kaidenjose Moore, am serving as a scribe to document services personally performed by Nile Malone MD based on my observation and the provider's statements to me. I, Nile Malone MD, attest that Kaiden Moore is acting in a scribe capacity, has observed my performance of the services and has documented them in accordance with my direction.    Nile Malone MD  [facility] EMERGENCY DEPARTMENT  [facility] [facility] [id number] [phone]      Nile Malone MD  03/04/25 2090

## 2025-03-04 NOTE — MEDICATION SCRIBE - ADMISSION MEDICATION HISTORY
Medication Scribe Admission Medication History    Admission medication history is complete. The information provided in this note is only as accurate as the sources available at the time of the update.    Information Source(s): Patient, Family member, and CareEverywhere/SureScripts via in-person    Pertinent Information: patient manages her own medication, and spouse present at bedside. Pt reports taking all AM meds for today.  Alendronate  -70 mg weekly, Sunday(s)    Changes made to PTA medication list:  Added: None  Deleted:   Benadryl PRN  Lisinopril 5 mg  Changed:   D3 to 1000 units.    Allergies reviewed with patient and updates made in EHR: yes    Medication History Completed By: Jesse Arevalo 3/3/2025 9:37 PM    PTA Med List   Medication Sig Last Dose/Taking    albuterol (PROAIR HFA/PROVENTIL HFA/VENTOLIN HFA) 108 (90 Base) MCG/ACT inhaler Inhale 1-2 puffs into the lungs every 4 hours as needed for shortness of breath, wheezing or cough. Past Month    alendronate (FOSAMAX) 70 MG tablet Take 70 mg by mouth every 7 days. Sunday(s) 3/2/2025 Morning    anastrozole (ARIMIDEX) 1 MG tablet Take 1 mg by mouth every morning. 3/3/2025 Morning    lisinopril (ZESTRIL) 10 MG tablet Take 10 mg by mouth every morning. 3/3/2025 Morning    montelukast (SINGULAIR) 10 MG tablet Take 10 mg by mouth at bedtime. 3/2/2025 Bedtime    vitamin D3 (CHOLECALCIFEROL) 50 mcg (2000 units) tablet Take 1 tablet by mouth every morning. 3/3/2025 Morning

## 2025-03-04 NOTE — CONSULTS
Care Management Initial Consult    General Information  Assessment completed with: Patient,    Type of CM/SW Visit: Initial Assessment    Primary Care Provider verified and updated as needed: Yes   Readmission within the last 30 days: no previous admission in last 30 days      Reason for Consult: discharge planning  Advance Care Planning: Advance Care Planning Reviewed: no concerns identified          Communication Assessment  Patient's communication style: spoken language (English or Bilingual)    Hearing Difficulty or Deaf: no   Wear Glasses or Blind: yes    Cognitive  Cognitive/Neuro/Behavioral: .WDL except, speech  Level of Consciousness: alert  Arousal Level: opens eyes spontaneously  Orientation: oriented x 4  Mood/Behavior: calm, cooperative  Best Language: 1 - Mild to moderate  Speech: slurred    Living Environment:   People in home: spouse  Zoltan  Current living Arrangements: house      Able to return to prior arrangements: yes       Family/Social Support:  Care provided by: self  Provides care for: no one  Marital Status:   Support system:   Zoltan       Description of Support System: Supportive, Involved         Current Resources:   Patient receiving home care services: No        Community Resources: None  Equipment currently used at home: (P) none (FWW)  Supplies currently used at home: None    Employment/Financial:  Employment Status: employed part-time        Financial Concerns: none           Does the patient's insurance plan have a 3 day qualifying hospital stay waiver?  No    Lifestyle & Psychosocial Needs:  Social Drivers of Health     Food Insecurity: Low Risk  (3/4/2025)    Food Insecurity     Within the past 12 months, did you worry that your food would run out before you got money to buy more?: No     Within the past 12 months, did the food you bought just not last and you didn t have money to get more?: No   Depression: Not at risk (4/7/2023)    Received from [facility],  [facility]    PHQ-2     PHQ-2 Score: 0   Housing Stability: Low Risk  (3/4/2025)    Housing Stability     Do you have housing? : Yes     Are you worried about losing your housing?: No   Tobacco Use: Low Risk  (12/26/2024)    Received from [facility]    Patient History     Smoking Tobacco Use: Never     Smokeless Tobacco Use: Never     Passive Exposure: Past   Financial Resource Strain: Low Risk  (3/4/2025)    Financial Resource Strain     Within the past 12 months, have you or your family members you live with been unable to get utilities (heat, electricity) when it was really needed?: No   Alcohol Use: Not on file   Transportation Needs: Low Risk  (3/4/2025)    Transportation Needs     Within the past 12 months, has lack of transportation kept you from medical appointments, getting your medicines, non-medical meetings or appointments, work, or from getting things that you need?: No   Physical Activity: Not on file   Interpersonal Safety: Low Risk  (3/4/2025)    Interpersonal Safety     Do you feel physically and emotionally safe where you currently live?: Yes     Within the past 12 months, have you been hit, slapped, kicked or otherwise physically hurt by someone?: No     Within the past 12 months, have you been humiliated or emotionally abused in other ways by your partner or ex-partner?: No   Stress: Not on file   Social Connections: Not on file   Health Literacy: Not on file       Functional Status:  Prior to admission patient needed assistance:   Dependent ADLs:: Independent  Dependent IADLs:: Independent       Mental Health Status:          Chemical Dependency Status:                Values/Beliefs:  Spiritual, Cultural Beliefs, Rastafarian Practices, Values that affect care:                 Discussed  Partnership in Safe Discharge Planning  document with patient/family: No    Additional Information:  Met with patient to review role of care management, progression of care and possible need for services at  discharge, including OP services, home care, or skilled nursing care. Patient alert, oriented and engaged in the conversation. Writer then verified patient demographics and updated any changes needed in the patient chart.     Pt lives in a 2 story home with her , Zoltan. At baseline pt is independent with ADLs and IADLs. No equipment or services. Pt works part time and still drives.  to transport at discharge. Goal is to discharge home.      Next Steps: follow for medical progression, therapy recommendations.    Esther Panda RN

## 2025-03-04 NOTE — PHARMACY-CONSULT NOTE
Pharmacy Consult to evaluate for medication related stroke core measures    Fang Estes, 84 year old female admitted for right-sided weakness and speech changes on 3/3/2025.    Thrombolytic was not given because of minor/isolated/quickly resolving symptoms    VTE Prophylaxis SCDs /PCDs placed on 3/4/25, as appropriate prior to end of hospital day 2.    Antithrombotic: aspirin and clopidogrel started on 3/3/25, as appropriate by end of hospital day 2. Continue antithrombotic therapy on discharge to meet quality measures, unless contraindicated.    Anticoagulation if history of A-fib/flutter: Patient does not have history of A-fib/flutter - anticoagulation not required for medication related stroke core measures.     LDL Cholesterol Calculated   Date Value Ref Range Status   03/03/2025 153 (H) <100 mg/dL Final       Patient currently receiving Lipitor (atorvastatin) continue statin on discharge to meet quality measures, unless contraindicated.    Recommendations: None at this time    Thank you for the consult.    SHAUN OREILLY [facility] 3/4/2025 3:18 PM

## 2025-03-04 NOTE — PLAN OF CARE
Problem: Adult Inpatient Plan of Care  Goal: Optimal Comfort and Wellbeing  Outcome: Progressing     Problem: Stroke, Ischemic (Includes Transient Ischemic Attack)  Goal: Optimal Coping  Outcome: Progressing   Goal Outcome Evaluation:     Patient is AAO. Denies pain. Scoring 3-4 on NIH. Patient had a large episode of emesis this morning. Offered antiemetics and patient declined. Patient denies nausea and reports feeling better after emesis. Blood pressure elevated this morning, contacted provider who gave orders for permissive hypertension and PRN medications that were not given due to parameters.

## 2025-03-04 NOTE — PROGRESS NOTES
[facility] [place]    Medicine Progress Note - Hospitalist Service    Date of Admission:  3/3/2025    Assessment & Plan                Fang Estes is a 84 year old female with history of hypertension, asthma, remote breast cancer, CKD 3 presented for evaluation of right-sided weakness and speech changes found to have acute pontine stroke. Hospital Day: 2    # Acute left pontine stroke  -Presented with speech changes and right-sided weakness.  Symptoms persist and she feels her right arm is actually worse than when she came in  -MRI showing acute infarct in the left hemipons  -Stroke protocol  -Started on aspirin and Plavix  -Neurology consulted  -Echo pending  -PT, OT, speech  -Permissive hypertension  -Given significant deficits anticipate she would benefit from ARU    #HLD  -  -start atorvastatin 80mg  -Lipid profile 1-3 months    # Hypertension, hold home lisinopril given above    #CKD 3, Cr at baseline  # Asthma, not in exacerbation.  Home albuterol, Singulair  # Remote breast cancer, home Arimidex            Diet: Regular Diet Adult    DVT Prophylaxis: Moderate risk.   Pneumatic Compression Devices  Ann Catheter: Not present  Lines: None     Cardiac Monitoring: ACTIVE order. Indication: Stroke, acute (48 hours)  Code Status: Full Code      Clinically Significant Risk Factors Present on Admission                   # Hypertension: Noted on problem list               # Financial/Environmental Concerns: none  # Asthma: noted on problem list        Disposition Plan     Medically Ready for Discharge: Anticipated Tomorrow         Discharge barrier(s): workup as above, ?worse symptoms  Care discussed with: patient, RN,       Jojo Hill MD  Hospitalist Service  [facility] [place]  Securely message with [employer] (more info)  Text page via [employer] Paging/Directory   ______________________________________________________________________      Physical Exam   Vital Signs:  Temp: 98  F (36.7  C) Temp src: Oral BP: (!) 155/71 Pulse: 66   Resp: 18 SpO2: 95 % O2 Device: None (Room air)    Weight: 123 lbs 0 oz    General: in no apparent distress, non-toxic, and alert female lying in hospital bed oriented x3  HEENT: Head normocephalic atraumatic, oral mucosa moist. Sclerae anicteric  CV: Regular rhythm, normal rate, no murmurs  Resp: No wheezes, no rales or rhonchi, no focal consolidations  GI: Belly soft, nondistended, nontender, bowel sounds present  Skin: No rashes or lesions  Extremities: No peripheral edema  Psych: Normal affect, mood dysthymic  Neuro: RLE strength 4+/5, LLE 5/5. R arm weakness. R facial droop. Dysarthric speech that fluctuates, speech thick at times.      Medical Decision Making               Data   Recent Results (from the past 16 hours)   Troponin T, High Sensitivity    Collection Time: 03/03/25 11:48 PM   Result Value Ref Range    Troponin T, High Sensitivity 11 <=14 ng/L   Glucose by meter    Collection Time: 03/04/25 12:41 AM   Result Value Ref Range    GLUCOSE BY METER POCT 128 (H) 70 - 99 mg/dL   Troponin T, High Sensitivity    Collection Time: 03/04/25  1:46 AM   Result Value Ref Range    Troponin T, High Sensitivity 12 <=14 ng/L   Glucose by meter    Collection Time: 03/04/25  4:42 AM   Result Value Ref Range    GLUCOSE BY METER POCT 121 (H) 70 - 99 mg/dL   CBC with platelets    Collection Time: 03/04/25  6:37 AM   Result Value Ref Range    WBC Count 7.5 4.0 - 11.0 10e3/uL    RBC Count 5.04 3.80 - 5.20 10e6/uL    Hemoglobin 14.7 11.7 - 15.7 g/dL    Hematocrit 43.7 35.0 - 47.0 %    MCV 87 78 - 100 fL    MCH 29.2 26.5 - 33.0 pg    MCHC 33.6 31.5 - 36.5 g/dL    RDW 12.5 10.0 - 15.0 %    Platelet Count 243 150 - 450 10e3/uL   Basic metabolic panel    Collection Time: 03/04/25  6:38 AM   Result Value Ref Range    Sodium 139 135 - 145 mmol/L    Potassium 4.3 3.4 - 5.3 mmol/L    Chloride 102 98 - 107 mmol/L    Carbon Dioxide (CO2) 23 22 - 29 mmol/L    Anion Gap 14 7  - 15 mmol/L    Urea Nitrogen 21.9 8.0 - 23.0 mg/dL    Creatinine 1.03 (H) 0.51 - 0.95 mg/dL    GFR Estimate 54 (L) >60 mL/min/1.73m2    Calcium 9.6 8.8 - 10.4 mg/dL    Glucose 149 (H) 70 - 99 mg/dL   Troponin T, High Sensitivity    Collection Time: 03/04/25  6:38 AM   Result Value Ref Range    Troponin T, High Sensitivity 14 <=14 ng/L   Troponin T, High Sensitivity    Collection Time: 03/04/25  8:34 AM   Result Value Ref Range    Troponin T, High Sensitivity 12 <=14 ng/L       Interval History     Patient states R arm weakness is a bit worse, speech and R leg symptoms about the same. Passed bedside swallow but given slightly thickened voice and dysarthria I will limit her to clears and meds pending Speech eval. Discussed natural history of stroke, prognosis and importance of rehab. Not ready for discharge, patient expressed understanding. If deficits do not improve she will need rehab.    I called and updated her , Don

## 2025-03-04 NOTE — ED NOTES
[facility] ED Handoff Report    ED Chief Complaint: Stroke Symptoms  ED Diagnosis:  (R47.01) Aphasia      PMH:    Past Medical History:   Diagnosis Date    Hypertension     Uncomplicated asthma         Code Status:  No Order     Falls Risk: Yes Band: Applied    Current Living Situation/Residence: lives with a significant other     Elimination Status: Continent: Yes     Activity Level: Independent normally, will need help now 1 assist.     Patients Preferred Language:  English     Needed: No    Vital Signs:  BP (!) 170/84   Pulse 71   Temp 98.3  F (36.8  C) (Temporal)   Resp 26   Wt 55.8 kg (123 lb)   SpO2 96%   BMI 21.79 kg/m       Cardiac Rhythm: NSR    Pain Score: 0/10    Is the Patient Confused:  No    Last Food or Drink: 03/03/25 at 2143    Focused Assessment:  Patient started having slurred speech and asaphia around 5pm. Patient had been skiing earlier in the day. Patient is alert and oriented x4. SEE NIH.      Tests Performed: Done: Labs and Imaging    Treatments Provided:  see MAR    Family Dynamics/Concerns: No    Family Updated On Visitor Policy: Yes    Plan of Care Communicated to Family: Yes    Who Was Updated about Plan of Care:     Belongings Checklist Done and Signed by Patient: Yes    Belongings Sent with Patient: clothes    Medications sent with patient:     Covid: asymptomatic , n/a    Additional Information:     RN: Angela Brower RN 3/3/2025 10:06 PM

## 2025-03-04 NOTE — CONSULTS
NEUROLOGY CONSULTATION NOTE     Fang Estes,  1940, MRN [id number] Date: 3/4/2025     [facility]   Code status:  Full Code   PCP: Santiago Silverio, [phone]      ASSESSMENT & PLAN   Diagnosis code: Left pontine stroke    84 year old female with a medical history significant for hypertension, asthma, stage 3 CKD, remote history of breast cancer, osteoporosis and other medical comorbidities who is admitted with an acute stroke.     Suspect some lack of patient participation affecting exam today    Head CT shows chronic stroke in Lt basal ganglia and Right CPA lesion (schwannoma vs meningioma)  CTA shows multivessel stenosis, some severe, and small SHELDON aneurysm  Neck vessels are without significant stenosis  MRI shows hyperacute Left pontine stroke  No TNK due to mild symptoms  Echo is pending  DAPT with aspirin and plavix  HbA1c: 6.3%  LDL: 153. Start statin if not contraindicated: 80mg atorvastatin daily  Neuro checks, telemetry  Permissive HTN: Treat SBP>220  Physical therapy/occupational therapy/speech language pathology  Stroke education  Neurology will follow along      Chief Complaint   Patient presents with    Stroke Symptoms        HISTORY OF PRESENT ILLNESS     We have been requested by Dr. Rose to evaluate Fang Estes who is a 84 year old female for stroke. She presented with Rt facial droop, Right-sided weakness and clumsiness. Stroke team initially consulted. Patient has additional history of breast cancer, CKD, HTN, osteoporosis and asthma.    Nataliya tells me that she is having trouble moving the right side, however I spoke with physical therapy who just worked with her and said she was able to weight-bear on the right and use her arm with mild difficulty.    Limited exam due to patient just having vomited, nursing is in the room to help clean her up.  This occurred after her PT session.     PAST MEDICAL & SURGICAL HISTORY     Medical History  Past Medical History:   Diagnosis  Date    Hypertension     Uncomplicated asthma      Surgical History  Past Surgical History:   Procedure Laterality Date    APPENDECTOMY      BREAST EXCISIONAL BIOPSY Left 01/01/1980    LUMPECTOMY BREAST Right 5/24/2022    Procedure: WIRE LOCALIZED LUMPECTOMY WITH SENTINEL LYMPH NODE BIOPSY;  Surgeon: Nida Mitchell DO;  Location: [facility]        SOCIAL HISTORY     Social History      FAMILY HISTORY     Reviewed, and family history includes Breast Cancer (age of onset: 45.00) in her daughter.     ALLERGIES     Allergies   Allergen Reactions    Ace Inhibitors Cough    Losartan Other (See Comments)     Shortness of breath, worsening cough(only very mild with lisinopril, much worse with this)    No Clinical Screening - See Comments Other (See Comments)     Cats, horses, dust.    Other (Do Not Use) Other (See Comments)     Cats, horses, dust.    Amoxicillin Rash        REVIEW OF SYSTEMS     Pertinent items are noted in HPI.     HOME & HOSPITAL MEDICATIONS     Prior to Admission Medications  Medications Prior to Admission   Medication Sig Dispense Refill Last Dose/Taking    albuterol (PROAIR HFA/PROVENTIL HFA/VENTOLIN HFA) 108 (90 Base) MCG/ACT inhaler Inhale 1-2 puffs into the lungs every 4 hours as needed for shortness of breath, wheezing or cough.   Past Month    alendronate (FOSAMAX) 70 MG tablet Take 70 mg by mouth every 7 days. Sunday(s)   3/2/2025 Morning    anastrozole (ARIMIDEX) 1 MG tablet Take 1 mg by mouth every morning.   3/3/2025 Morning    lisinopril (ZESTRIL) 10 MG tablet Take 10 mg by mouth every morning.   3/3/2025 Morning    montelukast (SINGULAIR) 10 MG tablet Take 10 mg by mouth at bedtime.   3/2/2025 Bedtime    vitamin D3 (CHOLECALCIFEROL) 50 mcg (2000 units) tablet Take 1 tablet by mouth every morning.   3/3/2025 Morning       Hospital Medications  Current Facility-Administered Medications   Medication Dose Route Frequency Provider Last Rate Last Admin    anastrozole (ARIMIDEX) tablet 1 mg   1 mg Oral QAM Kirstin Rose MD        aspirin EC tablet 81 mg  81 mg Oral Daily Kirstin Rose MD        Or    aspirin (ASA) chewable tablet 81 mg  81 mg Oral or NG Tube Daily Kirstin Rose MD        clopidogrel (PLAVIX) tablet 75 mg  75 mg Oral or NG Tube Daily Kirstin Rose MD        montelukast (SINGULAIR) tablet 10 mg  10 mg Oral At Bedtime Kirstin Rose MD   10 mg at 03/03/25 2244    sodium chloride (PF) 0.9% PF flush 3 mL  3 mL Intracatheter Q8H Kirstin Rose MD        sodium chloride 0.9 % bag for CT scan flush use  100 mL As instructed Once Nile Malone MD            PHYSICAL EXAM     Vital signs  Temp:  [97.7  F (36.5  C)-98.4  F (36.9  C)] 98.4  F (36.9  C)  Pulse:  [67-77] 69  Resp:  [15-28] 18  BP: (159-189)/() 160/92  SpO2:  [95 %-98 %] 96 %    Weight:   [unfilled]    General Physical Exam: Patient is alert and oriented to self. Vital signs were reviewed and are documented in EMR.   Neurological Exam:  Mental status: Attentive, interactive but appears tired.   Speech: Expressive Aphasia/Dysarthria.  Cranial nerves: EOM full, face slightly drooped on the Right.   Motor: Does not move Right arm, some movement of Right leg.   Reflexes: Toes are up-going on the Right.  Sensory: Intact to light touch throughout.  Coordination: Normal on Left, unable to test on Right  Gait: Deferred for safety. See physical therapy notes.      DIAGNOSTIC STUDIES     Pertinent Radiology   Radiology Results: Personally reviewed image/s    CT/CTA  IMPRESSION:   HEAD CT:  1.  No acute intracranial hemorrhage identified.     2.  No CT acute infarct. Aspect score 10.     3.  Right cerebellopontine angle small extra-axial lesion nonspecific possibly schwannoma or meningioma. No significant mass effect on the brainstem.     4.  Left basal ganglia chronic infarct.     5.  Age-related changes described above.     Dr Nile Malone was notified by Dr Joss Townsend at  7:15 PM 3/3/2025 CST/CDT.        HEAD CTA:   1.  No  intracranial arterial large vessel occlusion.     2.  Multifocal intracranial arterial stenoses described above. Some of these intracranial arterial stenoses are focal high-grade severe stenoses with only a thin wisp of enhancement (including but not limited to a left proximal M2 branch artery).      3.  Findings likely reflect atherosclerotic vascular disease given age. Expanded differential includes reversible cerebral venous constrictive syndrome, drug-induced vasospasm, vasculitis. Please see above for discussion.     4.  Anterior communicating artery possible tiny focal outpouching measuring 2 mm concerning for aneurysm.        NECK CTA:  1.  Atherosclerotic plaque results in less than 50% stenosis in the right carotid bulb.     2.  No rate limiting stenosis or occlusion. No cervical artery dissection.    MRI  IMPRESSION:     1.  Linear focus of diffusion restriction within the left armando-harish is likely indicative of a hyperacute infarction. No evidence of hemorrhagic conversion.  2.  Incidental presumable meningioma or schwannoma along the undersurface of the right tentorial leaflet.     Pertinent Labs   Lab Results: personally reviewed.   Recent Results (from the past 24 hours)   Basic metabolic panel    Collection Time: 03/03/25  7:15 PM   Result Value Ref Range    Sodium 138 135 - 145 mmol/L    Potassium 4.1 3.4 - 5.3 mmol/L    Chloride 100 98 - 107 mmol/L    Carbon Dioxide (CO2) 27 22 - 29 mmol/L    Anion Gap 11 7 - 15 mmol/L    Urea Nitrogen 26.9 (H) 8.0 - 23.0 mg/dL    Creatinine 1.11 (H) 0.51 - 0.95 mg/dL    GFR Estimate 49 (L) >60 mL/min/1.73m2    Calcium 9.7 8.8 - 10.4 mg/dL    Glucose 121 (H) 70 - 99 mg/dL   INR    Collection Time: 03/03/25  7:15 PM   Result Value Ref Range    INR 0.95 0.85 - 1.15   Partial thromboplastin time    Collection Time: 03/03/25  7:15 PM   Result Value Ref Range    aPTT 28 22 - 38 Seconds   Troponin T, High Sensitivity    Collection Time: 03/03/25  7:15 PM   Result Value Ref  Range    Troponin T, High Sensitivity 15 (H) <=14 ng/L   CBC with platelets and differential    Collection Time: 03/03/25  7:15 PM   Result Value Ref Range    WBC Count 7.6 4.0 - 11.0 10e3/uL    RBC Count 4.73 3.80 - 5.20 10e6/uL    Hemoglobin 13.6 11.7 - 15.7 g/dL    Hematocrit 41.3 35.0 - 47.0 %    MCV 87 78 - 100 fL    MCH 28.8 26.5 - 33.0 pg    MCHC 32.9 31.5 - 36.5 g/dL    RDW 12.4 10.0 - 15.0 %    Platelet Count 225 150 - 450 10e3/uL    % Neutrophils 61 %    % Lymphocytes 23 %    % Monocytes 8 %    % Eosinophils 6 %    % Basophils 2 %    % Immature Granulocytes 0 %    NRBCs per 100 WBC 0 <1 /100    Absolute Neutrophils 4.6 1.6 - 8.3 10e3/uL    Absolute Lymphocytes 1.8 0.8 - 5.3 10e3/uL    Absolute Monocytes 0.6 0.0 - 1.3 10e3/uL    Absolute Eosinophils 0.4 0.0 - 0.7 10e3/uL    Absolute Basophils 0.1 0.0 - 0.2 10e3/uL    Absolute Immature Granulocytes 0.0 <=0.4 10e3/uL    Absolute NRBCs 0.0 10e3/uL   Hemoglobin A1c    Collection Time: 03/03/25  7:15 PM   Result Value Ref Range    Estimated Average Glucose 134 (H) <117 mg/dL    Hemoglobin A1C 6.3 (H) <5.7 %   Lipid panel reflex to direct LDL: Non-fasting    Collection Time: 03/03/25  7:15 PM   Result Value Ref Range    Cholesterol 234 (H) <200 mg/dL    Triglycerides 146 <150 mg/dL    Direct Measure HDL 52 >=50 mg/dL    LDL Cholesterol Calculated 153 (H) <100 mg/dL    Non HDL Cholesterol 182 (H) <130 mg/dL   Troponin T, High Sensitivity    Collection Time: 03/03/25 11:48 PM   Result Value Ref Range    Troponin T, High Sensitivity 11 <=14 ng/L   Glucose by meter    Collection Time: 03/04/25 12:41 AM   Result Value Ref Range    GLUCOSE BY METER POCT 128 (H) 70 - 99 mg/dL   Troponin T, High Sensitivity    Collection Time: 03/04/25  1:46 AM   Result Value Ref Range    Troponin T, High Sensitivity 12 <=14 ng/L   Glucose by meter    Collection Time: 03/04/25  4:42 AM   Result Value Ref Range    GLUCOSE BY METER POCT 121 (H) 70 - 99 mg/dL   CBC with platelets     Collection Time: 03/04/25  6:37 AM   Result Value Ref Range    WBC Count 7.5 4.0 - 11.0 10e3/uL    RBC Count 5.04 3.80 - 5.20 10e6/uL    Hemoglobin 14.7 11.7 - 15.7 g/dL    Hematocrit 43.7 35.0 - 47.0 %    MCV 87 78 - 100 fL    MCH 29.2 26.5 - 33.0 pg    MCHC 33.6 31.5 - 36.5 g/dL    RDW 12.5 10.0 - 15.0 %    Platelet Count 243 150 - 450 10e3/uL   Basic metabolic panel    Collection Time: 03/04/25  6:38 AM   Result Value Ref Range    Sodium 139 135 - 145 mmol/L    Potassium 4.3 3.4 - 5.3 mmol/L    Chloride 102 98 - 107 mmol/L    Carbon Dioxide (CO2) 23 22 - 29 mmol/L    Anion Gap 14 7 - 15 mmol/L    Urea Nitrogen 21.9 8.0 - 23.0 mg/dL    Creatinine 1.03 (H) 0.51 - 0.95 mg/dL    GFR Estimate 54 (L) >60 mL/min/1.73m2    Calcium 9.6 8.8 - 10.4 mg/dL    Glucose 149 (H) 70 - 99 mg/dL   Troponin T, High Sensitivity    Collection Time: 03/04/25  6:38 AM   Result Value Ref Range    Troponin T, High Sensitivity 14 <=14 ng/L       Total time spent for face to face visit, reviewing labs/imaging studies, counseling and coordination of care was: 1 Hour 15 Minutes. More than 50% of this time was spent on counseling and coordination of care.    Dragon software used in the dictation of this note.    Salome Briscoe MD  [facility] - [address], [address]  [place] [address] [phone]

## 2025-03-04 NOTE — PROGRESS NOTES
03/04/25 1255   Appointment Info   Signing Clinician's Name / Credentials (OT) Mary Reyes,OTR/L   Living Environment   People in Home spouse   Current Living Arrangements house   Home Accessibility stairs to enter home;stairs within home   Number of Stairs, Main Entrance 2   Stair Railings, Main Entrance none   Number of Stairs, Within Home, Primary greater than 10 stairs  (bedrooms upstairs)   Stair Railings, Within Home, Primary railings on both sides of stairs   Transportation Anticipated health plan transportation   Self-Care   Usual Activity Tolerance excellent   Current Activity Tolerance fair   Equipment Currently Used at Home none  (has tub/shower)   Fall history within last six months no   Instrumental Activities of Daily Living (IADL)   IADL Comments IND all ADLs/IADLs, works PT and drives   General Information   Onset of Illness/Injury or Date of Surgery 03/03/25   Patient/Family Therapy Goal Statement (OT) none stated   Additional Occupational Profile Info/Pertinent History of Current Problem Fang Estes is a 84 year old female with a medical history significant forHypertension, asthma, CKD stage IIIremote history of breast cancer, osteoporosis and other medical comorbidities who is admitted with an acute stroke. Patient is being admitted for further risk stratification and neurological evaluation.   Existing Precautions/Restrictions fall   Limitations/Impairments aphasia   Cognitive Status Examination   Orientation Status person;place;time   Visual Perception   Visual Impairment/Limitations corrective lenses for reading   Sensory   Sensory Quick Adds sensation intact   Range of Motion Comprehensive   Comment, General Range of Motion AROM WFL L UE, PROM WFL R UE but demo increased mm tone   Strength Comprehensive (MMT)   General Manual Muscle Testing (MMT) Assessment   (L UE WFL, R UE limited strength-unable to elevate extremity against gravity,no active mvmt observed in upper arm,forearm, wrist or  hand, minimal proximal strength at shldr level)   Coordination   Upper Extremity Coordination Right UE impaired   Bed Mobility   Bed Mobility supine-sit;sit-supine   Supine-Sit Miner (Bed Mobility) supervision   Sit-Supine Miner (Bed Mobility) supervision   Transfers   Transfers other (see comments);bed-chair transfer  (commode and bed trsf mod A)   Transfer Skill: Bed to Chair/Chair to Bed   Bed-Chair Miner (Transfers) moderate assist (50% patient effort)   Balance   Balance Assessment standing dynamic balance   Balance Comments poor balance   Activities of Daily Living   BADL Assessment/Intervention toileting;lower body dressing   Lower Body Dressing Assessment/Training   Miner Level (Lower Body Dressing) maximum assist (25% patient effort)   Toileting   Miner Level (Toileting) maximum assist (25% patient effort)   Clinical Impression   Criteria for Skilled Therapeutic Interventions Met (OT) Yes, treatment indicated   OT Diagnosis decreased ADLs   OT Problem List-Impairments impacting ADL problems related to;balance;coordination;mobility;muscle tone;strength   Assessment of Occupational Performance 3-5 Performance Deficits   Identified Performance Deficits drsg, toileting, trsfs   Planned Therapy Interventions (OT) ADL retraining;balance training;strengthening;transfer training   Clinical Decision Making Complexity (OT) detailed assessment/moderate complexity   Risk & Benefits of therapy have been explained evaluation/treatment results reviewed;care plan/treatment goals reviewed;patient   OT Total Evaluation Time   OT Eval, Moderate Complexity Minutes (89091) 10   OT Goals   Therapy Frequency (OT) Daily   OT Predicted Duration/Target Date for Goal Attainment 03/11/25   OT Goals Lower Body Dressing;Transfers;Toilet Transfer/Toileting;OT Goal 1   OT: Lower Body Dressing Moderate assist   OT: Transfer Minimal assist   OT: Toilet Transfer/Toileting Minimal assist   OT: Goal 1 Pt will  "participate in 8+ minutes coordination and strengthening tasks w/ R UE to increase ADL independence.   Interventions   Interventions Quick Adds Self-Care/Home Management;Therapeutic Procedures/Exercise   Self-Care/Home Management   Self-Care/Home Mgmt/ADL, Compensatory, Meal Prep Minutes (01538) 12   Symptoms Noted During/After Treatment (Meal Preparation/Planning Training) none   Treatment Detail/Skilled Intervention eval completed, tx initiated, demo supine>sit SBA w/ cues for rail use and rolling to R, trsf bed<>commode w/ mod A x1-2 w/ FWW, needed A to place R hand on walker handle, increased tone in R UE/LE and demo decreased coordination on way to bed and w/ STS, A to \"untangle\" LE's, bed alarm on, family present   [place] Level (Grooming Training) stand-by assist   Assistance (Grooming Training)   (washed R hand ,washed face, used L hand)   Lower Body Dressing Training Assistance maximum assist (25% patient effort)  (donned underwear)   Lower Body Dressing Training Assistance verbal cues  (1-2 persons)   [place] Level (Toilet Training) maximum assist (25% patient effort)  (commode)   Assistance (Toilet Training) set-up required;supervision;verbal cues;2 person assist   Toilet Training Assistance - Assistive Device commode overlay   Therapeutic Procedures/Exercise   Therapeutic Procedure: strength, endurance, ROM, flexibillity minutes (19631) 8   Symptoms Noted During/After Treatment none   Treatment Detail/Skilled Intervention faciliated R UE self ROM ex w/ demo and tactile cues, completed 4 sets/10, performed gentle PROM R shldr, forearm 3 sets/10,   OT Discharge Planning   OT Plan close trsfs, R UE strength/coord, toileting/commode, drsg   OT Discharge Recommendation (DC Rec) Acute Rehab Center-Motivated patient will benefit from intensive, interdisciplinary therapy.  Anticipate will be able to tolerate 3 hours of therapy per day   OT Rationale for DC Rec Ax1-2 ADLs and mobility, recommend intense " rehab to improve functional deficits   OT Brief overview of current status maxA toileting, mod A trsf   OT Total Distance Amb During Session (feet) 0   Total Session Time   Timed Code Treatment Minutes 20   Total Session Time (sum of timed and untimed services) 30

## 2025-03-04 NOTE — INTERIM SUMMARY
[facility]hab [facility] Pre-Admission Screen    Referral Source:  [facility] CARDIAC TESTING  Admit date to referring facility: 3/3/2025    Physical Medicine and Rehab Consult Completed: No    Rehab Diagnosis:    Stroke Ischemic 01.2 (R) Body Involvement (L) Brain- acute left pontine stroke     Justification for Acute Inpatient Rehabilitation  Fang Esets is a 84 year old female with history of hypertension, asthma, remote breast cancer, CKD 3 presented for evaluation of right-sided weakness and speech changes found to have acute pontine stroke. *** She is now medically stable and ready for transfer to acute inpatient rehabilitation.     The patient requires transfer to acute inpatient rehabilitation for intensive therapies not available in a lesser level of care including PT/OT/SLP, ongoing medical management at least three days per week, and rehabilitative nursing cares. At baseline Nataliya was highly independent, driving and working part time. Currently she needs Ax1-2 for short distance mobility. Patient requires an intensive inpatient rehab program to address the following acute impairments: R hemiparesis, impaired balance, decreased activity tolerance, impaired R UE coordination, mild dysphagia, dysarthria.     Current Active Medical Management Needs/Risks for Clinical Complications  The patient requires the high level of rehabilitation physician supervision that accompanies the provision of intensive rehabilitation therapy.  The patient needs the services of the rehabilitation physician to assess the patient medically and functionally and to modify the course of treatment as needed to maximize the patient's capacity to benefit from the rehabilitation process. The patient needs physician oversight 3x/week to manage the following:   Neurologic status: assess for neurologic recovery. *** Provide stroke education and risk factor reduction strategies (modifiable risk  "factors include HTN). Pt is at risk for recurrent strokes and pain in setting of CVA. Pt at risk for mood and sleep disorders, DVT and falls with injury in setting of CVA.       {ARC Med Mgmt Needs/Risks Clinical Complications:846876}    Past Medical/Surgical History  Surgery in the past 100 days: Unknown  Additional relevant past medical history: hypertension, asthma, remote breast cancer, CKD 3    Level of Functioning Prior to Admission:    LIVING ENVIRONMENT  People in Home: spouse  Current Living Arrangements: house  Home Accessibility: stairs to enter home, stairs within home  Number of Stairs, Main Entrance: 2  Stair Railings, Main Entrance: none  Number of Stairs, Within Home, Primary: greater than 10 stairs (bedrooms upstairs)  Stair Railings, Within Home, Primary: railings on both sides of stairs  Transportation Anticipated: health plan transportation     SELF-CARE  Usual Activity Tolerance: excellent  Equipment Currently Used at Home: none (has tub/shower)  Activity/Exercise/Self-Care Comment: Pt is indep with all mobility, indep with ADLs and drive. Pt works part time.    Level of Function: GG Scale (Section GG Functional Ability and Goals; CMS's VILLEGAS Version 3.0 Manual effective 10.1.2019):  PT Current Function Goals for Rehab   Bed Rolling {GG Scale:813752::\"6 Independent\"} 6 Independent   Supine to Sit {GG Scale:525806::\"6 Independent\"} 6 Independent   Sit to Stand {GG Scale:026041::\"6 Independent\"} 6 Independent   Transfer {GG Scale:445613::\"6 Independent\"} 6 Independent   Ambulation {GG Scale:898822::\"6 Independent\"} 6 Independent   Stairs 88 Not attempted due to safety 6 Independent     OT Current Function Goals for Rehab   Feeding {GG Scale:065381::\"6 Independent\"} 6 Independent   Grooming {GG Scale:059058::\"6 Independent\"} 6 Independent   Bathing {GG Scale:984998::\"6 Independent\"} 6 Independent   Upper Body Dressing {GG Scale:505043::\"6 Independent\"} 6 Independent   Lower Body Dressing {GG " "Scale:733673::\"6 Independent\"} 6 Independent   Toileting {GG Scale:368018::\"6 Independent\"} 6 Independent   Toilet Transfer {GG Scale:633703::\"6 Independent\"} 6 Independent   Tub/Shower Transfer 88 Not attempted due to safety 6 Independent   Cognition Not Assessed Supervision     SLP Current Function Goals for Rehab   Swallow Impaired Tolerate least restrictive diet without signs & symptoms of aspiration and adhere to safe swallow strategies   Communication Impaired Communicate basic needs effectively       Current Diet:  0-Thin and 7-Regular    Summary Statement:  *** Nataliya currently has intensive physical therapy, occupational therapy and speech therapy needs. At baseline Nataliya was highly independent, driving and working part time. Currently she is limited by her R hemiparesis, impaired balance, decreased activity tolerance, impaired R UE coordination, mild dysphagia, dysarthria. ***    Expected Therapies and Services Required During Inpatient Rehab Admission  Intensity of Therapy: Patient requires intensive therapies not available in a lesser level of care. Patient is motivated, making gains, and can tolerate 3 hours of therapy a day.  Physical Therapy: 75 minutes per day, 6 days a week for *** days  Occupational Therapy: 75 minutes per day, 6 days a week for *** days  Speech and Language Therapy: 30 minutes per day, 6 days a week for *** days  Rehabilitation Nursing Needs: Patient requires 24 hour Rehab Nursing to manage {ARC Nursing Needs:248404}.    Precautions/restrictions/special needs:   Precautions: fall precautions   Restrictions: ***   Special Needs:  N/A    Expected Level of Improvement: Anticipate with intensive therapies, close medical management, and rehabilitative nursing care the patient will improve strength, balance, tolerance to activity, safety to ensure Mod I with basic mobility and ADL performance to allow return home with family assistance.   Expected Length of time to achieve: " ***    Anticipated Discharge Needs:  Anticipated Discharge Destination: Home  Anticipated Discharge Support: Family member  24/7 support available : {Yes-No:936040}  Identified caregiver(s):  Spouse  Anticipated Discharge Needs: Home with outpatient therapy    Identified challenges/barriers:  None anticipated.     Liaison signature/date/time: ***    Physician statement of review and agreement:  I have reviewed and am in agreement of the need for IRF stay to address above functional and medical needs. In addition to above statements address, Patient requires intensive active and ongoing therapeutic intervention and multiple therapies; Patient requires medical supervision; Expected to actively participate in the intensive rehab program; Sufficiently stable to actively participate; Expectation for measurable improvement in functional capacity or adaption to impairments.    MD signature/date/time:

## 2025-03-04 NOTE — ED NOTES
Pt to CT monitored. Pt inc of urine. Able to stand from wc with some assistance and get on CT table and then back to stretcher. Was unsteady on feet. Speech slurred and difficulty finding some words but is oriented. Report given to Nathaly at bedside

## 2025-03-04 NOTE — PLAN OF CARE
Goal Outcome Evaluation:      Plan of Care Reviewed With: patient, spouse          Outcome Evaluation: Plan pending medical progression and therapy recommendations

## 2025-03-04 NOTE — PLAN OF CARE
Problem: Adult Inpatient Plan of Care  Goal: Optimal Comfort and Wellbeing  Outcome: Progressing     Problem: Stroke, Ischemic (Includes Transient Ischemic Attack)  Goal: Optimal Cognitive Function  Outcome: Progressing  Goal: Improved Communication Skills  Outcome: Progressing  Intervention: Optimize Communication Skills  Recent Flowsheet Documentation  Taken 3/4/2025 0000 by Hilton Hyde RN  Communication Enhancement Strategies:   call light answered in person   extra time allowed for response  Goal: Optimal Functional Ability  Outcome: Progressing  Intervention: Optimize Functional Ability  Recent Flowsheet Documentation  Taken 3/4/2025 0000 by Hilton Hyde RN  Activity Management: activity adjusted per tolerance  Goal: Effective Urinary Elimination  Outcome: Progressing   Goal Outcome Evaluation:       Pt arrived to unit at start of shift. BP was elevated but within parameters. Pt denied pain. NIH score of 2 and 3, with slurring of words and limb ataxia in R arm. On tele - SR w/ 1st degree AVB. NPO overnight because pt refused swallow study. Pt passed swallow study this am. SCDs on overnight. BG at 0000 was 128, BG at 0442 was 121. Assist of 1 to the bathroom. This am pt started to feel dizzy and was visibly sweating. RN took temp and it showed 98. Pt states she still gets hot flashes. Pt was able to make it back to bed with gb and walker. Pt now comfortable in bed.

## 2025-03-04 NOTE — ED TRIAGE NOTES
Patient reports difficulty with speech and has right sided leg weakness per her report- tier one stroke code called in triage.

## 2025-03-04 NOTE — PROGRESS NOTES
03/04/25 1100   Appointment Info   Signing Clinician's Name / Credentials (PT) Shanika Reagan PT   Living Environment   People in Home spouse   Current Living Arrangements house  (2 level plus basement)   Home Accessibility stairs to enter home;stairs within home   Number of Stairs, Main Entrance 2;none   Stair Railings, Main Entrance none   Number of Stairs, Within Home, Primary greater than 10 stairs   Stair Railings, Within Home, Primary railings on both sides of stairs   Transportation Anticipated family or friend will provide   Self-Care   Equipment Currently Used at Home none  (FWW)   Fall history within last six months no   Activity/Exercise/Self-Care Comment Pt is indep with all mobility, indep with ADLs and drive. Pt works part time.   General Information   Onset of Illness/Injury or Date of Surgery 03/03/25   Cognition   Affect/Mental Status (Cognition) WNL   Orientation Status (Cognition) oriented x 4;person;place;situation;time   Pain Assessment   Patient Currently in Pain No  (No dizziness or nausea during most of the PT treatment.  Pt was assisted back to bed with HOB elevated and PT went out to get a pillow and blankets.  As PT was coming back in the room the pt was starting to have an emisis.  Pt was assisted to her side.)   Posture    Posture Comments good sit posture   Range of Motion (ROM)   ROM Comment PROM Bilat LEs WFL.   Strength (Manual Muscle Testing)   Strength (Manual Muscle Testing) MMT: Hip;MMT: Knee;MMT: Ankle   Strength Comments L LE hip 5/5   Left Knee (Manual Muscle Testing)   Knee Flexion - Left Side (5/5) normal,left   Knee Extension - Left Side (5/5) normal,left   Right Knee (Manual Muscle Testing)   Knee Flexion - Right Side (3/5) fair, right   Knee Extension - Right Side (3+/5) fair plus, right   Left Ankle/Foot (Manual Muscle Testing)   Ankle Dorsiflexion - Left Side 5/5) normal,left   Ankle Plantarflexion - Left Side 5/5) normal,left   Right Ankle/Foot (Manual Muscle Testing)    Ankle Dorsiflexion - Right Side 1/5) trace, right   Ankle Plantarflexion - Right Side (2/5) poor, right   Bed Mobility   Comment, (Bed Mobility) Supine>sit with HOB elevated with pt using the rail with min A x 1.   Transfers   Comment, (Transfers) Sit>stand with max A x 1 with FWW with assist with the R UE and R LE   Gait/Stairs (Locomotion)   [place]son Level (Gait) moderate assist (50% patient effort)   Assistive Device (Gait) walker, front-wheeled  (FWW)   Distance in Feet (Gait) standing   Comment, (Gait/Stairs) Pt did need assist to keep the R UE on the walker and assist at the R LE   Balance   Balance Comments mod A x 1 with FWW with dyn bal.   Sensory Examination   Sensory Perception Comments Pt could feel lighttouch bilat LEs.  Pt did attend to the R UE and LE in function.   Coordination   Coordination other (see comments)   Coordination Comments impaired R UE and LE coordination due to tone and weakness.   Muscle Tone   Muscle Tone other (see comments)   Muscle Tone Comments icreased flex tone R UE and ext tone R LE   Clinical Impression   Criteria for Skilled Therapeutic Intervention Yes, treatment indicated   PT Diagnosis (PT) Impaired functional mobility.   Influenced by the following impairments R hemiparesis, dec bal, dec endurance.  Pt is not at her PLOF.   Functional limitations due to impairments bed mobility, transfers, gait,  steps.   Clinical Presentation (PT Evaluation Complexity) evolving   Clinical Presentation Rationale Pt presents medically diagnosed.   Clinical Decision Making (Complexity) moderate complexity   Planned Therapy Interventions (PT) balance training;bed mobility training;gait training;neuromuscular re-education;patient/family education;postural re-education;ROM (range of motion);stair training;strengthening;stretching;transfer training   Risk & Benefits of therapy have been explained evaluation/treatment results reviewed;care plan/treatment goals reviewed;risks/benefits  reviewed;current/potential barriers reviewed;patient;spouse/significant other;participants voiced agreement with care plan   PT Total Evaluation Time   PT Eval, Moderate Complexity Minutes (49299) 13   Physical Therapy Goals   PT Frequency Daily   PT Predicted Duration/Target Date for Goal Attainment 03/11/25   PT Goals Bed Mobility;Transfers;Gait;PT Goal 1   PT: Bed Mobility Supervision/stand-by assist;Supine to/from sit;Rolling   PT: Transfers Minimal assist;Bed to/from chair;Assistive device;Within precautions   PT: Gait Moderate assist;Vaughn walker;50 feet   PT: Goal 1 Pt to kumar bilat LE ex x 15 to 20 reps with CGA R LE   Interventions   Interventions Quick Adds Therapeutic Activity;Therapeutic Procedure   Therapeutic Procedure/Exercise   Ther. Procedure: strength, endurance, ROM, flexibillity Minutes (57256) 10   Treatment Detail/Skilled Intervention PROM to AAAROM bilat LEs with pt needing min A R LE with ex  bilat LE ex x 10 reps with bilat HS, AP QS x 10 reps and PT did a gentle R HC stretch.  PT did educate the pt on positioning in the bed with her UEs and LEs.  Pt had an emesis at the end of treatment and nursing notified and they did come in to assist the pt.  Hand off to nursing.  (PT did rol the pt onto her side when she started to have an emeisis.  .)   Therapeutic Activity   Therapeutic Activities: dynamic activities to improve functional performance Minutes (78799) 23   Treatment Detail/Skilled Intervention Supine>sit with min A x 1 with the pt using the rail with cues for technique.  Sit scoot with min A x 1 with PT facilitating use of the R extremities.  Sit<>stand with max A x 1 with cues for foot and assist with R UE on the walker.  Pt worked on taking a few steps forward and backward with mod A x 1 with PT holding her hand on the walker and PT assisting to keep the R knee into ext with WB.  Pt walked forward and backward x 4 steps.  Pt also stood with mod A x 1 for a 2nd stand and worked wt  shifting bilat with the walker.  Pt was able to WB bilat LE with dec coordination R LE.  Sit scoot to the HOB with min A with the R UE with the task. Sit>supine with min A x 1.  Pt was resting in bed with HOB elevated while PT did run to get a pillow.  Pt did have an emesis and PT did hand off to nursing.   PT Discharge Planning   PT Plan neuro re ed, stand bal, pre gait and gait with hemiwalker. LE ex.   PT Discharge Recommendation (DC Rec) Acute Rehab Center-Motivated patient will benefit from intensive, interdisciplinary therapy.  Anticipate will be able to tolerate 3 hours of therapy per day   PT Rationale for DC Rec Pt might not be ready yet today for acute rehab but she could benefit from ARF for skill PT for neuro re ed for R hemiparesis and mobility.   PT Brief overview of current status PT eval, bed mobility with min A x 1, Sit<>stand with max A x 1, pre gait activities. LE ROM/strengthening.   Physical Therapy Time and Intention   Timed Code Treatment Minutes 33   Total Session Time (sum of timed and untimed services) 46   Discharge Needs Assessment (IRF)   Transportation Concerns none

## 2025-03-04 NOTE — PROGRESS NOTES
SPEECH PATHOLOGY CLINICAL SWALLOW EVALUATION  SPEECH PATHOLOGY SPEECH/LANGUAGE EVALUATION       03/04/25 0900   Appointment Info   Signing Clinician's Name / Credentials (SLP) Kun Miner MA [facility] SLP   General Information   Onset of Illness/Injury or Date of Surgery 03/03/25   Referring Physician Kirstin Rose MD   Patient/Family Therapy Goal Statement (SLP) to improve speech, return to independence with activities   Pertinent History of Current Problem 84 year old female with a medical history significant forHypertension, asthma, CKD stage IIIremote history of breast cancer, osteoporosis and other medical comorbidities who is admitted with an acute stroke. Patient presents with right facial droop, right upper extremity weakness, right lower extremity weakness, dysmetria on the right side, and inability to perform rapid repetitive movements. MRI of the brain indicated a hyperacute stroke in the left harish with no signs of restricted diffusion over the left MCA territory.   General Observations Pt is alert. She is very active at baseline, cross country skiing, canoeing, biking, works part time, helps with grandkids. Lives with her  independently.   Type of Evaluation   Type of Evaluation Swallow Evaluation;Speech, Language, Cognition   Oral Motor   Oral Musculature generally intact   Mucosal Quality adequate   Dentition (Oral Motor)   Dentition (Oral Motor) natural dentition;adequate dentition   Facial Symmetry (Oral Motor)   Facial Symmetry (Oral Motor) right side impairment   Right Side Facial Asymmetry minimal impairment   Lip Function (Oral Motor)   Lip Range of Motion (Oral Motor) retraction impairment;protrusion impairment   Lip Strength (Oral Motor) right side;mild impairment   Lip Coordination (Oral Motor) right side;moderate impairment   Protrusion, Lip Range of Motion right side;minimal impairment   Retraction, Lip Range of Motion right side;minimal impairment   Tongue Function (Oral Motor)    Tongue Strength (Oral Motor) strength decreased;right side   Tongue Coordination/Speed (Oral Motor) reduced rate   Tongue ROM (Oral Motor) protrusion is impaired;lateralization is impaired   Protrusion, Tongue ROM Impairment (Oral Motor) right side;minimal impairment   Lateralization, Tongue ROM Impairment (Oral Motor) right side;minimal impairment   Jaw Function (Oral Motor)   Jaw Function (Oral Motor) WNL   Cough/Swallow/Gag Reflex (Oral Motor)   Volitional Throat Clear/Cough (Oral Motor) WNL   Volitional Swallow (Oral Motor) WNL   Vocal Quality/Secretion Management (Oral Motor)   Vocal Quality (Oral Motor) other (see comments)  (low volume voicing, clear quality)   Secretion Management (Oral Motor) WNL   General Swallowing Observations   Past History of Dysphagia none   Respiratory Support room air   Current Diet/Method of Nutritional Intake (General Swallowing Observations, NIS) NPO   Swallowing Evaluation Clinical swallow evaluation   Clinical Swallow Evaluation   Feeding Assistance set up only required   Clinical Swallow Evaluation Textures Trialed thin liquids;soft & bite-sized;solid foods   Clinical Swallow Eval: Thin Liquid Texture Trial   Mode of Presentation, Thin Liquids straw;self-fed   Volume of Liquid or Food Presented 6 oz   Oral Phase of Swallow WFL   Pharyngeal Phase of Swallow intact   Diagnostic Statement able to draw from straw, single sips and consecutive swallows without overt s/s aspiration.   Clinical Swallow Eval: Soft & Bite Sized   Mode of Presentation spoon;self-fed   Volume Presented 2 oz   Oral Phase WFL   Pharyngeal Phase intact   Diagnostic Statement prolonged mastication but functional, able to do lingual sweep bilaterally, no stasis, no overt s/s aspiration.   Clinical Swallow Evaluation: Solid Food Texture Trial   Mode of Presentation self-fed   Volume Presented norma cracker   Oral Phase WFL   Pharyngeal Phase intact   Diagnostic Statement prolonged mastication but functional,  able to do lingual sweep bilaterally, tooks sips to help clear as needed,  no stasis, no overt s/s aspiration.   Esophageal Phase of Swallow   Patient reports or presents with symptoms of esophageal dysphagia No   Swallowing Recommendations   Diet Consistency Recommendations thin liquids (level 0);regular diet   Mode of Delivery Recommendations bolus size, small;slow rate of intake   Swallowing Maneuver Recommendations other (see comments)  (frequents sips throughout meal)   Monitoring/Assistance Required (Eating/Swallowing) check mouth frequently for oral residue/pocketing   Recommended Feeding/Eating Techniques (Swallow Eval) maintain upright sitting position for eating;set-up and prepare tray   Medication Administration Recommendations, Swallowing (SLP) with water/liquid   Instrumental Assessment Recommendations instrumental evaluation not recommended at this time   Comment, Swallowing Recommendations Slow but functional oral mastication, no pocketing, no overt s/s aspiration. Recommend regular diet, thin liquids, meds whole with water. Needs set up and eats with non dominent left hand. Moderately dysarthria without significant aphasia.   Motor Speech   Vocal Loudness (Motor Speech) reduced loudness;monoloudness   Speech Intelligibility (Motor Speech) word level;phrase/sentence level;conversational level   Breath Support (Motor Speech) intact   Comment, Motor Speech Assessment Moderate dysarthria with low monotone volume of speech, reduced coordination and reduced voicing of some consonants/syllables.   Rate/Prosody (Motor Speech) slow overall rate   Articulation (Motor Speech) errors with increasing word length;imprecise articulation;reduction of syllables   Phonation (motor speech) Loudness (soft)   Word Level, Speech Intelligibility (Motor Speech) minimal impairment;moderate impairment   Conversational Level, Speech Intelligibility (Motor Speech) moderate impairment   Phrase/Sentence Level, Speech  Intelligibility (Motor Speech) moderate impairment   Auditory Comprehension   Follows Commands (Auditory Comprehension) 1-step command   Paragraph Comprehension (Auditory Comprehension) intact;over 90% accuracy   Comment, Assessment (Auditory Comprehension) WFL   Yes/No Questions (Auditory Comprehension) WFL   1 Step, Follows Commands (Auditory Comprehension) intact   Verbal Expression   Comment, Assessment (Verbal Expression) no obvious word finding errors, able to name animals x13; States, x15, 5 children, 10 grandchildren,  by name without hesitation or paraphasic errors.   Confrontational Naming (Verbal Expression) WFL;objects   Conversational Speech (Verbal Expression) connected speech   Automatic Speech (Verbal Expression) WFL   Responsive Naming (Verbal Expression) WFL   Word Finding Skills (Verbal Expression) WFL;generative naming   Connected Speech, Conversational (Verbal Expression) intact   Pragmatic Language   Verbal Skills (Pragmatic Language) WFL;topic maintenance;turn taking   Comment, Assessment (Pragmatic Language) WFL, increase time to respond due to dysarthria   Nonverbal Skills (Pragmatic Language) body language/personal space;eye contact   Eye Contact, Nonverbal Skills (Pragmatic Language) intact   Body Language/Personal Space, Nonverbal Skills (Pragmatic Language) intact   Topic Maintenance, Verbal Skills (Pragmatic Language) intact   Turn Taking, Verbal Skills (Pragmatic Language) intact   Reading Comprehension   Comment, Assessment (Reading Comprehension) not tested   Written Language   Comment, Assessment (Written Language) not tested, unable to use dominent right hand   Cognition   Cognitive Status Exam Comments Appears WFL for orientation, directions, awareness   Orientation Status (Cognition) oriented x 4   Affect/Mental Status (Cognition) WF   Clinical Impression   Criteria for Skilled Therapeutic Interventions Met (SLP Eval) Yes, treatment indicated   SLP Diagnosis Dysarthria,  oral dysphagia   Risks & Benefits of therapy have been explained evaluation/treatment results reviewed;care plan/treatment goals reviewed;risks/benefits reviewed;current/potential barriers reviewed;participants voiced agreement with care plan;participants included;patient   Clinical Impression Comments Patient presents with right facial droop, reduced right lip strength, ROM, and coordination. Tongue slightly deviates to right on protrustion but able to produced lingual sweep bilaterally, good ROM, slightly reduced coordination. Voice is of low volume and slightly monotone. Speech is moderately dysarthric, slow, effortful, and with some reduction in voicing sounds/syllables especially in multisyllabic words or running speech. Speech intelligibility is reduced and impacts ability to communicate. Intelligibility does improve with increased vocal volume and exaggerated oral motor movements. Expressive and receptive language appears intact without word finding errors or paraphasias, able to follow directions, recall information. She is oriented x4 and has awareness of deficits. There is mild oral dysphagia due to right lip and lingual weakness and discoordination. Lip seal is adequate to spoon, straw. She demonstrates slow but good control of thin liquid, soft and regular solids. Mastication is prolonged but functional. She independently uses lingual sweep to control boluses and avoid pocketing in right buccal sulcus. She takes sips of liquid as needed to ensure oral clearance. Swallows are observed with all consistencies. There is no overt s/s aspiration throughout the exam. No cough, throat clear, or change in vocal quality. Recommend regular diet w/softer food choice, thin liquids, slow rate, sips as needed between bites. She may need set up of tray and will use her nondominent left had to self feed/drink.   SLP Total Evaluation Time   Eval: oral/pharyngeal swallow function, clinical swallow Minutes (24595) 32    Eval: Sound production with lang comprehension and expression Minutes (05795) 15                                           SLP Plan Wed 1/5; f/u reg diet/thin for indep oral clearance on right, sips as needed, no s/s aspiration. May need EC if reg items too difficulty. Loud exaggerated speech strategies in structured activity. CLQT if cog concerns. Pt is very active at baseline, former teacher, motivated.   SLP Discharge Recommendation [facility]-Motivated patient will benefit from intensive, interdisciplinary therapy.  Anticipate will be able to tolerate 3 hours of therapy per day   SLP Rationale for DC Rec Very motivated, active and independent at baseline.   SLP Brief overview of current status  Slow but functional oral mastication, no pocketing, no overt s/s aspiration. Recommend regular diet, thin liquids, meds whole with water. Needs set up and eats with non dominent left hand. Moderately dysarthria without significant aphasia.   SLP Time and Intention   Total Session Time (sum of timed and untimed services) 40

## 2025-03-04 NOTE — H&P
[facility]    History and Physical - Hospitalist Service       Date of Admission:  3/3/2025    Assessment & Plan      Fang Estes is a 84 year old female with a medical history significant forHypertension, asthma, CKD stage IIIremote history of breast cancer, osteoporosis and other medical comorbidities who is admitted with an acute stroke. Patient is being admitted for further risk stratification and neurological evaluation.    Patient Active Problem List   Diagnosis    Invasive ductal carcinoma of breast, right (H)    Invasive lobular carcinoma of breast, stage 2, right (H)    Age-related osteoporosis without current pathological fracture    Aphasia    Benign essential hypertension    Asthma    Chronic kidney disease, stage III (moderate) (H)    Osteopetrosis        Acute Cerebrovascular Accident (CVA)    Assessment: Patient presents with right facial droop, right upper extremity weakness, right lower extremity weakness, dysmetria on the right side, and inability to perform rapid repetitive movements.  Plan: Follow stroke protocol. Initiate Physical Therapy (PT), Occupational Therapy (OT), and Speech Therapy in the morning. Neurology consultation has been placed. Echocardiogram to be obtained in the morning.    Hypertension    Assessment: Permissive hypertension strategy indicated due to acute CVA.  Plan: Hold blood pressure medications temporarily. Monitor blood pressure closely and adjust management as needed based on neurological status and blood pressure readings.    Asthma    Plan: Optimize lung care with prescribed breathing treatments. Monitor response and adjust asthma management plan as needed.    Osteoporosis  Plan: Continue Fosamax (alendronate) once a week. Ensure adequate calcium and vitamin D intake.    History of Breast Cancer    Assessment: Status post-lumpectomy for invasive breast cancer stage II.  Plan: Continue Arimidex (anastrozole) as part of ongoing cancer  management. Schedule regular follow-up with oncology.    Chronic Kidney Disease (CKD) Stage III  Stable    Plan: Avoid nephrotoxic medications. Monitor renal function and adjust medications as necessary.  Other Medical Problems    Plan: Continue current management for other medical issues as previously established. Reassess and adjust as clinically indicated.     Diet: NPO for Medical/Clinical Reasons Except for: No Exceptions    DVT Prophylaxis: Pneumatic Compression Devices  Ann Catheter: Not present  Lines: None     Cardiac Monitoring: None  Code Status:  Full    Clinically Significant Risk Factors Present on Admission                   # Hypertension: Home medication list includes antihypertensive(s)                      Disposition Plan     Medically Ready for Discharge: Anticipated in 2-4 Days           Kirstin Rose MD  Hospitalist Service  [facility]  Securely message with [employer] (more info)  Text page via [email or web address] Paging/Directory     ______________________________________________________________________    Chief Complaint   Stuttering speech        History of Present Illness   Fang Estes is a 84 year old female with a medical history significant forHypertension, asthma, CKD stage IIIremote history of breast cancer, osteoporosis and other medical comorbidities who is admitted with an acute stroke. Patient is being admitted for further risk stratification and neurological evaluation. -symptoms on prsentation - stuttering pseech and right facial droop and right leg weakness     Per history, patient presented to the emergency room with neurological symptoms. Around 5 PM, Ms. Estes began experiencing stuttering speech and a right facial droop, prompting her visit to the ER. Upon evaluation in the ER, her stuttering speech persisted, although the right facial droop had resolved. She also reported right-sided leg weakness.      In the emergency department, a tier-one stroke code was  called in triage due to her symptoms. A CT scan of the head was performed, which showed no evidence of bleeding but did reveal numerous areas of vascular stenosis. An MRI of the brain indicated a hyperacute stroke in the left harish with no signs of restricted diffusion over the left MCA territory. Despite her speech difficulties, the ER provider noted that she was able to sustain a conversation, and it was determined that her speech changes did not represent a disabling deficit. Consequently, thrombolysis was not advised, and the decision was made to administer ASA (aspirin) and Plavix (300 mg).    Neurology was consulted, and after discussions, the decision was made to admit Ms. Estes for further risk stratification and management of her stroke risk factors. The plan for her admission includes a comprehensive neurological evaluation to better understand the extent of her stroke and to manage her risk factors for future cerebrovascular events.       Preliminary labs done  showed BMP which appeared stable.  LDL is 153, A1c 6.3 HDL was 52.  CBC was unremarkable      Mri of the brain   Narrative & Impression   EXAM: MR BRAIN W/O and W CONTRAST  LOCATION: [facility]  DATE: 3/3/2025     INDICATION: abrupt onset difficultly speaking dysarthria  COMPARISON: 03/03/2025  CONTRAST: 6 mL Gadavist  TECHNIQUE: Routine multiplanar multisequence head MRI without and with intravenous contrast.     FINDINGS:  INTRACRANIAL CONTENTS: Linear focus of faint diffusion restriction within the left hemipons that displays ADC hypointense signal and very faint T2 hyperintense superimposed signal (series 3, image 12 and series 301, image 12 and series 6, image 18).     There is no mass effect or midline shift. Age-related cerebral parenchymal volume loss and presumable small vessel ischemic disease with a chronic appearing focus of gliosis within the periventricular white matter of the left frontal lobe. There is no    abnormal intracranial enhancement. Well-circumscribed, avidly enhancing focus adjacent to the right porous acoustic this is likely representative of a small incidental meningioma.     SELLA: Empty sella morphology with flattening of the pituitary gland along the sellar floor.     OSSEOUS STRUCTURES/SOFT TISSUES: Normal marrow signal. The major intracranial vascular flow voids are maintained.      ORBITS: No abnormality accounting for technique.      SINUSES/MASTOIDS: No paranasal sinus mucosal disease. No middle ear or mastoid effusion.                                                                       IMPRESSION:     1.  Linear focus of diffusion restriction within the left armando-harish is likely indicative of a hyperacute infarction. No evidence of hemorrhagic conversion.  2.  Incidental presumable meningioma or schwannoma along the undersurface of the right tentorial leaflet.      These findings were communicated to Dr. Moreno over the phone at 9:08 PM on 03/03/2025 by Jeyson Garces.         Past Medical History    Past Medical History:   Diagnosis Date    Hypertension     Uncomplicated asthma    Medical History  Medical History Date Comments   Osteopenia   was on fosamax 10 years ago   Elevated blood pressure       Exercise-induced asthma ([facility])       Pneumonia 2009 & 1993 both treated at home   Pseudophakia of left eye       Breast cancer ([facility]) 4/21/22     Cataract     CKD stage III    Past Surgical History   Past Surgical History:   Procedure Laterality Date    APPENDECTOMY      BREAST EXCISIONAL BIOPSY Left 01/01/1980    LUMPECTOMY BREAST Right 5/24/2022    Procedure: WIRE LOCALIZED LUMPECTOMY WITH SENTINEL LYMPH NODE BIOPSY;  Surgeon: Nida Mitchell DO;  Location: [facility] OR       Prior to Admission Medications   Prior to Admission Medications   Prescriptions Last Dose Informant Patient Reported? Taking?   albuterol (PROAIR HFA/PROVENTIL HFA/VENTOLIN HFA) 108 (90 Base) MCG/ACT inhaler   Yes No    Sig: INHALE 2 PUFFS BY MOUTH EVERY 4 HOURS AS NEEDED   alendronate (FOSAMAX) 70 MG tablet   Yes No   Sig: TAKE 1 TABLET BY MOUTH WEEKLY 30 MINUTES BEFORE FIRST FOOD/DRINK/MEDICATION. AVOID LYING DOWN FOR 30 MINUTES   diphenhydrAMINE (BENADRYL) 25 MG tablet   Yes No   Sig: Take 25 mg by mouth nightly as needed for sleep   lisinopril (ZESTRIL) 10 MG tablet   Yes No   lisinopril (ZESTRIL) 5 MG tablet   Yes No   Sig: Take 5 mg by mouth daily   Patient not taking: Reported on 2024   montelukast (SINGULAIR) 10 MG tablet   Yes No   Sig: TAKE 1 TABLET BY MOUTH DAILY AT BEDTIME   vitamin D3 (CHOLECALCIFEROL) 10 MCG (400 UNIT) capsule   Yes No   Sig: Take 1 capsule by mouth daily      Facility-Administered Medications: None        Review of Systems    The 10 point Review of Systems is negative other than noted in the HPI or here.     Social History   I have reviewed this patient's social history and updated it with pertinent information if needed.  Social History     Tobacco Use    Smoking status: Never    Smokeless tobacco: Never   Substance Use Topics    Drug use: Never         Family History   I have reviewed this patient's family history and updated it with pertinent information if needed.  Family History   Problem Relation Age of Onset    Breast Cancer Daughter 45.00   Family History    Family History  Medical History Relation Name Comments   Diabetes, Type II Birth Father       Macular Degeneration Birth Father       Asthma Birth Mother       Cataract Birth Mother       Amblyopia/Strabismus Brother 1       Cataract Brother 1       Amblyopia/Strabismus Daughter 1       Cancer, Breast Daughter 5   very early stage   Amblyopia/Strabismus Sister No change     Asthma Sister No change     Cataract Sister No change     Eczema Sister No change       Family History  Relation Name Status Comments   Birth Father    (Age 84)     Birth Mother    (Age 87)     Brother 1   Alive     Brother 2   Alive     Brother  3   Alive     Brother 4   Alive     Daughter 1   Alive     Daughter 2   Alive     Daughter 3   Alive     Daughter 4   Alive     Daughter 5   Alive     Maternal Grandfather        Maternal Grandmother        Paternal Grandfather        Paternal Grandmother        Sister No change Alive           Allergies   Allergies   Allergen Reactions    Ace Inhibitors Cough    Losartan Other (See Comments)     Shortness of breath, worsening cough(only very mild with lisinopril, much worse with this)    No Clinical Screening - See Comments Other (See Comments)     Cats, horses, dust.    Other (Do Not Use) Other (See Comments)     Cats, horses, dust.    Amoxicillin Rash        Physical Exam   Vital Signs: Temp: 98.3  F (36.8  C) Temp src: Temporal BP: (!) 189/125 Pulse: 77   Resp: 20 SpO2: 98 % O2 Device: None (Room air)    Weight: 123 lbs 0 oz      General Aox3, appropriate affect, NAD, on RA  HEENT  MMM, EOMI, PERRL, speech slurring   and word finding difficult  Right facial droop  Chest Adeq E b/l, No wheezing  Heart RRR, No M/R/G  Abd- Soft, NT, BS+  - Deferred,   Extremity- Moving all extremities, No digital clubbing,   No edema  Neuro- Aox3, CN II- XII intact, No peripheral vision loss  P5/5,  RUE- 4/5 RLE 4/5, T-N, reflexes 3+,   plantar reflex upwards  Right side dysmetria  Unable to do rapid repetitive movements,  gait not checked  Skin  Has no tattoo, No skin rash     Medical Decision Making       85 MINUTES SPENT BY ME on the date of service doing chart review, history, exam, documentation & further activities per the note.      ------------------ MEDICAL DECISION MAKING ------------------------------------------------------------------------------------------------------  MANAGEMENT DISCUSSED with the following over the past 24 hours: patient and care team       Data   ------------------------- PAST 24 HR DATA REVIEWED -----------------------------------------------    I have personally  reviewed the following data over the past 24 hrs:    7.6  \   13.6   / 225     138 100 26.9 (H) /  121 (H)   4.1 27 1.11 (H) \     Trop: 15 (H) BNP: N/A     INR:  0.95 PTT:  28   D-dimer:  N/A Fibrinogen:  N/A       Imaging results reviewed over the past 24 hrs:   Recent Results (from the past 24 hours)   CTA Head Neck with Contrast    Narrative    EXAM: CTA HEAD NECK W CONTRAST  LOCATION: [facility]  DATE: 3/3/2025    INDICATION: Code Stroke to evaluate for potential thrombolysis and thrombectomy. Slurred speech.  COMPARISON: None.  CONTRAST: isovue 370 = 67ml  TECHNIQUE: Head and neck CT angiogram with IV contrast. Noncontrast head CT followed by axial helical CT images of the head and neck vessels obtained during the arterial phase of intravenous contrast administration. Axial 2D reconstructed images and   multiplanar 3D MIP reconstructed images of the head and neck vessels were performed by the technologist. Dose reduction techniques were used. All stenosis measurements made according to NASCET criteria unless otherwise specified.    FINDINGS:   Streak artifact limits aspects of exam.    HEAD CT:   INTRACRANIAL CONTENTS: No acute intracranial hemorrhage, extraaxial collection, or mass effect.  No CT evidence of acute infarct. Left basal ganglia chronic infarct. Mild presumed chronic small vessel ischemic changes. Normal ventricles and sulci. Right   cerebellopontine angle extra-axial lesion measuring 9 mm in thickness nonspecific possibly schwannoma or meningioma. No significant mass effect on the brainstem.      HEAD CTA:  Right distal carotid siphon moderate stenosis.    Right distal M1 severe stenosis. Right proximal M2 segment inferior division focal high-grade severe stenosis with a thin wisp of enhancement visualized. Right mid to distal M2 segment inferior division moderate stenosis.    Left proximal to mid M1 moderate stenosis. Left proximal M2 segment superior division focal  severe stenosis. Left proximal M2 segment inferior division focal high-grade severe stenosis with only thin wisp of enhancement.    Bilateral A2/A3 segments demonstrate several stenoses ranging from mild to severe.    Left proximal P2 focal high-grade severe stenosis. Bilateral PCAs demonstrate several additional stenoses ranging from mild to severe.    No additional significant stenosis or occlusion.      Anterior communicating artery possible tiny focal outpouching measuring 2 mm concerning for aneurysm.    Otherwise, no brain aneurysm. No AVM/AVF.    Hypoplastic right A1 segment.      NECK CTA:  RIGHT CAROTID:   Atherosclerotic plaque results in less than 50% stenosis in the right carotid bulb. No dissection.    LEFT CAROTID:  No significant stenosis/occlusion. No dissection.    VERTEBRAL ARTERIES:  No significant stenosis or occlusion. No dissection.      AORTIC ARCH: Classic aortic arch anatomy. No significant stenosis at the origin of the great vessels.    ARTERIAL PLAQUE: Calcified/noncalcified plaque scattered the arterial system.    NONVASCULAR STRUCTURES:   Multilevel spondylosis. Diffuse bony demineralization.        Impression    IMPRESSION:   HEAD CT:  1.  No acute intracranial hemorrhage identified.    2.  No CT acute infarct. Aspect score 10.    3.  Right cerebellopontine angle small extra-axial lesion nonspecific possibly schwannoma or meningioma. No significant mass effect on the brainstem.    4.  Left basal ganglia chronic infarct.    5.  Age-related changes described above.    Dr Nile Malone was notified by Dr Joss Townsend at  7:15 PM 3/3/2025 CST/CDT.      HEAD CTA:   1.  No intracranial arterial large vessel occlusion.    2.  Multifocal intracranial arterial stenoses described above. Some of these intracranial arterial stenoses are focal high-grade severe stenoses with only a thin wisp of enhancement (including but not limited to a left proximal M2 branch artery).     3.  Findings likely reflect  atherosclerotic vascular disease given age. Expanded differential includes reversible cerebral venous constrictive syndrome, drug-induced vasospasm, vasculitis. Please see above for discussion.    4.  Anterior communicating artery possible tiny focal outpouching measuring 2 mm concerning for aneurysm.      NECK CTA:  1.  Atherosclerotic plaque results in less than 50% stenosis in the right carotid bulb.    2.  No rate limiting stenosis or occlusion. No cervical artery dissection.      Dr Nile Malone was notified by Dr Joss Townsend at  7:40 PM 3/3/2025 CST/CDT.   MR Brain w/o & w Contrast    Narrative    EXAM: MR BRAIN W/O and W CONTRAST  LOCATION: [facility]  DATE: 3/3/2025    INDICATION: abrupt onset difficultly speaking dysarthria  COMPARISON: 03/03/2025  CONTRAST: 6 mL Gadavist  TECHNIQUE: Routine multiplanar multisequence head MRI without and with intravenous contrast.    FINDINGS:  INTRACRANIAL CONTENTS: Linear focus of faint diffusion restriction within the left hemipons that displays ADC hypointense signal and very faint T2 hyperintense superimposed signal (series 3, image 12 and series 301, image 12 and series 6, image 18).    There is no mass effect or midline shift. Age-related cerebral parenchymal volume loss and presumable small vessel ischemic disease with a chronic appearing focus of gliosis within the periventricular white matter of the left frontal lobe. There is no   abnormal intracranial enhancement. Well-circumscribed, avidly enhancing focus adjacent to the right porous acoustic this is likely representative of a small incidental meningioma.    SELLA: Empty sella morphology with flattening of the pituitary gland along the sellar floor.    OSSEOUS STRUCTURES/SOFT TISSUES: Normal marrow signal. The major intracranial vascular flow voids are maintained.     ORBITS: No abnormality accounting for technique.     SINUSES/MASTOIDS: No paranasal sinus mucosal disease. No middle ear  or mastoid effusion.       Impression    IMPRESSION:    1.  Linear focus of diffusion restriction within the left armando-harish is likely indicative of a hyperacute infarction. No evidence of hemorrhagic conversion.  2.  Incidental presumable meningioma or schwannoma along the undersurface of the right tentorial leaflet.     These findings were communicated to Dr. Moreno over the phone at 9:08 PM on 03/03/2025 by Jeyson Garces.

## 2025-03-04 NOTE — CONSULTS
MRI brain showed left harish hyperacute stroke  No signs of restricted diffusion over the left MCA territory    Per ED provider patient is having some speech difficulties but able to sustain a conversation.  ED provider agreed this speech changes may not represent a disabling deficit.  No TNK advised for those reasons.  Keep permissive blood pressure control.  Keep MAP higher than 70.

## 2025-03-05 ENCOUNTER — APPOINTMENT (OUTPATIENT)
Dept: OCCUPATIONAL THERAPY | Facility: HOSPITAL | Age: 85
DRG: 065 | End: 2025-03-05
Payer: COMMERCIAL

## 2025-03-05 ENCOUNTER — APPOINTMENT (OUTPATIENT)
Dept: SPEECH THERAPY | Facility: HOSPITAL | Age: 85
DRG: 065 | End: 2025-03-05
Payer: COMMERCIAL

## 2025-03-05 PROBLEM — I67.1 CEREBRAL ANEURYSM, NONRUPTURED: Status: ACTIVE | Noted: 2025-03-05

## 2025-03-05 LAB
ATRIAL RATE - MUSE: 72 BPM
DIASTOLIC BLOOD PRESSURE - MUSE: 98 MMHG
GLUCOSE BLDC GLUCOMTR-MCNC: 105 MG/DL (ref 70–99)
GLUCOSE BLDC GLUCOMTR-MCNC: 109 MG/DL (ref 70–99)
INTERPRETATION ECG - MUSE: NORMAL
P AXIS - MUSE: 37 DEGREES
PR INTERVAL - MUSE: 230 MS
QRS DURATION - MUSE: 78 MS
QT - MUSE: 402 MS
QTC - MUSE: 440 MS
R AXIS - MUSE: 76 DEGREES
SYSTOLIC BLOOD PRESSURE - MUSE: 168 MMHG
T AXIS - MUSE: 53 DEGREES
VENTRICULAR RATE- MUSE: 72 BPM

## 2025-03-05 PROCEDURE — 99232 SBSQ HOSP IP/OBS MODERATE 35: CPT | Performed by: PSYCHIATRY & NEUROLOGY

## 2025-03-05 PROCEDURE — 97110 THERAPEUTIC EXERCISES: CPT | Mod: GO

## 2025-03-05 PROCEDURE — 97535 SELF CARE MNGMENT TRAINING: CPT | Mod: GO

## 2025-03-05 PROCEDURE — 250N000013 HC RX MED GY IP 250 OP 250 PS 637: Performed by: HOSPITALIST

## 2025-03-05 PROCEDURE — 99232 SBSQ HOSP IP/OBS MODERATE 35: CPT | Performed by: INTERNAL MEDICINE

## 2025-03-05 PROCEDURE — 120N000001 HC R&B MED SURG/OB

## 2025-03-05 PROCEDURE — 92526 ORAL FUNCTION THERAPY: CPT | Mod: GN

## 2025-03-05 PROCEDURE — 250N000013 HC RX MED GY IP 250 OP 250 PS 637: Performed by: PSYCHIATRY & NEUROLOGY

## 2025-03-05 PROCEDURE — 92507 TX SP LANG VOICE COMM INDIV: CPT | Mod: GN

## 2025-03-05 PROCEDURE — 250N000013 HC RX MED GY IP 250 OP 250 PS 637: Performed by: INTERNAL MEDICINE

## 2025-03-05 RX ORDER — CLOPIDOGREL BISULFATE 75 MG/1
75 TABLET ORAL DAILY
Status: DISCONTINUED | OUTPATIENT
Start: 2025-03-05 | End: 2025-03-06 | Stop reason: HOSPADM

## 2025-03-05 RX ADMIN — ASPIRIN 81 MG: 81 TABLET, COATED ORAL at 10:50

## 2025-03-05 RX ADMIN — MONTELUKAST 10 MG: 10 TABLET, FILM COATED ORAL at 21:10

## 2025-03-05 RX ADMIN — ANASTROZOLE 1 MG: 1 TABLET ORAL at 10:51

## 2025-03-05 RX ADMIN — ATORVASTATIN CALCIUM 80 MG: 40 TABLET, FILM COATED ORAL at 21:10

## 2025-03-05 RX ADMIN — CLOPIDOGREL BISULFATE 75 MG: 75 TABLET ORAL at 10:50

## 2025-03-05 ASSESSMENT — ACTIVITIES OF DAILY LIVING (ADL)
ADLS_ACUITY_SCORE: 47

## 2025-03-05 NOTE — PROGRESS NOTES
Care Management Follow Up    Length of Stay (days): 2    Expected Discharge Date: 03/05/2025     Concerns to be Addressed: discharge planning     Patient plan of care discussed at interdisciplinary rounds: Yes    Anticipated Discharge Disposition:  [facility]      Anticipated Discharge Services:  None  Anticipated Discharge DME:      Referrals Placed by CM/SW:    Private pay costs discussed: transportation costs    Discussed  Partnership in Safe Discharge Planning  document with patient/family: No     Handoff Completed: No, handoff not indicated or clinically appropriate    Additional Information:  Patient accepted to [facility] for admission tomorrow around 1PM. SW spoke with patient and her spouse and patient is agreeable. Spouse will provide transportation. Provider updated.    Next Steps: CM will continue to follow and assist with patient's discharge planing when medically ready.    Deepthi Guerra, LSW

## 2025-03-05 NOTE — PROGRESS NOTES
NEUROLOGY INPATIENT PROGRESS NOTE       [facility] NEUROLOGY [place]  [address] [place] [address]  Tel: [phone]  Fax: [phone]  [email or web address]     ASSESSMENT & PLAN   Date: 03/05/2025  Hospital Day#: 2  Visit diagnosis: Left harish infarction    Left pontine infarction  Intracranial atherosclerosis  84-year-old female with a history of HTN, breast cancer, CKD stage III, osteoporosis who presented to the emergency room with acute onset of right facial droop along with stuttering speech  CT of the head showed no new acute ischemia with an aspect score of 10.  CTA showed multifocal atherosclerotic disease.  Incidentally anterior communicating artery 2 mm aneurysm noted.  This does not require any intervention but patient will need surveillance imaging studies to ensure stability  No hemodynamically significant stenosis in bilateral internal carotid arteries  MRI brain with left harish infarct and incidental right tentorial leaf meningioma, less likely schwannoma.  No intervention needed at present  Echocardiogram shows normal ejection fraction no significant valvular heart abnormality  Continue telemetry monitoring.  If no cardiac arrhythmias noted during hospitalizations schedule for outpatient 30-day event monitor.  Patient on dual antiplatelet therapy with aspirin and Plavix .  In view of extensive intracranial atherosclerotic disease, I would recommend continuing baby aspirin and Plavix indefinitely  Permissive hypertension keep systolic blood pressure less than 220 and diastolic less than 110.  In 1 week Target 120-130/80   patient started on Lipitor 80 mg daily with goal of LDL less than 70  Hemoglobin A1c 6.3, I will defer management to hospitalist  Physical, occupational and speech therapy consult appreciated.  They recommend acute rehab  Vascular risk factors modification: Healthy diet (fruits, vegetables, low fat dairy & reduced saturated fat), weight loss,  exercise at least 30 minutes 5 days/week, BMI goal <25.  Keep systolic blood pressure goal <130.  LDL goal <70.  Hemoglobin A1c goal <7. If applicable, STOP smoking  Transfer to rehab when bed available.  Follow-up with me in 3 months    Neurology Discharge Planning :   Transfer to rehab when bed available    Victoriano Charles MD  [facility] NEUROLOGY, [place]     PROBLEM LIST      Patient Active Problem List   Diagnosis    Invasive ductal carcinoma of breast, right (H)    Invasive lobular carcinoma of breast, stage 2, right (H)    Age-related osteoporosis without current pathological fracture    Aphasia    Benign essential hypertension    Asthma    Chronic kidney disease, stage III (moderate) (H)    Osteopetrosis    Left pontine CVA (H)    Prediabetes    Anterior communicating artery 2 mm aneurysm       Past Medical History:   Patient  has a past medical history of Hypertension, Left pontine CVA (H) (3/4/2025), and Uncomplicated asthma.     SUBJECTIVE     Patient alert and oriented denies any complaint no change in right-sided weakness     OBJECTIVE     Vital signs in last 24 hours  Temp:  [98  F (36.7  C)-98.6  F (37  C)] 98.2  F (36.8  C)  Pulse:  [61-71] 63  Resp:  [12-18] 18  BP: (155-190)/(71-88) 168/81  SpO2:  [95 %-97 %] 97 %    Review of Systems   Pertinent items are noted in HPI.    General Physical Exam: Patient is alert and oriented x 3. Vital signs were reviewed and are documented in EMR. Neck was supple, no Carotid bruit, thyromegaly, JVD or lymphadenopathy noted.  Neurological Exam:  Patient is alert and oriented x 3 no aphasia but minimal dysarthria.  Extraocular movements are intact pupil equal round and reactive face slightly asymmetrical with the droop on the right.  Left 5/5 right upper extremity 0/5 right lower extremity 3/5 reflexes 2+ in upper extremity absent in ankles.  2+ at knees with upgoing toe on the right.  Decreased light touch pinprick below knees bilaterally.  No dysmetria  noted on finger-nose testing unable to do on the right.  Gait testing deferred.     DIAGNOSTIC STUDIES     Pertinent Radiology   Following imaging studies were reviewed:    CT  HEAD CT:  1.  No acute intracranial hemorrhage identified.     2.  No CT acute infarct. Aspect score 10.     3.  Right cerebellopontine angle small extra-axial lesion nonspecific possibly schwannoma or meningioma. No significant mass effect on the brainstem.     4.  Left basal ganglia chronic infarct.     5.  Age-related changes described above.    HEAD CTA:   1.  No intracranial arterial large vessel occlusion.     2.  Multifocal intracranial arterial stenoses described above. Some of these intracranial arterial stenoses are focal high-grade severe stenoses with only a thin wisp of enhancement (including but not limited to a left proximal M2 branch artery).      3.  Findings likely reflect atherosclerotic vascular disease given age. Expanded differential includes reversible cerebral venous constrictive syndrome, drug-induced vasospasm, vasculitis. Please see above for discussion.     4.  Anterior communicating artery possible tiny focal outpouching measuring 2 mm concerning for aneurysm.        NECK CTA:  1.  Atherosclerotic plaque results in less than 50% stenosis in the right carotid bulb.     2.  No rate limiting stenosis or occlusion. No cervical artery dissection.    MRI  1.  Linear focus of diffusion restriction within the left armando-harish is likely indicative of a hyperacute infarction. No evidence of hemorrhagic conversion.  2.  Incidental presumable meningioma or schwannoma along the undersurface of the right tentorial leaflet.     Echocardiogram  The left ventricle is normal in size.  The visual ejection fraction is 60-65%.  No regional wall motion abnormalities noted.  Diastolic Doppler findings (E/E' ratio and/or other parameters) suggest left  ventricular filling pressures are increased.  Normal right ventricle size and systolic  function.  The right ventricular systolic pressure is approximated at 19mmHg plus the  right atrial pressure.  IVC diameter <2.1 cm collapsing >50% with sniff suggests a normal RA pressure  of 3 mmHg.  Sinus rhythm was noted.  No hemodynamically significant valvular abnormalities on 2D or color flow  imaging. There is no comparison study available.    Pertinent Labs   Lab Results: Personally Reviewed  Recent Results (from the past 24 hours)   Echocardiogram Complete - For age > 60 yrs    Collection Time: 03/04/25  2:35 PM   Result Value Ref Range    LVEF  60-65%    Glucose by meter    Collection Time: 03/04/25  4:43 PM   Result Value Ref Range    GLUCOSE BY METER POCT 116 (H) 70 - 99 mg/dL   Glucose by meter    Collection Time: 03/04/25  9:01 PM   Result Value Ref Range    GLUCOSE BY METER POCT 124 (H) 70 - 99 mg/dL         HOSPITAL MEDICATIONS     Current Facility-Administered Medications   Medication Dose Route Frequency Provider Last Rate Last Admin    anastrozole (ARIMIDEX) tablet 1 mg  1 mg Oral QAM Kirstin Rose MD   1 mg at 03/04/25 0840    aspirin EC tablet 81 mg  81 mg Oral Daily Kirstin Rose MD   81 mg at 03/04/25 0840    Or    aspirin (ASA) chewable tablet 81 mg  81 mg Oral or NG Tube Daily Kirstin Rose MD        atorvastatin (LIPITOR) tablet 80 mg  80 mg Oral QPM Jojo Hill MD   80 mg at 03/04/25 2026    clopidogrel (PLAVIX) tablet 75 mg  75 mg Oral or NG Tube Daily Victoriano Charles MD        montelukast (SINGULAIR) tablet 10 mg  10 mg Oral At Bedtime Kirstin Rose MD   10 mg at 03/04/25 2026    sodium chloride (PF) 0.9% PF flush 3 mL  3 mL Intracatheter Q8H Kirstin Rose MD   3 mL at 03/04/25 2134    sodium chloride 0.9 % bag for CT scan flush use  100 mL As instructed Once Nile Malone MD            Total time spent for face to face visit, reviewing labs/imaging studies, counseling and coordination of care was: 45 Minutes More than 50% of this time was spent on counseling and coordination  of care.      This note was dictated using voice recognition software.  Any grammatical or context distortions are unintentional and inherent to the software.

## 2025-03-05 NOTE — PROGRESS NOTES
[facility]    Medicine Progress Note - Hospitalist Service    Date of Admission:  3/3/2025    Assessment & Plan   Fang Estes is a 84 year old female with history of hypertension, asthma, remote breast cancer, CKD 3 presented for evaluation of right-sided weakness and speech changes found to have acute pontine stroke. Hospital Day: 3     # Acute left pontine stroke  -Presented with speech changes and right-sided weakness.  Symptoms persist and she feels her right arm is actually worse than when she came in  -MRI showing acute infarct in the left hemipons  -Stroke protocol  -Started on aspirin and Plavix. Cont dual antiplatelet therapy indefinitely per neurologist  -Neurology consulted; appreciate input  -Echo- normal EF  -PT, OT, speech  -Permissive hypertension  -Given significant deficits anticipate she would benefit from ARU  -Monitor on tele while here. 30 days event monitor upon discharge per neurologist     #HLD  -  -start atorvastatin 80mg  -Lipid profile 1-3 months     # Hypertension, hold home lisinopril given above     #CKD 3, Cr at baseline  # Asthma, not in exacerbation.  Home albuterol, Singulair  # Remote breast cancer, home Arimidex          Diet: Regular Diet Adult    DVT Prophylaxis: Pneumatic Compression Devices  Ann Catheter: Not present  Lines: None     Cardiac Monitoring: ACTIVE order. Indication: Stroke, acute (48 hours)  Code Status: Full Code      Clinically Significant Risk Factors                   # Hypertension: Noted on problem list                # Financial/Environmental Concerns: none  # Asthma: noted on problem list        Social Drivers of Health            Disposition Plan     Medically Ready for Discharge: Ready Now             TIBURCIO Drake  Hospitalist Service  [facility]  Securely message with [employer] (more info)  Text page via [employer] Paging/Directory    ______________________________________________________________________    Interval History   Patient is new to me today. Seen and examined this morning. She believes her speech is somewhat better. Still has significant weakness in RUE more than the RLE.     Physical Exam   Vital Signs: Temp: 98.2  F (36.8  C) Temp src: Oral BP: (!) 168/81 Pulse: 63   Resp: 18 SpO2: 97 % O2 Device: None (Room air)    Weight: 123 lbs 0 oz      General: Not in obvious distress.  HEENT: NC, AT   Chest: Clear to auscultation bilaterally  Heart: S1S2 normal, regular. No M/R/G  Abdomen: Soft. NT, ND. Bowel sounds- active.  Extremities: No legs swelling  Neuro: Alert and awake. Slurred speech. Motor strength: RUE-1/5, RLE- 3+/5      Medical Decision Making             Data         Imaging results reviewed over the past 24 hrs:   Recent Results (from the past 24 hours)   Echocardiogram Complete - For age > 60 yrs   Result Value    LVEF  60-65%    Narrative    [id number]  [id number]  [id number]  [id number]^TATIANNA^TUYET^[place] [address]     Name: DONTA JAQUEZ  MRN: [id number]  : 1940  Study Date: 2025 01:49 PM  Age: 84 yrs  Gender: Female  Patient Location: [facility]  Reason For Study: Cerebrovascular Incident  Ordering Physician: TUYET CAMPUZANO  Performed By: NADIA     BSA: 1.6 m2  Height: 63 in  Weight: 123 lb  HR: 70  BP: 186/87 mmHg  ______________________________________________________________________________  Procedure  Echocardiogram with two-dimensional, color and spectral Doppler.  ______________________________________________________________________________  Interpretation Summary     The left ventricle is normal in size.  The visual ejection fraction is 60-65%.  No regional wall motion abnormalities noted.  Diastolic Doppler findings (E/E' ratio and/or other parameters) suggest left  ventricular filling pressures are increased.  Normal right ventricle size and systolic  function.  The right ventricular systolic pressure is approximated at 19mmHg plus the  right atrial pressure.  IVC diameter <2.1 cm collapsing >50% with sniff suggests a normal RA pressure  of 3 mmHg.  Sinus rhythm was noted.  No hemodynamically significant valvular abnormalities on 2D or color flow  imaging. There is no comparison study available.  ______________________________________________________________________________  Left Ventricle  The left ventricle is normal in size. There is mild concentric left  ventricular hypertrophy. Proximal septal thickening is noted. Diastolic  Doppler findings (E/E' ratio and/or other parameters) suggest left ventricular  filling pressures are increased. The visual ejection fraction is 60-65%. No  regional wall motion abnormalities noted.     Right Ventricle  Normal right ventricle size and systolic function. TAPSE is normal, which is  consistent with normal right ventricular systolic function.     Atria  The left atrium is moderately dilated. The right atrium is mildly dilated.     Mitral Valve  Mitral valve leaflets appear normal. There is trace mitral regurgitation.  There is no mitral valve stenosis.     Tricuspid Valve  Tricuspid valve leaflets appear normal. There is trace tricuspid  regurgitation. The right ventricular systolic pressure is approximated at  19mmHg plus the right atrial pressure.     Aortic Valve  The aortic valve is trileaflet. There is trace aortic regurgitation. This  aortic regurgitation is central valvular. No aortic stenosis is present.     Pulmonic Valve  The pulmonic valve is not well visualized. There is no pulmonic valvular  stenosis.     Vessels  The aorta root is normal. Normal size ascending aorta. IVC diameter <2.1 cm  collapsing >50% with sniff suggests a normal RA pressure of 3 mmHg.     Pericardium  There is no pericardial effusion.     Rhythm  Sinus rhythm was  noted.  ______________________________________________________________________________  MMode/2D Measurements & Calculations     IVSd: 1.4 cm  LVIDd: 3.1 cm  LVIDs: 2.0 cm  LVPWd: 1.3 cm  FS: 35.2 %  LV mass(C)d: 143.5 grams  LV mass(C)dI: 91.2 grams/m2  Ao root diam: 2.9 cm  asc Aorta Diam: 3.6 cm  LVOT diam: 2.0 cm  LVOT area: 3.2 cm2  Ao root diam index Ht(cm/m): 1.8  Ao root diam index BSA (cm/m2): 1.8  Asc Ao diam index BSA (cm/m2): 2.3  Asc Ao diam index Ht(cm/m): 2.3  EF Biplane: 63.3 %     LA Volume Indexed (AL/bp): 38.8 ml/m2  RV Base: 3.6 cm  RWT: 0.83  TAPSE: 2.0 cm     Time Measurements  MM HR: 57.0 BPM     Doppler Measurements & Calculations  MV E max mk: 79.3 cm/sec  MV A max mk: 84.0 cm/sec  MV E/A: 0.94  MV max P.3 mmHg  MV mean P.1 mmHg  MV V2 VTI: 31.4 cm  MVA(VTI): 2.5 cm2  MV dec slope: 349.0 cm/sec2  MV dec time: 0.23 sec  Ao V2 max: 145.9 cm/sec  Ao max P.0 mmHg  Ao V2 mean: 94.4 cm/sec  Ao mean P.0 mmHg  Ao V2 VTI: 30.2 cm  LOTTIE(I,D): 2.7 cm2  LOTTIE(V,D): 2.5 cm2  LV V1 max P.0 mmHg  LV V1 max: 112.3 cm/sec  LV V1 VTI: 24.9 cm     SV(LVOT): 80.1 ml  SI(LVOT): 50.9 ml/m2  PA V2 max: 71.7 cm/sec  PA max P.1 mmHg  PA acc time: 0.10 sec  TR max mk: 219.5 cm/sec  TR max P.3 mmHg  AV Mk Ratio (DI): 0.77  LOTTIE Index (cm2/m2): 1.7  E/E': 23.5  E/E' av.0  Lateral E/e': 14.6  Medial E/e': 23.5  Peak E' Mk: 3.4 cm/sec  RV S Mk: 11.5 cm/sec     ______________________________________________________________________________  Report approved by: Simon Lomax MD on 2025 03:12 PM

## 2025-03-05 NOTE — INTERIM SUMMARY
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 She is found to have resultant significant hemiparesis due to new stroke and requires intensive therapies. She is now medically stable and ready for transfer to acute inpatient rehabilitation.      The patient requires transfer to acute inpatient rehabilitation for intensive therapies not available in a lesser level of care including PT/OT/SLP, ongoing medical management at least three days per week, and rehabilitative nursing cares. At baseline Nataliya was highly independent, driving and working part time. Currently she needs Ax1-2 for short distance mobility. Patient requires an intensive inpatient rehab program to address the following acute impairments: R hemiparesis, impaired balance, decreased activity tolerance, impaired R UE coordination, mild dysphagia, dysarthria.     Current Active Medical Management Needs/Risks for Clinical Complications  The patient requires the high level of rehabilitation physician supervision that accompanies the provision of intensive rehabilitation therapy.  The patient needs the services of the rehabilitation physician to assess the patient medically and  functionally and to modify the course of treatment as needed to maximize the patient's capacity to benefit from the rehabilitation process. The patient needs physician oversight 3x/week to manage the following:   Neurologic status: assess for neurologic recovery. Provide stroke education and risk factor reduction strategies (modifiable risk factors include HTN). Pt is at risk for recurrent strokes and pain in setting of CVA. Per neurology due to her extensive intracranial atherosclerotic disease, it is recommended she continue baby aspiring and Plavix indefinitely.   Cardiac: in the setting of HLD and HTN, new stroke, allowed permissive HTN, however at risk for further strokes, assess and manage. Pt currently holding home lisinopril, has started atorvastatin 80mg with goal of LDL less than 70. Will need education in adherence to plan for HTN. Permissive hypertension keep systolic blood pressure less than 220 and diastolic less than 110. In 1 week Target 120-130/80; will need ongoing PMR management to achieve this. Anticipate will need a 30 day cardiac event monitor placed upon discharge from the hospital.   Renal: in the setting of CKD, assess and manage. Continue home albuterol, Singulair.  Falls risk: in the setting of new hemiparesis, weakness, at risk for falls, assess and manage with fall prevention strategies  Mental Health: in the setting of new functional loss, assess for signs and symptoms of depression/anxiety; at risk for mood and sleep disorders; may benefit from health psych while on ARC.   Endocrine:  A1C of 6.3. Goal of less than 7. Will need ongoing assessment/education.     Past Medical/Surgical History   Surgery in the past 100 days: Unknown   Additional relevant past medical history: hypertension, asthma, remote breast cancer, CKD 3    Level of Functioning Prior to Admission:    LIVING ENVIRONMENT   People in Home: spouse   Current Living Arrangements: house   Home Accessibility: stairs to enter  home, stairs within home    Number of Stairs, Main Entrance: 2    Stair Railings, Main Entrance: none    Number of Stairs, Within Home, Primary: greater than 10 stairs (bedrooms upstairs)    Stair Railings, Within Home, Primary: railings on both sides of stairs   Transportation Anticipated: health plan transportation    SELF-CARE   Usual Activity Tolerance: excellent     Equipment Currently Used at Home: none (has tub/shower)   Activity/Exercise/Self-Care Comment: Pt is indep with all mobility, indep with ADLs and drive. Pt works part time.    Level of Function: GG Scale (Section GG Functional Ability and Goals; CMS's VILLEGAS Version 3.0 Manual effective 10.1.2019):  PT Current Function Goals for Rehab   Bed Rolling 3 Partial/moderate assistance 6 Independent   Supine to Sit 3 Partial/moderate assistance 6 Independent   Sit to Stand 2 Substantial/maximal assistance 6 Independent   Transfer 2 Substantial/maximal assistance 6 Independent   Ambulation Not completed 6 Independent   Stairs 88 Not attempted due to safety 3 Partial/moderate assistance     OT Current Function Goals for Rehab   Feeding 5 Setup or clean-up assistance 6 Independent   Grooming 3 Partial/moderate assistance 6 Independent   Bathing Not completed 6 Independent   Upper Body Dressing 3 Partial/moderate assistance 6 Independent   Lower Body Dressing 2 Substantial/maximal assistance 6 Independent   Toileting 2 Substantial/maximal assistance 6 Independent   Toilet Transfer 2 Substantial/maximal assistance 6 Independent   Tub/Shower Transfer 88 Not attempted due to safety 6 Independent   Cognition Not Assessed Supervision     SLP Current Function Goals for Rehab   Swallow Impaired Tolerate least restrictive diet without signs & symptoms of aspiration and adhere to safe swallow strategies   Communication Impaired Communicate basic needs effectively       Current Diet:  0-Thin and 7-Regular    Summary Statement:  Nataliya is an 83 y/o female who was admitted with  new stroke and currently has intensive physical therapy, occupational therapy and speech therapy needs. At baseline Nataliya was highly independent, driving and working part time. Currently she is limited by her R hemiparesis, impaired balance, decreased activity tolerance, impaired R UE coordination, mild dysphagia, dysarthria. She requires Yasmany for basic bed mobility, up to Max A for sit to stand with cues for foot and assist with R UE on the walker. Mod A x 1 with FWW with dyn standing bal.  Pre-gait activities have been initiated as pt has worked on taking a few steps forward and backward with mod A x 1 with PT holding her hand on the walker and PT assisting to facilitate the R knee into ext with WB secondary to weakness. Pt stepped forward and backward x 4 steps with walker and modAx1. She requires Max A with LB dressing, and toileting. She needs cues for safety. She is on a regular diet with thin liquids, however is noted to have slow mastication and is at risk for aspiration. She requires intensive therapies in PT/OT/SLP to promote return of function for safe return to the community with family support and ongoing home therapies likely.     Expected Therapies and Services Required During Inpatient Rehab Admission  Intensity of Therapy: Patient requires intensive therapies not available in a lesser level of care. Patient is motivated, making gains, and can tolerate 3 hours of therapy a day.  Physical Therapy: 60 minutes per day, 6 days a week for 18 days  Occupational Therapy: 60 minutes per day, 6 days a week for 18 days  Speech and Language Therapy: 60 minutes per day, 6 days a week for 18 days  Rehabilitation Nursing Needs: Patient requires 24 hour Rehab Nursing to manage vitals, medication education, positioning, carryover of new rehab techniques, care coordination, assess neurologic status, stroke education, and provide safe environment for patient at falls risk.    Precautions/restrictions/special  needs:   Precautions: fall precautions   Restrictions: NA   Special Needs: NA    Expected Level of Improvement: Anticipate with intensive therapies, close medical management, and rehabilitative nursing care the patient will improve strength, balance, tolerance to activity, safety to ensure Mod I with basic mobility and ADL performance to allow return home with family assistance.   Expected Length of time to achieve: 18 days    Anticipated Discharge Needs:  Anticipated Discharge Destination: Home  Anticipated Discharge Support: Family member  24/7 support available : Unknown  Identified caregiver(s):  Spouse  Anticipated Discharge Needs: Home with homecare    Identified challenges/barriers:  None anticipated    Liaison signature/date/time: Cris Razo PT, DPT 3/6/2025 10:03AM    Physician statement of review and agreement:  I have reviewed and am in agreement of the need for IRF stay to address above functional and medical needs. In addition to above statements address, Patient requires intensive active and ongoing therapeutic intervention and multiple therapies; Patient requires medical supervision; Expected to actively participate in the intensive rehab program; Sufficiently stable to actively participate; Expectation for measurable improvement in functional capacity or adaption to impairments.    MD signature/date/time:

## 2025-03-05 NOTE — PLAN OF CARE
Problem: Adult Inpatient Plan of Care  Goal: Optimal Comfort and Wellbeing  Outcome: Progressing     Problem: Stroke, Ischemic (Includes Transient Ischemic Attack)  Goal: Optimal Cognitive Function  Outcome: Progressing     Problem: Comorbidity Management  Goal: Blood Pressure in Desired Range  Outcome: Progressing  Intervention: Maintain Blood Pressure Management  Recent Flowsheet Documentation  Taken 3/5/2025 0000 by Shannon Wallace, RN  Medication Review/Management: medications reviewed  Taken 3/4/2025 2000 by Shannon Wallace RN  Medication Review/Management: medications reviewed   Goal Outcome Evaluation:  A&Ox4. Denied pain. Up to bedside commode w/ 1A and GB. Telemetry NSR w/ 1AVB. NIH scores (5-6-5). VSS on room air with permissive HTN.

## 2025-03-06 ENCOUNTER — HOSPITAL ENCOUNTER (INPATIENT)
Facility: CLINIC | Age: 85
DRG: 057 | End: 2025-03-06
Attending: PHYSICAL MEDICINE & REHABILITATION | Admitting: PHYSICAL MEDICINE & REHABILITATION
Payer: COMMERCIAL

## 2025-03-06 ENCOUNTER — APPOINTMENT (OUTPATIENT)
Dept: SPEECH THERAPY | Facility: HOSPITAL | Age: 85
DRG: 065 | End: 2025-03-06
Payer: COMMERCIAL

## 2025-03-06 ENCOUNTER — APPOINTMENT (OUTPATIENT)
Dept: PHYSICAL THERAPY | Facility: HOSPITAL | Age: 85
DRG: 065 | End: 2025-03-06
Payer: COMMERCIAL

## 2025-03-06 VITALS
OXYGEN SATURATION: 96 % | DIASTOLIC BLOOD PRESSURE: 81 MMHG | TEMPERATURE: 98.7 F | SYSTOLIC BLOOD PRESSURE: 167 MMHG | HEART RATE: 61 BPM | HEIGHT: 63 IN | BODY MASS INDEX: 21.13 KG/M2 | WEIGHT: 119.27 LBS | RESPIRATION RATE: 18 BRPM

## 2025-03-06 VITALS
TEMPERATURE: 98.1 F | RESPIRATION RATE: 18 BRPM | HEART RATE: 60 BPM | DIASTOLIC BLOOD PRESSURE: 77 MMHG | BODY MASS INDEX: 21.79 KG/M2 | WEIGHT: 123 LBS | OXYGEN SATURATION: 93 % | SYSTOLIC BLOOD PRESSURE: 161 MMHG

## 2025-03-06 DIAGNOSIS — R21 PERINEAL RASH IN FEMALE: ICD-10-CM

## 2025-03-06 DIAGNOSIS — M21.371 FOOT DROP, RIGHT: ICD-10-CM

## 2025-03-06 DIAGNOSIS — G81.91 RIGHT HEMIPARESIS (H): ICD-10-CM

## 2025-03-06 DIAGNOSIS — I63.9 LEFT PONTINE CVA (H): ICD-10-CM

## 2025-03-06 DIAGNOSIS — I63.512 CEREBROVASCULAR ACCIDENT (CVA) DUE TO STENOSIS OF LEFT MIDDLE CEREBRAL ARTERY (H): Primary | ICD-10-CM

## 2025-03-06 LAB
ANION GAP SERPL CALCULATED.3IONS-SCNC: 7 MMOL/L (ref 7–15)
BUN SERPL-MCNC: 25.7 MG/DL (ref 8–23)
CALCIUM SERPL-MCNC: 9.6 MG/DL (ref 8.8–10.4)
CHLORIDE SERPL-SCNC: 103 MMOL/L (ref 98–107)
CREAT SERPL-MCNC: 1.14 MG/DL (ref 0.51–0.95)
EGFRCR SERPLBLD CKD-EPI 2021: 48 ML/MIN/1.73M2
GLUCOSE BLDC GLUCOMTR-MCNC: 109 MG/DL (ref 70–99)
GLUCOSE SERPL-MCNC: 117 MG/DL (ref 70–99)
HCO3 SERPL-SCNC: 29 MMOL/L (ref 22–29)
MAGNESIUM SERPL-MCNC: 2.1 MG/DL (ref 1.7–2.3)
POTASSIUM SERPL-SCNC: 4.6 MMOL/L (ref 3.4–5.3)
SODIUM SERPL-SCNC: 139 MMOL/L (ref 135–145)

## 2025-03-06 PROCEDURE — 36415 COLL VENOUS BLD VENIPUNCTURE: CPT | Performed by: INTERNAL MEDICINE

## 2025-03-06 PROCEDURE — 250N000013 HC RX MED GY IP 250 OP 250 PS 637: Performed by: PSYCHIATRY & NEUROLOGY

## 2025-03-06 PROCEDURE — 128N000003 HC R&B REHAB

## 2025-03-06 PROCEDURE — 82310 ASSAY OF CALCIUM: CPT | Performed by: INTERNAL MEDICINE

## 2025-03-06 PROCEDURE — 97110 THERAPEUTIC EXERCISES: CPT | Mod: GP

## 2025-03-06 PROCEDURE — 99222 1ST HOSP IP/OBS MODERATE 55: CPT | Mod: AI | Performed by: PHYSICIAN ASSISTANT

## 2025-03-06 PROCEDURE — 99239 HOSP IP/OBS DSCHRG MGMT >30: CPT | Performed by: INTERNAL MEDICINE

## 2025-03-06 PROCEDURE — 97116 GAIT TRAINING THERAPY: CPT | Mod: GP

## 2025-03-06 PROCEDURE — 92507 TX SP LANG VOICE COMM INDIV: CPT | Mod: GN

## 2025-03-06 PROCEDURE — 97530 THERAPEUTIC ACTIVITIES: CPT | Mod: GP

## 2025-03-06 PROCEDURE — 250N000013 HC RX MED GY IP 250 OP 250 PS 637: Performed by: PHYSICIAN ASSISTANT

## 2025-03-06 PROCEDURE — 92526 ORAL FUNCTION THERAPY: CPT | Mod: GN

## 2025-03-06 PROCEDURE — 250N000013 HC RX MED GY IP 250 OP 250 PS 637: Performed by: INTERNAL MEDICINE

## 2025-03-06 PROCEDURE — 80048 BASIC METABOLIC PNL TOTAL CA: CPT | Performed by: INTERNAL MEDICINE

## 2025-03-06 PROCEDURE — 83735 ASSAY OF MAGNESIUM: CPT | Performed by: INTERNAL MEDICINE

## 2025-03-06 RX ORDER — LIDOCAINE 40 MG/G
CREAM TOPICAL
Status: DISCONTINUED | OUTPATIENT
Start: 2025-03-06 | End: 2025-03-06

## 2025-03-06 RX ORDER — LIDOCAINE 40 MG/G
CREAM TOPICAL
Status: CANCELLED | OUTPATIENT
Start: 2025-03-06

## 2025-03-06 RX ORDER — ONDANSETRON 2 MG/ML
4 INJECTION INTRAMUSCULAR; INTRAVENOUS EVERY 6 HOURS PRN
Status: CANCELLED | OUTPATIENT
Start: 2025-03-06

## 2025-03-06 RX ORDER — ALBUTEROL SULFATE 90 UG/1
1-2 INHALANT RESPIRATORY (INHALATION) EVERY 4 HOURS PRN
Status: DISCONTINUED | OUTPATIENT
Start: 2025-03-06 | End: 2025-03-10

## 2025-03-06 RX ORDER — ANASTROZOLE 1 MG/1
1 TABLET ORAL EVERY MORNING
Status: CANCELLED | OUTPATIENT
Start: 2025-03-07

## 2025-03-06 RX ORDER — MONTELUKAST SODIUM 10 MG/1
10 TABLET ORAL AT BEDTIME
Status: DISCONTINUED | OUTPATIENT
Start: 2025-03-06 | End: 2025-03-06

## 2025-03-06 RX ORDER — CLOPIDOGREL BISULFATE 75 MG/1
75 TABLET ORAL DAILY
Status: ON HOLD | DISCHARGE
Start: 2025-03-07

## 2025-03-06 RX ORDER — ASPIRIN 81 MG/1
81 TABLET ORAL DAILY
Status: ON HOLD | DISCHARGE
Start: 2025-03-07

## 2025-03-06 RX ORDER — ASPIRIN 81 MG/1
81 TABLET ORAL DAILY
Status: CANCELLED | OUTPATIENT
Start: 2025-03-06

## 2025-03-06 RX ORDER — HYDRALAZINE HYDROCHLORIDE 20 MG/ML
10 INJECTION INTRAMUSCULAR; INTRAVENOUS EVERY 30 MIN PRN
Status: DISCONTINUED | OUTPATIENT
Start: 2025-03-06 | End: 2025-03-06

## 2025-03-06 RX ORDER — ONDANSETRON 4 MG/1
4 TABLET, ORALLY DISINTEGRATING ORAL EVERY 6 HOURS PRN
Status: CANCELLED | OUTPATIENT
Start: 2025-03-06

## 2025-03-06 RX ORDER — CLOPIDOGREL BISULFATE 75 MG/1
75 TABLET ORAL DAILY
Status: CANCELLED | OUTPATIENT
Start: 2025-03-06

## 2025-03-06 RX ORDER — MONTELUKAST SODIUM 10 MG/1
10 TABLET ORAL AT BEDTIME
Status: DISCONTINUED | OUTPATIENT
Start: 2025-03-06 | End: 2025-03-18 | Stop reason: HOSPADM

## 2025-03-06 RX ORDER — ATORVASTATIN CALCIUM 40 MG/1
80 TABLET, FILM COATED ORAL EVERY EVENING
Status: CANCELLED | OUTPATIENT
Start: 2025-03-06

## 2025-03-06 RX ORDER — ALBUTEROL SULFATE 90 UG/1
1-2 INHALANT RESPIRATORY (INHALATION) EVERY 4 HOURS PRN
Status: CANCELLED | OUTPATIENT
Start: 2025-03-06

## 2025-03-06 RX ORDER — ASPIRIN 81 MG/1
81 TABLET, CHEWABLE ORAL DAILY
Status: CANCELLED | OUTPATIENT
Start: 2025-03-06

## 2025-03-06 RX ORDER — ANASTROZOLE 1 MG/1
1 TABLET ORAL EVERY MORNING
Status: DISCONTINUED | OUTPATIENT
Start: 2025-03-07 | End: 2025-03-06

## 2025-03-06 RX ORDER — LISINOPRIL 10 MG/1
10 TABLET ORAL EVERY MORNING
Status: DISCONTINUED | OUTPATIENT
Start: 2025-03-07 | End: 2025-03-18 | Stop reason: HOSPADM

## 2025-03-06 RX ORDER — ASPIRIN 81 MG/1
81 TABLET ORAL DAILY
Status: DISCONTINUED | OUTPATIENT
Start: 2025-03-07 | End: 2025-03-18 | Stop reason: HOSPADM

## 2025-03-06 RX ORDER — ASPIRIN 81 MG/1
81 TABLET ORAL DAILY
Status: DISCONTINUED | OUTPATIENT
Start: 2025-03-06 | End: 2025-03-06

## 2025-03-06 RX ORDER — LABETALOL HYDROCHLORIDE 5 MG/ML
10 INJECTION, SOLUTION INTRAVENOUS EVERY 10 MIN PRN
Status: DISCONTINUED | OUTPATIENT
Start: 2025-03-06 | End: 2025-03-06

## 2025-03-06 RX ORDER — ALBUTEROL SULFATE 90 UG/1
1-2 INHALANT RESPIRATORY (INHALATION) EVERY 4 HOURS PRN
Status: DISCONTINUED | OUTPATIENT
Start: 2025-03-06 | End: 2025-03-06

## 2025-03-06 RX ORDER — ACETAMINOPHEN 325 MG/1
650 TABLET ORAL EVERY 6 HOURS PRN
Status: DISCONTINUED | OUTPATIENT
Start: 2025-03-06 | End: 2025-03-18 | Stop reason: HOSPADM

## 2025-03-06 RX ORDER — CLOPIDOGREL BISULFATE 75 MG/1
75 TABLET ORAL DAILY
Status: DISCONTINUED | OUTPATIENT
Start: 2025-03-07 | End: 2025-03-18 | Stop reason: HOSPADM

## 2025-03-06 RX ORDER — MONTELUKAST SODIUM 10 MG/1
10 TABLET ORAL AT BEDTIME
Status: CANCELLED | OUTPATIENT
Start: 2025-03-06

## 2025-03-06 RX ORDER — ONDANSETRON 4 MG/1
4 TABLET, ORALLY DISINTEGRATING ORAL EVERY 6 HOURS PRN
Status: DISCONTINUED | OUTPATIENT
Start: 2025-03-06 | End: 2025-03-18 | Stop reason: HOSPADM

## 2025-03-06 RX ORDER — HYDRALAZINE HYDROCHLORIDE 10 MG/1
10 TABLET, FILM COATED ORAL 3 TIMES DAILY PRN
Status: DISCONTINUED | OUTPATIENT
Start: 2025-03-06 | End: 2025-03-18 | Stop reason: HOSPADM

## 2025-03-06 RX ORDER — ANASTROZOLE 1 MG/1
1 TABLET ORAL EVERY MORNING
Status: DISCONTINUED | OUTPATIENT
Start: 2025-03-07 | End: 2025-03-18 | Stop reason: HOSPADM

## 2025-03-06 RX ORDER — LABETALOL HYDROCHLORIDE 5 MG/ML
10 INJECTION, SOLUTION INTRAVENOUS EVERY 10 MIN PRN
Status: CANCELLED | OUTPATIENT
Start: 2025-03-06

## 2025-03-06 RX ORDER — HYDRALAZINE HYDROCHLORIDE 20 MG/ML
10 INJECTION INTRAMUSCULAR; INTRAVENOUS EVERY 30 MIN PRN
Status: CANCELLED | OUTPATIENT
Start: 2025-03-06

## 2025-03-06 RX ORDER — AMOXICILLIN 250 MG
1 CAPSULE ORAL 2 TIMES DAILY PRN
Status: DISCONTINUED | OUTPATIENT
Start: 2025-03-06 | End: 2025-03-18 | Stop reason: HOSPADM

## 2025-03-06 RX ORDER — ATORVASTATIN CALCIUM 80 MG/1
80 TABLET, FILM COATED ORAL EVERY EVENING
Status: DISCONTINUED | OUTPATIENT
Start: 2025-03-06 | End: 2025-03-06

## 2025-03-06 RX ORDER — ONDANSETRON 2 MG/ML
4 INJECTION INTRAMUSCULAR; INTRAVENOUS EVERY 6 HOURS PRN
Status: DISCONTINUED | OUTPATIENT
Start: 2025-03-06 | End: 2025-03-06

## 2025-03-06 RX ORDER — ATORVASTATIN CALCIUM 80 MG/1
80 TABLET, FILM COATED ORAL EVERY EVENING
Status: DISCONTINUED | OUTPATIENT
Start: 2025-03-06 | End: 2025-03-18 | Stop reason: HOSPADM

## 2025-03-06 RX ORDER — CLOPIDOGREL BISULFATE 75 MG/1
75 TABLET ORAL DAILY
Status: DISCONTINUED | OUTPATIENT
Start: 2025-03-06 | End: 2025-03-06

## 2025-03-06 RX ORDER — ATORVASTATIN CALCIUM 80 MG/1
80 TABLET, FILM COATED ORAL EVERY EVENING
Status: ON HOLD | DISCHARGE
Start: 2025-03-06

## 2025-03-06 RX ORDER — VITAMIN B COMPLEX
50 TABLET ORAL EVERY MORNING
Status: DISCONTINUED | OUTPATIENT
Start: 2025-03-07 | End: 2025-03-18 | Stop reason: HOSPADM

## 2025-03-06 RX ORDER — ASPIRIN 81 MG/1
81 TABLET, CHEWABLE ORAL DAILY
Status: DISCONTINUED | OUTPATIENT
Start: 2025-03-06 | End: 2025-03-06

## 2025-03-06 RX ADMIN — MONTELUKAST 10 MG: 10 TABLET, FILM COATED ORAL at 21:28

## 2025-03-06 RX ADMIN — ANASTROZOLE 1 MG: 1 TABLET ORAL at 08:27

## 2025-03-06 RX ADMIN — CLOPIDOGREL BISULFATE 75 MG: 75 TABLET ORAL at 08:26

## 2025-03-06 RX ADMIN — ASPIRIN 81 MG: 81 TABLET, CHEWABLE ORAL at 08:26

## 2025-03-06 RX ADMIN — ATORVASTATIN CALCIUM 80 MG: 80 TABLET, FILM COATED ORAL at 21:28

## 2025-03-06 ASSESSMENT — ACTIVITIES OF DAILY LIVING (ADL)
ADLS_ACUITY_SCORE: 47
ADLS_ACUITY_SCORE: 37
ADLS_ACUITY_SCORE: 37
ADLS_ACUITY_SCORE: 35
ADLS_ACUITY_SCORE: 47
ADLS_ACUITY_SCORE: 41
ADLS_ACUITY_SCORE: 41
ADLS_ACUITY_SCORE: 37
ADLS_ACUITY_SCORE: 37
ADLS_ACUITY_SCORE: 42
ADLS_ACUITY_SCORE: 47
ADLS_ACUITY_SCORE: 47
ADLS_ACUITY_SCORE: 41
ADLS_ACUITY_SCORE: 37
ADLS_ACUITY_SCORE: 58
ADLS_ACUITY_SCORE: 41
ADLS_ACUITY_SCORE: 37
ADLS_ACUITY_SCORE: 42

## 2025-03-06 NOTE — DISCHARGE SUMMARY
[facility] MEDICINE  DISCHARGE SUMMARY     Primary Care Physician: Santiago Silverio  Admission Date: 3/3/2025   Discharge Provider: TIBURCIO Drake Discharge Date: 3/6/2025   Diet: as below   Code Status: Full Code   Activity: DCACTIVITY: Activity as tolerated        Condition at Discharge: Stable     REASON FOR PRESENTATION(See Admission Note for Details)   Speech difficulty    PRINCIPAL & ACTIVE DISCHARGE DIAGNOSES     Principal Problem:    Left pontine CVA (H)  Active Problems:    Aphasia    Benign essential hypertension    Asthma    Chronic kidney disease, stage III (moderate) (H)    Osteopetrosis    Prediabetes    Anterior communicating artery 2 mm aneurysm      PENDING LABS     Unresulted Labs Ordered in the Past 30 Days of this Admission       No orders found from 2/1/2025 to 3/4/2025.              PROCEDURES ( this hospitalization only)          RECOMMENDATIONS TO OUTPATIENT PROVIDER FOR F/U VISIT     Follow-up Appointments       Follow Up and recommended labs and tests      Follow up with Nursing home physician.                    DISPOSITION     ARU    SUMMARY OF HOSPITAL COURSE:    Fang Estes is a 84 year old female with history of hypertension, asthma, remote breast cancer, CKD 3 who was brought to our ED for evaluation of right-sided weakness and speech changes. Found to have acute pontine stroke.      # Acute left pontine stroke  -Presented with speech changes and right-sided weakness.   -MRI showing acute infarct in the left hemipons.  -Started on aspirin and Plavix. Cont dual antiplatelet therapy indefinitely per neurologist  -Echo- normal EF  -PT, OT, speech consulted  -Allowed permissive hypertension initially. Resumed home lisinopril upon discharge  -Given significant deficits, she would benefit from ARU  -Monitor on tele while here. 30 days event monitor upon discharge per neurologist     #HLD  -  -Started atorvastatin 80mg  -Lipid profile  1-3 months     # Hypertension  -Home lisinopril resumed upon discharge     #CKD 3, Cr at baseline  # Asthma, not in exacerbation.  Home albuterol, Singulair  # Remote breast cancer, home Arimidex    Discharge Medications with Med changes:     Current Discharge Medication List        START taking these medications    Details   aspirin 81 MG EC tablet Take 1 tablet (81 mg) by mouth daily.    Associated Diagnoses: Left pontine CVA (H)      atorvastatin (LIPITOR) 80 MG tablet Take 1 tablet (80 mg) by mouth every evening.    Associated Diagnoses: Left pontine CVA (H)      clopidogrel (PLAVIX) 75 MG tablet Take 1 tablet (75 mg) by mouth or NG Tube daily.    Associated Diagnoses: Left pontine CVA (H)           CONTINUE these medications which have NOT CHANGED    Details   albuterol (PROAIR HFA/PROVENTIL HFA/VENTOLIN HFA) 108 (90 Base) MCG/ACT inhaler Inhale 1-2 puffs into the lungs every 4 hours as needed for shortness of breath, wheezing or cough.      alendronate (FOSAMAX) 70 MG tablet Take 70 mg by mouth every 7 days. Sunday(s)      anastrozole (ARIMIDEX) 1 MG tablet Take 1 mg by mouth every morning.      lisinopril (ZESTRIL) 10 MG tablet Take 10 mg by mouth every morning.      montelukast (SINGULAIR) 10 MG tablet Take 10 mg by mouth at bedtime.      vitamin D3 (CHOLECALCIFEROL) 50 mcg (2000 units) tablet Take 1 tablet by mouth every morning.                   Rationale for medication changes:      Please see above        Consults   Neurology      Immunizations given this encounter     Most Recent Immunizations   Administered Date(s) Administered    COVID-19 12+ (Pfizer) 11/14/2024    COVID-19 Bivalent 12+ (Pfizer) 05/19/2023    COVID-19 MONOVALENT 12+ (Pfizer) 09/29/2021    COVID-19 Monovalent 12+ (Pfizer 2022) 04/22/2022    DT (PEDS <7y) 08/24/2000    Pneumococcal 23 valent 1941    Td,adult,historic,unspecified 08/24/2000           Anticoagulation Information      Recent INR results:   Recent Labs   Lab  03/03/25 1915   INR 0.95     Warfarin doses (if applicable) or name of other anticoagulant: NA      SIGNIFICANT IMAGING FINDINGS     Results for orders placed or performed during the hospital encounter of 03/03/25   CTA Head Neck with Contrast    Impression    IMPRESSION:   HEAD CT:  1.  No acute intracranial hemorrhage identified.    2.  No CT acute infarct. Aspect score 10.    3.  Right cerebellopontine angle small extra-axial lesion nonspecific possibly schwannoma or meningioma. No significant mass effect on the brainstem.    4.  Left basal ganglia chronic infarct.    5.  Age-related changes described above.    Dr Nile Malone was notified by Dr Joss Townsend at  7:15 PM 3/3/2025 CST/CDT.      HEAD CTA:   1.  No intracranial arterial large vessel occlusion.    2.  Multifocal intracranial arterial stenoses described above. Some of these intracranial arterial stenoses are focal high-grade severe stenoses with only a thin wisp of enhancement (including but not limited to a left proximal M2 branch artery).     3.  Findings likely reflect atherosclerotic vascular disease given age. Expanded differential includes reversible cerebral venous constrictive syndrome, drug-induced vasospasm, vasculitis. Please see above for discussion.    4.  Anterior communicating artery possible tiny focal outpouching measuring 2 mm concerning for aneurysm.      NECK CTA:  1.  Atherosclerotic plaque results in less than 50% stenosis in the right carotid bulb.    2.  No rate limiting stenosis or occlusion. No cervical artery dissection.      Dr Nile Malone was notified by Dr Joss Townsend at  7:40 PM 3/3/2025 CST/CDT.   MR Brain w/o & w Contrast    Impression    IMPRESSION:    1.  Linear focus of diffusion restriction within the left armando-harish is likely indicative of a hyperacute infarction. No evidence of hemorrhagic conversion.  2.  Incidental presumable meningioma or schwannoma along the undersurface of the right tentorial leaflet.     These  findings were communicated to Dr. Moreno over the phone at 9:08 PM on 03/03/2025 by Jeyson Garces.   Echocardiogram Complete - For age > 60 yrs   Result Value Ref Range    LVEF  60-65%        SIGNIFICANT LABORATORY FINDINGS     Most Recent 3 CBC's:  Recent Labs   Lab Test 03/04/25  0637 03/03/25 1915 12/02/22  0946   WBC 7.5 7.6 6.0   HGB 14.7 13.6 14.2   MCV 87 87 91    225 211     Most Recent 3 BMP's:  Recent Labs   Lab Test 03/06/25  0726 03/06/25  0634 03/05/25  2134 03/04/25  1643 03/04/25  0638 03/04/25  0041 03/03/25 1915   NA  --  139  --   --  139  --  138   POTASSIUM  --  4.6  --   --  4.3  --  4.1   CHLORIDE  --  103  --   --  102  --  100   CO2  --  29  --   --  23  --  27   BUN  --  25.7*  --   --  21.9  --  26.9*   CR  --  1.14*  --   --  1.03*  --  1.11*   ANIONGAP  --  7  --   --  14  --  11   LÁZARO  --  9.6  --   --  9.6  --  9.7   * 117* 109*   < > 149*   < > 121*    < > = values in this interval not displayed.     Most Recent 2 LFT's:  Recent Labs   Lab Test 12/02/22  0946   AST 21   ALT 10   ALKPHOS 68   BILITOTAL 1.4*     Most Recent 3 INR's:  Recent Labs   Lab Test 03/03/25 1915   INR 0.95         Discharge Orders        General info for SNF    Length of Stay Estimate: Short Term Care: Estimated # of Days <30  Condition at Discharge: Improving  Level of care:skilled   Rehabilitation Potential: Good  Admission H&P remains valid and up-to-date: Yes  Recent Chemotherapy: N/A  Use Nursing Home Standing Orders: Yes     Mantoux instructions    Give two-step Mantoux (PPD) Per Facility Policy Yes     Follow Up and recommended labs and tests    Follow up with Nursing home physician.     Reason for your hospital stay    Pontine stroke     Activity - Up ad jaja     Full Code     Fall precautions     Diet    Follow this diet upon discharge: Current Diet:Orders Placed This Encounter      Regular Diet Adult       Examination   BP (!) 161/77 (BP Location: Left arm)   Pulse 60   Temp 98.1  F  (36.7  C) (Oral)   Resp 18   Wt 55.8 kg (123 lb)   SpO2 93%   BMI 21.79 kg/m    General: Not in obvious distress.  HEENT: NC, AT   Chest: Clear to auscultation bilaterally  Heart: S1S2 normal, regular. No M/R/G  Abdomen: Soft. NT, ND. Bowel sounds- active.  Extremities: No legs swelling  Neuro: Alert and awake. Slurred speech. Motor strength: RUE-1/5, RLE- 3+/5    Please see EMR for more detailed significant labs, imaging, consultant notes etc.    I, TIBURCIO Drake, personally saw the patient today and spent greater than 30 minutes discharging this patient.    TIBURCIO Drake  [facility]    CC:Santiago Silverio

## 2025-03-06 NOTE — PLAN OF CARE
Goal Outcome Evaluation:      Plan of Care Reviewed With: patient, spouse          Outcome Evaluation: Improving functional status, Returning to baseline

## 2025-03-06 NOTE — PROGRESS NOTES
Care Management Discharge Note    Discharge Date: 03/06/2025       Discharge Disposition:  [facility]    Discharge Services:  None    Discharge DME:  None    Discharge Transportation: health plan transportation    Private pay costs discussed: transportation costs    Does the patient's insurance plan have a 3 day qualifying hospital stay waiver?  No    Education Provided on the Discharge Plan:  Yes  Persons Notified of Discharge Plans: Patient, Spouse, provider, Unit, receiving facility  Patient/Family in Agreement with the Plan:  Yes    Handoff Referral Completed: No, handoff not indicated or clinically appropriate    Additional Information:  Patient is discharging to [facility] in [facility]. SW discussed with patient and her spouse. Spouse Don had some questions regarding length and scope of rehab. SW  explained to Don that Cris from admissions is planing to give him a call answer any questions he might have.   Patient had no other questions. She was excited to go to rehab to improve her functional status. Don is planing to meet her at the rehab later today.     Deepthi Guerra, LSW

## 2025-03-06 NOTE — PROGRESS NOTES
Speech Language Therapy Discharge Summary    Reason for therapy discharge:    Discharged to acute rehabilitation facility.    Progress towards therapy goal(s). See goals on Care Plan in [facility] electronic health record for goal details.  Goals partially met.  Barriers to achieving goals:   discharge from facility.    Therapy recommendation(s):    Continued therapy is recommended.  Rationale/Recommendations:  Recommend regular diet, thin liquids, meds whole with water. Needs set up and eats with non dominent left hand. Moderate dysarthria without significant aphasia.

## 2025-03-06 NOTE — PLAN OF CARE
Problem: Adult Inpatient Plan of Care  Goal: Optimal Comfort and Wellbeing  Outcome: Progressing     Problem: Stroke, Ischemic (Includes Transient Ischemic Attack)  Goal: Effective Urinary Elimination  Outcome: Progressing     Problem: Comorbidity Management  Goal: Blood Pressure in Desired Range  Outcome: Progressing   Goal Outcome Evaluation:    Patient alert and oriented, BP within permissive HTN parameters. NIH scoring 5, RUE weaker than RLE. Up A1 GB and walker, voiding without issue. Polite and pleasant with cares, no new concerns.

## 2025-03-06 NOTE — PROGRESS NOTES
Pt alert and oriented x 4. NIH scoring 4. Still having right sided weakness, arm worse than leg. Swallowing without issue. Voiding. Denies pain. Discharged to  Acute Rehab this afternoon.

## 2025-03-06 NOTE — PLAN OF CARE
Physical Therapy Discharge Summary    Reason for therapy discharge:    Discharged to acute rehabilitation facility.    Progress towards therapy goal(s). See goals on Care Plan in [facility] electronic health record for goal details.  Goals partially met.  Barriers to achieving goals:   Pt is making progress with goals yet..    Therapy recommendation(s):    Continued therapy is recommended.  Rationale/Recommendations:  To improve mobility and strength. .

## 2025-03-06 NOTE — PROGRESS NOTES
Care Management Follow Up    Length of Stay (days): 3    Expected Discharge Date: 03/06/2025     Concerns to be Addressed: discharge planning     Patient plan of care discussed at interdisciplinary rounds: Yes    Anticipated Discharge Disposition:  [facility] Acute Rehab      Anticipated Discharge Services:  transportation  Anticipated Discharge DME:      Patient/family educated on Medicare website which has current facility and service quality ratings:    Education Provided on the Discharge Plan:    Patient/Family in Agreement with the Plan:      Referrals Placed by CM/SW:    Private pay costs discussed: transportation costs    Discussed  Partnership in Safe Discharge Planning  document with patient/family: No     Handoff Completed: No, handoff not indicated or clinically appropriate    Additional Information:  JARRELL communicated with  [facility] Liaison (Cris) this morning and received clarification that patient wasn't accepted to [facility] in [place], only Acute Inpatient Rehab in [place]. [address]. [id number].  Patient should arrive after 1PM and before 4PM.   Nurse to nurse report should be called to: [phone].  SW spoke with patient and she would prefer to stay in [facility], but understands that Acute Rehab is what she needs. Patient agreeable to wheelchair transport, not family transport.  SW also updated patient's spouse and provided [facility] address and phone number.   JARRELL arranged wheelchair ride for : [phone]. Patient, family, provider and Unit notified.    Next Steps: Continue to follow and assist with patient's discharge plans.    Deepthi Guerra, HERO

## 2025-03-06 NOTE — PHARMACY-ADMISSION MEDICATION HISTORY
Admission medication history completed at [facility]. Please see Medication Scribe Admission Medication History note from 3/3/2025 for more information.    Nabor Coot, JudsonD

## 2025-03-06 NOTE — PROGRESS NOTES
Pt arrived to unit from [facility]. Alert and oriented x 4. Denies upon arrival. Oriented to room, unit routine and call light usage. Meals ordered.

## 2025-03-06 NOTE — CONSULTS
Social Work: Initial Assessment with Discharge Plan    Patient Name: Fang Estes  : 1940  Age: 84 year old  MRN: [id number]  Completed assessment with: Chart review and interview with patient.   Admitted to ARU: 2025    Presenting Information   Date of SW assessment: 2025  Health Care Directive: will provide education and copy  Primary Health Care Agent: Patient/self.   Secondary Health Care Agent: In absence of HC, per  [facility] policy, pt spouse- Don Esteswould be HCD agent  Living Situation: lives with spouse 3 ilt and flight inside;stairs to enter home;stairs within home   Previous Functional Status: Pt was independent with all ADLs/IADLs, transfers, mobility and gait.  DME available: *** See therapy evaluation for more information   Patient and family understanding of hospitalization: Appropriate and Pleasant.  Cultural/Language/Spiritual Considerations: 84 year old female, English speaking, Roman Catholic jaime     Physical Health  Reason for admission: Right sided weakness; according to h&p; patient is a 4 year old right hand dominant  woman with past medical history of HTN, stage 3A CKD, mild persistent asmtha, osteoporosis, breast ca, who presented with right facial droop, right sided weakness, and impaired coordination.   Head CT with chronic stroke in left basal ganglia and right cpa lesions, CTA with multivessel stenosis, no significant cervical stenosis, MRI with hyperacute left pontine stroke.       Seen by neurology, started on DAPT, Hgb A1C 6.3%, , started on atorvastatin 80 mg daily, EF wnl.          During acute hospitalization, patient was seen and evaluated by PT and OT,  who collectively recommended that patient would benefit from ongoing therapies in the acute inpatient rehabilitation setting.     Provider Information   Primary Care Physician:Santiago Silverio   [address] [place] [address] [id number] [phone]   [facility] will schedule PCP apt at discharge.   Case  "Manager: ***    Mental Health/Chemical Dependency:   Diagnosis: ***  Alcohol/Tobacco/Narcotis: ***  Support/Services in Place: ***  Services Needed/Recommended: [facility] and Health Psychology support while on ARU available.   Sexuality/Intimacy: Not discussed     Support System  Marital Status:   Family support: supportive- has spouse and 5 children who live nearby   Other support available: ***  Areas of fulfillment/samara:     Community Resources  Current in home services: ***  Previous services: ***  Social support, individual psychotherapy, and medications   What associations, organizations or groups have been especially helpful to you in the past ?    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   Income Source: ***  Education: ***  Financial Concerns:  ***  Insurance: [employer]     ------------------------------------------------------------------------------------------------------------  ANUP Pain Assessment    Pain Effect on Sleep  Over the past 5 days, how much of the time has pain made it hard for you to sleep at night?\"    {Post Acute Pain:241838}    Pain Interference with Therapy Activities  \"Over the past 5 days, how often have you limited your participation in rehabilitation therapy sessions due to pain?\"  {Post Acute Pain:273353}    Pain Interference with Day-to-Day Activities  \"Over the past 5 days, how often have you limited your day-to-day activities (excluding rehabilitation therapy sessions) because of pain?\"  {Post Acute Pain:592549}  -------------------------------------------------------------------------------------------------------------    TRANSPORTATION:    Has lack of transportation kept you from medical appointments, meetings, work, or from getting things needed for daily living?  A. Yes, it has kept me from medical appointments or from getting my medications  B. Yes, it has kept me from non-medical " meetings, appointments, work or from getting things that I need  C. No  X. Patient Unable to respond  Y. Patient declines to respond  -------------------------------------------------------------------------------------------------------------  Health Literacy:   How Often do you need to have someone help you when you read instructions, pamphlets, or other written material from your doctor or pharmacy?  0.       Never  1.       Rarely  2.       Sometimes  3.       Often  4.       Always  5.       Patient declines to respond  6.       Patient unable to respond  ------------------------------------------------------------------------------------------------------------    SOCIAL ISOLATION  How often do you feel lonely or isolated from those around you?  0.       Never  1.       Rarely  2.       Sometimes  3.       Often  4.       Always  5.       Patient declines to respond  6.       Patient unable to respond  -------------------------------------------------------------------------------------------------------------    Discharge Plan     Patient and family discharge goal: To be determined, pending progress  Provided Education on discharge plan: Evaluations and discharge recommendations pending.   Patient agreeable to discharge plan:  Pending further discussion. Evaluations and discharge recommendations pending.   Provided education and attained signature for Medicare IM and IRF Patient Rights and Privacy Information provided to patient : Yes  Provided patient with [facility] Resources: Yes  Barriers to discharge: to be determined    Discharge Recommendations   Disposition: TBD, pending therapy updates  Transportation Needs: Patient, family/friends, paid transport, insurance transport (if applicable)   Name of Transportation Company and Phone: TBD    Additional comments   Discharge TBD, ELOS 18 days. Evals and discharge needs pending.     SW met with pt at bedside and introduced self and  role.   stay in [facility],   Pt works part time and still drives.     SW will continue to remain available for patient and family support, discharge planning, and access to resources.      SIOMARA Lazaro  [facility], Acute Inpatient Rehab Unit   [address] [place] [address], [address] [place] [address]  Phone: [phone]  Fax: [phone]

## 2025-03-06 NOTE — PROGRESS NOTES
Occupational Therapy Discharge Summary    Reason for therapy discharge:    Discharged to acute rehabilitation facility.    Progress towards therapy goal(s). See goals on Care Plan in Epic electronic health record for goal details.  Goals partially met.  Barriers to achieving goals:   discharge from facility.    Therapy recommendation(s):    Continued therapy is recommended.  Rationale/Recommendations:  @ Acute Rehab to progress self-care skills and to optimize safe return to daily activity.    Guy Marvin, OTR

## 2025-03-06 NOTE — PROGRESS NOTES
Patient is going to [facility] today, 3/6/25. Please find the discharge-readmit orders in the Epic.

## 2025-03-06 NOTE — H&P
[facility]   Acute Rehabilitation Unit  Admission History and Physical    CHIEF COMPLAINT   Right sided weakness     HISTORY OF PRESENT ILLNESS  Fang Estes is a 84 year old right hand dominant  woman with past medical history of HTN, stage 3A CKD, mild persistent asmtha, osteoporosis, breast ca, who presented with right facial droop, right sided weakness, and impaired coordination.   Head CT with chronic stroke in left basal ganglia and right cpa lesions, CTA with multivessel stenosis, no significant cervical stenosis, MRI with hyperacute left pontine stroke.      Seen by neurology, started on DAPT, Hgb A1C 6.3%, , started on atorvastatin 80 mg daily, EF wnl.        During acute hospitalization, patient was seen and evaluated by PT and OT,  who collectively recommended that patient would benefit from ongoing therapies in the acute inpatient rehabilitation setting.     Functionally noted to have impaired strength, impaired activity tolerance, impaired balance, impaired coordination, dysphagia, and dysarthria.  She is currently requiring min assist for bed mobility, max assist for sit to stand.  Mod assist for standing, has initiated pre-gait activities with mod assist.  Currently, Nataliya is needing max assist for lower body dressing and toileting.  Currently on regular diet with thin liquids with slow mastication and felt at risk for aspiration.  Goals for mod I with basic mobility, assist for stairs, and to tolerate least restrictive diet, and improve ability to communicate needs effectively.     On arrival to rehab seen sitting edge of bed after use of bathroom with nursing.  Reports feeling well, denies dizziness, nausea, sob, and fevers.  Denies chest pain, headaches, and heart palpitations.  Nataliya denies sensation changes, vision changes and changes in hearing.  Reports right sided weakness and difficulty getting her words out, speech is more effortful.  She was  very active prior to admission and motivated to regain functional independence.        PAST MEDICAL HISTORY   Reviewed and updated in Epic.  Past Medical History:   Diagnosis Date    Hypertension     Left pontine CVA (H) 3/4/2025    Uncomplicated asthma        SURGICAL HISTORY  Reviewed and updated in Epic.  Past Surgical History:   Procedure Laterality Date    APPENDECTOMY      BREAST EXCISIONAL BIOPSY Left 01/01/1980    LUMPECTOMY BREAST Right 5/24/2022    Procedure: WIRE LOCALIZED LUMPECTOMY WITH SENTINEL LYMPH NODE BIOPSY;  Surgeon: Nida Mitchell DO;  Location: [facility]       SOCIAL HISTORY  Reviewed and updated in Epic.  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   Tobacco use: none  Alcohol use: none  Illicit drug use: none  Social History     Socioeconomic History    Marital status:      Spouse name: Not on file    Number of children: Not on file    Years of education: Not on file    Highest education level: Not on file   Occupational History    Not on file   Tobacco Use    Smoking status: Never    Smokeless tobacco: Never   Substance and Sexual Activity    Alcohol use: Not on file    Drug use: Never    Sexual activity: Not on file   Other Topics Concern    Not on file   Social History Narrative    Not on file     Social Drivers of Health     Financial Resource Strain: Low Risk  (3/4/2025)    Financial Resource Strain     Within the past 12 months, have you or your family members you live with been unable to get utilities (heat, electricity) when it was really needed?: No   Food Insecurity: Low Risk  (3/4/2025)    Food Insecurity     Within the past 12 months, did you worry that your food would run out before you got money to buy more?: No     Within the past 12 months, did the food you bought just not last and  you didn t have money to get more?: No   Transportation Needs: Low Risk  (3/4/2025)    Transportation Needs     Within the past 12 months, has lack of transportation kept you from medical appointments, getting your medicines, non-medical meetings or appointments, work, or from getting things that you need?: No   Physical Activity: Not on file   Stress: Not on file   Social Connections: Not on file   Interpersonal Safety: Low Risk  (3/4/2025)    Interpersonal Safety     Do you feel physically and emotionally safe where you currently live?: Yes     Within the past 12 months, have you been hit, slapped, kicked or otherwise physically hurt by someone?: No     Within the past 12 months, have you been humiliated or emotionally abused in other ways by your partner or ex-partner?: No   Housing Stability: Low Risk  (3/4/2025)    Housing Stability     Do you have housing? : Yes     Are you worried about losing your housing?: No       FAMILY HISTORY  Reviewed and updated in Epic.  Family History   Problem Relation Age of Onset    Breast Cancer Daughter 45.00         PRIOR FUNCTIONAL HISTORY   Pt was independent with all ADLs/IADLs, transfers, mobility and gait.      MEDICATIONS  Scheduled meds  Medications Prior to Admission   Medication Sig Dispense Refill Last Dose/Taking    albuterol (PROAIR HFA/PROVENTIL HFA/VENTOLIN HFA) 108 (90 Base) MCG/ACT inhaler Inhale 1-2 puffs into the lungs every 4 hours as needed for shortness of breath, wheezing or cough.       alendronate (FOSAMAX) 70 MG tablet Take 70 mg by mouth every 7 days. Sunday(s)       anastrozole (ARIMIDEX) 1 MG tablet Take 1 mg by mouth every morning.       [START ON 3/7/2025] aspirin 81 MG EC tablet Take 1 tablet (81 mg) by mouth daily.       atorvastatin (LIPITOR) 80 MG tablet Take 1 tablet (80 mg) by mouth every evening.       [START ON 3/7/2025] clopidogrel (PLAVIX) 75 MG tablet Take 1 tablet (75 mg) by mouth or NG Tube daily.       lisinopril (ZESTRIL) 10 MG  "tablet Take 10 mg by mouth every morning.       montelukast (SINGULAIR) 10 MG tablet Take 10 mg by mouth at bedtime.       vitamin D3 (CHOLECALCIFEROL) 50 mcg (2000 units) tablet Take 1 tablet by mouth every morning.          ALLERGIES     Allergies   Allergen Reactions    Ace Inhibitors Cough    Losartan Other (See Comments)     Shortness of breath, worsening cough(only very mild with lisinopril, much worse with this)    No Clinical Screening - See Comments Other (See Comments)     Cats, horses, dust.    Other (Do Not Use) Other (See Comments)     Cats, horses, dust.    Amoxicillin Rash         REVIEW OF SYSTEMS  A 10 point ROS was performed and negative unless otherwise noted in HPI.       PHYSICAL EXAM  VITAL SIGNS:  BP (!) 171/87 (BP Location: Left arm)   Pulse 58   Temp 98.7  F (37.1  C) (Oral)   Resp 18   Wt 54.1 kg (119 lb 4.3 oz)   SpO2 96%   BMI 21.13 kg/m    BMI:  Estimated body mass index is 21.79 kg/m  as calculated from the following:    Height as of 11/22/24: 1.6 m (5' 3\").    Weight as of 3/3/25: 55.8 kg (123 lb).     General: awake alert nad  HEENT: mucus membranes dry, eomi, mild right facial droop/ weakness  Pulmonary: non labored clear on room air  Cardiovascular: rrr  Abdominal: soft non tender non distended   Extremities: warm, well perfused, no edema in bilateral lower extremities, no tenderness in calves   MSK/neuro:   Mental Status:  alert and oriented    Cranial Nerves: mild right facial weakness and droop   Sensory: Normal to light touch in bilateral upper and lower extremities    Strength: 5/5 in all muscle groups of the upper and lower extremities    SF  EF  EE  WE  G  I  HF  KE  DF  EHL  PF   R  1 2 2 1 0 0 2 2 1 0 1  L  5 5 5 5 5 5 5 5 5 5 5   Reflexes: Present and symmetrical    Reyes's test: negative bilaterally    Babinski reflex: downgoing bilaterally    Tone per modified Randa Scale: none   Abnormal movements: None    Coordination: No dysmetria on finger to nose on " "left   Speech:mild dysarthria reported speech is more effortful   Cognition:intact to admission conversation though not formally tested   Gait: not tested  Skin: normal skin color, texture, turgor      LABS  Pertinent labs   Lab Results   Component Value Date    WBC 7.5 03/04/2025         Lab Results   Component Value Date    RBC 5.04 03/04/2025     Lab Results   Component Value Date    HGB 14.7 03/04/2025     Lab Results   Component Value Date    HCT 43.7 03/04/2025     No components found for: \"MCT\"  Lab Results   Component Value Date    MCV 87 03/04/2025     Lab Results   Component Value Date    MCH 29.2 03/04/2025     Lab Results   Component Value Date    MCHC 33.6 03/04/2025     Lab Results   Component Value Date    RDW 12.5 03/04/2025     Lab Results   Component Value Date     03/04/2025       Lab Results   Component Value Date     03/06/2025      Lab Results   Component Value Date    POTASSIUM 4.6 03/06/2025     Lab Results   Component Value Date    CHLORIDE 103 03/06/2025     Lab Results   Component Value Date    LÁZARO 9.6 03/06/2025     Lab Results   Component Value Date    CO2 29 03/06/2025     Lab Results   Component Value Date    BUN 25.7 03/06/2025     Lab Results   Component Value Date    CR 1.14 03/06/2025     Lab Results   Component Value Date     03/06/2025     03/06/2025       IMPRESSION/PLAN:  Fang Estes is a 84 year old right hand dominant woman with past medical history of HTN, CKD, breast CA, asthma, and osteoporosis who presented with right sided weakness found to have 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   Admitted to rehab 03/06/2025 in setting of impaired strength, impaired activity tolerance, impaired balance, impaired coordination, dysarthria, and dysphagia.       Admission to acute inpatient rehab 03/06/2025.    Impairment group code: Stroke Ischemic 01.2 (R) Body Involvement (L) Brain; Acute left pontine stroke " "likely due to intracranial atherosclerosis       PT, OT and SLP 60 minutes of each on a daily basis up to 6 days per week, in addition to rehab nursing and close management of physiatrist x15 days.      Impairment of ADL's: Noted to have impaired strength, impaired activity tolerance, impaired balance,  and impaired coordination leading to decreased ability to independently complete ADL's.  Will benefit from ongoing OT with goal for MOD I with basic ADLs.     Impairment of mobility:  Noted to have impaired strength, impaired activity tolerance,  and impaired balance leading to decreased mobility.  Will benefit from ongoing PT with goal for MICAH with basic mobility.       Impairment of cognition/language/swallow:  Noted to have impaired swallow and dysarthria will benefit from ongoing SLP to assess safety of swallow and provide ongoing safe swallow strategies, improve ability to communicate needs effectively.     Medical Conditions  Acute Left Pontine stroke  Intracranial atherosclerosis  Presented with right sided weakness, impaired coordination, CT head neg for acute stroke, MRI with left pontine stroke. Imaging with extensive intracranial atherosclerotic disease.  EF wnl, A1C 6.3%, . Seen by stroke neurology, \" in view of extensive intracranial atherosclerotic disease, I would recommend continuing baby aspirin and Plavix indefinitely\"   - HTN as below- long term goal 120-130/80 (next one week) with permissive HTN (3/3-3/10)  -HLD as below- LDL goal <70  -continue PT/OT/SLP  -follow up for 30 day event monitor  -continue asa 81 and plavix      Anterior communicating artery 2 mm aneurysm  Right tentorial leaf meningioma  Found incidentally during stroke workup  -routine surveillance imaging    HTN  Allow initial permissive HTN (~ 3/10)  then long term goal 120-130/80  -restart lisinopril 10 mg daily  -monitor bp and titrate towards goal in next one week    HLD  -ldl 152, started on atorvastatin 80 mg daily, " long term goal LDL <70  -continue atorvastatin 80 mg daily    Stage 3A CKD  Cr baseline appears to be around 1.1.  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       Adjustment to disability:  Clinical psychology to eval and treat as indicated  FEN: reg  Bowel: monitor  Bladder: monitor  DVT Prophylaxis: mechanical   GI Prophylaxis: none  Code: full- confirmed on admission.   Disposition: goal for home  ELOS:  ~18 days.  Rehab prognosis:  good  Follow up Appointments on Discharge: pcp, neuro,          discussed with Dr. Villafana , PM&R staff physician     Iris Bird PA-C  Physical Medicine & Rehabiltiation     mobility  Anticipated rehabilitation course: she will be most likely WC at discharge and mod I with basic transfers and ADLs after set-up    Will benefit from intensive rehabilitation includin minutes each of PT, OT and SLP  Rehabilitation nursing  Close management by physiatry    Prognosis: good  Estimated length of stay: 18 days      Ethel Villafana MD  Date of Service (when I saw the patient): 3/6/25

## 2025-03-06 NOTE — PLAN OF CARE
Problem: Adult Inpatient Plan of Care  Goal: Readiness for Transition of Care  Outcome: Progressing     Problem: Stroke, Ischemic (Includes Transient Ischemic Attack)  Goal: Improved Communication Skills  Outcome: Progressing   Goal Outcome Evaluation:    Patient is AAO. Denies pain. Has right sided weakness and dysarthria. Patient accepted to LTACH tomorrow and ready for discharge.

## 2025-03-07 ENCOUNTER — APPOINTMENT (OUTPATIENT)
Dept: OCCUPATIONAL THERAPY | Facility: CLINIC | Age: 85
DRG: 057 | End: 2025-03-07
Attending: PHYSICAL MEDICINE & REHABILITATION
Payer: COMMERCIAL

## 2025-03-07 ENCOUNTER — APPOINTMENT (OUTPATIENT)
Dept: SPEECH THERAPY | Facility: CLINIC | Age: 85
DRG: 057 | End: 2025-03-07
Attending: PHYSICAL MEDICINE & REHABILITATION
Payer: COMMERCIAL

## 2025-03-07 ENCOUNTER — APPOINTMENT (OUTPATIENT)
Dept: PHYSICAL THERAPY | Facility: CLINIC | Age: 85
DRG: 057 | End: 2025-03-07
Attending: PHYSICAL MEDICINE & REHABILITATION
Payer: COMMERCIAL

## 2025-03-07 PROCEDURE — 99232 SBSQ HOSP IP/OBS MODERATE 35: CPT | Performed by: PHYSICAL MEDICINE & REHABILITATION

## 2025-03-07 PROCEDURE — 97166 OT EVAL MOD COMPLEX 45 MIN: CPT | Mod: GO

## 2025-03-07 PROCEDURE — 128N000003 HC R&B REHAB

## 2025-03-07 PROCEDURE — 250N000013 HC RX MED GY IP 250 OP 250 PS 637: Performed by: PHYSICIAN ASSISTANT

## 2025-03-07 PROCEDURE — 250N000011 HC RX IP 250 OP 636: Performed by: PHYSICAL MEDICINE & REHABILITATION

## 2025-03-07 PROCEDURE — 97162 PT EVAL MOD COMPLEX 30 MIN: CPT | Mod: GP

## 2025-03-07 PROCEDURE — 92610 EVALUATE SWALLOWING FUNCTION: CPT | Mod: GN

## 2025-03-07 PROCEDURE — 92523 SPEECH SOUND LANG COMPREHEN: CPT | Mod: GN

## 2025-03-07 PROCEDURE — 97530 THERAPEUTIC ACTIVITIES: CPT | Mod: GP

## 2025-03-07 PROCEDURE — 97535 SELF CARE MNGMENT TRAINING: CPT | Mod: GO

## 2025-03-07 RX ORDER — ENOXAPARIN SODIUM 100 MG/ML
40 INJECTION SUBCUTANEOUS EVERY 24 HOURS
Status: DISCONTINUED | OUTPATIENT
Start: 2025-03-07 | End: 2025-03-12

## 2025-03-07 RX ORDER — PANTOPRAZOLE SODIUM 40 MG/1
40 TABLET, DELAYED RELEASE ORAL
Status: DISCONTINUED | OUTPATIENT
Start: 2025-03-08 | End: 2025-03-18 | Stop reason: HOSPADM

## 2025-03-07 RX ADMIN — CLOPIDOGREL 75 MG: 75 TABLET ORAL at 07:59

## 2025-03-07 RX ADMIN — ASPIRIN 81 MG: 81 TABLET, COATED ORAL at 07:59

## 2025-03-07 RX ADMIN — ATORVASTATIN CALCIUM 80 MG: 80 TABLET, FILM COATED ORAL at 20:26

## 2025-03-07 RX ADMIN — ENOXAPARIN SODIUM 40 MG: 40 INJECTION SUBCUTANEOUS at 12:07

## 2025-03-07 RX ADMIN — Medication 50 MCG: at 08:01

## 2025-03-07 RX ADMIN — ANASTROZOLE 1 MG: 1 TABLET, COATED ORAL at 08:01

## 2025-03-07 RX ADMIN — MONTELUKAST 10 MG: 10 TABLET, FILM COATED ORAL at 20:26

## 2025-03-07 RX ADMIN — LISINOPRIL 10 MG: 10 TABLET ORAL at 08:01

## 2025-03-07 ASSESSMENT — ACTIVITIES OF DAILY LIVING (ADL)
ADLS_ACUITY_SCORE: 37
ADLS_ACUITY_SCORE: 37
IADLS,_PREVIOUS_FUNCTIONAL_LEVEL: INDEPENDENT
ADLS_ACUITY_SCORE: 37
PREVIOUS_RESPONSIBILITIES: MEAL PREP;HOUSEKEEPING;LAUNDRY;SHOPPING;YARDWORK;MEDICATION MANAGEMENT;DRIVING;WORK
ADLS_ACUITY_SCORE: 37
BADLS,_PREVIOUS_FUNCTIONAL_LEVEL: INDEPENDENT
ADLS_ACUITY_SCORE: 37
ADLS_ACUITY_SCORE: 37
IADLS,_PREVIOUS_FUNCTIONAL_LEVEL: INDEPENDENT
ADLS_ACUITY_SCORE: 37
BADLS,_PREVIOUS_FUNCTIONAL_LEVEL: INDEPENDENT
ADLS_ACUITY_SCORE: 37

## 2025-03-07 NOTE — CARE PLAN
Discharge Planner Post-Acute Rehab OT:     Discharge Plan: home w/ spouse assist prn los 10-12 days    Precautions: fall, R hemiparesis, R AFO for transfers and amb     Current Status:  ADLs:  Mobility:  Sarah  transfers:CGA w/ FWW   Grooming: sba standing at sink   Dressing: G/H:  U/b dressing: pullover min a  L/B dressing:shorts min a  Feet: min a  Bathing: tba  Toileting: cga with fww to handicap height toilet 18.5  IADLs: assist at this time  Vision/Cognition: assess and tx higher functional cog    Assessment: pt is a 84 yr old active female who presents below baseline for adls and iadls s/p cva and will benefit from ot per ot goals    Other Barriers to Discharge (DME, Family Training, etc): tba

## 2025-03-07 NOTE — PROGRESS NOTES
"   03/07/25 1236   Appointment Info   Signing Clinician's Name / Credentials (SLP) Amy Liu SLP Student   Student Supervision Direct supervision provided;Direct Patient Contact Provided   General Information   Onset of Illness/Injury or Date of Surgery 03/03/25   Referring Physician Iris Bird PA   Pertinent History of Current Problem per H&P:\"84 year old right hand dominant woman with past medical history of HTN, stage 3A CKD, mild persistent asmtha, osteoporosis, breast ca, who presented with right facial droop, right sided weakness, and impaired coordination.   Head CT with chronic stroke in left basal ganglia and right PCA lesions, CTA with multivessel stenosis, no significant cervical stenosis, MRI with hyperacute left pontine stroke.       \"   General Observations Pt seen by SLP for dysphagia and language during acute hospitalization. Per chart review, pt completed WAB bedside on 3/6 presented with mild anomic aphasia. Pt reports frustrations with speech and communication since CVA. Pt presenting with dysarthric/apraxic speech. Pt agreeable to participate in cognitive-linguistic assessment given IND PLOF. Pt complete RBANS form b. See below for details.       Present no   Pain Assessment   Patient Currently in Pain No   Type of Evaluation   Type of Evaluation Speech, Language, Cognition   Motor Speech   Vocal Loudness (Motor Speech) reduced loudness;monoloudness   Speech Intelligibility (Motor Speech) word level;phrase/sentence level;conversational level   Breath Support (Motor Speech) intact   Comment, Motor Speech Assessment Motor speech significant for impresise articulation, slow and haulting/effortful speech. minimal groping movements, and reduced loudness.   Rate/Prosody (Motor Speech) slow overall rate   Articulation (Motor Speech) errors with increasing word length;imprecise articulation   Respiration (motor speech) None   Phonation (motor speech) Loudness (soft) "   Word Level, Speech Intelligibility (Motor Speech) minimal impairment   Conversational Level, Speech Intelligibility (Motor Speech) moderate impairment   Phrase/Sentence Level, Speech Intelligibility (Motor Speech) moderate impairment   Auditory Comprehension   Follows Commands (Auditory Comprehension) WNL   Paragraph Comprehension (Auditory Comprehension) not tested   Picture Identification (Auditory Comprehension) not tested   Object Identification (Auditory Comprehension) not tested   Yes/No Questions (Auditory Comprehension) WNL   Verbal Expression   Confrontational Naming (Verbal Expression) pictures;WFL   Conversational Speech (Verbal Expression) WNL;connected speech   Automatic Speech (Verbal Expression) not tested   Responsive Naming (Verbal Expression) WFL   Narrative Speech (Verbal Expression) storytelling/retelling;WNL   Repetition Skills (Verbal Expression) not tested   Word Finding Skills (Verbal Expression) WFL;generative naming   Pictures, Confrontational Naming (Verbal Expression) intact   Generative Naming, Word Finding (Verbal Expression) intact   Storytelling/Retelling, Narrative Speech (Verbal Expression) intact   Connected Speech, Conversational (Verbal Expression) intact   Pragmatic Language   Verbal Skills (Pragmatic Language) WNL   Nonverbal Skills (Pragmatic Language) WNL;facial affect;eye contact   Facial Affect, Nonverbal Skills (Pragmatic Language) minimal impairment   Eye Contact, Nonverbal Skills (Pragmatic Language) intact   Reading Comprehension   Oral Reading Ability (Reading Comprehension) not tested   Written Language   Written Expression (Written Language) not tested   Cognition   Cognitive Function WFL   Cognitive Status Within functional limits   Additional cognitive-linguistic evaluation indicated  Other (see comments)   Cognitive Status Exam Comments Pt cognition within functional limits. No further assessment/tx of cognition recommended.   Orientation Status (Cognition)  oriented x 4   Affect/Mental Status (Cognition) flat/blunted affect   Follows Commands (Cognition) WNL   General Therapy Interventions   Planned Therapy Interventions Dysphagia Treatment;Communication   Communication Improve speech intelligibility   Clinical Impression   Criteria for Skilled Therapeutic Interventions Met (SLP Eval) Yes, treatment indicated   SLP Diagnosis Moderate dysarthric/apraxic speech. Cognition WFL.   Problem List (SLP) decreased intellgibility   Risks & Benefits of therapy have been explained risks/benefits reviewed;participants voiced agreement with care plan;participants included;patient   Clinical Impression Comments SLP: Pt seen on ARU for cognitive-linguistic evaluation and informal motor speech evaluation this date. Administered RBANS form b. Pt scoring total scale of 105 indicating 63rd precentile compared to age related norms. Noting lower individual attention score but overall scores largely WFL. Pt also endorses feeling at cognitive baseline. Pt presenting with moderate dysarthric/apraxic speech c/b slow, effortful, haulting speech with reduced loudness especially within the conversation level. Pt able to recall previously introduced clear speech strategies. Recommend few sessions tx in area of dysarthria to train speech intelligibilty strategies then likely reduce time to 1x/day to tx dyspagia. Pt will benefit from skilled SLP services to increase speech intelligibility.   SLP Total Evaluation Time   Eval: Sound production with lang comprehension and expression Minutes (47717) 30   SLP Goals   Therapy Frequency (SLP Eval) 6 times/week   SLP Predicted Duration/Target Date for Goal Attainment 03/21/25   SLP Goals SLP Goal 1   SLP: Goal 1 Pt will IND implement clear speech stratigies (slow down, loudness, overarticulate, breathe) to increase speech intelligibility to 90% within conversation.   Interventions   Interventions Quick Adds Speech, Language, Voice & Communication   SLP  Discharge Planning   SLP Plan SLP: f/u meal reg, 1-2 level liquid. modify liquid as appropriate. oral clearance strategies. plan for VFSS 3/10. intro dysarthria strategies and practice at sentence-convo level   SLP Discharge Recommendation home with assist;home with outpatient therapy services   SLP Rationale for DC Rec Ongoing SLP to assess/tx areas of dysphagia and dysarthria.   SLP Time and Intention   Total Session Time (sum of timed and untimed services) 30   SLP - Acute Rehab Center Time   Individual Time (minutes) - SLP 30   Group Time (minutes) - SLP 0   Concurrent Time (minutes) - SLP 0   Co-Treatment Time (minutes) - SLP 0   ARC Total Session Time (minutes) - SLP 30   ARC Daily Total Session Time   SLP ARC Daily Total Session Time 60   ARC Daily Rehab Total Minutes 60   Comprehension   Comprehension Comment WNL   Expression   Expression Comment dysarthric   Social Interaction   Social Interaction Comment WNL   Memory   Memory Comment WNL   Repeatable Battery for the Assessment of Neuropsychological Status (RBANS) FORM b   Immediate Memory Visuospatial/  Constructional Language Attention Delayed Memory Total Scale   Index Score 97 136 95 88 107 105   Percentile Rank 42 99 37 21 68 63     SLP:  Pt seen for administration of RBANS. Results are based on a mean of 100 and a standard deviation of +/- 15.   Interpretation: See above.  Face to Face Administration:  30  Scoring/Interpretation: 15  Total Time: 45

## 2025-03-07 NOTE — PLAN OF CARE
Discharge Planner Post-Acute Rehab SLP:     Discharge Plan: home with spouse. Ongoing SLP     Precautions: fall, aspiration     Current Status:  Hearing: WFL  Vision: reading glasses  Communication: mild-mod dysarthria and suspect slight apraxia c/b slow, effortful, haulting speech with reduced loudness especially within the conversation level.  Cognition: RBANS scores grossly intact with exception to slightly impaired attention. Pt feels at baseline cognitively. Pt mostly concerned with intelligibility   Swallow: regular, slightly thick (1) liquid NO straw. Fully upright all PO, small single sips/bites. VFSS scheduled for 3/10    Assessment:  Dysphagia: Pt seen on ARU for dysphagia assessment following recent pontine CVA. Oral motor functions remarkable for reduced R labial ROM/weakness, weak cough, and mild dysarthria. Given location of CVA, pt is at increased risk for dysphagia. Pt seen with PO trials of regular texture and thin (0), slightly thick (1) and mildly thick (2) liquid by cup and straw. Pt with prolonged but funcitonal mastication, AP transit, trace oral residuals in R buccal cavity. Noting consistent coughing/throat clearing and runny eye/nose with 0 liquids. Cough noted following consecutive sips of 1 liquid by straw. no other s/sx aspiration. Pt demonstrates below baseline level of function and will benefit from skilled SLP to tx dysphagia and modify diet as clinically or instrumentally indicated. Recommend continue regular texture, modify to level 1 liquid NO straw. Pt introdcued to safe swallow strategies and oral clearance strategies. SLP plan to complete VFSS as soon as able and set for Monday 3/10.     Cognitive-linguistic assessment:  Pt seen on ARU for cognitive-linguistic evaluation and informal motor speech evaluation this date. Administered RBANS form b. Pt scoring total scale of 105 indicating 63rd precentile compared to age related norms. Noting lower individual attention score but  overall scores largely WFL. Pt also endorses feeling at cognitive baseline. Pt presenting with moderate dysarthric/apraxic speech c/b slow, effortful, haulting speech with reduced loudness especially within the conversation level. Pt able to recall previously introduced clear speech strategies. Recommend few sessions tx in area of dysarthria to train speech intelligibilty strategies then likely reduce time to 1x/day to tx dyspagia. Pt will benefit from skilled SLP services to increase speech intelligibility.     Other Barriers to Discharge (Family Training, etc): family training: diet pending progress, dysarthria/communicaiton strategies to family

## 2025-03-07 NOTE — PROGRESS NOTES
"  [facility]   Acute Rehabilitation Unit  Daily progress note    INTERVAL HISTORY  Notes reviewed.  Fang Estes was seen in her room this morning, sitting up in chair.  No acute events overnight and Nataliya reports she slept well.  She has no specific concerns or complaints thus far, other than the morning being \"slow\".  She denies any chest pain, shortness of breath, or problems with bowel or bladder.  Functionally, therapy evaluations underway today.        MEDICATIONS  Scheduled:  Current Facility-Administered Medications   Medication Dose Route Frequency Provider Last Rate Last Admin    anastrozole (ARIMIDEX) tablet 1 mg  1 mg Oral QAM Iris Bird PA   1 mg at 03/07/25 0801    aspirin EC tablet 81 mg  81 mg Oral Daily Iris Bird PA   81 mg at 03/07/25 0759    atorvastatin (LIPITOR) tablet 80 mg  80 mg Oral QPM Iris Bird PA   80 mg at 03/06/25 2128    clopidogrel (PLAVIX) tablet 75 mg  75 mg Oral Daily Iris Bird PA   75 mg at 03/07/25 0759    enoxaparin ANTICOAGULANT (LOVENOX) injection 40 mg  40 mg Subcutaneous Q24H Ani Wilson MD   40 mg at 03/07/25 1207    lisinopril (ZESTRIL) tablet 10 mg  10 mg Oral Iris Anders PA   10 mg at 03/07/25 0801    montelukast (SINGULAIR) tablet 10 mg  10 mg Oral At Bedtime Iris Bird PA   10 mg at 03/06/25 2128    [START ON 3/8/2025] pantoprazole (PROTONIX) EC tablet 40 mg  40 mg Oral QAM AC Ani Wilson MD        Vitamin D3 (CHOLECALCIFEROL) tablet 50 mcg  50 mcg Oral Iris Anders PA   50 mcg at 03/07/25 0801        PRN:    Current Facility-Administered Medications   Medication Dose Route Frequency Provider Last Rate Last Admin    acetaminophen (TYLENOL) tablet 650 mg  650 mg Oral Q6H PRN Iris Bird PA        albuterol (PROVENTIL HFA/VENTOLIN HFA) inhaler  1-2 puff Inhalation Q4H PRN Iris Bird PA        hydrALAZINE (APRESOLINE) tablet 10 " "mg  10 mg Oral TID PRN Iris Bird PA        ondansetron (ZOFRAN ODT) ODT tab 4 mg  4 mg Oral Q6H PRN Lars Krause MBBS        senna-docusate (SENOKOT-S/PERICOLACE) 8.6-50 MG per tablet 1 tablet  1 tablet Oral BID PRN Iris Bird PA              PHYSICAL EXAM  Patient Vitals for the past 24 hrs:   BP Temp Temp src Pulse Resp SpO2 Height   03/07/25 1211 -- 97.9  F (36.6  C) -- -- -- -- --   03/07/25 1151 (!) 147/81 -- -- -- -- -- --   03/07/25 0933 120/76 (!) 96.4  F (35.8  C) Oral 81 17 94 % --   03/07/25 0800 (!) 157/75 -- -- -- -- -- --   03/07/25 0024 129/76 98.7  F (37.1  C) Oral 64 16 94 % --   03/06/25 1559 (!) 167/81 -- -- 61 18 96 % 1.6 m (5' 3\")       GEN: NAD, pleasant and cooperative  HEENT: NC/AT.  Right facial droop.  CVS: RRR, S1+S2, no m/r/g  PULM: CTA b/l, no w/r/r  ABD: Soft, NT, ND, bowel sounds present  EXT: No LE edema or calf tenderness b/l  Neuro: Answers appropriately, follows commands, dysarthric speech, right upper extremity weakness    LABS  CBC RESULTS:   Recent Labs   Lab Test 03/04/25  0637   WBC 7.5   RBC 5.04   HGB 14.7   HCT 43.7   MCV 87   MCH 29.2   MCHC 33.6   RDW 12.5          Last Comprehensive Metabolic Panel:  Sodium   Date Value Ref Range Status   03/06/2025 139 135 - 145 mmol/L Final     Potassium   Date Value Ref Range Status   03/06/2025 4.6 3.4 - 5.3 mmol/L Final     Chloride   Date Value Ref Range Status   03/06/2025 103 98 - 107 mmol/L Final     Carbon Dioxide (CO2)   Date Value Ref Range Status   03/06/2025 29 22 - 29 mmol/L Final     Anion Gap   Date Value Ref Range Status   03/06/2025 7 7 - 15 mmol/L Final     Glucose   Date Value Ref Range Status   03/06/2025 117 (H) 70 - 99 mg/dL Final     GLUCOSE BY METER POCT   Date Value Ref Range Status   03/06/2025 109 (H) 70 - 99 mg/dL Final     Comment:     /RN Notified     Urea Nitrogen   Date Value Ref Range Status   03/06/2025 25.7 (H) 8.0 - 23.0 mg/dL Final     Creatinine   Date Value Ref " Range Status   03/06/2025 1.14 (H) 0.51 - 0.95 mg/dL Final     GFR Estimate   Date Value Ref Range Status   03/06/2025 48 (L) >60 mL/min/1.73m2 Final     Comment:     eGFR calculated using 2021 CKD-EPI equation.     Calcium   Date Value Ref Range Status   03/06/2025 9.6 8.8 - 10.4 mg/dL Final       Recent Labs   Lab 03/06/25  0726 03/06/25  0634 03/05/25  2134 03/05/25  1623 03/04/25  2101 03/04/25  1643   * 117* 109* 105* 124* 116*         IMPRESSION/PLAN:  Fang Estes is a 84 year old right hand dominant woman with past medical history of HTN, CKD, breast CA, asthma, and osteoporosis who presented with right sided weakness found to have 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   Admitted to rehab 03/06/2025 in setting of impaired strength, impaired activity tolerance, impaired balance, impaired coordination, dysarthria, and dysphagia.         Admission to acute inpatient rehab 03/06/2025.    Impairment group code: Stroke Ischemic 01.2 (R) Body Involvement (L) Brain; Acute left pontine stroke likely due to intracranial atherosclerosis         PT, OT and SLP 60 minutes of each on a daily basis up to 6 days per week, in addition to rehab nursing and close management of physiatrist x15 days.       Impairment of ADL's: Noted to have impaired strength, impaired activity tolerance, impaired balance,  and impaired coordination leading to decreased ability to independently complete ADL's.  Will benefit from ongoing OT with goal for MOD I with basic ADLs.      Impairment of mobility:  Noted to have impaired strength, impaired activity tolerance,  and impaired balance leading to decreased mobility.  Will benefit from ongoing PT with goal for MICAH with basic mobility.      Impairment of cognition/language/swallow:  Noted to have impaired swallow and dysarthria will benefit from ongoing SLP to assess safety of swallow and provide ongoing safe swallow strategies, improve ability  "to communicate needs effectively.      Medical Conditions  Acute Left Pontine stroke  Intracranial atherosclerosis  Presented with right sided weakness, impaired coordination, CT head neg for acute stroke, MRI with left pontine stroke. Imaging with extensive intracranial atherosclerotic disease.  EF wnl, A1C 6.3%, . Seen by stroke neurology, \" in view of extensive intracranial atherosclerotic disease, I would recommend continuing baby aspirin and Plavix indefinitely\"   - HTN as below- long term goal 120-130/80 (next one week) with permissive HTN (3/3-3/10)  -HLD as below- LDL goal <70  -continue PT/OT/SLP  -follow up for 30 day event monitor  -continue asa 81 and plavix     Anterior communicating artery 2 mm aneurysm  Right tentorial leaf meningioma  Found incidentally during stroke workup  -routine surveillance imaging     HTN  Allow initial permissive HTN (~ 3/10)  then long term goal 120-130/80  -restart lisinopril 10 mg daily  -monitor bp and titrate towards goal in next one week     HLD  -ldl 152, started on atorvastatin 80 mg daily, long term goal LDL <70  -continue atorvastatin 80 mg daily     Stage 3A CKD  Cr baseline appears to be around 1.1.  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         Adjustment to disability:  Clinical psychology to eval and treat as indicated  FEN: reg  Bowel: monitor; on prn bowel meds  Bladder: monitor; PVRs x2 mildly elevated at 161 and 142.  Continue to monitor bladder patterns   DVT Prophylaxis: Start Lovenox 40 mg daily given CVA and immobility  GI Prophylaxis: Start Protonix 40 mg daily due to DAPT and age  Code: full- confirmed on admission.   Disposition: goal for home  ELOS:  ~18 days.  Rehab " prognosis:  good  Follow up Appointments on Discharge: pcp, neuro,       Hong Wilson MD  Department of Rehabilitation Medicine      Time Spent on this Encounter   I spent a total of 35 minutes face-to-face or managing the care of Fang Estes. Over 50% of my time on the unit was spent counseling the patient and coordinating care. See note for details.

## 2025-03-07 NOTE — PHARMACY-MEDICATION REGIMEN REVIEW
Pharmacy Medication Regimen Review  Fang Estes is a 84 year old female who is currently in the Acute Rehab Unit.    Assessment: All medications have an appropriate indications, durations and no unnecessary use was found    Plan:   Continue current medication regimen.     Attending provider will be sent this note for review.  If there are any emergent issues noted above, pharmacist will contact provider directly by phone.      Pharmacy will periodically review the resident's medication regimen for any PRN medications not administered in > 72 hours and discontinue them. The pharmacist will discuss gradual dose reductions of psychopharmacologic medications with interdisciplinary team on a regular basis.    Please contact pharmacy if the above does not answer specific medication questions/concerns.    Background:  A pharmacist has reviewed all medications and pertinent medical history today.  Medications were reviewed for appropriate use and any irregularities found are listed with recommendations.      Current Facility-Administered Medications:     acetaminophen (TYLENOL) tablet 650 mg, 650 mg, Oral, Q6H PRN, Iris Bird PA    albuterol (PROVENTIL HFA/VENTOLIN HFA) inhaler, 1-2 puff, Inhalation, Q4H PRN, Iris Bird PA    anastrozole (ARIMIDEX) tablet 1 mg, 1 mg, Oral, QAM, Iris Bird PA, 1 mg at 03/07/25 0801    aspirin EC tablet 81 mg, 81 mg, Oral, Daily, Iris Bird PA, 81 mg at 03/07/25 0759    atorvastatin (LIPITOR) tablet 80 mg, 80 mg, Oral, QPM, Iris Bird PA, 80 mg at 03/06/25 2128    clopidogrel (PLAVIX) tablet 75 mg, 75 mg, Oral, Daily, Iris Bird PA, 75 mg at 03/07/25 0759    hydrALAZINE (APRESOLINE) tablet 10 mg, 10 mg, Oral, TID PRN, Iris Bird PA    lisinopril (ZESTRIL) tablet 10 mg, 10 mg, Oral, QAM, Iris Bird PA, 10 mg at 03/07/25 0801    montelukast (SINGULAIR) tablet 10 mg, 10 mg, Oral, At Bedtime, Iris Bird PA, 10 mg  at 03/06/25 2128    ondansetron (ZOFRAN ODT) ODT tab 4 mg, 4 mg, Oral, Q6H PRN **OR** [DISCONTINUED] ondansetron (ZOFRAN) injection 4 mg, 4 mg, Intravenous, Q6H PRN, Lars Krause MBBS    senna-docusate (SENOKOT-S/PERICOLACE) 8.6-50 MG per tablet 1 tablet, 1 tablet, Oral, BID PRN, Iris Bird PA    Vitamin D3 (CHOLECALCIFEROL) tablet 50 mcg, 50 mcg, Oral, QAM, Iris Bird PA, 50 mcg at 03/07/25 0801  No current outpatient prescriptions on file.   PMH: hypertension, asthma, remote breast cancer, CKD 3

## 2025-03-07 NOTE — PROGRESS NOTES
03/07/25 0800   Appointment Info   Signing Clinician's Name / Credentials (OT) Cheryl Russell OTR-L   Living Environment   People in Home spouse   Current Living Arrangements house   Transportation Anticipated family or friend will provide   Living Environment Comments lives with spouse 2 step to main level bathroom kitchen living room up 14 steps to bedroom level  with flat bed tub/shower combination and comfort height toilets  retired teacher works part time in retail   Self-Care   Usual Activity Tolerance excellent   Current Activity Tolerance fair   Regular Exercise Yes   Activity/Exercise Type   [facility]   Exercise Amount/Frequency 1 hr;3-5 times/wk   Equipment Currently Used at Home none   Fall history within last six months no   Instrumental Activities of Daily Living (IADL)   Previous Responsibilities meal prep;housekeeping;laundry;shopping;yardwork;medication management;driving;work   Post-Acute Assessment Only   Post-Acute Functional Assessment See below   Previous Level of Function/Home Environm   Bathing, Previous Functional Level independent   Grooming, Previous Functional Level independent   Dressing, Previous Functional Level independent   Eating/Feeding, Previous Functional Level independent   Toileting, Previous Functional Level independent   BADLs, Previous Functional Level independent   IADLs, Previous Functional Level independent   Bed Mobility, Previous Functional Level independent   Transfers, Previous Functional Level independent   Household Ambulation, Previous Functional Level independent   Stairs, Previous Functional Level independent   Community Ambulation, Previous Functional Level independent   Functional Cognition, Previous Functional Level wnl   Previous Level of Function independent   General Information   Onset of Illness/Injury or Date of Surgery 03/03/25   Referring Physician Steve Pitts MD   Patient/Family Therapy Goal Statement (OT) to return home    Additional Occupational Profile Info/Pertinent History of Current Problem per md report  is a 84 year old right hand dominant woman with past medical history of HTN, CKD, breast CA, asthma, and osteoporosis who presented with right sided weakness found to have 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   Admitted to rehab 03/06/2025 in setting of impaired strength, impaired activity tolerance, impaired balance, impaired coordination, dysarthria, and dysphagia.   Existing Precautions/Restrictions fall   Limitations/Impairments aphasia   Left Upper Extremity (Weight-bearing Status) full weight-bearing (FWB)   Right Upper Extremity (Weight-bearing Status) full weight-bearing (FWB)   Left Lower Extremity (Weight-bearing Status) full weight-bearing (FWB)   Right Lower Extremity (Weight-bearing Status) full weight-bearing (FWB)   Cognitive Status Examination   Orientation Status orientation to person, place and time   Visual Perception   Visual Impairment/Limitations corrective lenses for reading   Sensory   Sensory Comments decrease sensation R U/E   Range of Motion Comprehensive   Comment, General Range of Motion arom l wfl r prom wfl arom trace shoulxdxer elbow 45 werist 30  finger ext 0 full finger dflezxion   Coordination   Coordination Comments l wdfl r none   Clinical Impression   Criteria for Skilled Therapeutic Interventions Met (OT) Yes, treatment indicated   OT Problem List-Impairments impacting ADL problems related to;balance;coordination;mobility;muscle tone;strength;cognition;range of motion (ROM);sensation   Planned Therapy Interventions (OT) ADL retraining;balance training;strengthening;transfer training;IADL retraining;bed mobility training;cognition;fine motor coordination training;neuromuscular re-education;orthotic fitting/training;motor coordination training;prosthetic fitting/training;home program guidelines;progressive activity/exercise;risk factor  education;E-stim   Clinical Decision Making Complexity (OT) detailed assessment/moderate complexity   Risk & Benefits of therapy have been explained evaluation/treatment results reviewed;care plan/treatment goals reviewed;patient;risks/benefits reviewed;current/potential barriers reviewed;participants voiced agreement with care plan;participants included   OT Total Evaluation Time   OT Eval, Moderate Complexity Minutes (97501) 15   OT Goals   Therapy Frequency (OT) 6 times/week   OT Predicted Duration/Target Date for Goal Attainment 03/17/25   OT Goals Hygiene/Grooming;Upper Body Dressing;Lower Body Dressing;Upper Body Bathing;Lower Body Bathing;Bed Mobility;Transfers;Toilet Transfer/Toileting;Meal Preparation;Home Management   OT: Hygiene/Grooming modified independent   OT: Upper Body Dressing Modified independent   OT: Lower Body Dressing Modified independent   OT: Upper Body Bathing Modified independent   OT: Lower Body Bathing Modified independent   OT: Bed Mobility Modified independent   OT: Transfer Supervision/stand-by assist  (shower)   OT: Toilet Transfer/Toileting Modified independent   OT: Meal Preparation Supervision/stand-by assist   OT: Home Management Supervision/stand-by assist   Self-Care/Home Management   Self-Care/Home Mgmt/ADL, Compensatory, Meal Prep Minutes (48090) 38   Neuromuscular Re-Education   Neuromuscular Re-Education Minutes (80737) 15   OT Discharge Planning   OT Plan ot shower ambulation or w/c to extended tubbench   Total Session Time   Timed Code Treatment Minutes 53   Total Session Time (sum of timed and untimed services) 68   Post Acute Settings Only   What unit is patient on? Acute Rehab   OT - Acute Rehab Center Time   Individual Time (minutes) - OT 68   ARC Total Session Time (minutes) - OT 68   ARC Daily Total Session Time   OT ARC Daily Total Session Time 68   ARC Daily Rehab Total Minutes 68   Grooming (except oral cares)   Grooming Comment sba   Upper Body Dressing    Describe performance min a   Lower Body Dressing (Pants/Undergarments)   Describe performance min a   Lower Body Dressing putting on/taking off footwear   Describe performance min a sittingi n chair crossing leg over other   Toileting Hygiene: Ability to maintain perineal hygiene and adjust clothes   Describe performance cga   Toilet Transfer: standard height (18 inches)   Describe performance cga handicap height toilet 18.5   Chair/bed-to-chair Transfer: standard height (18 inches)   Describe performance cga with fww   Roll Left and Right: flat no rail   Assistance Needed Adaptive equipment   Physical Assistance Level No physical assistance   Roll Left to Right CARE Score 6   Sit to Lying: flat no rail   Assistance Needed Adaptive equipment   Sit to Lying CARE Score 6   Lying to Sitting on Side of Bed: flat no rail   Assistance Needed Adaptive equipment   Lying to Sitting CARE Score 6   Sit to Stand: standard height (18 inches)   Assistance Needed Adaptive equipment   Physical Assistance Level Less than 25%   Sitting to Standing CARE Score 3   Walk 10 Feet   Physical Assistance Level Contact guard assist   Walk 10 Ft. CARE Score 4

## 2025-03-07 NOTE — PROGRESS NOTES
Patient right walk on AFO size small has been ordered. The brace will be fit on Monday upon brace arrival.

## 2025-03-07 NOTE — PLAN OF CARE
Goal Outcome Evaluation:    Patient is alert and oriented.  Patient transfers W/GB AO1.  Patient is on room air.  Patient continent of bowel and bladder.   VSS except mild HTN.   Vital signs this shift B/P: 147/81, T: 97.9, P: 81, R: 17   Patient is able to make needs known, uses the call light appropriately. Continue with the plan of care.   .

## 2025-03-07 NOTE — PLAN OF CARE
Goal Outcome Evaluation:      Plan of Care Reviewed With: patient    Overall Patient Progress: no changeOverall Patient Progress: no change     Pt alert and oriented x4. Denies chest pain and shortness of breath. Continent of both, LBM 3/5 per report. Admission packet continued, skin check complete. PVRs 161 and 183mL. Able to make needs known. Call light within reach, bed in low position.

## 2025-03-07 NOTE — PROGRESS NOTES
03/07/25 1137   Appointment Info   Signing Clinician's Name / Credentials (PT) Martine Pagan DPT   Rehab Comments (PT) -10 toileting   Living Environment   People in Home spouse   Current Living Arrangements house   Transportation Anticipated family or friend will provide   Living Environment Comments Lives with spouse, 2 step to main level w/ bathroom kitchen living room. 14 steps to bedroom level with flat bed tub/shower combination and comfort height toilets . Retired teacher works part time in retail.   Self-Care   Usual Activity Tolerance excellent   Current Activity Tolerance fair   Activity/Exercise Type   [facility]   Exercise Amount/Frequency 3-5 times/wk   Equipment Currently Used at Home none   Fall history within last six months no   Activity/Exercise/Self-Care Comment Previously IND w/ all mobiltiy and ADLs, remains very active. Spouse could assist with dressing and IADLs prn.   Post-Acute Assessment Only   Post-Acute Functional Assessment See below   Previous Level of Function/Home Environm   Bathing, Previous Functional Level independent   Grooming, Previous Functional Level independent   Dressing, Previous Functional Level independent   Eating/Feeding, Previous Functional Level independent   Toileting, Previous Functional Level independent   BADLs, Previous Functional Level independent   IADLs, Previous Functional Level independent   Bed Mobility, Previous Functional Level independent   Transfers, Previous Functional Level independent   Household Ambulation, Previous Functional Level independent   Stairs, Previous Functional Level independent   Community Ambulation, Previous Functional Level independent   Previous Level of Function IND   General Information   Onset of Illness/Injury or Date of Surgery 03/03/25   Referring Physician Dr Hong Wilson MD   Patient/Family Therapy Goals Statement (PT) Get R side back.   Pertinent History of Current Problem (include personal  factors and/or comorbidities that impact the POC) Fang Etses is a 84 year old right hand dominant woman with past medical history of HTN, CKD, breast CA, asthma, and osteoporosis who presented with right sided weakness found to have 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   Admitted to rehab 03/06/2025 in setting of impaired strength, impaired activity tolerance, impaired balance, impaired coordination, dysarthria, and dysphagia.   General Observations Attempted orthostatics but unable to get accurate reading, BP elevated but remains stable. Pt seems to have good insight into deficits. Motivated to participate   Cognition   Cognitive Status Comments No overt cog deficits. Able to make needs known. Follows multistep commands. Able to recall room number   Integumentary/Edema   Integumentary/Edema Comments No LE edema. All exposed skin intact   Posture    Posture Comments No overt postural deficits   Range of Motion (ROM)   ROM Comment WFL   Strength (Manual Muscle Testing)   Strength Comments LLE grossly 4+/5. R hip flexion 2+/5, knee ext 3-/5, DF 1/5, PF 2/5. Functionally, pt has heavy reliance on LLE for STS and ambulation, able to correct with cueing.   Balance   Balance Comments Able to stand unsupported w/o UE support static, requires assist for dynamic stepping. Stable dynamic sitting. Significant R foot drag d/t lacking ankle DF, hip flexion and motor control on R side.   Sensory Examination    Intact light touch. Full assessment limited d/t time constraints, will complete at later date.    Coordination   Coordination Comments Assessment somewhat limited d/t R hemiparesis. Proximal incoordination noted w/ transfers an mobiltiy.   Muscle Tone   Muscle Tone Comments MAS 0 plantar flexors and hamstring on R side, wrist flexors 1+, biceps/triceps 1+   Clinical Impression   Criteria for Skilled Therapeutic Intervention Yes, treatment indicated   PT Diagnosis (PT) Force  production deficit   Influenced by the following impairments R hemiparesis, decreased activity toelrance, elevated BP, impaired coordination.   Functional limitations due to impairments Impaired gait mobilty and ADLs.   Clinical Presentation (PT Evaluation Complexity) evolving   Clinical Presentation Rationale >2 personal factors and comorbidities impacting plan of care. Elevated BP at rest and w/ activity   Clinical Decision Making (Complexity) moderate complexity   Planned Therapy Interventions (PT) balance training;bed mobility training;gait training;neuromuscular re-education;patient/family education;stair training;strengthening;stretching;transfer training;progressive activity/exercise;risk factor education   Risk & Benefits of therapy have been explained evaluation/treatment results reviewed;care plan/treatment goals reviewed;risks/benefits reviewed;current/potential barriers reviewed;patient;participants voiced agreement with care plan;participants included   Clinical Impression Comments Pt presents below baseline w/ chronic L basal ganglia stroke w/ R PCA lesions, L pontine stroke. Goals for Tonya w/ FWW, spouse able to provide assist with dressing and stairs prn. Pt previously very active and IND. Primary deficits include R sided hemiparesis and incoordination, mild R inattention and impaired dynamic standing balance. Will benefit from skilled therapies to promote improved safety and IND w/ mobility. ELOS 10-12 days.   PT Total Evaluation Time   PT Eval, Moderate Complexity Minutes (95533) 10   Physical Therapy Goals   PT Frequency 6x/week   PT Predicted Duration/Target Date for Goal Attainment 03/20/25   PT Goals Bed Mobility;Transfers;Gait;Stairs;PT Goal 1;PT Goal 2   PT: Bed Mobility Modified independent   PT: Transfers Modified independent   PT: Gait Modified independent;150 feet   PT: Stairs Greater than 10 stairs  (2STE no railin, 14STI w/ R railing, SBA)   PT: Goal 1 Car transfer w/ FWW, SBA   PT:  Goal 2 Floor transfer w/ SBA and furniture assist.   Interventions   Interventions Quick Adds Therapeutic Activity   Therapeutic Activity   Therapeutic Activities: dynamic activities to improve functional performance Minutes (23369) 10   Treatment Detail/Skilled Intervention See GG. Reviewed therapy goals and recommendations for expected level of assist. Obtained transport w/c for longer distance mobility. Plan to trial AFO in PM to allow for improved amb endurance.   PT Discharge Planning   PT Plan Complete sensory testing. GG car, stairs, , uneven, 150. [place], 10mWT. Amb w/ FWW and AFO trial.   Physical Therapy Time and Intention   Timed Code Treatment Minutes 10   Total Session Time (sum of timed and untimed services) 20   Post Acute Settings Only   What unit is patient on? Acute Rehab   PT - Acute Rehab Center Time   Individual Time (minutes) - PT 20   Group Time (minutes) - PT 0   Concurrent Time (minutes) - PT 0   Co-Treatment Time (minutes) - PT 0   ARC Total Session Time (minutes) - PT 20   ARC Daily Total Session Time   PT ARC Daily Total Session Time 20   ARC Daily Rehab Total Minutes 50   Lower Body Dressing putting on/taking off footwear   Describe performance total A   Chair/bed-to-chair Transfer: standard height (18 inches)   Describe performance CGA w/ FWW   Sit to Stand: standard height (18 inches)   Assistance Needed Adaptive equipment   Physical Assistance Level Less than 25%   Sitting to Standing CARE Score 3   Locomotion   Locomotion Comment Amb x80' w/ turns, FWW, CGA, verbal cues   Walk 10 Feet   Assistance Needed Adaptive equipment;Verbal cues   Physical Assistance Level Contact guard assist   Walk 10 Ft. CARE Score 4   Walk 50 Feet with Two Turns   Patient Performance Adaptive equipment;Verbal cues   Physical Assistance Level Less than 25%   Walk 50 Ft. CARE Score 3   Wheel 50 Feet with Two Turns   Reason if not Attempted Activity not applicable   Wheel 50 Feet with Two Turns CARE Score  9   Wheel 150 Feet   Reason if not Attempted Activity not applicable   Wheel 150 Feet CARE Score 9

## 2025-03-07 NOTE — PLAN OF CARE
"Discharge Plan: home w/ spouse assist prn    Precautions: fall, R hemiparesis, R AFO for transfers and amb    Current Status:  Bed Mobility: Sarah  Transfer: CGA w/ FWW  Gait: Up to 250 w/ FWW, Sarah w/ cueing fro R foot drag  Stairs: 6\" x12 w/ modA for RLE management  Balance: Stable dynamic sitting. Able to stand unsupported static, requires UE support for dynamic stepping.     Outcome Measures:   [place]    3/8: **   10MWT    3/7: 0.31m/s self paced; 0.48m/s fast, Sarah w/ FWW    Assessment:  Pt presents below baseline w/ chronic L basal ganglia stroke w/ R PCA lesions, L pontine stroke. Goals for Tonya w/ FWW, spouse able to provide assist with dressing and stairs prn. Pt previously very active and IND. Primary deficits include R sided hemiparesis and incoordination, mild R inattention and impaired dynamic standing balance. Will benefit from skilled therapies to promote improved safety and IND w/ mobility. ELOS 10-12 days.     Other Barriers to Discharge (DME, Family Training, etc):   2STE no rail + 14STI R railing      DME: owns FWW  "

## 2025-03-07 NOTE — PLAN OF CARE
Goal Outcome Evaluation:      VS: Vital signs:  Temp: 98.7  F (37.1  C) Temp src: Oral BP: (!) 167/81 Pulse: 61   Resp: 18 SpO2: 96 % O2 Device: None (Room air)    O2: O2>95% on RA. Denies SOB, CP    Output: Voiding without any issues. 225 ml   Last BM: 3/5/25   Activity: A1 with walker an GB   Skin: R breast incision site.   Pain: Denies pain   CMS: A/O x4,  denies n/t   Dressing: R Incision site   Diet: Regular/thin/whole   LDA: N/A   Equipment: Wheelchair/ call light within reach   Plan: Continue with plan of care.   Additional Info:  Slurred speech, UE Flaccid, LE weak

## 2025-03-07 NOTE — PROGRESS NOTES
"   03/07/25 1104   Appointment Info   Signing Clinician's Name / Credentials (SLP) Esmer Ulloa MS CCC-SLP   General Information   Onset of Illness/Injury or Date of Surgery 03/03/25   Referring Physician Iris Bird PA   Pertinent History of Current Problem per H&P:\"84 year old right hand dominant woman with past medical history of HTN, stage 3A CKD, mild persistent asmtha, osteoporosis, breast ca, who presented with right facial droop, right sided weakness, and impaired coordination.   Head CT with chronic stroke in left basal ganglia and right PCA lesions, CTA with multivessel stenosis, no significant cervical stenosis, MRI with hyperacute left pontine stroke.       \"   General Observations Pt seen by SLP for dysphagia and language during acute hospitalization. Pt admitted on regular texture, thin (0) liquid. Pt denies any increased difficulty/changes to swallow function following CVA. No instrumental assessment completed during acute despite known impact on swallow function from stroke location.   Pain Assessment   Patient Currently in Pain No   Type of Evaluation   Type of Evaluation Swallow Evaluation   Oral Motor   Oral Musculature generally intact   Dentition (Oral Motor)   Dentition (Oral Motor) natural dentition;adequate dentition   Facial Symmetry (Oral Motor)   Facial Symmetry (Oral Motor) right side impairment   Right Side Facial Asymmetry minimal impairment;moderate impairment   Lip Function (Oral Motor)   Lip Range of Motion (Oral Motor) WNL   Lip Strength (Oral Motor) right side;mild impairment   Lip Coordination (Oral Motor) right side;moderate impairment   Tongue Function (Oral Motor)   Tongue Strength (Oral Motor) WNL   Tongue Coordination/Speed (Oral Motor) WNL   Tongue ROM (Oral Motor) WNL   Jaw Function (Oral Motor)   Jaw Function (Oral Motor) WNL   Cough/Swallow/Gag Reflex (Oral Motor)   Volitional Throat Clear/Cough (Oral Motor) reduced strength   Volitional Swallow (Oral Motor) " WNL   General Swallowing Observations   Past History of Dysphagia denies any hx dysphagia.   Respiratory Support room air   Current Diet/Method of Nutritional Intake (General Swallowing Observations, NIS) regular diet;thin liquids (level 0)   Swallowing Evaluation Clinical swallow evaluation   Clinical Swallow Evaluation   Clinical Swallow Evaluation Textures Trialed thin liquids;slightly thick liquids;mildly thick liquids;solid foods   Clinical Swallow Eval: Thin Liquid Texture Trial   Mode of Presentation, Thin Liquids self-fed;cup;straw   Volume of Liquid or Food Presented 5oz   Oral Phase of Swallow WFL   Pharyngeal Phase of Swallow coughing/choking;throat clearing   Diagnostic Statement noting consistent throat clearing, cough, eyes/nose watering with thin (0) liquid by cup and straw   Clinical Swallow Eval: Slightly Thick Liquids   Mode of Presentation self-fed;cup;straw   Volume Presented 4 oz   Oral Phase WFL   Pharyngeal Phase cough/choking   Diagnostic Statement single instance cough following consecutive sips of slightly thick (1) by straw   Clinical Swallow Eval: Mildly Thick Liquids   Mode of Presentation self-fed;cup   Volume Presented 2 oz   Oral Phase WFL   Pharyngeal Phase intact   Clinical Swallow Evaluation: Solid Food Texture Trial   Mode of Presentation self-fed   Volume Presented crackers   Oral Phase impaired mastication;residue in oral cavity   Pharyngeal Phase intact   Diagnostic Statement minimal - trace R buccal residue   Esophageal Phase of Swallow   Patient reports or presents with symptoms of esophageal dysphagia Other (Comments)   Esophageal comments few instances belching   Swallowing Recommendations   Diet Consistency Recommendations regular diet;slightly thick liquids (level 1)   Supervision Level for Intake patient independent   Mode of Delivery Recommendations bolus size, small;no straws;slow rate of intake   Swallowing Maneuver Recommendations alternate food and liquid intake    Monitoring/Assistance Required (Eating/Swallowing) check mouth frequently for oral residue/pocketing   Recommended Feeding/Eating Techniques (Swallow Eval) maintain upright sitting position for eating;set-up and prepare tray   Medication Administration Recommendations, Swallowing (SLP) whole wiht puree   Instrumental Assessment Recommendations VFSS (videofluoroscopic swallowing study)   General Therapy Interventions   Planned Therapy Interventions Dysphagia Treatment   Dysphagia treatment Modified diet education;Instruction of safe swallow strategies;Compensatory strategies for swallowing;Oropharyngeal exercise training   Clinical Impression   Criteria for Skilled Therapeutic Interventions Met (SLP Eval) Yes, treatment indicated   SLP Diagnosis mild-mod oropharyngeal dysphagia   Problem List (SLP) intake, aspiration   Risks & Benefits of therapy have been explained evaluation/treatment results reviewed;care plan/treatment goals reviewed;risks/benefits reviewed;current/potential barriers reviewed;participants voiced agreement with care plan;participants included;patient   Clinical Impression Comments SLP: Pt seen on ARU for dysphagia assessment following recent pontine CVA. Oral motor functions remarkable for reduced R labial ROM/weakness, weak cough, and mild dysarthria. Given location of CVA, pt is at increased risk for dysphagia. Pt seen with PO trials of regular texture and thin (0), slightly thick (1) and mildly thick (2) liquid by cup and straw. Pt with prolonged but funcitonal mastication, AP transit, trace oral residuals in R buccal cavity. Noting consistent coughing/throat clearing and runny eye/nose with 0 liquids. Cough noted following consecutive sips of 1 liquid by straw. no other s/sx aspiration. Pt demonstrates below baseline level of function and will benefit from skilled SLP to tx dysphagia and modify diet as clinically or instrumentally indicated. Recommend continue regular texture, modify to  level 1 liquid NO straw. Pt introdcued to safe swallow strategies and oral clearance strategies. SLP plan to complete VFSS as soon as able and set for Monday 3/10.   SLP Total Evaluation Time   Eval: oral/pharyngeal swallow function, clinical swallow Minutes ([id number]) 30   SLP Goals   Therapy Frequency (SLP Eval) 6 times/week   SLP Predicted Duration/Target Date for Goal Attainment 03/21/25   SLP Goals Swallow   SLP Discharge Planning   SLP Plan SLP: f/u meal reg, 1-2 level liquid. modify liquid as appropriate. oral clearance strategies. plan for VFSS 3/10. intro dysarthria strategies and practice at sentence-convo level   SLP Time and Intention   Total Session Time (sum of timed and untimed services) 30   SLP - Acute Rehab Center Time   Individual Time (minutes) - SLP 30   Group Time (minutes) - SLP 0   Concurrent Time (minutes) - SLP 0   Co-Treatment Time (minutes) - SLP 0   ARC Total Session Time (minutes) - SLP 30   ARC Daily Total Session Time   SLP ARC Daily Total Session Time 30   ARC Daily Rehab Total Minutes 30

## 2025-03-08 ENCOUNTER — APPOINTMENT (OUTPATIENT)
Dept: SPEECH THERAPY | Facility: CLINIC | Age: 85
DRG: 057 | End: 2025-03-08
Attending: PHYSICAL MEDICINE & REHABILITATION
Payer: COMMERCIAL

## 2025-03-08 ENCOUNTER — APPOINTMENT (OUTPATIENT)
Dept: PHYSICAL THERAPY | Facility: CLINIC | Age: 85
DRG: 057 | End: 2025-03-08
Attending: PHYSICAL MEDICINE & REHABILITATION
Payer: COMMERCIAL

## 2025-03-08 ENCOUNTER — APPOINTMENT (OUTPATIENT)
Dept: OCCUPATIONAL THERAPY | Facility: CLINIC | Age: 85
DRG: 057 | End: 2025-03-08
Attending: PHYSICAL MEDICINE & REHABILITATION
Payer: COMMERCIAL

## 2025-03-08 LAB
CREAT SERPL-MCNC: 1.11 MG/DL (ref 0.51–0.95)
EGFRCR SERPLBLD CKD-EPI 2021: 49 ML/MIN/1.73M2
PLATELET # BLD AUTO: 265 10E3/UL (ref 150–450)

## 2025-03-08 PROCEDURE — 250N000013 HC RX MED GY IP 250 OP 250 PS 637: Performed by: PHYSICAL MEDICINE & REHABILITATION

## 2025-03-08 PROCEDURE — 82565 ASSAY OF CREATININE: CPT | Performed by: PHYSICAL MEDICINE & REHABILITATION

## 2025-03-08 PROCEDURE — 97750 PHYSICAL PERFORMANCE TEST: CPT | Mod: GP | Performed by: PHYSICAL THERAPIST

## 2025-03-08 PROCEDURE — 250N000013 HC RX MED GY IP 250 OP 250 PS 637

## 2025-03-08 PROCEDURE — 99231 SBSQ HOSP IP/OBS SF/LOW 25: CPT | Mod: GC | Performed by: PHYSICAL MEDICINE & REHABILITATION

## 2025-03-08 PROCEDURE — 85049 AUTOMATED PLATELET COUNT: CPT | Performed by: PHYSICAL MEDICINE & REHABILITATION

## 2025-03-08 PROCEDURE — 128N000003 HC R&B REHAB

## 2025-03-08 PROCEDURE — 97530 THERAPEUTIC ACTIVITIES: CPT | Mod: GP | Performed by: PHYSICAL THERAPIST

## 2025-03-08 PROCEDURE — 250N000011 HC RX IP 250 OP 636: Performed by: PHYSICAL MEDICINE & REHABILITATION

## 2025-03-08 PROCEDURE — 97535 SELF CARE MNGMENT TRAINING: CPT | Mod: GO | Performed by: OCCUPATIONAL THERAPIST

## 2025-03-08 PROCEDURE — 92507 TX SP LANG VOICE COMM INDIV: CPT | Mod: GN | Performed by: SPEECH-LANGUAGE PATHOLOGIST

## 2025-03-08 PROCEDURE — 250N000013 HC RX MED GY IP 250 OP 250 PS 637: Performed by: PHYSICIAN ASSISTANT

## 2025-03-08 PROCEDURE — 97112 NEUROMUSCULAR REEDUCATION: CPT | Mod: GO | Performed by: OCCUPATIONAL THERAPIST

## 2025-03-08 PROCEDURE — 92526 ORAL FUNCTION THERAPY: CPT | Mod: GN | Performed by: SPEECH-LANGUAGE PATHOLOGIST

## 2025-03-08 PROCEDURE — 97116 GAIT TRAINING THERAPY: CPT | Mod: GP | Performed by: PHYSICAL THERAPIST

## 2025-03-08 PROCEDURE — 36415 COLL VENOUS BLD VENIPUNCTURE: CPT | Performed by: PHYSICAL MEDICINE & REHABILITATION

## 2025-03-08 RX ORDER — ALENDRONATE SODIUM 70 MG/1
70 TABLET ORAL WEEKLY
Status: DISCONTINUED | OUTPATIENT
Start: 2025-03-09 | End: 2025-03-18 | Stop reason: HOSPADM

## 2025-03-08 RX ADMIN — LISINOPRIL 10 MG: 10 TABLET ORAL at 08:42

## 2025-03-08 RX ADMIN — CLOPIDOGREL 75 MG: 75 TABLET ORAL at 08:42

## 2025-03-08 RX ADMIN — Medication 50 MCG: at 08:42

## 2025-03-08 RX ADMIN — ASPIRIN 81 MG: 81 TABLET, COATED ORAL at 08:42

## 2025-03-08 RX ADMIN — ENOXAPARIN SODIUM 40 MG: 40 INJECTION SUBCUTANEOUS at 11:55

## 2025-03-08 RX ADMIN — PANTOPRAZOLE SODIUM 40 MG: 40 TABLET, DELAYED RELEASE ORAL at 05:48

## 2025-03-08 RX ADMIN — MONTELUKAST 10 MG: 10 TABLET, FILM COATED ORAL at 20:28

## 2025-03-08 RX ADMIN — ANASTROZOLE 1 MG: 1 TABLET, COATED ORAL at 08:42

## 2025-03-08 RX ADMIN — ATORVASTATIN CALCIUM 80 MG: 80 TABLET, FILM COATED ORAL at 20:28

## 2025-03-08 RX ADMIN — Medication 3 MG: at 20:28

## 2025-03-08 ASSESSMENT — ACTIVITIES OF DAILY LIVING (ADL)
ADLS_ACUITY_SCORE: 37
ADLS_ACUITY_SCORE: 38
ADLS_ACUITY_SCORE: 37
ADLS_ACUITY_SCORE: 38
ADLS_ACUITY_SCORE: 37
ADLS_ACUITY_SCORE: 38
ADLS_ACUITY_SCORE: 37
ADLS_ACUITY_SCORE: 38
ADLS_ACUITY_SCORE: 37
ADLS_ACUITY_SCORE: 38
ADLS_ACUITY_SCORE: 37

## 2025-03-08 NOTE — PROGRESS NOTES
"Discharge Plan: home w/ spouse assist prn     Precautions: fall, R hemiparesis, R AFO for transfers and amb     Current Status:  Bed Mobility: Sarah  Transfer: CGA w/ FWW  Gait: Up to 250 w/ FWW, Sarah w/ cueing fro R foot drag  Stairs: 6\" x12 w/ modA for RLE management  Balance: Stable dynamic sitting. Able to stand unsupported static, requires UE support for dynamic stepping.      Outcome Measures:   B[place]eo               3/8: 26/56  10MWT               3/7: 0.31m/s self paced; 0.48m/s fast, Sarah w/ FWW     Assessment:  Pt highly motivated to restore gait and mobility skills, responded well to session focused on functional mobility and balance.  Pt has demo good safety awareness, continue focus on strengthening and balance     Other Barriers to Discharge (DME, Family Training, etc):   2STE no rail + 14STI R railing        DME: owns FWW  "

## 2025-03-08 NOTE — PLAN OF CARE
Discharge Planner Post-Acute Rehab SLP:     Discharge Plan: home with spouse. Ongoing SLP     Precautions: fall, aspiration     Current Status:  Hearing: WFL  Vision: reading glasses  Communication: mild-mod dysarthria and suspect slight apraxia c/b slow, effortful, haulting speech with reduced loudness especially within the conversation level.  Cognition: RBANS scores grossly intact with exception to slightly impaired attention. Pt feels at baseline cognitively. Pt mostly concerned with intelligibility   Swallow: Regular Solids/Slightly thick (1) liquid. NO STRAWS. Fully upright all PO, small single sips/bites. VFSS scheduled for 3/10    Assessment:   Speech: Pt edu in RBANS findings, all domains in average range except attention; however, edu that suspect attention score impacted by use of left non-dominant hand for coding task (slower than if she could use right/dominant hand). Edu in SLP plan for dysarthria and dysphagia interventions, pt agreeable. Pt verbalized trained speech strategies of slowing down, getting louder. Visual aide developed for pt use of speech strategies with SLOB acronym- Slow down, Loud!, Overaticulate, Breath. Pt edu in all, SLP modeled each. Pt edu in respiration for phonation, likely need for increased breaths while talking to achieve loud vocal intensity. Pt endorsed comprehension.     Swallow: Rehab RN notified SLP prior to session pt demo'ing dystonia while sitting up in chair, appeared to be close to sliding out of chair; RN transferred pt back to bed and notified MD but unsure if safe for pt to be up in chair in room unsupervised.   Pt in bed asleep when SLP arrived, pt agreeable to meal in bed if SLP repositioned fully upright. Pt boosted up in bed and head of bed raised up. Analyzed pt with regular solids, slightly thick liquids (1), and mildly thick liquids (2). Liquids consumed via cup edge. Pt with no coughing or throat clearing post-swallow of either liquid consistency, pt  "denied swallow feeling \"easier\" or \"harder\" with either liquid, reported they were \"about the same.\" Pt re-educated in risk of silent aspiration given location of CVA, VFSS on Monday will help SLP determine safest level of liquid to consume. Recommend continuing regular solids with slightly thick liquids (1), no straws, as no discernable difference in liquids at bedside.     Other Barriers to Discharge (Family Training, etc): family training: diet pending progress, dysarthria/communicaiton strategies to family     "

## 2025-03-08 NOTE — PLAN OF CARE
Goal Outcome Evaluation:      Plan of Care Reviewed With: patient    Overall Patient Progress: no changeOverall Patient Progress: no change         Orientation: Alert/oriented x4. Slurred speech  Bowel: Mixed continents. LBM 3/7   Bladder: Continent.   Pain: Patient denies pain this shift.   Ambulation/Transfers: A1 Walker and Gait belt  Blood sugars: N/A  Diet/ Liquids: Regular diet, slightly thick liquids, medications whole.   Tubes/ Lines/ Drains:  N/A  Tube Feeding: N/A  Oxygen: on Room Air, denies SOB, chest pain  Skin: Open area to bottom, provider notified.     Writer noticed while sitting up in chair patient had an episode of dystonia, provider notified. Assisted patient back to bed, and no further episodes noted.      brought in Fosomax prescription sent to pharmacy to be labeled.     Bed alarm on for safety, call light within reach. Continue with POC.

## 2025-03-08 NOTE — PLAN OF CARE
Goal Outcome Evaluation:           Overall Patient Progress: no change    Outcome Evaluation:    Patient is alert and oriented x4, makes needs known and calls appropriately. Speech is slightly slurred. Denies pain, sob, dizziness, lightheadedness, N/V. Remains continent with bladder, incontinent of loose stools tonight. Pt transfers with assist of 1 with a walker and gait belt. Safety precautions maintained.

## 2025-03-08 NOTE — PROGRESS NOTES
03/08/25 1000   Signing Clinician's Name / Credentials   Signing clinician's name / credentials Liss Acevedo, PT   Bermeo Balance Scale (KATRINA MURGUIA, ROSE S, HILLARY ORTIZ, IAN PABON: MEASURING BALANCE IN THE ELDERLY: VALIDATION OF AN INSTRUMENT. CAN. J. PUB. HEALTH, JULY/AUGUST SUPPLEMENT 2:S7-11, 1992.)   Sit To Stand 2   Standing Unsupported 3   Sitting Unsupported 4   Stand to Sit 3   Transfers 1   Standing with Eyes Closed 3   Standing Unsupported, Feet Together 1   Reach Forward With Outstretched Arm 1   Retrieve Object From Floor 3   Turning to Look Behind 2   Turn 360 Degrees 0   Placing Alternate Foot on Stool (4-6 inches) 0   Unsupported Tandem Stand (Demonstrate to Subject) 1   One Leg Stand 2   Total Score (A score of 45 or less has been correlated with an increased risk of falls)   Total Score (out of 56) 26     Bermeo Balance Scale (BBS) Cutoff Scores for CVA Population:    The BBS is a measure of static and dynamic standing balance that has been validated in community dwelling elderly individuals and individuals who have Parkinson's Disease, MS, and those who are s/p CVA and TBI. The test is administered without an assistive device. Scores from the B[place]eo are used to determine the probability of falling based on the patient's previous history of falls and their test performance.     0-20 High risk for falling- Corresponded with w/c bound status  21-40 Medium risk for falling- Able to walk with assistance  41-56 Low risk for falling- Able to walk independently  According to [facility].  Available at http://www.strokecenter.org/.  Accessibility verified April 10, 2013.  Minimal Detectable Change = 6.5 according to Fernie & Seney 2008    Assessment (rationale for performing, application to patient s function & care plan): Note current score 26/56 indicates medium falls risk.  Pt has high potential for making gains with balance and gait skills, continue focus in PT and re-test closer to  discharge  (Minutes billed as physical performance test)

## 2025-03-08 NOTE — CARE PLAN
Discharge Planner Post-Acute Rehab OT:     Discharge Plan: Home with Spouse; OP OT    Precautions: Fall, R hemiparesis, R AFO for transfers and amb     Current Status:  ADLs:  Mobility:  CGA-Min A transfers/mobility FWW, AFO.  Grooming: SBA standing at sink.  Dressing: UB Min A. LB Mod A.  Bathing: Mod A extended tub bench.  Toileting: CGA-Min A transfer. Assist thorough hygiene, CGA clothing management.  IADLs: PLOF IND. Spouse to assist at discharge.  Vision/Cognition: Dysarthria. Mild deficits attention.     Assessment: Completed shower with ADL routine. Ambulatory ADLs with walker. Reinforced combination of armando techniques and incorporation of RUE. Pt initially declining use of R AFO with room mobility but ultimately safer and more IND when donned.     Other Barriers to Discharge (DME, Family Training, etc):   Equipment: Anticipate needs for shower bench vs chair, walker, AFO.  Caregiver support: Spouse supportive, able to provide assist ADL at discharge prn.  Home set up: 2 RASHAUN no rail + 14 STI R railing

## 2025-03-08 NOTE — PROGRESS NOTES
[facility]   Acute Rehabilitation Unit  Weekend Progress Note    INTERVAL HISTORY  Fang Estes was seen and examined at bedside.  No acute events reported overnight. Patient is doing well overall. Therapies going well. Nursing reported that patient had movements in her arms, legs, and body upon waking up from sitting in chair. When discussed with patient, patient stated that she felt uncomfortable in chair and said that she was moving around to find a comfortable position. Patient did state that she hasn't had movements like that before. Neurologically intact on exam. Will continue to monitor for now. Patient states that she has not been getting good sleep at night and is agreeable with starting melatonin. Patient stated that she had an episode of diarrhea last night. Denies fevers, chest pain, abdominal pain, headaches, blurry vision at this time. Will continue to monitor this as well.    Functionally, Patient continues to be a full participant with therapies:    OT (3/8/25):    ADLs:  Mobility:  CGA-Min A transfers/mobility FWW, AFO.  Grooming: SBA standing at sink.  Dressing: UB Min A. LB Mod A.  Bathing: Mod A extended tub bench.  Toileting: CGA-Min A transfer. Assist thorough hygiene, CGA clothing management.  IADLs: PLOF IND. Spouse to assist at discharge.  Vision/Cognition: Dysarthria. Mild deficits attention.     SLP (3/8/25)    Hearing: WFL  Vision: reading glasses  Communication: mild-mod dysarthria and suspect slight apraxia c/b slow, effortful, haulting speech with reduced loudness especially within the conversation level.  Cognition: RBANS scores grossly intact with exception to slightly impaired attention. Pt feels at baseline cognitively. Pt mostly concerned with intelligibility   Swallow: Regular Solids/Slightly thick (1) liquid. NO STRAWS. Fully upright all PO, small single sips/bites. VFSS scheduled for 3/10    ROS: 10 point ROS negative other than the  "symptoms noted above in the HPI.    MEDICATIONS  Current Facility-Administered Medications   Medication Dose Route Frequency Provider Last Rate Last Admin    anastrozole (ARIMIDEX) tablet 1 mg  1 mg Oral Iris Anders PA   1 mg at 03/08/25 0842    aspirin EC tablet 81 mg  81 mg Oral Daily Iris Bird PA   81 mg at 03/08/25 0842    atorvastatin (LIPITOR) tablet 80 mg  80 mg Oral QPM Iris Bird PA   80 mg at 03/07/25 2026    clopidogrel (PLAVIX) tablet 75 mg  75 mg Oral Daily Iris Bird PA   75 mg at 03/08/25 0842    enoxaparin ANTICOAGULANT (LOVENOX) injection 40 mg  40 mg Subcutaneous Q24H Ani Wilson MD   40 mg at 03/08/25 1155    lisinopril (ZESTRIL) tablet 10 mg  10 mg Oral [facility] Iris Bird PA   10 mg at 03/08/25 0842    montelukast (SINGULAIR) tablet 10 mg  10 mg Oral At Bedtime Iris Bird PA   10 mg at 03/07/25 2026    pantoprazole (PROTONIX) EC tablet 40 mg  40 mg Oral [facility] Ani Wilson MD   40 mg at 03/08/25 0548    Vitamin D3 (CHOLECALCIFEROL) tablet 50 mcg  50 mcg Oral Iris Anders PA   50 mcg at 03/08/25 0842          Current Facility-Administered Medications   Medication Dose Route Frequency Provider Last Rate Last Admin    acetaminophen (TYLENOL) tablet 650 mg  650 mg Oral Q6H PRN Iris Bird PA        albuterol (PROVENTIL HFA/VENTOLIN HFA) inhaler  1-2 puff Inhalation Q4H PRN Iris Bird PA        hydrALAZINE (APRESOLINE) tablet 10 mg  10 mg Oral TID PRN Iris Bird PA        ondansetron (ZOFRAN ODT) ODT tab 4 mg  4 mg Oral Q6H PRN Lars Krause MBBS        senna-docusate (SENOKOT-S/PERICOLACE) 8.6-50 MG per tablet 1 tablet  1 tablet Oral BID PRN Iris Bird, PA            PHYSICAL EXAM  /49 (BP Location: Left arm, Patient Position: Supine, Cuff Size: Adult Regular)   Pulse 63   Temp 97.9  F (36.6  C) (Oral)   Resp 16   Ht 1.6 m (5' 3\")   Wt 54.1 kg (119 lb 4.3 oz)   SpO2 " 96%   BMI 21.13 kg/m      General: No acute distress. Lying comfortably in bed  HEENT: Right facial droop  Cardiovascular: No edema appreciated  Pulmonary: Breathing comfortably on room air,   Abdominal: Non-tender, non-distended,   Extremities: Warm, well perfused, no edema in bilateral lower extremities, no tenderness in calves   Neuro/MSK:  Alert. No gross changes to neuro exam compared to prior.    - 5/5 strength LUE and LLE   - 2/5 strength with R EF and EE, 1/5 strength with WE   - 3/5 strength with R HF, 1-2/5 with R KE and DF    LABS  Recent Results (from the past 24 hours)   Platelet count    Collection Time: 03/08/25  6:51 AM   Result Value Ref Range    Platelet Count 265 150 - 450 10e3/uL   Creatinine    Collection Time: 03/08/25  6:51 AM   Result Value Ref Range    Creatinine 1.11 (H) 0.51 - 0.95 mg/dL    GFR Estimate 49 (L) >60 mL/min/1.73m2         ASSESSMENT AND PLAN  Fang Estes is a 84 year old right hand dominant woman with past medical history of HTN, CKD, breast CA, asthma, and osteoporosis who presented with right sided weakness found to have 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 Admitted to rehab 03/06/2025 in setting of impaired strength, impaired activity tolerance, impaired balance, impaired coordination, dysarthria, and dysphagia.     ARU Diagnosis: Stroke Ischemic 01.2 (R) Body Involvement (L) Brain; Acute left pontine stroke likely due to intracranial atherosclerosis     Rehabilitation - Continue comprehensive acute inpatient rehabilitation program with multidisciplinary approach including therapies, rehab nursing, and physiatry following. See interval history for updates.      - Vitals stable. Labs reviewed on 3/8. Creatinine stable at 1.11 BUN 49. Platelets 265. No concerns from charge nurse.   - Unclear etiology of body movements. From description of nursing staff, sounds like dystonia-like movements but none were observed as patient was  lying in bed. Patient states this happened when patient woke up while sitting in chair and trying to get comfortable. Will monitor for now  - Patient expresses that she has been having trouble with sleeping. Scheduled melatonin 3 mg at bedtime tonight  - Patient had an episode of loose stools/diarrhea last night. Patient is afebrile and denies abdominal pain. Medications reviewed. There are no scheduled bowel meds at this time. Anastrozole can cause diarrhea but patient has been on this for a couple of years. Will continue to monitor. Can consider adding imodium if diarrhea is persistent.  - Restarted PTA alendronate 70mg qSunday for 3/9 (non-formulary)  - Continue therapies and plan of care.  - Refer to last progress note from primary team for full problems list.    Patient reviewed with attending physician, Dr. Martinez, who agrees with the assessment and plan.    Shashank Esquivel DO  Resident Physician  Physical Medicine and Rehabilitation  [facility]  03/08/2025

## 2025-03-09 ENCOUNTER — APPOINTMENT (OUTPATIENT)
Dept: OCCUPATIONAL THERAPY | Facility: CLINIC | Age: 85
DRG: 057 | End: 2025-03-09
Attending: PHYSICAL MEDICINE & REHABILITATION
Payer: COMMERCIAL

## 2025-03-09 ENCOUNTER — APPOINTMENT (OUTPATIENT)
Dept: PHYSICAL THERAPY | Facility: CLINIC | Age: 85
DRG: 057 | End: 2025-03-09
Attending: PHYSICAL MEDICINE & REHABILITATION
Payer: COMMERCIAL

## 2025-03-09 PROCEDURE — 97535 SELF CARE MNGMENT TRAINING: CPT | Mod: GO

## 2025-03-09 PROCEDURE — 250N000013 HC RX MED GY IP 250 OP 250 PS 637: Performed by: PHYSICIAN ASSISTANT

## 2025-03-09 PROCEDURE — 250N000013 HC RX MED GY IP 250 OP 250 PS 637

## 2025-03-09 PROCEDURE — 250N000011 HC RX IP 250 OP 636: Performed by: PHYSICAL MEDICINE & REHABILITATION

## 2025-03-09 PROCEDURE — 250N000013 HC RX MED GY IP 250 OP 250 PS 637: Performed by: PHYSICAL MEDICINE & REHABILITATION

## 2025-03-09 PROCEDURE — 97116 GAIT TRAINING THERAPY: CPT | Mod: GP

## 2025-03-09 PROCEDURE — 97112 NEUROMUSCULAR REEDUCATION: CPT | Mod: GO | Performed by: OCCUPATIONAL THERAPIST

## 2025-03-09 PROCEDURE — 128N000003 HC R&B REHAB

## 2025-03-09 PROCEDURE — 97112 NEUROMUSCULAR REEDUCATION: CPT | Mod: GP

## 2025-03-09 PROCEDURE — 97112 NEUROMUSCULAR REEDUCATION: CPT | Mod: GO

## 2025-03-09 RX ADMIN — LISINOPRIL 10 MG: 10 TABLET ORAL at 09:44

## 2025-03-09 RX ADMIN — ATORVASTATIN CALCIUM 80 MG: 80 TABLET, FILM COATED ORAL at 20:02

## 2025-03-09 RX ADMIN — ANASTROZOLE 1 MG: 1 TABLET, COATED ORAL at 09:44

## 2025-03-09 RX ADMIN — MONTELUKAST 10 MG: 10 TABLET, FILM COATED ORAL at 20:02

## 2025-03-09 RX ADMIN — CLOPIDOGREL 75 MG: 75 TABLET ORAL at 09:44

## 2025-03-09 RX ADMIN — ENOXAPARIN SODIUM 40 MG: 40 INJECTION SUBCUTANEOUS at 09:48

## 2025-03-09 RX ADMIN — PANTOPRAZOLE SODIUM 40 MG: 40 TABLET, DELAYED RELEASE ORAL at 09:44

## 2025-03-09 RX ADMIN — Medication 3 MG: at 20:02

## 2025-03-09 RX ADMIN — Medication 50 MCG: at 09:44

## 2025-03-09 RX ADMIN — ALENDRONATE SODIUM 70 MG: 70 TABLET ORAL at 06:57

## 2025-03-09 RX ADMIN — ASPIRIN 81 MG: 81 TABLET, COATED ORAL at 09:44

## 2025-03-09 ASSESSMENT — ACTIVITIES OF DAILY LIVING (ADL)
ADLS_ACUITY_SCORE: 38

## 2025-03-09 NOTE — PLAN OF CARE
Discharge Planner Post-Acute Rehab OT:      Discharge Plan: Home with Spouse; OP OT     Precautions: Fall, R hemiparesis, R AFO for transfers and amb      Current Status:  ADLs:  Mobility:  CGA-Min A transfers/mobility FWW, AFO.  Grooming: SBA standing at sink.  Dressing: UB Min A. LB Mod A.  Bathing: Mod A extended tub bench.  Toileting: CGA-Min A transfer. Assist thorough hygiene, CGA clothing management.  IADLs: PLOF IND. Spouse to assist at discharge.  Vision/Cognition: Dysarthria. Mild deficits attention.      Assessment: Pt initially declining use of R AFO with room mobility but ultimately safer and more IND when donned. Completing functional mobility in room and bathroom with CGA- min A at all time and verbal cues for safety. RUE neuro re-ed addressed during OT session. Pt motivated and actively participating in all activities suggested.      Other Barriers to Discharge (DME, Family Training, etc):   Equipment: Anticipate needs for shower bench vs chair, walker, AFO.  Caregiver support: Spouse supportive, able to provide assist ADL at discharge prn.  Home set up: 2 RASHAUN no rail + 14 STI R railing

## 2025-03-09 NOTE — PLAN OF CARE
Goal Outcome Evaluation:      Plan of Care Reviewed With: patient    Overall Patient Progress: no changeOverall Patient Progress: no change     Orientation: alert and oriented.   Respiration: not on respiratory distress.   Pain: denies of pain  Diet:regular slightly thick liquid  Bladder: mixed continence  Bowel:incontinent. LBM3/8/25  Ambulation/Transfer: A1 FWW with AFO on right foot when OOB  Skin:intact  Safety:fall precaution   Others:  Appears sleeping during rounds

## 2025-03-09 NOTE — PROGRESS NOTES
"Discharge Plan: home w/ spouse assist prn     Precautions: fall, R hemiparesis, R AFO for transfers and amb     Current Status:  Bed Mobility: Sarah  Transfer: CGA w/ quad cane  Gait: Up to 250 w/ quad cane, Sarah w/ cueing fro R foot drag  Stairs: 6\" x12 w/ modA for RLE management  Balance: Stable dynamic sitting. Able to stand unsupported static, requires UE support for dynamic stepping.      Outcome Measures:   Bermeo               3/8: 26/56  10MWT               3/7: 0.31m/s self paced; 0.48m/s fast, Sarah w/ FWW     Assessment: Initiated use of quad cane and omoneurexa in gait, pt with instability in turns but remains Sarah throughout when using R AFO.      Other Barriers to Discharge (DME, Family Training, etc):   2STE no rail + 14STI R railing        DME: owns FWW  "

## 2025-03-09 NOTE — PLAN OF CARE
FOCUS/GOAL  Safety management, Prevention of secondary complications, and Adjustment to lifestyle change    ASSESSMENT, INTERVENTIONS AND CONTINUING PLAN FOR GOAL:    Goal Outcome Evaluation:      Plan of Care Reviewed With: patient    Overall Patient Progress: no changeOverall Patient Progress: no change       Pt Aox4  Calls appropriately  Needs in reach and safety measures in place.   Cont POC    Mobility: A1 FWW with AFO  Bowel/Bladder: mixed cont of bladder, incont of bowel  Skin: no new concerns  O2: RA  Diet: Reg/mildly thick  Psychosocial: appropriate to situation  Pain: denies pain  Tubes/Lines/Drains: none  Misc: dysarthric

## 2025-03-09 NOTE — PLAN OF CARE
Goal Outcome Evaluation:      Plan of Care Reviewed With: patient    Overall Patient Progress: improvingOverall Patient Progress: improving    Patient alert and oriented x4. Able to make needs known, call light with in reach. Patient denies SOB, chest pain, pain, and nausea. VSS on RA. Patient Ax1 with walker and gait belt, right AFO on when out of bed. Incontinent of bowels, last BM 3/8, continent of bladder, voiding without difficulties. Patient up to the bathroom. Patient has an open area to the coccyx, foam dressing in place. Scattered bruises to extremities. Patient in on the regular diet, slightly thick liquids, with no straws, takes pills whole. Patient resting comfortably in bed. Bed alarm on for safety.      Continue with plan of care.

## 2025-03-10 ENCOUNTER — APPOINTMENT (OUTPATIENT)
Dept: OCCUPATIONAL THERAPY | Facility: CLINIC | Age: 85
DRG: 057 | End: 2025-03-10
Attending: PHYSICAL MEDICINE & REHABILITATION
Payer: COMMERCIAL

## 2025-03-10 ENCOUNTER — APPOINTMENT (OUTPATIENT)
Dept: PHYSICAL THERAPY | Facility: CLINIC | Age: 85
DRG: 057 | End: 2025-03-10
Attending: PHYSICAL MEDICINE & REHABILITATION
Payer: COMMERCIAL

## 2025-03-10 ENCOUNTER — APPOINTMENT (OUTPATIENT)
Dept: GENERAL RADIOLOGY | Facility: CLINIC | Age: 85
End: 2025-03-10
Attending: PHYSICAL MEDICINE & REHABILITATION
Payer: COMMERCIAL

## 2025-03-10 ENCOUNTER — APPOINTMENT (OUTPATIENT)
Dept: SPEECH THERAPY | Facility: CLINIC | Age: 85
DRG: 057 | End: 2025-03-10
Attending: PHYSICAL MEDICINE & REHABILITATION
Payer: COMMERCIAL

## 2025-03-10 LAB
ANION GAP SERPL CALCULATED.3IONS-SCNC: 12 MMOL/L (ref 7–15)
BUN SERPL-MCNC: 43.3 MG/DL (ref 8–23)
CALCIUM SERPL-MCNC: 9.5 MG/DL (ref 8.8–10.4)
CHLORIDE SERPL-SCNC: 101 MMOL/L (ref 98–107)
CREAT SERPL-MCNC: 1.14 MG/DL (ref 0.51–0.95)
EGFRCR SERPLBLD CKD-EPI 2021: 48 ML/MIN/1.73M2
ERYTHROCYTE [DISTWIDTH] IN BLOOD BY AUTOMATED COUNT: 12.7 % (ref 10–15)
GLUCOSE SERPL-MCNC: 146 MG/DL (ref 70–99)
HCO3 SERPL-SCNC: 23 MMOL/L (ref 22–29)
HCT VFR BLD AUTO: 44.3 % (ref 35–47)
HGB BLD-MCNC: 14.3 G/DL (ref 11.7–15.7)
MAGNESIUM SERPL-MCNC: 2 MG/DL (ref 1.7–2.3)
MCH RBC QN AUTO: 28.9 PG (ref 26.5–33)
MCHC RBC AUTO-ENTMCNC: 32.3 G/DL (ref 31.5–36.5)
MCV RBC AUTO: 90 FL (ref 78–100)
PLATELET # BLD AUTO: 255 10E3/UL (ref 150–450)
POTASSIUM SERPL-SCNC: 4.4 MMOL/L (ref 3.4–5.3)
RBC # BLD AUTO: 4.94 10E6/UL (ref 3.8–5.2)
SODIUM SERPL-SCNC: 136 MMOL/L (ref 135–145)
WBC # BLD AUTO: 7.3 10E3/UL (ref 4–11)

## 2025-03-10 PROCEDURE — 92611 MOTION FLUOROSCOPY/SWALLOW: CPT | Mod: GN

## 2025-03-10 PROCEDURE — 97530 THERAPEUTIC ACTIVITIES: CPT | Mod: GP

## 2025-03-10 PROCEDURE — 97112 NEUROMUSCULAR REEDUCATION: CPT | Mod: GP

## 2025-03-10 PROCEDURE — 85041 AUTOMATED RBC COUNT: CPT | Performed by: PHYSICIAN ASSISTANT

## 2025-03-10 PROCEDURE — 99232 SBSQ HOSP IP/OBS MODERATE 35: CPT | Performed by: PHYSICIAN ASSISTANT

## 2025-03-10 PROCEDURE — 97112 NEUROMUSCULAR REEDUCATION: CPT | Mod: GO | Performed by: OCCUPATIONAL THERAPIST

## 2025-03-10 PROCEDURE — G0463 HOSPITAL OUTPT CLINIC VISIT: HCPCS

## 2025-03-10 PROCEDURE — 250N000013 HC RX MED GY IP 250 OP 250 PS 637: Performed by: PHYSICIAN ASSISTANT

## 2025-03-10 PROCEDURE — 36415 COLL VENOUS BLD VENIPUNCTURE: CPT | Performed by: PHYSICIAN ASSISTANT

## 2025-03-10 PROCEDURE — 83735 ASSAY OF MAGNESIUM: CPT | Performed by: PHYSICIAN ASSISTANT

## 2025-03-10 PROCEDURE — 92526 ORAL FUNCTION THERAPY: CPT | Mod: GN

## 2025-03-10 PROCEDURE — 80048 BASIC METABOLIC PNL TOTAL CA: CPT | Performed by: PHYSICIAN ASSISTANT

## 2025-03-10 PROCEDURE — 250N000011 HC RX IP 250 OP 636: Performed by: PHYSICAL MEDICINE & REHABILITATION

## 2025-03-10 PROCEDURE — 128N000003 HC R&B REHAB

## 2025-03-10 PROCEDURE — 85018 HEMOGLOBIN: CPT | Performed by: PHYSICIAN ASSISTANT

## 2025-03-10 PROCEDURE — 97760 ORTHOTIC MGMT&TRAING 1ST ENC: CPT | Mod: GO | Performed by: OCCUPATIONAL THERAPIST

## 2025-03-10 PROCEDURE — 74230 X-RAY XM SWLNG FUNCJ C+: CPT | Mod: 26 | Performed by: STUDENT IN AN ORGANIZED HEALTH CARE EDUCATION/TRAINING PROGRAM

## 2025-03-10 PROCEDURE — 250N000013 HC RX MED GY IP 250 OP 250 PS 637: Performed by: PHYSICAL MEDICINE & REHABILITATION

## 2025-03-10 PROCEDURE — 74230 X-RAY XM SWLNG FUNCJ C+: CPT

## 2025-03-10 RX ORDER — MICONAZOLE NITRATE 20 MG/G
CREAM TOPICAL 2 TIMES DAILY
Status: DISCONTINUED | OUTPATIENT
Start: 2025-03-10 | End: 2025-03-16

## 2025-03-10 RX ORDER — ALBUTEROL SULFATE 90 UG/1
2 INHALANT RESPIRATORY (INHALATION) EVERY 4 HOURS PRN
Status: DISCONTINUED | OUTPATIENT
Start: 2025-03-10 | End: 2025-03-18 | Stop reason: HOSPADM

## 2025-03-10 RX ORDER — MICONAZOLE NITRATE 20 MG/G
CREAM TOPICAL 2 TIMES DAILY
Status: DISCONTINUED | OUTPATIENT
Start: 2025-03-10 | End: 2025-03-10

## 2025-03-10 RX ORDER — BARIUM SULFATE 400 MG/ML
SUSPENSION ORAL ONCE
Status: COMPLETED | OUTPATIENT
Start: 2025-03-10 | End: 2025-03-10

## 2025-03-10 RX ADMIN — ATORVASTATIN CALCIUM 80 MG: 80 TABLET, FILM COATED ORAL at 20:11

## 2025-03-10 RX ADMIN — PANTOPRAZOLE SODIUM 40 MG: 40 TABLET, DELAYED RELEASE ORAL at 06:14

## 2025-03-10 RX ADMIN — ANASTROZOLE 1 MG: 1 TABLET, COATED ORAL at 08:23

## 2025-03-10 RX ADMIN — MICONAZOLE NITRATE: 20 CREAM TOPICAL at 14:43

## 2025-03-10 RX ADMIN — MONTELUKAST 10 MG: 10 TABLET, FILM COATED ORAL at 20:11

## 2025-03-10 RX ADMIN — ASPIRIN 81 MG: 81 TABLET, COATED ORAL at 08:23

## 2025-03-10 RX ADMIN — LISINOPRIL 10 MG: 10 TABLET ORAL at 08:23

## 2025-03-10 RX ADMIN — Medication 50 MCG: at 08:23

## 2025-03-10 RX ADMIN — ENOXAPARIN SODIUM 40 MG: 40 INJECTION SUBCUTANEOUS at 12:03

## 2025-03-10 RX ADMIN — MICONAZOLE NITRATE: 20 CREAM TOPICAL at 20:13

## 2025-03-10 RX ADMIN — BARIUM SULFATE 30 ML: 400 SUSPENSION ORAL at 14:30

## 2025-03-10 RX ADMIN — CLOPIDOGREL 75 MG: 75 TABLET ORAL at 08:23

## 2025-03-10 RX ADMIN — Medication 5 MG: at 20:11

## 2025-03-10 ASSESSMENT — ACTIVITIES OF DAILY LIVING (ADL)
ADLS_ACUITY_SCORE: 35
ADLS_ACUITY_SCORE: 38
ADLS_ACUITY_SCORE: 37
ADLS_ACUITY_SCORE: 38
ADLS_ACUITY_SCORE: 37
ADLS_ACUITY_SCORE: 35
ADLS_ACUITY_SCORE: 37
ADLS_ACUITY_SCORE: 35
ADLS_ACUITY_SCORE: 38
ADLS_ACUITY_SCORE: 37
ADLS_ACUITY_SCORE: 38
ADLS_ACUITY_SCORE: 37
ADLS_ACUITY_SCORE: 35
ADLS_ACUITY_SCORE: 35
ADLS_ACUITY_SCORE: 37
ADLS_ACUITY_SCORE: 37

## 2025-03-10 NOTE — CONSULTS
[facility] Nurse Inpatient Assessment     Consulted for: perineum    Summary: Fungal rash to perineal skin.     [facility] nurse follow-up plan: weekly    Patient History (according to provider note(s):      Fang Estes is a 84 year old right hand dominant woman with past medical history of HTN, CKD, breast CA, asthma, and osteoporosis who presented with right sided weakness found to have 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 Admitted to rehab 03/06/2025 in setting of impaired strength, impaired activity tolerance, impaired balance, impaired coordination, dysarthria, and dysphagia.      ARU Diagnosis: Stroke Ischemic 01.2 (R) Body Involvement (L) Brain; Acute left pontine stroke likely due to intracranial atherosclerosis     Assessment:      Areas visualized during today's visit: Perineal area    Skin Injury Location: Perineal skin    Last photo: 3/10  Skin injury due to: Fungal rash   Skin history and plan of care:   Confluent erythema with satellite lesions consistent with fungal rash in perineal and perianal skin. However, pt denies itching or burning in the area.   Affected area:      Skin assessment: Erosion of epidermis, Lesions-satellite, and Rash     Measurements (length x width x depth, in cm)   perineal skin     Color: normal and consistent with surrounding tissue     Temperature  normal      Drainage: none .      Color: none      Odor: mild  Pain: denies , none  Pain interventions prior to dressing change: no significant pain present   Treatment goal: Infection control/prevention  STATUS: initial assessment  Supplies ordered: discussed with RN and discussed with patient        Treatment Plan:     Buttocks BID and as needed.   Cleanse the area with Narinder cleanse and protect, very gently with soft cloth.  Apply thin layer of Narinder antifungal plus incontinence barrier to perineal skin.   With repeat application, do not  scrub the paste, only remove soiled paste and reapply.  If complete removal of paste is necessary use baby oil/mineral oil (#[id number]) and soft wash cloth.  Ensure pt has chair cushion (#[id number]) while sitting up in the chair.  Use only one blue pad in between mattress and pt. No brief in bed.      Orders: Written    RECOMMEND PRIMARY TEAM ORDER: None, at this time  Education provided: plan of care, wound progress, and Infection prevention   Discussed plan of care with: Patient and Nurse  Notify [facility] if wound(s) deteriorate.  Nursing to notify the Provider(s) and re-consult the [facility] Nurse if new skin concern.    DATA:     Current support surface: Standard  Standard gel mattress (Isoflex)  Containment of urine/stool: Continent of bladder and Continent of bowel  BMI: Body mass index is 21.13 kg/m .   Active diet order: Orders Placed This Encounter      Combination Diet Regular Diet; Slightly Thick (level 1) (NO STRAW)     Output: I/O last 3 completed shifts:  In: 475 [P.O.:475]  Out: -      Labs:   Recent Labs   Lab 03/04/25  0637 03/03/25  1915   HGB 14.7 13.6   INR  --  0.95   WBC 7.5 7.6   A1C  --  6.3*     Pressure injury risk assessment:   Sensory Perception: 4-->no impairment  Moisture: 3-->occasionally moist  Activity: 3-->walks occasionally  Mobility: 3-->slightly limited  Nutrition: 3-->adequate  Friction and Shear: 2-->potential problem  Barry Score: 18    Jessica Wong RN CWOCN  Pager no longer is use, please contact through [employer]: [facility] Nurse [facility]  Dept. Office Number: [phone]

## 2025-03-10 NOTE — PLAN OF CARE
FUGL-BROWN ASSESSMENT   UPPER EXTREMITY  Assessment of sensorimotor function   Stroke specific, performance-based impairment index. It is designed to assess motor functioning, sensation, and joint functioning in patients with post-stroke hemiplegia.    Scoring  The FMA is an impairment measure, consisting of 33 motor tasks and 30 sensory/pain observations.   Motor and sensory items are scored on a 0-2 scale: 0 none, 1 partial, and 2 full.  Motor Function   Upper Extremity 12/36   Wrist 2/10   Hand 1/14   Coordination/Speed 0/6   Total 15/66     Sensory Function   Sensation 12/12   Joint Pain 24/24   Passive Joint Motion 24/24     Motor Classification  Very Severe (0-12)  Severe-Moderate (13-30)  Moderate-Mild (31-47)  Mild (48-60)    Sensory/joint observations (light touch, proprioception, PROM, PMHx impacting UE function):      References  FMA-UE PROTOCOL Rehabilitation [facility], [facility]  Approved by Theresal-Borwn [place] 2010  Theresal-Mejia KUHN, Priti L, Donte I, Gilbert S, Nicole S: The post-stroke hemiplegic patient. A method for evaluation of physical performance. Scand J Rehabil Med 1975, 7:13-31.  Flip et al.: Determining Levels of Upper Extremity Movement Impairment by Applying a Cluster Analysis to the Fugl-Brown Assessment of the Upper Extremity in Chronic Stroke. Archives of Physical Medicine and Rehabilitation 2016, 98: 456-462.

## 2025-03-10 NOTE — PLAN OF CARE
Discharge Planner Post-Acute Rehab OT:      Discharge Plan: Home with Spouse; OP OT     Precautions: Fall, R hemiparesis, R AFO for transfers and amb   RUE resting hand orthosis on overnight, off during day     Current Status:  ADLs:  Mobility:  CGA transfers/mobility FWW, AFO.  Grooming: SBA standing at sink.  Dressing: UB Min A. LB Min A.  Bathing: Mod A extended tub bench.  Toileting: CGA transfer. Assist thorough hygiene, CGA clothing management.  IADLs: PLOF IND. Spouse to assist at discharge.  Vision/Cognition: Dysarthria. Mild deficits attention.      Assessment: Issued soft bend ease resting hand orthosis for RUE.Purpose of splint to counteract flexor tone/contracture and provide functional hand/instrinsic plus position.  Improved stability with transfers/mobility today using walker, trial of cloth on shoe to limit foot catching.      Other Barriers to Discharge (DME, Family Training, etc):   Equipment: Anticipate needs for shower bench vs chair, walker, AFO.  Caregiver support: Spouse supportive, able to provide assist ADL at discharge prn.  Home set up: 2 RASHAUN no rail + 14 STI R railing

## 2025-03-10 NOTE — PLAN OF CARE
Goal Outcome Evaluation:      Plan of Care Reviewed With: patient    Overall Patient Progress: no changeOverall Patient Progress: no change       Patient alert and oriented, able to make needs known  Slept most of the night  No complained of pain, no respiratory distress, appeared to be comfortable on bed during rounding checks  Continent of Bladder and Bowel, no BM this shift  Safety checks done, fall prevention maintained, bed alarm ON, call light in reach  No acute event overnight  Plan of care ongoing

## 2025-03-10 NOTE — PLAN OF CARE
Discharge Planner Post-Acute Rehab SLP:     Discharge Plan: home with spouse. Ongoing SLP     Precautions: fall, aspiration     Current Status:  Hearing: WFL  Vision: reading glasses  Communication: mild-mod dysarthria and suspect slight apraxia c/b slow, effortful, haulting speech with reduced loudness especially within the conversation level.  Cognition: RBANS scores grossly intact with exception to slightly impaired attention. Pt feels at baseline cognitively. Pt mostly concerned with intelligibility   Swallow: Regular Solids/Slightly thick (1) liquid. NO STRAWS. Fully upright all PO, small single sips/bites. VFSS scheduled for 3/10    Assessment: Pt seen with meal regular txture, slightly thick (1) liquid by cup. noting prolonged but functional mastication, timel AP transit, mod oral residuals in R buccal cavity. review oral clearance strategies and cued to utilize across meal. Pt with mild impulsivity with intake. Single instance overt coughing following large volume sip from lqiuid wash otherwise, no s/sx aspiration across meal. education provided on plan for repeat VFSS this PM, importance of oral clearance strategies, and single small sip at a time. recommend continue current diet     PM:  Pt seen for VFSS this date. Pt presents with mild oropharyngeal dysphagia c/b delayed initiation of swallow to pyriforms, reduced tongue base retraction, occasional incomplete epiglottic inversion, incomplete vestibule closure. impairments resulting in instances of deeper penetration with ejection with continuation of swallow with consecutive sips of thin (0) liquid by cup and larger sip by straw. penetration with lsightly thick (1) liquid by cup. Recommend continue current diet of regular texture, slightly thick (1) liquid. SLP to trial 0 liquid at bedside and will advance pending safety and training on strategies. Would benefit from training on small single sips.     Other Barriers to Discharge (Family Training, etc):  family training: diet pending progress, dysarthria/communicaiton strategies to family

## 2025-03-10 NOTE — PROGRESS NOTES
"   03/10/25 0060   Appointment Info   Signing Clinician's Name / Credentials (SLP) Esmersuzie Ulloa MS CCC-SLP   General Information   Referring Physician Iris Bird PA   Pertinent History of Current Problem per H&P:\"84 year old right hand dominant woman with past medical history of HTN, stage 3A CKD, mild persistent asmtha, osteoporosis, breast ca, who presented with right facial droop, right sided weakness, and impaired coordination.   Head CT with chronic stroke in left basal ganglia and right PCA lesions, CTA with multivessel stenosis, no significant cervical stenosis, MRI with hyperacute left pontine stroke.       \"   General Observations seen for VFSS This date   Pain Assessment   Patient Currently in Pain No   Type of Evaluation   Type of Evaluation Swallow Evaluation   General Swallowing Observations   Swallowing Evaluation Videofluoroscopic swallow study (VFSS)   VFSS Evaluation   Radiologist Dr daily   Views Taken left lateral   Physical Location of Procedure [facility]   VFSS Textures Trialed thin liquids;slightly thick liquids;solid foods   VFSS Eval: Thin Liquid Texture Trial   Mode of Presentation, Thin Liquid self-fed;cup;straw;fed by clinician;spoon   Order of Presentation 5-7,9-12,15   Oral Phase, Thin Liquid premature pharyngeal entry   Bolus Location When Swallow Triggered pyriforms   Pharyngeal Phase, Thin Liquid impaired epiglottic movement;impaired tongue base retraction;pharyngeal wall coating;residue in pyriform sinus   Rosenbek's Penetration Aspiration Scale: Thin Liquid Trial Results 5 - contrast contacts vocal cords, visible residue remains (penetration)   Diagnostic Statement few instances of deeper penetration, all ejected.   VFSS Eval: Slightly Thick Liquids   Mode of Presentation self-fed;cup;straw   Order of Presentation 3-4   Oral Phase premature pharyngeal entry   Bolus Location When Swallow Triggered pyriforms   Pharyngeal Phase impaired epiglottic movement;impaired tongue base " retraction   Rosenbek's Penetration Aspiration Scale 3 - contrast remains above the vocal cords, visable residue remains (penetration)   Diagnostic Statement penetration with consecutive sips by cup.   VFSS Evaluation: Solid Food Texture Trial   Mode of Presentation, Solid self-fed   Order of Presentation 8 and 13   Bolus Location When Swallow Triggered posterior laryngeal surface of epiglottis   Pharyngeal Phase, Solid WFL   Rosenbek's Penetration Aspiration Scale: Solid Food Trial Results 1 - no aspiration, contrast does not enter airway   Esophageal Phase of Swallow   Patient reports or presents with symptoms of esophageal dysphagia Yes   Esophageal comments some residue below UES indicating possible dysmotility   Swallowing Recommendations   Diet Consistency Recommendations regular diet;slightly thick liquids (level 1)   Supervision Level for Intake distant supervision needed   Mode of Delivery Recommendations bolus size, small;no straws;slow rate of intake   Swallowing Maneuver Recommendations alternate food and liquid intake   Monitoring/Assistance Required (Eating/Swallowing) check mouth frequently for oral residue/pocketing   Recommended Feeding/Eating Techniques (Swallow Eval) maintain upright sitting position for eating;set-up and prepare tray   Clinical Impression   Criteria for Skilled Therapeutic Interventions Met (SLP Eval) Yes, treatment indicated   SLP Diagnosis mild oropharyngeal dysphagia   Activity Limitations Related to Problem List (SLP) intake   Risks & Benefits of therapy have been explained risks/benefits reviewed;participants voiced agreement with care plan;participants included;patient   Clinical Impression Comments SLP: Pt seen for VFSS this date. Pt presents with mild oropharyngeal dysphagia c/b delayed initiation of swallow to pyriforms, reduced tongue base retraction, occasional incomplete epiglottic inversion, incomplete vestibule closure. impairments resulting in instances of deeper  penetration with ejection with continuation of swallow with consecutive sips of thin (0) liquid by cup and larger sip by straw. penetration with lsightly thick (1) liquid by cup. Recommend continue current diet of regular texture, slightly thick (1) liquid. SLP to trial 0 liquid at bedside and will advance pending safety and training on strategies. Would benefit from training on small single sips.   SLP Total Evaluation Time   Evaluation, videofluoroscopic eval of swallow function Minutes (63052) 30   SLP Discharge Planning   SLP Plan SLP: [facility] results VFSS. meal trial reg, 0. small single sips. oral clearance strategies. Practice dysarthria strategies at sentence-convo level   SLP Time and Intention   Total Session Time (sum of timed and untimed services) 30   SLP - Acute Rehab Center Time   Individual Time (minutes) - SLP 30   Group Time (minutes) - SLP 0   Concurrent Time (minutes) - SLP 0   Co-Treatment Time (minutes) - SLP 0   ARC Total Session Time (minutes) - SLP 30   ARC Daily Total Session Time   SLP ARC Daily Total Session Time 55   ARC Daily Rehab Total Minutes 185

## 2025-03-10 NOTE — PROGRESS NOTES
"Discharge Plan: home w/ spouse assist prn     Precautions: fall, R hemiparesis, R AFO for transfers and amb     Current Status:  Bed Mobility: Sarah  Transfer: CGA w/ quad cane vs FWW  Gait: Up to 250 w/ quad cane vsFWW, Sarah w/ cueing fro R foot drag  Stairs: 6\" x12 w/ modA for RLE management  Balance: Stable dynamic sitting. Able to stand unsupported static, requires UE support for dynamic stepping.      Outcome Measures:   Bermeo               3/8: 26/56  10MWT               3/7: 0.31m/s self paced; 0.48m/s fast, Sarah w/ FWW     Assessment: Will continue to problem solve LRAD, recommending FWW w/ nsg at this time d/t high variability with stepping accuracy and path deviation using NBQC. Remains motivated. Was incontinent urine during session, aware but did not alert therapist.     Other Barriers to Discharge (DME, Family Training, etc):   2STE no rail + 14STI R railing        DME: owns FWW  "

## 2025-03-10 NOTE — PROGRESS NOTES
[facility]   Acute Rehabilitation Unit  Daily progress note    INTERVAL HISTORY  Seen walking down [address] with therapy, then during rest break in therapy gym.  Reports she is overall feeling well.  Denies n/v/, headache, dizziness, and fever.  Says she is eating ok, continues to have challenges falling and staying asleep trying melatonin will move up time and increase to 5 mg and monitor for effectiveness.     Per OT:   Current Status:  ADLs:  Mobility:  CGA transfers/mobility FWW, AFO.  Grooming: SBA standing at sink.  Dressing: UB Min A. LB Min A.  Bathing: Mod A extended tub bench.  Toileting: CGA transfer. Assist thorough hygiene, CGA clothing management.  IADLs: PLOF IND. Spouse to assist at discharge.  Vision/Cognition: Dysarthria. Mild deficits attention.      Assessment: Issued soft bend ease resting hand orthosis for RUE.Purpose of splint to counteract flexor tone/contracture and provide functional hand/instrinsic plus position.  Improved stability with transfers/mobility today using walker, trial of cloth on shoe to limit foot catching.       Per SLP   Assessment: Pt seen with meal regular txture, slightly thick (1) liquid by cup. noting prolonged but functional mastication, timel AP transit, mod oral residuals in R buccal cavity. review oral clearance strategies and cued to utilize across meal. Pt with mild impulsivity with intake. Single instance overt coughing following large volume sip from lqiuid wash otherwise, no s/sx aspiration across meal. education provided on plan for repeat VFSS this PM, importance of oral clearance strategies, and single small sip at a time. recommend continue current diet     PT:   Assessment: Will continue to problem solve LRAD, recommending FWW w/ nsg at this time d/t high variability with stepping accuracy and path deviation using NBQC. Remains motivated. Was incontinent urine during session, aware but did not alert therapist.      MEDICATIONS  Scheduled:  Current Facility-Administered Medications   Medication Dose Route Frequency Provider Last Rate Last Admin    alendronate (FOSAMAX) tablet 70 mg  70 mg Oral Weekly Ani Wilson MD   70 mg at 03/09/25 0657    anastrozole (ARIMIDEX) tablet 1 mg  1 mg Oral [facility] Iris Bird PA   1 mg at 03/10/25 0823    aspirin EC tablet 81 mg  81 mg Oral Daily Iris Bird PA   81 mg at 03/10/25 0823    atorvastatin (LIPITOR) tablet 80 mg  80 mg Oral QPM Iris Bird PA   80 mg at 03/09/25 2002    clopidogrel (PLAVIX) tablet 75 mg  75 mg Oral Daily Iris Bird PA   75 mg at 03/10/25 0823    enoxaparin ANTICOAGULANT (LOVENOX) injection 40 mg  40 mg Subcutaneous Q24H Ani Wilson MD   40 mg at 03/09/25 0948    lisinopril (ZESTRIL) tablet 10 mg  10 mg Oral JACKY Iris Bird PA   10 mg at 03/10/25 0823    melatonin tablet 5 mg  5 mg Oral QPM Iris Bird PA        montelukast (SINGULAIR) tablet 10 mg  10 mg Oral At Bedtime Iris Bird PA   10 mg at 03/09/25 2002    pantoprazole (PROTONIX) EC tablet 40 mg  40 mg Oral [facility] Ani Wilson MD   40 mg at 03/10/25 0614    Vitamin D3 (CHOLECALCIFEROL) tablet 50 mcg  50 mcg Oral Iris Anders PA   50 mcg at 03/10/25 0823        PRN:    Current Facility-Administered Medications   Medication Dose Route Frequency Provider Last Rate Last Admin    acetaminophen (TYLENOL) tablet 650 mg  650 mg Oral Q6H PRN Iris Bird PA        albuterol (PROVENTIL HFA/VENTOLIN HFA) inhaler  1-2 puff Inhalation Q4H PRN Iris Bird PA        hydrALAZINE (APRESOLINE) tablet 10 mg  10 mg Oral TID PRN Iris Bird PA        ondansetron (ZOFRAN ODT) ODT tab 4 mg  4 mg Oral Q6H PRN Lars Krause, TIBURCIO        senna-docusate (SENOKOT-S/PERICOLACE) 8.6-50 MG per tablet 1 tablet  1 tablet Oral BID PRN Iris Bird, PA              PHYSICAL EXAM  Patient Vitals for the past 24 hrs:    BP Temp Temp src Pulse Resp SpO2   03/10/25 0821 (!) 144/70 -- -- 68 -- --   03/10/25 0628 119/72 97.7  F (36.5  C) Oral 57 18 97 %   03/10/25 0549 126/62 -- -- 55 16 96 %   03/09/25 1549 (!) 143/64 98.1  F (36.7  C) Oral 65 16 96 %       GEN: NAD, pleasant and cooperative  HEENT: NC/AT.  Right facial droop.  CVS: RRR, S1+S2, no m/r/g  PULM: CTA b/l, no w/r/r  ABD: Soft, NT, ND, bowel sounds present  EXT: No LE edema or calf tenderness b/l  Neuro: Answers appropriately, follows commands, dysarthric speech, right upper extremity weakness, walking with walker with cga noted right foot drag    LABS  CBC RESULTS:   Recent Labs   Lab Test 03/10/25  0831 03/08/25  0651 03/04/25  0637 03/03/25  1915   WBC 7.3  --  7.5 7.6   RBC 4.94  --  5.04 4.73   HGB 14.3  --  14.7 13.6   HCT 44.3  --  43.7 41.3   MCV 90  --  87 87   MCH 28.9  --  29.2 28.8   MCHC 32.3  --  33.6 32.9   RDW 12.7  --  12.5 12.4    265 243 225       Last Comprehensive Metabolic Panel:  Sodium   Date Value Ref Range Status   03/10/2025 136 135 - 145 mmol/L Final     Potassium   Date Value Ref Range Status   03/10/2025 4.4 3.4 - 5.3 mmol/L Final     Chloride   Date Value Ref Range Status   03/10/2025 101 98 - 107 mmol/L Final     Carbon Dioxide (CO2)   Date Value Ref Range Status   03/10/2025 23 22 - 29 mmol/L Final     Anion Gap   Date Value Ref Range Status   03/10/2025 12 7 - 15 mmol/L Final     Glucose   Date Value Ref Range Status   03/10/2025 146 (H) 70 - 99 mg/dL Final     GLUCOSE BY METER POCT   Date Value Ref Range Status   03/06/2025 109 (H) 70 - 99 mg/dL Final     Comment:     /RN Notified     Urea Nitrogen   Date Value Ref Range Status   03/10/2025 43.3 (H) 8.0 - 23.0 mg/dL Final     Creatinine   Date Value Ref Range Status   03/10/2025 1.14 (H) 0.51 - 0.95 mg/dL Final     GFR Estimate   Date Value Ref Range Status   03/10/2025 48 (L) >60 mL/min/1.73m2 Final     Comment:     eGFR calculated using 2021 CKD-EPI equation.     Calcium   Date  Value Ref Range Status   03/10/2025 9.5 8.8 - 10.4 mg/dL Final       Recent Labs   Lab 03/10/25  0831 03/06/25  0726 03/06/25  0634 03/05/25  2134 03/05/25  1623 03/04/25  2101   * 109* 117* 109* 105* 124*       IMPRESSION/PLAN:  Fang Estes is a 84 year old right hand dominant woman with past medical history of HTN, CKD, breast CA, asthma, and osteoporosis who presented with right sided weakness found to have 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   Admitted to rehab 03/06/2025 in setting of impaired strength, impaired activity tolerance, impaired balance, impaired coordination, dysarthria, and dysphagia.         Admission to acute inpatient rehab 03/06/2025.    Impairment group code: Stroke Ischemic 01.2 (R) Body Involvement (L) Brain; Acute left pontine stroke likely due to intracranial atherosclerosis         PT, OT and SLP 60 minutes of each on a daily basis up to 6 days per week, in addition to rehab nursing and close management of physiatrist x15 days.       Impairment of ADL's: Noted to have impaired strength, impaired activity tolerance, impaired balance,  and impaired coordination leading to decreased ability to independently complete ADL's.  Will benefit from ongoing OT with goal for MOD I with basic ADLs.      Impairment of mobility:  Noted to have impaired strength, impaired activity tolerance,  and impaired balance leading to decreased mobility.  Will benefit from ongoing PT with goal for MICAH with basic mobility.      Impairment of cognition/language/swallow:  Noted to have impaired swallow and dysarthria will benefit from ongoing SLP to assess safety of swallow and provide ongoing safe swallow strategies, improve ability to communicate needs effectively.      Medical Conditions  Acute Left Pontine stroke  Intracranial atherosclerosis  Presented with right sided weakness, impaired coordination, CT head neg for acute stroke, MRI with left pontine  "stroke. Imaging with extensive intracranial atherosclerotic disease.  EF wnl, A1C 6.3%, . Seen by stroke neurology, \" in view of extensive intracranial atherosclerotic disease, I would recommend continuing baby aspirin and Plavix indefinitely\"   - HTN as below- long term goal 120-130/80 (next one week) with permissive HTN (3/3-3/10)  -HLD as below- LDL goal <70  -continue PT/OT/SLP  -follow up for 30 day event monitor  -continue asa 81 and plavix     Anterior communicating artery 2 mm aneurysm  Right tentorial leaf meningioma  Found incidentally during stroke workup  -routine surveillance imaging     HTN  Allow initial permissive HTN (~ 3/10)  then long term goal 120-130/80  -continue lisinopril 10 mg daily  -BP 110s-140s last 24 hours- continue to monitor and titrate as indicated     HLD  -ldl 152, started on atorvastatin 80 mg daily, long term goal LDL <70  -continue atorvastatin 80 mg daily     Stage 3A CKD  Cr baseline appears to be around 1.1.  stable 1.14 3/10  -trend     Mild persistent asthma  -continue Singulair, albuterol prn     Invasive lobular carcinoma  Hormone positive HER2 neg, s/p lumpectomy, follows with oncology   -continue arimidex     Prediabetes        Lab Results   Component Value Date     A1C 6.3 03/03/2025   -trend glucose with labs.     Fungal rash  -appreciate wodoe zimmerman- uche bid         Adjustment to disability:  Clinical psychology to eval and treat as indicated  FEN: reg  Bowel: monitor; on prn bowel meds  Bladder: monitor; PVRs x2 mildly elevated at 161 and 143  Continue to monitor bladder patterns   DVT Prophylaxis: lovenox  GI Prophylaxis: continue Protonix 40 mg daily - started due to DAPT and age  Code: full- confirmed on admission.   Disposition: goal for home  ELOS:  ~18 days.  Rehab prognosis:  good  Follow up Appointments on Discharge: pcp, neuro,       Iris Bird PA-C  Department of Rehabilitation Medicine      Time Spent on this Encounter   I spent a total of 40 " minutes face-to-face or managing the care of Fang Estes. Over 50% of my time on the unit was spent counseling the patient and coordinating care. See note for details.

## 2025-03-11 ENCOUNTER — APPOINTMENT (OUTPATIENT)
Dept: SPEECH THERAPY | Facility: CLINIC | Age: 85
DRG: 057 | End: 2025-03-11
Attending: PHYSICAL MEDICINE & REHABILITATION
Payer: COMMERCIAL

## 2025-03-11 ENCOUNTER — APPOINTMENT (OUTPATIENT)
Dept: OCCUPATIONAL THERAPY | Facility: CLINIC | Age: 85
DRG: 057 | End: 2025-03-11
Attending: PHYSICAL MEDICINE & REHABILITATION
Payer: COMMERCIAL

## 2025-03-11 ENCOUNTER — APPOINTMENT (OUTPATIENT)
Dept: PHYSICAL THERAPY | Facility: CLINIC | Age: 85
DRG: 057 | End: 2025-03-11
Attending: PHYSICAL MEDICINE & REHABILITATION
Payer: COMMERCIAL

## 2025-03-11 PROCEDURE — 97112 NEUROMUSCULAR REEDUCATION: CPT | Mod: GP

## 2025-03-11 PROCEDURE — 97530 THERAPEUTIC ACTIVITIES: CPT | Mod: GP

## 2025-03-11 PROCEDURE — 250N000011 HC RX IP 250 OP 636: Performed by: PHYSICAL MEDICINE & REHABILITATION

## 2025-03-11 PROCEDURE — 250N000013 HC RX MED GY IP 250 OP 250 PS 637: Performed by: PHYSICIAN ASSISTANT

## 2025-03-11 PROCEDURE — L1951 AFO SPIRAL PREFABRICATED: HCPCS

## 2025-03-11 PROCEDURE — 99232 SBSQ HOSP IP/OBS MODERATE 35: CPT | Performed by: PHYSICIAN ASSISTANT

## 2025-03-11 PROCEDURE — 97112 NEUROMUSCULAR REEDUCATION: CPT | Mod: GO | Performed by: OCCUPATIONAL THERAPIST

## 2025-03-11 PROCEDURE — 128N000003 HC R&B REHAB

## 2025-03-11 PROCEDURE — 250N000013 HC RX MED GY IP 250 OP 250 PS 637: Performed by: PHYSICAL MEDICINE & REHABILITATION

## 2025-03-11 PROCEDURE — 92526 ORAL FUNCTION THERAPY: CPT | Mod: GN

## 2025-03-11 PROCEDURE — 92507 TX SP LANG VOICE COMM INDIV: CPT | Mod: GN

## 2025-03-11 RX ADMIN — LISINOPRIL 10 MG: 10 TABLET ORAL at 09:22

## 2025-03-11 RX ADMIN — MICONAZOLE NITRATE: 20 CREAM TOPICAL at 09:25

## 2025-03-11 RX ADMIN — Medication 50 MCG: at 09:22

## 2025-03-11 RX ADMIN — MONTELUKAST 10 MG: 10 TABLET, FILM COATED ORAL at 20:37

## 2025-03-11 RX ADMIN — CLOPIDOGREL 75 MG: 75 TABLET ORAL at 09:22

## 2025-03-11 RX ADMIN — ATORVASTATIN CALCIUM 80 MG: 80 TABLET, FILM COATED ORAL at 20:36

## 2025-03-11 RX ADMIN — ANASTROZOLE 1 MG: 1 TABLET, COATED ORAL at 09:23

## 2025-03-11 RX ADMIN — PANTOPRAZOLE SODIUM 40 MG: 40 TABLET, DELAYED RELEASE ORAL at 06:11

## 2025-03-11 RX ADMIN — ASPIRIN 81 MG: 81 TABLET, COATED ORAL at 09:23

## 2025-03-11 RX ADMIN — ENOXAPARIN SODIUM 40 MG: 40 INJECTION SUBCUTANEOUS at 11:00

## 2025-03-11 RX ADMIN — MICONAZOLE NITRATE: 20 CREAM TOPICAL at 20:37

## 2025-03-11 RX ADMIN — Medication 5 MG: at 22:34

## 2025-03-11 NOTE — PROGRESS NOTES
S: patient seen today at  for fitting of Right Loi Buhler walk on AFO.    O/G: (prevent drop foot)(assist in ambulation)    A: patient seen today for fitting of a right small loi lilian walk on AFO. Patient has been instructed on proper donning/doffing, use, care and break-in period.  Patient observed in the brace and both the patient and I are satisfied with the fit and function of the AFO at this time.     P: patient has been instructed to contact us if any issues arise.   Joss OSEI,LO.

## 2025-03-11 NOTE — PLAN OF CARE
Discharge Planner Post-Acute Rehab SLP:     Discharge Plan: home with spouse. Ongoing SLP     Precautions: fall, aspiration     Current Status:  Hearing: WFL  Vision: reading glasses  Communication: mild-mod dysarthria and suspect slight apraxia c/b slow, effortful, haulting speech with reduced loudness especially within the conversation level.  Cognition: RBANS scores grossly intact with exception to slightly impaired attention. Pt feels at baseline cognitively. Pt mostly concerned with intelligibility   Swallow: Regular Solids, Thin (0) liquids by SMALL single sip. Straws okay. Fully upright all PO.    Assessment: Pt educated results VFSS. Pt educated on importance of small single sip by cup or straw for safety. Pt seen in chair for meal trial of regular diet, thin (0) liquids by small single sip and straw. Pt with prolonged but functional mastication, delayed AP transit, and mod oral residuals that cleared with subsequent liquid or puree(4) wash. Pt with awareness of residuals on R side and able to perform lingual sweep to clear mostly IND. Single instance of pt requiring cues to implement small single sip, otherwise able to implement IND. mild intermittent throat clearing otherwise No s/sx aspiration across meal. Recommend continue regular diet and advanced to thin (0) liquids by SMALL single cup/straw sip. all meals upright.     Other Barriers to Discharge (Family Training, etc): family training: diet pending progress, dysarthria/communicaiton strategies to family

## 2025-03-11 NOTE — PLAN OF CARE
Skilled set up on :  Pt dependent for proper placement of electrodes on RUE for optimum muscle contraction, safe positioning on w/c within frame and cushion for prevention of skin breakdown, UE secured to arm support.  Passive motion assessed to ensure proper positioning.      Pt performed 35 minutes of active FES ergometry with 0-100% stimulation applied to above muscles at 30-40 rpm with .5-1.76nm resistance.  This OT adjusted e-stim and cycling parameters in real-time to ensure palpable muscle contractions throughout session.  Please see [email or web address] for further details on patient's stimulation parameters and ergometry outcomes.      Changes in parameters that were part of this treatment:   -resistance  -pulse width, frequency  -amplitude  -pedal speed    Functional outcomes from this intervention include:   -reduced spasticity  -improved sensory awareness and proprioception  -improved muscle strength  -improved motor coordination    Session Summary:   -Average Power: 2.2 W  -Asymmetry: L %  -Active Minutes: 14min

## 2025-03-11 NOTE — PLAN OF CARE
Goal Outcome Evaluation:      Plan of Care Reviewed With: patient    Overall Patient Progress: no changeOverall Patient Progress: no change    FOCUS/GOAL     Bowel management, Bladder management, Medication management, Wound care management, Medical management, Mobility, Skin integrity, and Safety management     ASSESSMENT, INTERVENTIONS AND CONTINUING PLAN FOR GOAL:     Pt is A&OX4, calm, & cooperative with care. Denied CP, SOB, & n/v. Pt transfers A of 1 with walker & GB; with R sided weakness. AFO to the RLE when OOB. Continent for both B&B. On slightly thick (level 1) liquid. No straw. Takes meds whole with slightly thick (level 1) liquid. Pt slept well for most of the shift & no other acute concern. Able to make needs known & call light within reach. Continue with plan of care.

## 2025-03-11 NOTE — PLAN OF CARE
"SHIFT 0700 -1930     Goal Outcome Evaluation:      Plan of Care Reviewed With: patient    Overall Patient Progress: improvingOverall Patient Progress: improving    BP (!) 145/68 (BP Location: Left arm)   Pulse 65   Temp 98  F (36.7  C) (Oral)   Resp 16   Ht 1.6 m (5' 3\")   Wt 54.1 kg (119 lb 4.3 oz)   SpO2 99%   BMI 21.13 kg/m        Orientation:Alert & oriented X4 , able to make needs known  Bowel:Last BM 3/10  Bladder: Voiding Adequately toot he bathroom  Pain: Denies  Ambulation/Transfers: CGA 1. AFO boot to RLE when OOB  Diet/ Liquids: Regular diet, take medications whole,Okay to use straw per SLP  Tubes/ Lines/ Drains: None  Oxygen: On room air, denies chest pain or shortness of breath  Skin:intact  No new acute events this shift Bed and chair alarm on for safety, call light within reach. Continue with POC.     Alba Soria RN            "

## 2025-03-11 NOTE — PROGRESS NOTES
[facility]   Acute Rehabilitation Unit  Daily progress note    INTERVAL HISTORY  Seen sitting up in chair, sleep still interrupted but maybe a little better with melatonin.  Appetite is ok, hydrating ok, denies bowel or bladder concerns.  Denies sob, fevers and dizziness.  Asks about discharge date, we discussed tentative date for 3/20 and that date as flexible pending progress.  Nataliya acknowledges she would like to be more stable with her walking and dressing before she goes home reports spouse is home and mobile/ willing to help.       PT:   Assessment: Progressing w/ RLE activation w/ gait and functional moblility, still compensates w/ excessive and premature plantarflexion on L to allow for R foot clearance. Transitioned to carbon fiber ottobock walkon AFO in lieu of posterior leaf spring w/ improved mechanics.           MEDICATIONS  Scheduled:  Current Facility-Administered Medications   Medication Dose Route Frequency Provider Last Rate Last Admin    alendronate (FOSAMAX) tablet 70 mg  70 mg Oral Weekly Ani Wilson MD   70 mg at 03/09/25 0657    anastrozole (ARIMIDEX) tablet 1 mg  1 mg Oral JACKY Iris Bird PA   1 mg at 03/11/25 0923    aspirin EC tablet 81 mg  81 mg Oral Daily Iris Bird PA   81 mg at 03/11/25 0923    atorvastatin (LIPITOR) tablet 80 mg  80 mg Oral QPM Iris Bird PA   80 mg at 03/10/25 2011    clopidogrel (PLAVIX) tablet 75 mg  75 mg Oral Daily Iris Bird PA   75 mg at 03/11/25 0922    enoxaparin ANTICOAGULANT (LOVENOX) injection 40 mg  40 mg Subcutaneous Q24H Ani Wilson MD   40 mg at 03/10/25 1203    lisinopril (ZESTRIL) tablet 10 mg  10 mg Oral QAM Iris Bird PA   10 mg at 03/11/25 0922    melatonin tablet 5 mg  5 mg Oral QPM Iris Bird PA   5 mg at 03/10/25 2011    miconazole with skin protectant (MAVIS ANTIFUNGAL) 2 % cream   Topical BID Iris Bird PA   Given at 03/11/25  0925    montelukast (SINGULAIR) tablet 10 mg  10 mg Oral At Bedtime Iris Bird PA   10 mg at 03/10/25 2011    pantoprazole (PROTONIX) EC tablet 40 mg  40 mg Oral Ani Lainez MD   40 mg at 03/11/25 0611    Vitamin D3 (CHOLECALCIFEROL) tablet 50 mcg  50 mcg Oral Iris Anders PA   50 mcg at 03/11/25 0922      PRN:  Current Facility-Administered Medications   Medication Dose Route Frequency Provider Last Rate Last Admin    acetaminophen (TYLENOL) tablet 650 mg  650 mg Oral Q6H PRN Iris Bird PA        albuterol (PROVENTIL HFA/VENTOLIN HFA) inhaler  2 puff Inhalation Q4H PRN Iris Bird PA        hydrALAZINE (APRESOLINE) tablet 10 mg  10 mg Oral TID PRN Iris Bird PA        ondansetron (ZOFRAN ODT) ODT tab 4 mg  4 mg Oral Q6H PRN Lars Krause MBBS        senna-docusate (SENOKOT-S/PERICOLACE) 8.6-50 MG per tablet 1 tablet  1 tablet Oral BID PRN Iris Bird PA            PHYSICAL EXAM  Patient Vitals for the past 24 hrs:   BP Temp Temp src Pulse Resp SpO2   03/11/25 0922 125/67 -- -- 72 -- 98 %   03/11/25 0834 114/76 98.4  F (36.9  C) Oral 78 16 97 %   03/11/25 0613 130/70 97.6  F (36.4  C) Oral 60 -- 96 %   03/10/25 1600 128/67 98.4  F (36.9  C) Oral 65 16 97 %     GEN: NAD, pleasant and cooperative  HEENT: NC/AT.  Right facial droop.  PULM: CTA b/l, no w/r/r  ABD: Soft, NT, ND, bowel sounds present  EXT: No LE edema or calf tenderness b/l  Neuro: Answers appropriately, follows commands, mildly dysarthric    LABS  CBC RESULTS:   Recent Labs   Lab Test 03/10/25  0831 03/08/25  0651 03/04/25  0637 03/03/25 1915   WBC 7.3  --  7.5 7.6   RBC 4.94  --  5.04 4.73   HGB 14.3  --  14.7 13.6   HCT 44.3  --  43.7 41.3   MCV 90  --  87 87   MCH 28.9  --  29.2 28.8   MCHC 32.3  --  33.6 32.9   RDW 12.7  --  12.5 12.4    265 243 225       Last Comprehensive Metabolic Panel:  Sodium   Date Value Ref Range Status   03/10/2025 136 135 - 145 mmol/L Final      Potassium   Date Value Ref Range Status   03/10/2025 4.4 3.4 - 5.3 mmol/L Final     Chloride   Date Value Ref Range Status   03/10/2025 101 98 - 107 mmol/L Final     Carbon Dioxide (CO2)   Date Value Ref Range Status   03/10/2025 23 22 - 29 mmol/L Final     Anion Gap   Date Value Ref Range Status   03/10/2025 12 7 - 15 mmol/L Final     Glucose   Date Value Ref Range Status   03/10/2025 146 (H) 70 - 99 mg/dL Final     GLUCOSE BY METER POCT   Date Value Ref Range Status   03/06/2025 109 (H) 70 - 99 mg/dL Final     Comment:     Dr/RN Notified     Urea Nitrogen   Date Value Ref Range Status   03/10/2025 43.3 (H) 8.0 - 23.0 mg/dL Final     Creatinine   Date Value Ref Range Status   03/10/2025 1.14 (H) 0.51 - 0.95 mg/dL Final     GFR Estimate   Date Value Ref Range Status   03/10/2025 48 (L) >60 mL/min/1.73m2 Final     Comment:     eGFR calculated using 2021 CKD-EPI equation.     Calcium   Date Value Ref Range Status   03/10/2025 9.5 8.8 - 10.4 mg/dL Final       Recent Labs   Lab 03/10/25  0831 03/06/25  0726 03/06/25  0634 03/05/25  2134 03/05/25  1623 03/04/25  2101   * 109* 117* 109* 105* 124*       IMPRESSION/PLAN:  Fang Estes is a 84 year old right hand dominant woman with past medical history of HTN, CKD, breast CA, asthma, and osteoporosis who presented with right sided weakness found to have 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   Admitted to rehab 03/06/2025 in setting of impaired strength, impaired activity tolerance, impaired balance, impaired coordination, dysarthria, and dysphagia.      Admission to acute inpatient rehab 03/06/2025.    Impairment group code: Stroke Ischemic 01.2 (R) Body Involvement (L) Brain; Acute left pontine stroke likely due to intracranial atherosclerosis      PT, OT and SLP 60 minutes of each on a daily basis up to 6 days per week, in addition to rehab nursing and close management of physiatrist x15 days.       Impairment of  "ADL's: Noted to have impaired strength, impaired activity tolerance, impaired balance,  and impaired coordination leading to decreased ability to independently complete ADL's.  Will benefit from ongoing OT with goal for MOD I with basic ADLs.      Impairment of mobility:  Noted to have impaired strength, impaired activity tolerance,  and impaired balance leading to decreased mobility.  Will benefit from ongoing PT with goal for MICAH with basic mobility.      Impairment of cognition/language/swallow:  Noted to have impaired swallow and dysarthria will benefit from ongoing SLP to assess safety of swallow and provide ongoing safe swallow strategies, improve ability to communicate needs effectively.      Medical Conditions  Acute Left Pontine stroke  Intracranial atherosclerosis  Presented with right sided weakness, impaired coordination, CT head neg for acute stroke, MRI with left pontine stroke. Imaging with extensive intracranial atherosclerotic disease.  EF wnl, A1C 6.3%, . Seen by stroke neurology, \" in view of extensive intracranial atherosclerotic disease, I would recommend continuing baby aspirin and Plavix indefinitely\"   - HTN as below- long term goal 120-130/80 (next one week) with permissive HTN (3/3-3/10)  -HLD as below- LDL goal <70  -continue PT/OT/SLP  -follow up for 30 day event monitor  -continue asa 81 and plavix     Anterior communicating artery 2 mm aneurysm  Right tentorial leaf meningioma  Found incidentally during stroke workup  -routine surveillance imaging     HTN  Allow initial permissive HTN (~ 3/10)  then long term goal 120-130/80  -continue lisinopril 10 mg daily  -BP 110s-140s last 24 hours- continue to monitor and titrate as indicated     HLD  -ldl 152, started on atorvastatin 80 mg daily, long term goal LDL <70  -continue atorvastatin 80 mg daily     Stage 3A CKD  Cr baseline appears to be around 1.1.  stable 1.14 3/10  -trend     Mild persistent asthma  -continue Singulair, " albuterol prn     Invasive lobular carcinoma  Hormone positive HER2 neg, s/p lumpectomy, follows with oncology   -continue arimidex     Prediabetes        Lab Results   Component Value Date     A1C 6.3 03/03/2025   -trend glucose with labs.     Fungal rash  -appreciate wocn recs- uche bid         Adjustment to disability:  Clinical psychology to eval and treat as indicated  FEN: reg  Bowel: monitor; on prn bowel meds  Bladder: monitor; PVRs x2 mildly elevated at 161 and 143  Continue to monitor bladder patterns   DVT Prophylaxis: lovenox  GI Prophylaxis: continue Protonix 40 mg daily - started due to DAPT and age  Code: full- confirmed on admission.   Disposition: goal for home  ELOS:  ~18 days.  Rehab prognosis:  good  Follow up Appointments on Discharge: pcp, neuro,       Iris Bird PA-C  Department of Rehabilitation Medicine      Time Spent on this Encounter   I spent a total of 35 minutes face-to-face or managing the care of Fang Estes. Over 50% of my time on the unit was spent counseling the patient and coordinating care. See note for details.

## 2025-03-11 NOTE — PROGRESS NOTES
"Discharge Plan: home w/ spouse assist prn     Precautions: fall, R hemiparesis, R AFO for transfers and amb     Current Status:  Bed Mobility: Sarah  Transfer: CGA w/ quad cane vs FWW  Gait: Up to 300 w/ quad cane vsFWW, Sarah w/ cueing fro R foot drag  Stairs: 6\" x12 w/ modA for RLE management  Balance: Stable dynamic sitting. Able to stand unsupported static, requires UE support for dynamic stepping.      Outcome Measures:   Bermeo               3/8: 26/56  10MWT               3/7: 0.31m/s self paced; 0.48m/s fast, Sarah w/ FWW     Assessment: Progressing w/ RLE activation w/ gait and functional moblility, still compensates w/ excessive and premature plantarflexion on L to allow for R foot clearance. Transitioned to carbon fiber ottobock walkon AFO in lieu of posterior leaf spring w/ improved mechanics.        Other Barriers to Discharge (DME, Family Training, etc):   2STE no rail + 14STI R railing        DME: owns FWW  "

## 2025-03-11 NOTE — PLAN OF CARE
Discharge Planner Post-Acute Rehab SLP:     Discharge Plan: home with spouse. Ongoing SLP TBD    Precautions: fall, aspiration     Current Status:  Hearing: WFL  Vision: reading glasses  Communication: mild-mod dysarthria and suspect slight apraxia c/b slow, effortful, haulting speech with reduced loudness especially within the conversation level.  Cognition: RBANS scores grossly intact with exception to slightly impaired attention. Pt feels at baseline cognitively. Pt mostly concerned with intelligibility   Swallow: Regular Solids, Thin (0) liquids by SMALL single sip. Straws okay. Fully upright all PO.    Assessment: Pt IND recalled +3/4 clear speech strategies previously introduced. With mod cues, pt able to recall last strategy. Pt answered structured and unstructured questions while IND implementing clear speech strategies in responses. Single instance cueing pt to repeat multisyllabic word, otherwise pt 100% intellegible. Pt reports feeling improvements with speech intelligibility. Pt demonstrating steady improvements with speech intelligibility and ability to implement strategies IND. SLP to reduce to 1x/day to tx areas of dysphagia and reinforce clear speech strategies.     Other Barriers to Discharge (Family Training, etc): family training: diet pending progress, dysarthria/communicaiton strategies to family

## 2025-03-11 NOTE — PLAN OF CARE
Discharge Planner Post-Acute Rehab OT:      Discharge Plan: Home with Spouse; OP OT     Precautions: Fall, R hemiparesis, R AFO for transfers and amb   RUE resting hand orthosis on overnight, off during day     Current Status:  ADLs:  Mobility:  CGA transfers/mobility FWW, AFO.  Grooming: SBA standing at sink.  Dressing: UB Min A. LB Min A.  Bathing: Mod A extended tub bench.  Toileting: CGA transfer. Assist thorough hygiene, CGA clothing management.  IADLs: PLOF IND. Spouse to assist at discharge.  Vision/Cognition: Dysarthria. Mild deficits attention.      Assessment: Completed FES bike for RUE sensorimotor retraining - see plan of care note for further information.     Other Barriers to Discharge (DME, Family Training, etc):   Equipment: Anticipate needs for shower bench vs chair, walker, AFO.  Caregiver support: Spouse supportive, able to provide assist ADL at discharge prn.  Home set up: 2 RASHAUN no rail + 14 STI R railing

## 2025-03-11 NOTE — PLAN OF CARE
Goal Outcome Evaluation:      Plan of Care Reviewed With: patient    Overall Patient Progress: improvingOverall Patient Progress: improving  Patient is alert and oriented x 4, able to make needs known. A-1 walker with R leg AFO on. Regular/slihtly thicken liquid. Continent of B/B last BM 3/10. No care concern at this time. Call light is with in reach alarm is on.

## 2025-03-12 ENCOUNTER — APPOINTMENT (OUTPATIENT)
Dept: SPEECH THERAPY | Facility: CLINIC | Age: 85
DRG: 057 | End: 2025-03-12
Attending: PHYSICAL MEDICINE & REHABILITATION
Payer: COMMERCIAL

## 2025-03-12 ENCOUNTER — APPOINTMENT (OUTPATIENT)
Dept: PHYSICAL THERAPY | Facility: CLINIC | Age: 85
DRG: 057 | End: 2025-03-12
Attending: PHYSICAL MEDICINE & REHABILITATION
Payer: COMMERCIAL

## 2025-03-12 ENCOUNTER — APPOINTMENT (OUTPATIENT)
Dept: OCCUPATIONAL THERAPY | Facility: CLINIC | Age: 85
DRG: 057 | End: 2025-03-12
Attending: PHYSICAL MEDICINE & REHABILITATION
Payer: COMMERCIAL

## 2025-03-12 PROCEDURE — 99232 SBSQ HOSP IP/OBS MODERATE 35: CPT | Performed by: PHYSICAL MEDICINE & REHABILITATION

## 2025-03-12 PROCEDURE — 92526 ORAL FUNCTION THERAPY: CPT | Mod: GN

## 2025-03-12 PROCEDURE — 97535 SELF CARE MNGMENT TRAINING: CPT | Mod: GO | Performed by: OCCUPATIONAL THERAPIST

## 2025-03-12 PROCEDURE — 250N000013 HC RX MED GY IP 250 OP 250 PS 637: Performed by: PHYSICIAN ASSISTANT

## 2025-03-12 PROCEDURE — 97112 NEUROMUSCULAR REEDUCATION: CPT | Mod: GP

## 2025-03-12 PROCEDURE — 97112 NEUROMUSCULAR REEDUCATION: CPT | Mod: GO | Performed by: OCCUPATIONAL THERAPIST

## 2025-03-12 PROCEDURE — 250N000013 HC RX MED GY IP 250 OP 250 PS 637: Performed by: PHYSICAL MEDICINE & REHABILITATION

## 2025-03-12 PROCEDURE — 128N000003 HC R&B REHAB

## 2025-03-12 PROCEDURE — 92507 TX SP LANG VOICE COMM INDIV: CPT | Mod: GN

## 2025-03-12 RX ADMIN — PANTOPRAZOLE SODIUM 40 MG: 40 TABLET, DELAYED RELEASE ORAL at 06:59

## 2025-03-12 RX ADMIN — Medication 5 MG: at 21:40

## 2025-03-12 RX ADMIN — LISINOPRIL 10 MG: 10 TABLET ORAL at 08:15

## 2025-03-12 RX ADMIN — MONTELUKAST 10 MG: 10 TABLET, FILM COATED ORAL at 21:40

## 2025-03-12 RX ADMIN — CLOPIDOGREL 75 MG: 75 TABLET ORAL at 08:16

## 2025-03-12 RX ADMIN — MICONAZOLE NITRATE: 20 CREAM TOPICAL at 13:53

## 2025-03-12 RX ADMIN — ANASTROZOLE 1 MG: 1 TABLET, COATED ORAL at 08:16

## 2025-03-12 RX ADMIN — ATORVASTATIN CALCIUM 80 MG: 80 TABLET, FILM COATED ORAL at 21:40

## 2025-03-12 RX ADMIN — ASPIRIN 81 MG: 81 TABLET, COATED ORAL at 08:16

## 2025-03-12 RX ADMIN — Medication 50 MCG: at 08:16

## 2025-03-12 ASSESSMENT — ACTIVITIES OF DAILY LIVING (ADL)
ADLS_ACUITY_SCORE: 35
ADLS_ACUITY_SCORE: 37
ADLS_ACUITY_SCORE: 42
ADLS_ACUITY_SCORE: 42
ADLS_ACUITY_SCORE: 37
ADLS_ACUITY_SCORE: 42
ADLS_ACUITY_SCORE: 37
ADLS_ACUITY_SCORE: 42
ADLS_ACUITY_SCORE: 37
ADLS_ACUITY_SCORE: 37
ADLS_ACUITY_SCORE: 42
ADLS_ACUITY_SCORE: 37
ADLS_ACUITY_SCORE: 42
ADLS_ACUITY_SCORE: 37
ADLS_ACUITY_SCORE: 37
ADLS_ACUITY_SCORE: 42

## 2025-03-12 NOTE — PROGRESS NOTES
"  [facility]   Acute Rehabilitation Unit  Daily progress note    INTERVAL HISTORY  Fang Estes was seen in team rounds this morning.  No acute events overnight and this morning Nataliya has no new concerns or complaints.  She denies any chest pain, shortness of breath, fevers, or chills.  Functionally she continues to be motivated and works hard with therapies, improving with mobility and proximal right upper extremity strength.  She will likely need assistance with some lower body dressing and AFO, and will set up family training with him.  Given progress and excellent family support, we will tentatively move up discharge date to 3/18.    Current functional status:  PT:  Bed Mobility: Sarah  Transfer: CGA w/ quad cane vs FWW  Gait: Up to 300 w/ quad cane vsFWW, Sarah w/ cueing fro R foot drag  Stairs: 6\" x12 w/ modA for RLE management  Balance: Stable dynamic sitting. Able to stand unsupported static, requires UE support for dynamic stepping.      Outcome Measures:   Bermeo               3/8: 26/56  10MWT               3/7: 0.31m/s self paced; 0.48m/s fast, Sarah w/ FWW     Assessment: Continues to work very hard in therapies. Does well with tall kneeling and quadruped for RLE/RUE activation.     OT:  ADLs:  Mobility:  CGA transfers/mobility FWW, AFO.  Grooming: SBA standing at sink.  Dressing: UB Min A. LB Min A.  Bathing: Min A extended tub bench.  Toileting: CGA transfer. CGA clothing management and hygiene.  IADLs: PLOF IND. Spouse to assist at discharge.  Vision/Cognition: Dysarthria. Mild deficits attention.      Assessment: Team rounds today - goal to discharge home 3/18 with spouse support and OP therapy. Hopeful to advance to Mod IND but may need some assist ADL due to RUE hemiparesis. Caregiver training set up 3/16 1:15-3:15pm.    SLP:  Hearing: WFL  Vision: reading glasses  Communication: mild-mod dysarthria and suspect slight apraxia c/b slow, effortful, haulting speech " with reduced loudness especially within the conversation level.  Cognition: RBANS scores grossly intact with exception to slightly impaired attention. Pt feels at baseline cognitively. Pt mostly concerned with intelligibility   Swallow: Regular Solids, Thin (0) liquids by SMALL single sip. Straws okay. Fully upright all PO.     Assessment: Pt educated results VFSS. Pt educated on importance of small single sip by cup or straw for safety. Pt seen in chair for meal trial of regular diet, thin (0) liquids by small single sip and straw. Pt with prolonged but functional mastication, delayed AP transit, and mod oral residuals that cleared with subsequent liquid or puree(4) wash. Pt with awareness of residuals on R side and able to perform lingual sweep to clear mostly IND. Single instance of pt requiring cues to implement small single sip, otherwise able to implement IND. mild intermittent throat clearing otherwise No s/sx aspiration across meal. Recommend continue regular diet and advanced to thin (0) liquids by SMALL single cup/straw sip. all meals upright.        MEDICATIONS  Scheduled:  Current Facility-Administered Medications   Medication Dose Route Frequency Provider Last Rate Last Admin    alendronate (FOSAMAX) tablet 70 mg  70 mg Oral Weekly Ani Wilson MD   70 mg at 03/09/25 0657    anastrozole (ARIMIDEX) tablet 1 mg  1 mg Oral QAM Iris Bird PA   1 mg at 03/12/25 0816    aspirin EC tablet 81 mg  81 mg Oral Daily Iris Bird PA   81 mg at 03/12/25 0816    atorvastatin (LIPITOR) tablet 80 mg  80 mg Oral QPM Iris Bird PA   80 mg at 03/11/25 2036    clopidogrel (PLAVIX) tablet 75 mg  75 mg Oral Daily Iris Bird PA   75 mg at 03/12/25 0816    lisinopril (ZESTRIL) tablet 10 mg  10 mg Oral QAM Iris Bird PA   10 mg at 03/12/25 0815    melatonin tablet 5 mg  5 mg Oral QPM Iris Bird PA   5 mg at 03/11/25 2234    miconazole with skin protectant (MAVIS  ANTIFUNGAL) 2 % cream   Topical BID Iris Bird PA   Given at 03/12/25 1353    montelukast (SINGULAIR) tablet 10 mg  10 mg Oral At Bedtime Iris Bird PA   10 mg at 03/11/25 2037    pantoprazole (PROTONIX) EC tablet 40 mg  40 mg Oral QAAni Melendez MD   40 mg at 03/12/25 0659    Vitamin D3 (CHOLECALCIFEROL) tablet 50 mcg  50 mcg Oral Iris Anders PA   50 mcg at 03/12/25 0816      PRN:  Current Facility-Administered Medications   Medication Dose Route Frequency Provider Last Rate Last Admin    acetaminophen (TYLENOL) tablet 650 mg  650 mg Oral Q6H PRN Iris Bird PA        albuterol (PROVENTIL HFA/VENTOLIN HFA) inhaler  2 puff Inhalation Q4H PRN Iris Bird PA        hydrALAZINE (APRESOLINE) tablet 10 mg  10 mg Oral TID PRN Iris Bird PA        ondansetron (ZOFRAN ODT) ODT tab 4 mg  4 mg Oral Q6H PRN Lars Krause MBBS        senna-docusate (SENOKOT-S/PERICOLACE) 8.6-50 MG per tablet 1 tablet  1 tablet Oral BID PRN Iris Bird PA            PHYSICAL EXAM  Patient Vitals for the past 24 hrs:   BP Temp Temp src Pulse Resp SpO2   03/12/25 0853 132/69 98.1  F (36.7  C) Oral 79 18 99 %   03/12/25 0815 115/62 -- -- -- -- --   03/11/25 1544 (!) 145/68 98  F (36.7  C) Oral 65 16 99 %     GEN: NAD, pleasant and cooperative  HEENT: NC/AT.  Right facial droop.  PULM: Non-labored breathing on room air  ABD: Non-distended  EXT: No LE edema, R AFO in place  Neuro: Answers appropriately, follows commands, mildly dysarthric.  RUE weakness, 2/5 to shoulder abduction and elbow flexion    LABS  CBC RESULTS:   Recent Labs   Lab Test 03/10/25  0831 03/08/25  0651 03/04/25  0637 03/03/25  1915   WBC 7.3  --  7.5 7.6   RBC 4.94  --  5.04 4.73   HGB 14.3  --  14.7 13.6   HCT 44.3  --  43.7 41.3   MCV 90  --  87 87   MCH 28.9  --  29.2 28.8   MCHC 32.3  --  33.6 32.9   RDW 12.7  --  12.5 12.4    265 243 225       Last Comprehensive Metabolic Panel:  Sodium    Date Value Ref Range Status   03/10/2025 136 135 - 145 mmol/L Final     Potassium   Date Value Ref Range Status   03/10/2025 4.4 3.4 - 5.3 mmol/L Final     Chloride   Date Value Ref Range Status   03/10/2025 101 98 - 107 mmol/L Final     Carbon Dioxide (CO2)   Date Value Ref Range Status   03/10/2025 23 22 - 29 mmol/L Final     Anion Gap   Date Value Ref Range Status   03/10/2025 12 7 - 15 mmol/L Final     Glucose   Date Value Ref Range Status   03/10/2025 146 (H) 70 - 99 mg/dL Final     GLUCOSE BY METER POCT   Date Value Ref Range Status   03/06/2025 109 (H) 70 - 99 mg/dL Final     Comment:     Dr/RN Notified     Urea Nitrogen   Date Value Ref Range Status   03/10/2025 43.3 (H) 8.0 - 23.0 mg/dL Final     Creatinine   Date Value Ref Range Status   03/10/2025 1.14 (H) 0.51 - 0.95 mg/dL Final     GFR Estimate   Date Value Ref Range Status   03/10/2025 48 (L) >60 mL/min/1.73m2 Final     Comment:     eGFR calculated using 2021 CKD-EPI equation.     Calcium   Date Value Ref Range Status   03/10/2025 9.5 8.8 - 10.4 mg/dL Final       Recent Labs   Lab 03/10/25  0831 03/06/25  0726 03/06/25  0634 03/05/25  2134 03/05/25  1623   * 109* 117* 109* 105*       IMPRESSION/PLAN:  Fang Estes is a 84 year old right hand dominant woman with past medical history of HTN, CKD, breast CA, asthma, and osteoporosis who presented with right sided weakness found to have 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   Admitted to rehab 03/06/2025 in setting of impaired strength, impaired activity tolerance, impaired balance, impaired coordination, dysarthria, and dysphagia.      Admission to acute inpatient rehab 03/06/2025.    Impairment group code: Stroke Ischemic 01.2 (R) Body Involvement (L) Brain; Acute left pontine stroke likely due to intracranial atherosclerosis      PT, OT and SLP 60 minutes of each on a daily basis up to 6 days per week, in addition to rehab nursing and close management  "of physiatrist x15 days.       Impairment of ADL's: Noted to have impaired strength, impaired activity tolerance, impaired balance,  and impaired coordination leading to decreased ability to independently complete ADL's.  Will benefit from ongoing OT with goal for MOD I with basic ADLs.      Impairment of mobility:  Noted to have impaired strength, impaired activity tolerance,  and impaired balance leading to decreased mobility.  Will benefit from ongoing PT with goal for MICAH with basic mobility.      Impairment of cognition/language/swallow:  Noted to have impaired swallow and dysarthria will benefit from ongoing SLP to assess safety of swallow and provide ongoing safe swallow strategies, improve ability to communicate needs effectively.      Medical Conditions  Acute Left Pontine stroke  Intracranial atherosclerosis  Presented with right sided weakness, impaired coordination, CT head neg for acute stroke, MRI with left pontine stroke. Imaging with extensive intracranial atherosclerotic disease.  EF wnl, A1C 6.3%, . Seen by stroke neurology, \" in view of extensive intracranial atherosclerotic disease, I would recommend continuing baby aspirin and Plavix indefinitely\"   - HTN as below- long term goal 120-130/80 (next one week) with permissive HTN (3/3-3/10)  -HLD as below- LDL goal <70  -continue PT/OT/SLP  -follow up for 30 day event monitor  -continue asa 81 and plavix     Anterior communicating artery 2 mm aneurysm  Right tentorial leaf meningioma  Found incidentally during stroke workup  -routine surveillance imaging     HTN  Period of permissive HTN now completed, long term BP goal 120-130/80  -continue lisinopril 10 mg daily  -BP 110s-140s last 24 hours- continue to monitor and titrate as indicated     HLD  -ldl 152, started on atorvastatin 80 mg daily, long term goal LDL <70  -continue atorvastatin 80 mg daily     Stage 3A CKD  Cr baseline appears to be around 1.1.  stable 1.14 3/10  -trend     Mild " persistent asthma  -continue Singulair, albuterol prn     Invasive lobular carcinoma  Hormone positive HER2 neg, s/p lumpectomy, follows with oncology   -continue arimidex     Prediabetes        Lab Results   Component Value Date     A1C 6.3 03/03/2025   -trend glucose with labs.     Fungal rash  -appreciate wocn recs- uche bid         Adjustment to disability:  Clinical psychology to eval and treat as indicated  FEN: reg  Bowel: monitor; on prn bowel meds  Bladder: monitor; PVRs x2 mildly elevated at 161 and 143  Continue to monitor bladder patterns.  No further scans obtained, but appears to be continent and voiding without difficulty  DVT Prophylaxis: Discontinue Lovenox given ambulation distances  GI Prophylaxis: continue Protonix 40 mg daily - started due to DAPT and age  Code: full- confirmed on admission.   Disposition: goal for home  ELOS:  Tentative discharge date 3/18/25 with OP PT, OT, ?SLP  Rehab prognosis:  good  Follow up Appointments on Discharge: pcp, neuro,     Hong Wilson MD  Department of Rehabilitation Medicine    Time Spent on this Encounter   I spent a total of 40 minutes face-to-face or managing the care of Fang Estes. Over 50% of my time on the unit was spent counseling the patient and coordinating care. See note for details.

## 2025-03-12 NOTE — PLAN OF CARE
Acute Rehab Care Conference/Team Rounds    Type: Team Rounds    Present: Dr. Wilson, Ani Pitts MD, Pelon SOSA PA, Martine Pagan PT, Rissa Alex OT, Adiel Diaz SLP, Queenie Leija , Macey Griffin RD, Andreia Ervin RN, Franklyn Headley Patient    Discharge Barriers/Treatment/Education    Rehab Diagnosis: L Pontine CVA    Active Medical Co-morbidities/Prognosis: Intracranial atherosclerosis, HTN, HLD, incidental AComm aneurysm and meningioma, CKD IIIA, asthma, lobular carcinoma, prediabetes, fungal rash    Safety: Uses call light appropriately to communicate needs;     Pain: Denies pain    Medications, Skin, Tubes/Lines: Takes medication whole with thin liquids. Swapna rectal rash to coccyx. Antifungal paste ordered.     Swallowing/Nutrition: Diet has been advanced to regular textures with thin liquids. Benefits from occasional cues to limit consecutive swallows. Anticipate dysphagia related goals being met in upcoming days and no ongoing dysphagia interventions.     Bowel/Bladder: Continent bowel/bladder, ambulates to bathroom with assist of 1 using a walker. 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 Pt works part time and still drives. IMM signed and placed in physical chart. Stroke resource guide was given. lives with spouse 3 lit and flight inside;stairs to enter home;stairs within home     ADLs/IADLs: Making good progress with ADLs/IADLs. Performance limited by hemiparesis, impaired balance, impaired attention. ADL measures are listed below.  ADLs:  Mobility:  CGA transfers/mobility FWW, AFO.  Grooming: SBA standing at sink.  Dressing: UB Min A. LB Min A.  Bathing: Mod A extended tub bench.  Toileting: CGA transfer. Assist thorough hygiene, CGA clothing management.  IADLs: PLOF IND. Spouse to assist at discharge.  Vision/Cognition: Dysarthria. Mild deficits attention.   Goals to advance to SBA/Mod IND FWW with mobility. May need increased  assist ADL due to significant deficits distal RUE, reinforcing increased use of armando techniques. Recommend increased IADL support from spouse. Recommend OP OT follow up. Equipment: shower bench vs chair, R AFO, R resting hand orthosis, elastic.     Mobility: Progressing well w/ mobility. Sarah w/ bed mobility. CGA w/ FWW or quad cane using R AFO, needing up to Sarah at times with turns or in busy environments. Expect pt will progress to Sarah, still problem solving LRAD. Ind currently limited by balance impairments and R foot catching w/ gait. Bermeo score and gait speed indicate medium fall risk. Pt remains very motivated and sessions focused on dynamic balance and HIGT strategies. Pt will need assist with community mobility, stairs and IADLs at home, spouse able to provide. Will plan to complete family training prior to discharge. OP PT    Cognition/Language: Speech intelligibility is generally good with just occasional need for repetitions. Patient aware of the strategies to use and independence utilizing the strategies improving. Likely no ongoing SLP interventions at discharge pending level of progress over remainder of ARU stay. Cognition seems to be WNL.  Intensity of treatment decreased to single 30-45 minute session/day.     Community Re-Entry: Recommending ongoing therapies to promote improved safety and IND at discharge.    Transportation: Car transfer not a barrier. Family will provide.     Decision maker: self    Plan of Care and goals reviewed and updated.    Discharge Plan/Recommendations    Fall Precautions: continue    Patient/Family input to goals: Yes    Anticipated rehab needs following discharge: OP PT, OT, ?SLP    Anticipated care giver support after discharge: Spouse    Estimated length of stay: 12 days    Overall plan for the patient: Pt motivated and making progress, but still with RUE weakness distal > proximal.  Will need some assistance with ADLs due to weakness, especially with lower body  cares.  Spouse supportive and will need family training, and have set tentative discharge date for 3/18.  Appropriate to continue with current plan of care.        Utilization Review and Continued Stay Justification    Medical Necessity Criteria:    For any criteria that is not met, please document reason and plan for discharge, transfer, or modification of plan of care to address.    Requires intensive rehabilitation program to treat functional deficits?: Yes    Requires 3x per week or greater involvement of rehabilitation physician to oversee rehabilitation program?: Yes    Requires rehabilitation nursing interventions?: Yes    Patient is making functional progress?: Yes    There is a potential for additional functional progress? Yes    Patient is participating in therapy 3 hours per day a minimum of 5 days per week or 15 hours per week in 7 day period?:Yes    Has discharge needs that require coordinated discharge planning approach?:Yes      Barriers/Concerns related to meeting medical necessity criteria:  None    Team Plan to Address Concern:  N/A      Final Physician Sign off    Statement of Approval: I have reviewed and agree with the recommendations and documentation in this team rounds note.       Patient Goals    Social Work Goals: Confirm discharge recommendations with therapy, coordinate safe discharge plan and remain available to support and assist as needed.     OT Predicted Duration/Target Date for Goal Attainment: 03/17/25  Therapy Frequency (OT): 6 times/week  OT: Hygiene/Grooming: modified independent  OT: Upper Body Dressing: Modified independent  OT: Lower Body Dressing: Modified independent  OT: Upper Body Bathing: Modified independent  OT: Lower Body Bathing: Modified independent  OT: Bed Mobility: Modified independent  OT: Transfer: Supervision/stand-by assist (shower)  OT: Toilet Transfer/Toileting: Modified independent  OT: Meal Preparation: Supervision/stand-by assist  OT: Home Management:  Supervision/stand-by assist    PT Predicted Duration/Target Date for Goal Attainment: 03/20/25  PT Frequency: 6x/week  PT: Bed Mobility: Modified independent  PT: Transfers: Modified independent  PT: Gait: Modified independent, 150 feet  PT: Stairs: Greater than 10 stairs (2STE no railin, 14STI w/ R railing, SBA)  PT: Goal 1: Car transfer w/ FWW, SBA  PT: Goal 2: Floor transfer w/ SBA and furniture assist.    SLP Predicted Duration/Target Date for Goal Attainment: 03/21/25  Therapy Frequency (SLP Eval): 6 times/week  SLP: Safely tolerate diet without signs/symptoms of aspiration: Regular diet, Thin liquids, With use of swallow precautions, With use of compensatory swallow strategies  SLP: Goal 1: Pt will IND implement clear speech stratigies (slow down, loudness, overarticulate, breathe) to increase speech intelligibility to 90% within conversation.     Patient/Family Goal: Medication Management: (P) Pt will demonstrate ability to manage meds safely before discharge.  Goal: Skin Integrity: Pt will demonstrate understanding of importance of maintaining skin integrity by turning and repositioning frequently and asking assist as needed.  Goal: Safety Management: (P) Pt will demonstrate safety awareness by using the call light and wating for staff assist until deemed safe for independent transfers/mobilty by rehab team during ARU stay.       Goal Outcome Evaluation:      Plan of Care Reviewed With: patient    Overall Patient Progress: no changeOverall Patient Progress: no change

## 2025-03-12 NOTE — PLAN OF CARE
Discharge Planner Post-Acute Rehab OT:      Discharge Plan: Home with Spouse; OP OT     Precautions: Fall, R hemiparesis, R AFO for transfers and amb   RUE resting hand orthosis on overnight, off during day     Current Status:  ADLs:  Mobility:  CGA transfers/mobility FWW, AFO.  Grooming: SBA standing at sink.  Dressing: UB Min A. LB Min A.  Bathing: Min A extended tub bench.  Toileting: CGA transfer. CGA clothing management and hygiene.  IADLs: PLOF IND. Spouse to assist at discharge.  Vision/Cognition: Dysarthria. Mild deficits attention.      Assessment: Team rounds today - goal to discharge home 3/18 with spouse support and OP therapy. Hopeful to advance to Mod IND but may need some assist ADL due to RUE hemiparesis. Caregiver training set up 3/16 1:15-3:15pm.      Other Barriers to Discharge (DME, Family Training, etc):   Equipment: Recommend shower bench, walker, AFO.  Caregiver support: Spouse supportive, able to provide assist ADL at discharge prn. Training set up 3/16 1:15-3:15pm.  Home set up: 2 RASHAUN no rail + 14 STI R railing

## 2025-03-12 NOTE — PLAN OF CARE
Discharge Planner Post-Acute Rehab SLP:     Discharge Plan: home with spouse. Ongoing SLP     Precautions: fall, aspiration     Current Status:  Hearing: WFL  Vision: reading glasses  Communication: mild-mod dysarthria and suspect slight apraxia c/b slow, effortful, haulting speech with reduced loudness especially within the conversation level.  Cognition: RBANS scores grossly intact with exception to slightly impaired attention. Pt feels at baseline cognitively. Pt mostly concerned with intelligibility   Swallow: Regular Solids, Thin (0) liquids by SMALL single sip. Straws okay. Fully upright all PO.    Assessment:   Swallowing: Pt seen seated upright in chair for meal. f/u with regular textures and thin liquids. Pt unable to recall safe swallow strategies. re-edu pt on importance of small single sip by cup or straw for safety. pt prolionged but functional mastication and mod oral residues, mostly on right side, that cleared with subsequent liquid wash. Pt able to recall importance of lingual sweep when there are a build up of residues.     Speech: Pt did well utilizing dysarthria strategies during unstructured conversation. Intelligibility was within functional limits throughout conversation. Pt noted that when watching tv commercials she likes to read the subtitles out loud to practice speech strategies.     Other Barriers to Discharge (Family Training, etc): family training: diet pending progress, dysarthria/communicaiton strategies to family

## 2025-03-12 NOTE — PROGRESS NOTES
ARU Social Work Progress Notes     Team rounds today; anticipating a discharge of 03/20 with OP therapies. PT/OT and SLP?    SIOMARA Lazaro  [facility], Acute Inpatient Rehab Unit   [address] [place] [address], [address] [place] [address]  Phone: [phone]  Fax: [phone]

## 2025-03-12 NOTE — PLAN OF CARE
Goal Outcome Evaluation:      Plan of Care Reviewed With: patient           Vitals: Stable. She has an am scheduled lisinopril.  Neuros: Alert and oriented x 4. Speech slightly slurred.  Cardiac: Regular. Denied pain.  Respiratory: Clear. Non labored.  Pain: Denied pain.  Musculoskeletal/Mobility:Right sided weakness. AFO to left LE when out of bed. Assist of 1 with transfers and walking in room using the walker.  Diet: Tolerating regular/thin with good appetite.  Bladder: Continent.  Bowel: Continent. LBM today.  Skin/Lines: Rash in diogo rectal area looking better, Atifungal barrier cream applied.  Safety: Alarms on. Frequent checks and cannot be left alone in the toilet or sitting up in w/c because of dystonia but not noted today.

## 2025-03-12 NOTE — PROGRESS NOTES
"Discharge Plan: home w/ spouse assist prn     Precautions: fall, R hemiparesis, R AFO for transfers and amb     Current Status:  Bed Mobility: Sarah  Transfer: CGA w/ quad cane vs FWW  Gait: Up to 300 w/ quad cane vsFWW, Sarah w/ cueing fro R foot drag  Stairs: 6\" x12 w/ modA for RLE management  Balance: Stable dynamic sitting. Able to stand unsupported static, requires UE support for dynamic stepping.      Outcome Measures:   Bermeo               3/8: 26/56  10MWT               3/7: 0.31m/s self paced; 0.48m/s fast, Sarah w/ FWW     Assessment: Continues to work very hard in therapies. Does well with tall kneeling and quadruped for RLE/RUE activation.     Other Barriers to Discharge (DME, Family Training, etc):   2STE no rail + 14STI R railing        DME: owns FWW  "

## 2025-03-13 ENCOUNTER — APPOINTMENT (OUTPATIENT)
Dept: SPEECH THERAPY | Facility: CLINIC | Age: 85
DRG: 057 | End: 2025-03-13
Attending: PHYSICAL MEDICINE & REHABILITATION
Payer: COMMERCIAL

## 2025-03-13 ENCOUNTER — APPOINTMENT (OUTPATIENT)
Dept: PHYSICAL THERAPY | Facility: CLINIC | Age: 85
DRG: 057 | End: 2025-03-13
Attending: PHYSICAL MEDICINE & REHABILITATION
Payer: COMMERCIAL

## 2025-03-13 ENCOUNTER — APPOINTMENT (OUTPATIENT)
Dept: OCCUPATIONAL THERAPY | Facility: CLINIC | Age: 85
DRG: 057 | End: 2025-03-13
Attending: PHYSICAL MEDICINE & REHABILITATION
Payer: COMMERCIAL

## 2025-03-13 LAB
CREAT SERPL-MCNC: 1.09 MG/DL (ref 0.51–0.95)
EGFRCR SERPLBLD CKD-EPI 2021: 50 ML/MIN/1.73M2
PLATELET # BLD AUTO: 222 10E3/UL (ref 150–450)

## 2025-03-13 PROCEDURE — 82565 ASSAY OF CREATININE: CPT | Performed by: PHYSICAL MEDICINE & REHABILITATION

## 2025-03-13 PROCEDURE — 250N000013 HC RX MED GY IP 250 OP 250 PS 637: Performed by: PHYSICAL MEDICINE & REHABILITATION

## 2025-03-13 PROCEDURE — 85049 AUTOMATED PLATELET COUNT: CPT | Performed by: PHYSICAL MEDICINE & REHABILITATION

## 2025-03-13 PROCEDURE — 36415 COLL VENOUS BLD VENIPUNCTURE: CPT | Performed by: PHYSICAL MEDICINE & REHABILITATION

## 2025-03-13 PROCEDURE — 128N000003 HC R&B REHAB

## 2025-03-13 PROCEDURE — 97116 GAIT TRAINING THERAPY: CPT | Mod: GP

## 2025-03-13 PROCEDURE — 250N000013 HC RX MED GY IP 250 OP 250 PS 637: Performed by: PHYSICIAN ASSISTANT

## 2025-03-13 PROCEDURE — 97112 NEUROMUSCULAR REEDUCATION: CPT | Mod: GO | Performed by: OCCUPATIONAL THERAPIST

## 2025-03-13 PROCEDURE — 92507 TX SP LANG VOICE COMM INDIV: CPT | Mod: GN

## 2025-03-13 PROCEDURE — 97112 NEUROMUSCULAR REEDUCATION: CPT | Mod: GP

## 2025-03-13 PROCEDURE — 99232 SBSQ HOSP IP/OBS MODERATE 35: CPT | Performed by: PHYSICIAN ASSISTANT

## 2025-03-13 PROCEDURE — 92526 ORAL FUNCTION THERAPY: CPT | Mod: GN

## 2025-03-13 RX ADMIN — MONTELUKAST 10 MG: 10 TABLET, FILM COATED ORAL at 21:09

## 2025-03-13 RX ADMIN — PANTOPRAZOLE SODIUM 40 MG: 40 TABLET, DELAYED RELEASE ORAL at 06:08

## 2025-03-13 RX ADMIN — Medication 5 MG: at 21:09

## 2025-03-13 RX ADMIN — CLOPIDOGREL 75 MG: 75 TABLET ORAL at 07:53

## 2025-03-13 RX ADMIN — Medication 50 MCG: at 07:53

## 2025-03-13 RX ADMIN — MICONAZOLE NITRATE: 20 CREAM TOPICAL at 07:55

## 2025-03-13 RX ADMIN — LISINOPRIL 10 MG: 10 TABLET ORAL at 07:53

## 2025-03-13 RX ADMIN — ANASTROZOLE 1 MG: 1 TABLET, COATED ORAL at 07:53

## 2025-03-13 RX ADMIN — ASPIRIN 81 MG: 81 TABLET, COATED ORAL at 07:53

## 2025-03-13 RX ADMIN — ATORVASTATIN CALCIUM 80 MG: 80 TABLET, FILM COATED ORAL at 21:09

## 2025-03-13 RX ADMIN — MICONAZOLE NITRATE: 20 CREAM TOPICAL at 21:20

## 2025-03-13 ASSESSMENT — ACTIVITIES OF DAILY LIVING (ADL)
ADLS_ACUITY_SCORE: 42

## 2025-03-13 NOTE — PROGRESS NOTES
Discharge Planner Post-Acute Rehab OT:      Discharge Plan: Home with Spouse; OP OT     Precautions: Fall, R hemiparesis, R AFO for transfers and amb   RUE resting hand orthosis on overnight, off during day     Current Status:  ADLs:  Mobility:  CGA transfers/mobility FWW, AFO.  Grooming: SBA standing at sink.  Dressing: UB Min A. LB Min A.  Bathing: Min A extended tub bench.  Toileting: CGA transfer. CGA clothing management and hygiene.  IADLs: PLOF IND. Spouse to assist at discharge.  Vision/Cognition: Dysarthria. Mild deficits attention.      Assessment: Utilized FES for RUE neuro re-education and sensorimotor skills; refer to care plan note for further details.     Other Barriers to Discharge (DME, Family Training, etc):   Equipment: Recommend shower bench, walker, AFO.  Caregiver support: Spouse supportive, able to provide assist ADL at discharge prn. Training set up 3/16 1:15-3:15pm.  Home set up: 2 RASHAUN no rail + 14 STI R railing

## 2025-03-13 NOTE — PROGRESS NOTES
"Discharge Plan: home w/ spouse assist prn     Precautions: fall, R hemiparesis, R AFO for transfers and amb     Current Status:  Bed Mobility: Sarah  Transfer: CGA w/ quad cane vs FWW  Gait: Up to 300 w/ quad cane vsFWW, Sarah w/ cueing fro R foot clearance  Stairs: 6\" x12 w/ modA for RLE management  Balance: Stable dynamic sitting. Able to stand unsupported static, requires UE support for dynamic stepping.      Outcome Measures:   Bermeo               3/8: 26/56  10MWT               3/7: 0.31m/s self paced; 0.48m/s fast, Sarah w/ FWW     Assessment: Pt engaged in Xcite for electrical stimulation during step-tap task, and STS. PM pt engaged in gait training with use of resistance of forward propulsion for increased intensity of gait.    Other Barriers to Discharge (DME, Family Training, etc):   2STE no rail + 14STI R railing        DME: owns FWW  "

## 2025-03-13 NOTE — PLAN OF CARE
Goal Outcome Evaluation:      Plan of Care Reviewed With: patient    Overall Patient Progress: no changeOverall Patient Progress: no change       Orientation:alert and oriented  Bowel: continent  LBM: 3/12  Bladder:continent  Pain: denies  Ambulation/Transfers: 1 assist with walker and gait belt  Diet/Liquids: regular diet, thin liquids, pills whole   Tubes/Lines/Drains: none   Skin: groin redness, has miconazole ointment

## 2025-03-13 NOTE — PROGRESS NOTES
CLINICAL NUTRITION SERVICES    Reviewed nutrition risk factors due to LOS. Pt consuming % of meals per flowsheets since admission. Per [employer], pt ordering 3 adequate meals/day from room service. Reviewed wt hx. No unintentional wt loss. No indication of pressure injury.    Follow Up / Monitoring:   Per policy unless consulted    Macey Griffin RD, LD  Available via phone and Vocera  Phone: [phone]  Vocera: 5R Acute Rehab Clinical Dietitian  Weekend/Holiday Vocera: Weekend Holiday Clinical Dietitian [Multi Site Groups]

## 2025-03-13 NOTE — PROGRESS NOTES
within frame and cushion for prevention of skin breakdown, UE secured to arm support.  Passive motion assessed to ensure proper positioning.       Pt performed 35 minutes of active FES ergometry with 0-100% stimulation applied to above muscles at 30-40 rpm with .5-1.76nm resistance.  This OT adjusted e-stim and cycling parameters in real-time to ensure palpable muscle contractions throughout session.  Please see [email or web address] for further details on patient's stimulation parameters and ergometry outcomes.       Changes in parameters that were part of this treatment:   -resistance  -pulse width, frequency  -amplitude  -pedal speed     Functional outcomes from this intervention include:   -reduced spasticity  -improved sensory awareness and proprioception  -improved muscle strength  -improved motor coordination     Session Summary:   -Average Power:  .8W  -Asymmetry: L 0%  -Active Minutes: 1.58 min

## 2025-03-13 NOTE — PLAN OF CARE
Goal Outcome Evaluation:      Plan of Care Reviewed With: patient    Overall Patient Progress: no change  Pt is alert and oriented x 4 , denies pain. On regular diet,, thin liquids and takes pills whole. Assist of x 1 walker and GB. Continent of Bm and bladder, LBM 3/12. Pt had shower this evening. Call light within reach and safety precaution is in place. Will continue POC.

## 2025-03-13 NOTE — PLAN OF CARE
Discharge Planner Post-Acute Rehab SLP:     Discharge Plan: home with spouse. Ongoing SLP TBD    Precautions: fall, aspiration     Current Status:  Hearing: WFL  Vision: reading glasses  Communication: mild-mod dysarthria and suspect slight apraxia c/b slow, effortful, haulting speech with reduced loudness especially within the conversation level.  Cognition: RBANS scores grossly intact with exception to slightly impaired attention. Pt feels at baseline cognitively. Pt mostly concerned with intelligibility   Swallow: Regular Solids, Thin (0) liquids by SMALL single sip. Straws okay. Fully upright all PO.    Assessment: Pt able to recall safe swallow strategy of small single sip and oral clearance strategy IND. Pt repositioned upright in bed and agreeable to trial snack of regular food textures and thin (0) liquids. Pt with prolonged mastication, delayed AP transit, and minimal oral residuals in R buccal cleared with lingual sweep. Pt with single instance coughing following larger sip of thin liquid otherwise no s/sx aspiration. Pt re-educated on importance of small single sips to prevent aspiration and was able to implement strategy IND throughout rest of snack. Continue current diet of regular food, thin (0) liquids with small single sips. Straws okay. Pt reported she IND implementing strategies for intelligibility with news report. SLP provided pt with tongue twisters to continue practice outside of SLP.     Other Barriers to Discharge (Family Training, etc): family training: diet pending progress, dysarthria/communicaiton strategies to family

## 2025-03-13 NOTE — PLAN OF CARE
Goal Outcome Evaluation:      Plan of Care Reviewed With: patient    Overall Patient Progress: no changeOverall Patient Progress: no change      No acute events this shift. Pt remains A/Ox4 with slurred/slow speech. Ax1 w/ walker, GB. Continent BB LBM 3/12. Denies new CP, SOB, or generalized pain. Regular diet, takes meds whole with thin liquids. Blanchable redness to sacral area with protective mepilex applied, CDI. Call light within reach, pt calls appropriately to communicate needs. POC ongoing.    Kizzy Villa RN

## 2025-03-13 NOTE — PROGRESS NOTES
"  [facility]   Acute Rehabilitation Unit  Daily progress note    INTERVAL HISTORY  Fang Estes was seen sitting up in chair, reports she is feeling fatigued after PT and OT this am.  Also reports ongoing issues with sleep reports she was ok sleeper at home, but worse since hospitalized.  Tried melatonin which felt maybe slightly beneficial, falls asleep but then wakes, last night was around 3am and was unable to fall back asleep.  Denies noise/ pain/ racing thoughts/anxiety, keeping her up.  She is not interested in adding additional medications at this time \"I'm already on so many new medications\".     Otherwise denies, headache, dizziness, sob, and fever.  Denies bowel and bladder concerns.  Eating ok.      OT:   Current Status:  ADLs:  Mobility:  CGA transfers/mobility FWW, AFO.  Grooming: SBA standing at sink.  Dressing: UB Min A. LB Min A.  Bathing: Min A extended tub bench.  Toileting: CGA transfer. CGA clothing management and hygiene.  IADLs: PLOF IND. Spouse to assist at discharge.  Vision/Cognition: Dysarthria. Mild deficits attention.      Assessment: Utilized FES for RUE neuro re-education and sensorimotor skills; refer to care plan note for further details.        MEDICATIONS  Scheduled:  Current Facility-Administered Medications   Medication Dose Route Frequency Provider Last Rate Last Admin    alendronate (FOSAMAX) tablet 70 mg  70 mg Oral Weekly Ani Wilson MD   70 mg at 03/09/25 0657    anastrozole (ARIMIDEX) tablet 1 mg  1 mg Oral QAM Iris Bird PA   1 mg at 03/13/25 0753    aspirin EC tablet 81 mg  81 mg Oral Daily Iris Bird PA   81 mg at 03/13/25 0753    atorvastatin (LIPITOR) tablet 80 mg  80 mg Oral QPM Iris Bird PA   80 mg at 03/12/25 2140    clopidogrel (PLAVIX) tablet 75 mg  75 mg Oral Daily Iris Bird PA   75 mg at 03/13/25 0753    lisinopril (ZESTRIL) tablet 10 mg  10 mg Oral QAM Iris Bird PA   " 10 mg at 03/13/25 0753    melatonin tablet 5 mg  5 mg Oral QPM Iris Bird PA   5 mg at 03/12/25 2140    miconazole with skin protectant (MAVIS ANTIFUNGAL) 2 % cream   Topical BID Iris Bird PA   Given at 03/13/25 0755    montelukast (SINGULAIR) tablet 10 mg  10 mg Oral At Bedtime Iris Bird PA   10 mg at 03/12/25 2140    pantoprazole (PROTONIX) EC tablet 40 mg  40 mg Oral [facility] Ani Wilson MD   40 mg at 03/13/25 0608    Vitamin D3 (CHOLECALCIFEROL) tablet 50 mcg  50 mcg Oral Iris Anders PA   50 mcg at 03/13/25 0753      PRN:  Current Facility-Administered Medications   Medication Dose Route Frequency Provider Last Rate Last Admin    acetaminophen (TYLENOL) tablet 650 mg  650 mg Oral Q6H PRN Iris Bird PA        albuterol (PROVENTIL HFA/VENTOLIN HFA) inhaler  2 puff Inhalation Q4H PRN Iris Bird PA        hydrALAZINE (APRESOLINE) tablet 10 mg  10 mg Oral TID PRN Iris Bird PA        ondansetron (ZOFRAN ODT) ODT tab 4 mg  4 mg Oral Q6H PRN Lars Krause MBBS        senna-docusate (SENOKOT-S/PERICOLACE) 8.6-50 MG per tablet 1 tablet  1 tablet Oral BID PRN Iris Bird PA            PHYSICAL EXAM  Patient Vitals for the past 24 hrs:   BP Temp Temp src Pulse Resp SpO2   03/13/25 0751 127/58 97.1  F (36.2  C) Axillary 60 16 99 %   03/12/25 1819 132/70 98.6  F (37  C) Oral 68 16 97 %     GEN: NAD, pleasant and cooperative  HEENT: NC/AT.  Right facial droop.  PULM: Non-labored breathing on room air  ABD: Non-distended non tender  EXT: No LE edema, R AFO in place  Neuro: Answers appropriately, follows commands, mildly dysarthric.      LABS  CBC RESULTS:   Recent Labs   Lab Test 03/13/25  0609 03/10/25  0831 03/08/25  0651 03/04/25  0637 03/03/25  3686   WBC  --  7.3  --  7.5 7.6   RBC  --  4.94  --  5.04 4.73   HGB  --  14.3  --  14.7 13.6   HCT  --  44.3  --  43.7 41.3   MCV  --  90  --  87 87   MCH  --  28.9  --  29.2 28.8   MCHC  --   32.3  --  33.6 32.9   RDW  --  12.7  --  12.5 12.4    255 265 243 225       Last Comprehensive Metabolic Panel:  Sodium   Date Value Ref Range Status   03/10/2025 136 135 - 145 mmol/L Final     Potassium   Date Value Ref Range Status   03/10/2025 4.4 3.4 - 5.3 mmol/L Final     Chloride   Date Value Ref Range Status   03/10/2025 101 98 - 107 mmol/L Final     Carbon Dioxide (CO2)   Date Value Ref Range Status   03/10/2025 23 22 - 29 mmol/L Final     Anion Gap   Date Value Ref Range Status   03/10/2025 12 7 - 15 mmol/L Final     Glucose   Date Value Ref Range Status   03/10/2025 146 (H) 70 - 99 mg/dL Final     GLUCOSE BY METER POCT   Date Value Ref Range Status   03/06/2025 109 (H) 70 - 99 mg/dL Final     Comment:     Dr/RN Notified     Urea Nitrogen   Date Value Ref Range Status   03/10/2025 43.3 (H) 8.0 - 23.0 mg/dL Final     Creatinine   Date Value Ref Range Status   03/13/2025 1.09 (H) 0.51 - 0.95 mg/dL Final     GFR Estimate   Date Value Ref Range Status   03/13/2025 50 (L) >60 mL/min/1.73m2 Final     Comment:     eGFR calculated using 2021 CKD-EPI equation.     Calcium   Date Value Ref Range Status   03/10/2025 9.5 8.8 - 10.4 mg/dL Final       Recent Labs   Lab 03/10/25  0831   *       IMPRESSION/PLAN:  Fang Estes is a 84 year old right hand dominant woman with past medical history of HTN, CKD, breast CA, asthma, and osteoporosis who presented with right sided weakness found to have 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   Admitted to rehab 03/06/2025 in setting of impaired strength, impaired activity tolerance, impaired balance, impaired coordination, dysarthria, and dysphagia.      Admission to acute inpatient rehab 03/06/2025.    Impairment group code: Stroke Ischemic 01.2 (R) Body Involvement (L) Brain; Acute left pontine stroke likely due to intracranial atherosclerosis      PT, OT and SLP 60 minutes of each on a daily basis up to 6 days per week, in  "addition to rehab nursing and close management of physiatrist x15 days.       Impairment of ADL's: Noted to have impaired strength, impaired activity tolerance, impaired balance,  and impaired coordination leading to decreased ability to independently complete ADL's.  Will benefit from ongoing OT with goal for MOD I with basic ADLs.      Impairment of mobility:  Noted to have impaired strength, impaired activity tolerance,  and impaired balance leading to decreased mobility.  Will benefit from ongoing PT with goal for MICAH with basic mobility.      Impairment of cognition/language/swallow:  Noted to have impaired swallow and dysarthria will benefit from ongoing SLP to assess safety of swallow and provide ongoing safe swallow strategies, improve ability to communicate needs effectively.      Medical Conditions  Acute Left Pontine stroke  Intracranial atherosclerosis  Presented with right sided weakness, impaired coordination, CT head neg for acute stroke, MRI with left pontine stroke. Imaging with extensive intracranial atherosclerotic disease.  EF wnl, A1C 6.3%, . Seen by stroke neurology, \" in view of extensive intracranial atherosclerotic disease, I would recommend continuing baby aspirin and Plavix indefinitely\"   - HTN as below- long term goal 120-130/80 (next one week) with permissive HTN (3/3-3/10)  -HLD as below- LDL goal <70  -continue PT/OT/SLP  -follow up for 30 day event monitor  -continue asa 81 and plavix     Anterior communicating artery 2 mm aneurysm  Right tentorial leaf meningioma  Found incidentally during stroke workup  -routine surveillance imaging     HTN  Period of permissive HTN now completed, long term BP goal 120-130/80  -continue lisinopril 10 mg daily  -BP 110s-140s last 24 hours- continue to monitor and titrate as indicated     HLD  -ldl 152, started on atorvastatin 80 mg daily, long term goal LDL <70  -continue atorvastatin 80 mg daily     Stage 3A CKD  Cr baseline appears to be " around 1.1.  stable 1.14 3/10--> 1.09 3/13  -trend     Mild persistent asthma  -continue Singulair, albuterol prn     Invasive lobular carcinoma  Hormone positive HER2 neg, s/p lumpectomy, follows with oncology   -continue arimidex     Prediabetes        Lab Results   Component Value Date     A1C 6.3 03/03/2025   -trend glucose with labs.     Fungal rash  -appreciate wocn recs- uche bid         Adjustment to disability:  Clinical psychology to eval and treat as indicated  FEN: reg  Bowel: monitor; on prn bowel meds  Bladder: monitor; PVRs x2 mildly elevated at 161 and 143  Continue to monitor bladder patterns.  No further scans obtained, but appears to be continent and voiding without difficulty  DVT Prophylaxis: ambulation  GI Prophylaxis: continue Protonix 40 mg daily - started due to DAPT and age  Code: full- confirmed on admission.   Disposition: goal for home  ELOS:  Tentative discharge date 3/18/25 with OP PT, OT, ?SLP  Rehab prognosis:  good  Follow up Appointments on Discharge: pcp, neuro,     Iris Bird PA-C  Department of Rehabilitation Medicine    Time Spent on this Encounter   I spent a total of  35  minutes face-to-face or managing the care of Fang Estes. Over 50% of my time on the unit was spent counseling the patient and coordinating care. See note for details.

## 2025-03-14 ENCOUNTER — APPOINTMENT (OUTPATIENT)
Dept: SPEECH THERAPY | Facility: CLINIC | Age: 85
DRG: 057 | End: 2025-03-14
Attending: PHYSICAL MEDICINE & REHABILITATION
Payer: COMMERCIAL

## 2025-03-14 ENCOUNTER — APPOINTMENT (OUTPATIENT)
Dept: OCCUPATIONAL THERAPY | Facility: CLINIC | Age: 85
DRG: 057 | End: 2025-03-14
Attending: PHYSICAL MEDICINE & REHABILITATION
Payer: COMMERCIAL

## 2025-03-14 ENCOUNTER — APPOINTMENT (OUTPATIENT)
Dept: PHYSICAL THERAPY | Facility: CLINIC | Age: 85
DRG: 057 | End: 2025-03-14
Attending: PHYSICAL MEDICINE & REHABILITATION
Payer: COMMERCIAL

## 2025-03-14 PROCEDURE — 128N000003 HC R&B REHAB

## 2025-03-14 PROCEDURE — 97112 NEUROMUSCULAR REEDUCATION: CPT | Mod: GP

## 2025-03-14 PROCEDURE — G0463 HOSPITAL OUTPT CLINIC VISIT: HCPCS

## 2025-03-14 PROCEDURE — 250N000013 HC RX MED GY IP 250 OP 250 PS 637: Performed by: PHYSICAL MEDICINE & REHABILITATION

## 2025-03-14 PROCEDURE — 250N000013 HC RX MED GY IP 250 OP 250 PS 637: Performed by: PHYSICIAN ASSISTANT

## 2025-03-14 PROCEDURE — 97530 THERAPEUTIC ACTIVITIES: CPT | Mod: GO

## 2025-03-14 PROCEDURE — 99231 SBSQ HOSP IP/OBS SF/LOW 25: CPT | Performed by: PHYSICAL MEDICINE & REHABILITATION

## 2025-03-14 PROCEDURE — 97535 SELF CARE MNGMENT TRAINING: CPT | Mod: GO

## 2025-03-14 PROCEDURE — 97530 THERAPEUTIC ACTIVITIES: CPT | Mod: GP | Performed by: REHABILITATION PRACTITIONER

## 2025-03-14 PROCEDURE — 92526 ORAL FUNCTION THERAPY: CPT | Mod: GN

## 2025-03-14 RX ADMIN — Medication 50 MCG: at 08:58

## 2025-03-14 RX ADMIN — MICONAZOLE NITRATE: 20 CREAM TOPICAL at 08:59

## 2025-03-14 RX ADMIN — ATORVASTATIN CALCIUM 80 MG: 80 TABLET, FILM COATED ORAL at 20:17

## 2025-03-14 RX ADMIN — Medication 5 MG: at 20:17

## 2025-03-14 RX ADMIN — LISINOPRIL 10 MG: 10 TABLET ORAL at 08:58

## 2025-03-14 RX ADMIN — PANTOPRAZOLE SODIUM 40 MG: 40 TABLET, DELAYED RELEASE ORAL at 05:48

## 2025-03-14 RX ADMIN — ANASTROZOLE 1 MG: 1 TABLET, COATED ORAL at 08:58

## 2025-03-14 RX ADMIN — CLOPIDOGREL 75 MG: 75 TABLET ORAL at 08:59

## 2025-03-14 RX ADMIN — ASPIRIN 81 MG: 81 TABLET, COATED ORAL at 08:58

## 2025-03-14 RX ADMIN — MONTELUKAST 10 MG: 10 TABLET, FILM COATED ORAL at 20:17

## 2025-03-14 ASSESSMENT — ACTIVITIES OF DAILY LIVING (ADL)
ADLS_ACUITY_SCORE: 39
ADLS_ACUITY_SCORE: 42
ADLS_ACUITY_SCORE: 39
ADLS_ACUITY_SCORE: 39
ADLS_ACUITY_SCORE: 42
ADLS_ACUITY_SCORE: 39
ADLS_ACUITY_SCORE: 39

## 2025-03-14 NOTE — CONSULTS
[facility] Nurse Inpatient Assessment     Consulted for: perineum    Summary: Fungal rash to perineal skin. Resolved 3/14    [facility] nurse follow-up plan: signing off    Patient History (according to provider note(s):      Fang Estes is a 84 year old right hand dominant woman with past medical history of HTN, CKD, breast CA, asthma, and osteoporosis who presented with right sided weakness found to have 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 Admitted to rehab 03/06/2025 in setting of impaired strength, impaired activity tolerance, impaired balance, impaired coordination, dysarthria, and dysphagia.      ARU Diagnosis: Stroke Ischemic 01.2 (R) Body Involvement (L) Brain; Acute left pontine stroke likely due to intracranial atherosclerosis     Assessment:      Areas visualized during today's visit: Perineal area    Skin Injury Location: Perineal skin    Last photo: 3/10  Skin injury due to: Fungal rash   Skin history and plan of care:   Confluent erythema with satellite lesions consistent with fungal rash in perineal and perianal skin. However, pt denies itching or burning in the area.   STATUS: healed 3/14  Supplies ordered: discussed with RN and discussed with patient        Treatment Plan:     Buttocks BID and as needed.   Cleanse the area with Narinder cleanse and protect, very gently with soft cloth.  Apply thin layer of Narinder antifungal plus incontinence barrier to perineal skin.   With repeat application, do not scrub the paste, only remove soiled paste and reapply.  If complete removal of paste is necessary use baby oil/mineral oil (#126683) and soft wash cloth.  Ensure pt has chair cushion (#925552) while sitting up in the chair.  Use only one blue pad in between mattress and pt. No brief in bed.      Orders: Written    RECOMMEND PRIMARY TEAM ORDER: None, at this time  Education provided: plan of care, wound progress, and  Infection prevention   Discussed plan of care with: Patient and Nurse  Notify [facility] if wound(s) deteriorate.  Nursing to notify the Provider(s) and re-consult the WO Nurse if new skin concern.    DATA:     Current support surface: Standard  Standard gel mattress (Isoflex)  Containment of urine/stool: Continent of bladder and Continent of bowel  BMI: Body mass index is 21.13 kg/m .   Active diet order: Orders Placed This Encounter      Combination Diet Regular Diet; Thin Liquids (level 0) (small single sip liquid)     Output: I/O last 3 completed shifts:  In: 420 [P.O.:420]  Out: -      Labs:   Recent Labs   Lab 03/10/25  0831   HGB 14.3   WBC 7.3     Pressure injury risk assessment:   Sensory Perception: 4-->no impairment  Moisture: 3-->occasionally moist  Activity: 3-->walks occasionally  Mobility: 3-->slightly limited  Nutrition: 3-->adequate  Friction and Shear: 2-->potential problem  Barry Score: 18    Jessica Wong RN CWOCN  Pager no longer is use, please contact through [employer] group: [facility] Nurse [facility]  Dept. Office Number: [phone]

## 2025-03-14 NOTE — PLAN OF CARE
Goal Outcome Evaluation:      Plan of Care Reviewed With: patient  Overall Patient Progress: no change  Overall Patient Progress: no change        Orientation: AOx4  Bowel: Continent  LBM: 3-12-25  Bladder: Continent  Pain: Denies  Ambulation/Transfers: Ax1 WW and GB  Diet/Liquids: Regular Diet; Thin Liquids; Pills Whole  Tubes/Lines/Drains: N/A  Skin: WDL x. Appearance: Redness to groin; applied scheduled antifungal cream      No acute changes this shift. Continue POC.

## 2025-03-14 NOTE — PROGRESS NOTES
"Discharge Plan: home w/ spouse assist prn     Precautions: fall, R hemiparesis, R AFO for transfers and amb     Current Status:  Bed Mobility: Sarah  Transfer: CGA w/ quad cane vs FWW  Gait: Up to 300 w/ quad cane vsFWW, Sarah w/ cueing fro R foot clearance  Stairs: 6\" x12 w/ modA for RLE management  Balance: Stable dynamic sitting. Able to stand unsupported static, requires UE support for dynamic stepping.      Outcome Measures:   Bermeo               3/8: 26/56  10MWT               3/7: 0.31m/s self paced; 0.48m/s fast, Sarah w/ FWW     Assessment: PT progressing with functional mobility with and without wheeled walker to increase strength of core and the right hip flexor.  Other Barriers to Discharge (DME, Family Training, etc):   2STE no rail + 14STI R railing        DME: owns FWW  "

## 2025-03-14 NOTE — PLAN OF CARE
Goal Outcome Evaluation:      Plan of Care Reviewed With: patient    Overall Patient Progress: no changeOverall Patient Progress: no change     Patient alert and oriented, able to make needs known  Slept most of the night  No complained of pain, no respiratory distress, appeared to be comfortable on bed during rounding checks  Continent of Bladder and Bowel, ambulates with walker to the bathroom, no BM this shift  Safety checks done, fall prevention maintained, bed alarm On, call light in reach  No significant changed overnight  Plan of care ongoing.

## 2025-03-14 NOTE — PROGRESS NOTES
Discharge Planner Post-Acute Rehab OT:      Discharge Plan: Home with Spouse; OP OT     Precautions: Fall, R hemiparesis, R AFO for transfers and amb   RUE resting hand orthosis on overnight, off during day     Current Status:  ADLs:  Mobility:  CGA transfers/mobility FWW, AFO.  Grooming: SBA standing at sink.  Dressing: UB set up assist. LB CGA standing to pull over hips. Footwear min A  Bathing: Min A extended tub bench.  Toileting: CGA transfer. CGA clothing management and hygiene.  IADLs: PLOF IND. Spouse to assist at discharge.  Vision/Cognition: Dysarthria. Mild deficits attention.      Assessment: Pt progressed with IND in FB dressing, utilizing armando techniques demonstrating good carryover and problem solving managing RLE. Pt completed high intensity dynamic circuit for gross motor coordination for functional mobility and standing and ambulatory ADLs, tolerated ~ 18 minutes without rest break.    Other Barriers to Discharge (DME, Family Training, etc):   Equipment: Recommend shower bench, walker, AFO.  Caregiver support: Spouse supportive, able to provide assist ADL at discharge prn. Training set up 3/16 1:15-3:15pm.  Home set up: 2 RASHAUN no rail + 14 STI R railing

## 2025-03-14 NOTE — PLAN OF CARE
"Goal Outcome Evaluation:      Plan of Care Reviewed With: patient    Overall Patient Progress: improving    VS: /59 (BP Location: Left arm, Patient Position: Semi-Todd's, Cuff Size: Adult Regular)   Pulse 61   Temp 97.6  F (36.4  C) (Oral)   Resp 18   Ht 1.6 m (5' 3\")   Wt 54.1 kg (119 lb 4.3 oz)   SpO2 98%   BMI 21.13 kg/m     O2: SpO2 > 98 and stable on RA. LS clear and equal bilaterally. Denies chest pain and SOB.    Output: Voids spontaneously without difficulty to bathroom.   Last BM: 3/12, denies abdominal discomfort. BS active / passing flatus.    Activity: Up with CGA with a walker/ belt   Skin: WDL.   Pain: Denied pain.   CMS: Intact, AOx4. Denies numbness and tingling.   Dressing: None   Diet: Regular diet. Denies nausea/vomiting.    LDA: None.   Equipment: IV pole, personal belongings,    Plan: Fall precautions maintained / Continue with plan of care. Call light within reach, pt able to make needs known. Safety check completed and alarms.   Additional Info:              "

## 2025-03-14 NOTE — PROGRESS NOTES
"  [facility]   Acute Rehabilitation Unit  Daily progress note    INTERVAL HISTORY  Fang Estes was seen in her room this morning.  No acute events overnight.  She reports she slept to about 3 in the morning, and has been awake since.  She has previously declined any further sleep medications and continues to do so today.  She otherwise has no concerns or complaints today, denies any chest pain, shortness of breath, fevers, chills, or problems with bowel or bladder.  Last bowel movement was yesterday.    Current Functional Status:  PT:  Bed Mobility: Sarah  Transfer: CGA w/ quad cane vs FWW  Gait: Up to 300 w/ quad cane vsFWW, Sarah w/ cueing fro R foot clearance  Stairs: 6\" x12 w/ modA for RLE management  Balance: Stable dynamic sitting. Able to stand unsupported static, requires UE support for dynamic stepping.      Outcome Measures:   Bermeo               3/8: 26/56  10MWT               3/7: 0.31m/s self paced; 0.48m/s fast, Sarah w/ FWW     Assessment: PT progressing with functional mobility with and without wheeled walker to increase strength of core and the right hip flexor.    OT:  ADLs:  Mobility:  CGA transfers/mobility FWW, AFO.  Grooming: SBA standing at sink.  Dressing: UB set up assist. LB CGA standing to pull over hips. Footwear min A  Bathing: Min A extended tub bench.  Toileting: CGA transfer. CGA clothing management and hygiene.  IADLs: PLOF IND. Spouse to assist at discharge.  Vision/Cognition: Dysarthria. Mild deficits attention.      Assessment: Pt progressed with IND in FB dressing, utilizing armando techniques demonstrating good carryover and problem solving managing RLE. Pt completed high intensity dynamic circuit for gross motor coordination for functional mobility and standing and ambulatory ADLs, tolerated ~ 18 minutes without rest break.    SLP:  Hearing: WFL  Vision: reading glasses  Communication: mild-mod dysarthria and suspect slight apraxia c/b slow, " effortful, haulting speech with reduced loudness especially within the conversation level.  Cognition: RBANS scores grossly intact with exception to slightly impaired attention. Pt feels at baseline cognitively. Pt mostly concerned with intelligibility   Swallow: Regular Solids, Thin (0) liquids by SMALL single sip. Straws okay. Fully upright all PO. All dysphagia goals met 3/14.      Assessment: SLP: Pt seen at EOB for follow up meal of regular textures, thin (0) liquids by small single sip. Pt with prolonged but functional mastication, delayed AP transit, and minimal oral residuals. No s/sx aspiration across meal. Pt IND implemented safe swallow strategy of small single sip across meal. Pt demonstrating good oral clearance strategies with R lingual sweep IND. No further dysphagia concerns. Pt cognition largely intact and demonstrating ability to implement safe swallow strategies IND. Recommend pt continue current diet of regular textures, thin (0) liquids by small single sip. Dysphagia goals met.        MEDICATIONS  Scheduled:  Current Facility-Administered Medications   Medication Dose Route Frequency Provider Last Rate Last Admin    alendronate (FOSAMAX) tablet 70 mg  70 mg Oral Weekly Ani Wilson MD   70 mg at 03/09/25 0657    anastrozole (ARIMIDEX) tablet 1 mg  1 mg Oral QAM Iris Bird PA   1 mg at 03/14/25 0858    aspirin EC tablet 81 mg  81 mg Oral Daily Iris Bird PA   81 mg at 03/14/25 0858    atorvastatin (LIPITOR) tablet 80 mg  80 mg Oral QPM Iris Bird PA   80 mg at 03/13/25 2109    clopidogrel (PLAVIX) tablet 75 mg  75 mg Oral Daily Iris Bird PA   75 mg at 03/14/25 0859    lisinopril (ZESTRIL) tablet 10 mg  10 mg Oral QAM Iris Bird PA   10 mg at 03/14/25 0858    melatonin tablet 5 mg  5 mg Oral QPM Iris Bird PA   5 mg at 03/13/25 2109    miconazole with skin protectant (MAVIS ANTIFUNGAL) 2 % cream   Topical BID Iris Bird PA    Given at 03/14/25 0859    montelukast (SINGULAIR) tablet 10 mg  10 mg Oral At Bedtime Iris Bird PA   10 mg at 03/13/25 2109    pantoprazole (PROTONIX) EC tablet 40 mg  40 mg Oral JACKY Ani Lopez MD   40 mg at 03/14/25 0548    Vitamin D3 (CHOLECALCIFEROL) tablet 50 mcg  50 mcg Oral Iris Anders PA   50 mcg at 03/14/25 0858      PRN:  Current Facility-Administered Medications   Medication Dose Route Frequency Provider Last Rate Last Admin    acetaminophen (TYLENOL) tablet 650 mg  650 mg Oral Q6H PRN Iris Bird PA        albuterol (PROVENTIL HFA/VENTOLIN HFA) inhaler  2 puff Inhalation Q4H PRN Iris Bird PA        hydrALAZINE (APRESOLINE) tablet 10 mg  10 mg Oral TID PRN Iris Bird PA        ondansetron (ZOFRAN ODT) ODT tab 4 mg  4 mg Oral Q6H PRN Lars Krause MBBS        senna-docusate (SENOKOT-S/PERICOLACE) 8.6-50 MG per tablet 1 tablet  1 tablet Oral BID PRN Iris Bird PA            PHYSICAL EXAM  Patient Vitals for the past 24 hrs:   BP Temp Temp src Pulse Resp SpO2   03/14/25 0855 121/59 97.6  F (36.4  C) Oral 61 18 98 %   03/14/25 0600 130/67 97.7  F (36.5  C) Oral 61 18 95 %   03/13/25 1600 -- 97.5  F (36.4  C) Oral -- -- --   03/13/25 1517 113/58 -- -- 67 16 96 %     GEN: NAD, pleasant and cooperative  HEENT: NC/AT.  Right facial droop.  CVS: RRR, S1+S2  PULM: Non-labored breathing on room air, CTA b/l  ABD: Non-distended non tender, soft, BS+  EXT: No LE edema, R AFO in place, no calf tenderness b/l  Neuro: Answers appropriately, follows commands, mildly dysarthric.      LABS  CBC RESULTS:   Recent Labs   Lab Test 03/13/25  0609 03/10/25  0831 03/08/25  0651 03/04/25  0637 03/03/25  1915   WBC  --  7.3  --  7.5 7.6   RBC  --  4.94  --  5.04 4.73   HGB  --  14.3  --  14.7 13.6   HCT  --  44.3  --  43.7 41.3   MCV  --  90  --  87 87   MCH  --  28.9  --  29.2 28.8   MCHC  --  32.3  --  33.6 32.9   RDW  --  12.7  --  12.5 12.4     255 265 243 225       Last Comprehensive Metabolic Panel:  Sodium   Date Value Ref Range Status   03/10/2025 136 135 - 145 mmol/L Final     Potassium   Date Value Ref Range Status   03/10/2025 4.4 3.4 - 5.3 mmol/L Final     Chloride   Date Value Ref Range Status   03/10/2025 101 98 - 107 mmol/L Final     Carbon Dioxide (CO2)   Date Value Ref Range Status   03/10/2025 23 22 - 29 mmol/L Final     Anion Gap   Date Value Ref Range Status   03/10/2025 12 7 - 15 mmol/L Final     Glucose   Date Value Ref Range Status   03/10/2025 146 (H) 70 - 99 mg/dL Final     GLUCOSE BY METER POCT   Date Value Ref Range Status   03/06/2025 109 (H) 70 - 99 mg/dL Final     Comment:     Dr/RN Notified     Urea Nitrogen   Date Value Ref Range Status   03/10/2025 43.3 (H) 8.0 - 23.0 mg/dL Final     Creatinine   Date Value Ref Range Status   03/13/2025 1.09 (H) 0.51 - 0.95 mg/dL Final     GFR Estimate   Date Value Ref Range Status   03/13/2025 50 (L) >60 mL/min/1.73m2 Final     Comment:     eGFR calculated using 2021 CKD-EPI equation.     Calcium   Date Value Ref Range Status   03/10/2025 9.5 8.8 - 10.4 mg/dL Final       Recent Labs   Lab 03/10/25  0831   *       IMPRESSION/PLAN:  Fnag Estes is a 84 year old right hand dominant woman with past medical history of HTN, CKD, breast CA, asthma, and osteoporosis who presented with right sided weakness found to have 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   Admitted to rehab 03/06/2025 in setting of impaired strength, impaired activity tolerance, impaired balance, impaired coordination, dysarthria, and dysphagia.      Admission to acute inpatient rehab 03/06/2025.    Impairment group code: Stroke Ischemic 01.2 (R) Body Involvement (L) Brain; Acute left pontine stroke likely due to intracranial atherosclerosis      PT, OT and SLP 60 minutes of each on a daily basis up to 6 days per week, in addition to rehab nursing and close management of physiatrist  "x15 days.       Impairment of ADL's: Noted to have impaired strength, impaired activity tolerance, impaired balance,  and impaired coordination leading to decreased ability to independently complete ADL's.  Will benefit from ongoing OT with goal for MOD I with basic ADLs.      Impairment of mobility:  Noted to have impaired strength, impaired activity tolerance,  and impaired balance leading to decreased mobility.  Will benefit from ongoing PT with goal for MICAH with basic mobility.      Impairment of cognition/language/swallow:  Noted to have impaired swallow and dysarthria will benefit from ongoing SLP to assess safety of swallow and provide ongoing safe swallow strategies, improve ability to communicate needs effectively.      Medical Conditions  Acute Left Pontine stroke  Intracranial atherosclerosis  Presented with right sided weakness, impaired coordination, CT head neg for acute stroke, MRI with left pontine stroke. Imaging with extensive intracranial atherosclerotic disease.  EF wnl, A1C 6.3%, . Seen by stroke neurology, \" in view of extensive intracranial atherosclerotic disease, I would recommend continuing baby aspirin and Plavix indefinitely\"   - HTN as below- long term goal 120-130/80 (next one week) with permissive HTN (3/3-3/10)  -HLD as below- LDL goal <70  -continue PT/OT/SLP  -follow up for 30 day event monitor  -continue asa 81 and plavix     Anterior communicating artery 2 mm aneurysm  Right tentorial leaf meningioma  Found incidentally during stroke workup  -routine surveillance imaging     HTN  Period of permissive HTN now completed, long term BP goal 120-130/80  -continue lisinopril 10 mg daily  -BP 110s-130s last 24 hours- continue to monitor and titrate as indicated     HLD  -ldl 152, started on atorvastatin 80 mg daily, long term goal LDL <70  -continue atorvastatin 80 mg daily     Stage 3A CKD  Cr baseline appears to be around 1.1.  stable 1.14 3/10--> 1.09 3/13  -trend     Mild " persistent asthma  -continue Singulair, albuterol prn     Invasive lobular carcinoma  Hormone positive HER2 neg, s/p lumpectomy, follows with oncology   -continue arimidex     Prediabetes        Lab Results   Component Value Date     A1C 6.3 03/03/2025   -trend glucose with labs.     Fungal rash  -appreciate wocn recs- uche bid         Adjustment to disability:  Clinical psychology to eval and treat as indicated  FEN: reg  Bowel: monitor; on prn bowel meds  Bladder: monitor; PVRs x2 mildly elevated at 161 and 143  Continue to monitor bladder patterns.  No further scans obtained, but appears to be continent and voiding without difficulty  DVT Prophylaxis: ambulation  GI Prophylaxis: continue Protonix 40 mg daily - started due to DAPT and age  Code: full- confirmed on admission.   Disposition: goal for home  ELOS:  Tentative discharge date 3/18/25 with OP PT, OT, ?SLP  Rehab prognosis:  good  Follow up Appointments on Discharge: pcp, neuro,     Hong Wilson MD  Department of Rehabilitation Medicine    Time Spent on this Encounter   I spent a total of 25 minutes face-to-face or managing the care of Fang Estes. Over 50% of my time on the unit was spent counseling the patient and coordinating care. See note for details.

## 2025-03-14 NOTE — PLAN OF CARE
Discharge Planner Post-Acute Rehab SLP:     Discharge Plan: home with spouse. Ongoing SLP TBD. Pt to decide upon discharge.    Precautions: fall, aspiration     Current Status:  Hearing: WFL  Vision: reading glasses  Communication: mild-mod dysarthria and suspect slight apraxia c/b slow, effortful, haulting speech with reduced loudness especially within the conversation level.  Cognition: RBANS scores grossly intact with exception to slightly impaired attention. Pt feels at baseline cognitively. Pt mostly concerned with intelligibility   Swallow: Regular Solids, Thin (0) liquids by SMALL single sip. Straws okay. Fully upright all PO. All dysphagia goals met 3/14.     Assessment: SLP: Pt seen at EOB for follow up meal of regular textures, thin (0) liquids by small single sip. Pt with prolonged but functional mastication, delayed AP transit, and minimal oral residuals. No s/sx aspiration across meal. Pt IND implemented safe swallow strategy of small single sip across meal. Pt demonstrating good oral clearance strategies with R lingual sweep IND. No further dysphagia concerns. Pt cognition largely intact and demonstrating ability to implement safe swallow strategies IND. Recommend pt continue current diet of regular textures, thin (0) liquids by small single sip. Dysphagia goals met.     Other Barriers to Discharge (Family Training, etc): family training: diet pending progress, dysarthria/communicaiton strategies to family

## 2025-03-15 ENCOUNTER — APPOINTMENT (OUTPATIENT)
Dept: SPEECH THERAPY | Facility: CLINIC | Age: 85
DRG: 057 | End: 2025-03-15
Attending: PHYSICAL MEDICINE & REHABILITATION
Payer: COMMERCIAL

## 2025-03-15 ENCOUNTER — APPOINTMENT (OUTPATIENT)
Dept: PHYSICAL THERAPY | Facility: CLINIC | Age: 85
DRG: 057 | End: 2025-03-15
Attending: PHYSICAL MEDICINE & REHABILITATION
Payer: COMMERCIAL

## 2025-03-15 ENCOUNTER — APPOINTMENT (OUTPATIENT)
Dept: OCCUPATIONAL THERAPY | Facility: CLINIC | Age: 85
DRG: 057 | End: 2025-03-15
Attending: PHYSICAL MEDICINE & REHABILITATION
Payer: COMMERCIAL

## 2025-03-15 PROCEDURE — 128N000003 HC R&B REHAB

## 2025-03-15 PROCEDURE — 97530 THERAPEUTIC ACTIVITIES: CPT | Mod: GP

## 2025-03-15 PROCEDURE — 250N000013 HC RX MED GY IP 250 OP 250 PS 637: Performed by: PHYSICAL MEDICINE & REHABILITATION

## 2025-03-15 PROCEDURE — 97112 NEUROMUSCULAR REEDUCATION: CPT | Mod: GP

## 2025-03-15 PROCEDURE — 92507 TX SP LANG VOICE COMM INDIV: CPT | Mod: GN

## 2025-03-15 PROCEDURE — 97535 SELF CARE MNGMENT TRAINING: CPT | Mod: GO

## 2025-03-15 PROCEDURE — 99231 SBSQ HOSP IP/OBS SF/LOW 25: CPT | Performed by: PHYSICAL MEDICINE & REHABILITATION

## 2025-03-15 PROCEDURE — 250N000013 HC RX MED GY IP 250 OP 250 PS 637: Performed by: PHYSICIAN ASSISTANT

## 2025-03-15 RX ADMIN — Medication 50 MCG: at 08:44

## 2025-03-15 RX ADMIN — ANASTROZOLE 1 MG: 1 TABLET, COATED ORAL at 08:44

## 2025-03-15 RX ADMIN — PANTOPRAZOLE SODIUM 40 MG: 40 TABLET, DELAYED RELEASE ORAL at 06:10

## 2025-03-15 RX ADMIN — MONTELUKAST 10 MG: 10 TABLET, FILM COATED ORAL at 21:48

## 2025-03-15 RX ADMIN — ASPIRIN 81 MG: 81 TABLET, COATED ORAL at 08:44

## 2025-03-15 RX ADMIN — CLOPIDOGREL 75 MG: 75 TABLET ORAL at 08:44

## 2025-03-15 RX ADMIN — ATORVASTATIN CALCIUM 80 MG: 80 TABLET, FILM COATED ORAL at 21:48

## 2025-03-15 RX ADMIN — Medication 5 MG: at 21:48

## 2025-03-15 RX ADMIN — LISINOPRIL 10 MG: 10 TABLET ORAL at 08:44

## 2025-03-15 ASSESSMENT — ACTIVITIES OF DAILY LIVING (ADL)
ADLS_ACUITY_SCORE: 39

## 2025-03-15 NOTE — PLAN OF CARE
Discharge Planner Post-Acute Rehab SLP:     Discharge Plan: home with spouse. Ongoing SLP TBD. Pt to decide upon discharge.    Precautions: fall, aspiration     Current Status:  Hearing: WFL  Vision: reading glasses  Communication: mild-mod dysarthria and suspect slight apraxia c/b slow, effortful, haulting speech with reduced loudness especially within the conversation level.  Cognition: RBANS scores grossly intact with exception to slightly impaired attention. Pt feels at baseline cognitively. Pt mostly concerned with intelligibility   Swallow: Regular Solids, Thin (0) liquids by SMALL single sip. Straws okay. Fully upright all PO. All dysphagia goals met 3/14.     Assessment: Pt seen for dysarthria tx. Pt upright in bed and recalled 3/4 speech strategies (SLOB) and needed cue for breath support. Pt engaged in conversational speech with 95% intelligibility, requiring only min cues to correct errors. Pt engaged in reading phrase and sentence level targets with mild-mod cues for intentionally writing out pause/breath times and then implementing IND. Pt's volume and breath support much improved with this strategy. Mild cues required to carryover in conversational speech. Provided ongoing training r/t environmental strategies to support communication (i.e., talk on phone after naps, reduce background noise) and answered questions that pt had.     Other Barriers to Discharge (Family Training, etc): family training: dysarthria/communicaiton strategies to family

## 2025-03-15 NOTE — PROGRESS NOTES
"Discharge Plan: home w/ spouse assist prn     Precautions: fall, R hemiparesis, R AFO for transfers and amb     Current Status:  Bed Mobility: Sarah  Transfer: CGA w/  FWW  Gait: Up to 300 w/ FWW CGA>SBA  Stairs: 6\" x12 w/ modA for RLE management  Balance: Stable dynamic sitting. Able to stand unsupported static, requires UE support for dynamic stepping.      Outcome Measures:   [place]ermeo               3/8: 26/56   3/16: **  10MWT               3/7: 0.31m/s self paced; 0.48m/s fast, Sarah w/ FWW   3/16: **      Assessment: Nearing Tonya w/ FWW. Needs more consistency w/ sequencing for STS. Determined that FWW will be most appropriate device for home initially.        Other Barriers to Discharge (DME, Family Training, etc):   2STE no rail + 14STI R railing     DME: owns FWW  "

## 2025-03-15 NOTE — PROGRESS NOTES
"  [facility]   Acute Rehabilitation Unit  Daily progress note    INTERVAL HISTORY  No acute events overnight.  Nataliya continues to wake up in the middle of the night, however was able to fall back asleep.  This morning has no new concerns or complaints.  Denies any chest pain, shortness of breath, fevers, or chills.  She reports she was able to dress herself this morning which she is very happy about.    Current Functional Status:  PT:  Bed Mobility: Sarah  Transfer: CGA w/  FWW  Gait: Up to 300 w/ FWW CGA>SBA  Stairs: 6\" x12 w/ modA for RLE management  Balance: Stable dynamic sitting. Able to stand unsupported static, requires UE support for dynamic stepping.      Outcome Measures:   Bermeo               3/8: 26/56              3/16: **  10MWT               3/7: 0.31m/s self paced; 0.48m/s fast, Sarah w/ FWW              3/16: **      Assessment: Nearing Tonya w/ FWW. Needs more consistency w/ sequencing for STS. Determined that FWW will be most appropriate device for home initially.         OT:  ADLs:  Mobility:  CGA transfers/mobility FWW, AFO.  Grooming: SBA standing at sink.  Dressing: UB set up assist. LB CGA standing to pull over hips. Footwear min A  Bathing: Min A extended tub bench.  Toileting: CGA transfer. CGA clothing management and hygiene.  IADLs: PLOF IND. Spouse to assist at discharge.  Vision/Cognition: Dysarthria. Mild deficits attention.      Assessment: Pt progressed with IND in FB dressing, utilizing armando techniques demonstrating good carryover. Pt does continue to require SBA/CS for gathering clothing items with fww and transferring items/moving of obstacles.       SLP:  Hearing: WFL  Vision: reading glasses  Communication: mild-mod dysarthria and suspect slight apraxia c/b slow, effortful, haulting speech with reduced loudness especially within the conversation level.  Cognition: RBANS scores grossly intact with exception to slightly impaired attention. Pt feels " at baseline cognitively. Pt mostly concerned with intelligibility   Swallow: Regular Solids, Thin (0) liquids by SMALL single sip. Straws okay. Fully upright all PO. All dysphagia goals met 3/14.      Assessment: Pt seen for dysarthria tx. Pt upright in bed and recalled 3/4 speech strategies (SLOB) and needed cue for breath support. Pt engaged in conversational speech with 95% intelligibility, requiring only min cues to correct errors. Pt engaged in reading phrase and sentence level targets with mild-mod cues for intentionally writing out pause/breath times and then implementing IND. Pt's volume and breath support much improved with this strategy. Mild cues required to carryover in conversational speech. Provided ongoing training r/t environmental strategies to support communication (i.e., talk on phone after naps, reduce background noise) and answered questions that pt had.        MEDICATIONS  Scheduled:  Current Facility-Administered Medications   Medication Dose Route Frequency Provider Last Rate Last Admin    alendronate (FOSAMAX) tablet 70 mg  70 mg Oral Weekly Ani Wilson MD   70 mg at 03/09/25 0657    anastrozole (ARIMIDEX) tablet 1 mg  1 mg Oral QAM Iris Bird PA   1 mg at 03/15/25 0844    aspirin EC tablet 81 mg  81 mg Oral Daily Iris Bird PA   81 mg at 03/15/25 0844    atorvastatin (LIPITOR) tablet 80 mg  80 mg Oral QPM Iris Bird PA   80 mg at 03/14/25 2017    clopidogrel (PLAVIX) tablet 75 mg  75 mg Oral Daily Iris Bird PA   75 mg at 03/15/25 0844    lisinopril (ZESTRIL) tablet 10 mg  10 mg Oral QAM Iris Bird PA   10 mg at 03/15/25 0844    melatonin tablet 5 mg  5 mg Oral QPM Iris Bird PA   5 mg at 03/14/25 2017    miconazole with skin protectant (MAVIS ANTIFUNGAL) 2 % cream   Topical BID Iris Bird PA   Given at 03/14/25 0859    montelukast (SINGULAIR) tablet 10 mg  10 mg Oral At Bedtime Iris Bird PA   10 mg at  03/14/25 2017    pantoprazole (PROTONIX) EC tablet 40 mg  40 mg Oral Ani Lainez MD   40 mg at 03/15/25 0610    Vitamin D3 (CHOLECALCIFEROL) tablet 50 mcg  50 mcg Oral Iris Anders PA   50 mcg at 03/15/25 0844      PRN:  Current Facility-Administered Medications   Medication Dose Route Frequency Provider Last Rate Last Admin    acetaminophen (TYLENOL) tablet 650 mg  650 mg Oral Q6H PRN Iris Bird PA        albuterol (PROVENTIL HFA/VENTOLIN HFA) inhaler  2 puff Inhalation Q4H PRN Iris Bird PA        hydrALAZINE (APRESOLINE) tablet 10 mg  10 mg Oral TID PRN Iris Bird PA        ondansetron (ZOFRAN ODT) ODT tab 4 mg  4 mg Oral Q6H PRN Lars Krause MBBS        senna-docusate (SENOKOT-S/PERICOLACE) 8.6-50 MG per tablet 1 tablet  1 tablet Oral BID PRN Iris Bird PA            PHYSICAL EXAM  Patient Vitals for the past 24 hrs:   BP Temp Temp src Pulse Resp SpO2   03/15/25 1526 113/69 97.4  F (36.3  C) Oral 54 16 95 %   03/15/25 0844 138/71 98.3  F (36.8  C) Oral 86 16 97 %   03/15/25 0703 127/64 98  F (36.7  C) Oral 86 16 97 %     GEN: NAD, pleasant and cooperative  HEENT: NC/AT.  Right facial droop.  CVS: RRR, S1+S2  PULM: Non-labored breathing on room air, CTA b/l  ABD: Non-distended non tender, soft, BS+  EXT: No LE edema, R AFO in place, no calf tenderness b/l  Neuro: Answers appropriately, follows commands, mildly dysarthric.      LABS  CBC RESULTS:   Recent Labs   Lab Test 03/13/25  0609 03/10/25  0831 03/08/25  0651 03/04/25  0637 03/03/25  1915   WBC  --  7.3  --  7.5 7.6   RBC  --  4.94  --  5.04 4.73   HGB  --  14.3  --  14.7 13.6   HCT  --  44.3  --  43.7 41.3   MCV  --  90  --  87 87   MCH  --  28.9  --  29.2 28.8   MCHC  --  32.3  --  33.6 32.9   RDW  --  12.7  --  12.5 12.4    255 265 243 225       Last Comprehensive Metabolic Panel:  Sodium   Date Value Ref Range Status   03/10/2025 136 135 - 145 mmol/L Final     Potassium   Date  Value Ref Range Status   03/10/2025 4.4 3.4 - 5.3 mmol/L Final     Chloride   Date Value Ref Range Status   03/10/2025 101 98 - 107 mmol/L Final     Carbon Dioxide (CO2)   Date Value Ref Range Status   03/10/2025 23 22 - 29 mmol/L Final     Anion Gap   Date Value Ref Range Status   03/10/2025 12 7 - 15 mmol/L Final     Glucose   Date Value Ref Range Status   03/10/2025 146 (H) 70 - 99 mg/dL Final     GLUCOSE BY METER POCT   Date Value Ref Range Status   03/06/2025 109 (H) 70 - 99 mg/dL Final     Comment:     Dr/RN Notified     Urea Nitrogen   Date Value Ref Range Status   03/10/2025 43.3 (H) 8.0 - 23.0 mg/dL Final     Creatinine   Date Value Ref Range Status   03/13/2025 1.09 (H) 0.51 - 0.95 mg/dL Final     GFR Estimate   Date Value Ref Range Status   03/13/2025 50 (L) >60 mL/min/1.73m2 Final     Comment:     eGFR calculated using 2021 CKD-EPI equation.     Calcium   Date Value Ref Range Status   03/10/2025 9.5 8.8 - 10.4 mg/dL Final       Recent Labs   Lab 03/10/25  0831   *       IMPRESSION/PLAN:  Fang Estes is a 84 year old right hand dominant woman with past medical history of HTN, CKD, breast CA, asthma, and osteoporosis who presented with right sided weakness found to have 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   Admitted to rehab 03/06/2025 in setting of impaired strength, impaired activity tolerance, impaired balance, impaired coordination, dysarthria, and dysphagia.      Admission to acute inpatient rehab 03/06/2025.    Impairment group code: Stroke Ischemic 01.2 (R) Body Involvement (L) Brain; Acute left pontine stroke likely due to intracranial atherosclerosis      PT, OT and SLP 60 minutes of each on a daily basis up to 6 days per week, in addition to rehab nursing and close management of physiatrist x15 days.       Impairment of ADL's: Noted to have impaired strength, impaired activity tolerance, impaired balance,  and impaired coordination leading to  "decreased ability to independently complete ADL's.  Will benefit from ongoing OT with goal for MOD I with basic ADLs.      Impairment of mobility:  Noted to have impaired strength, impaired activity tolerance,  and impaired balance leading to decreased mobility.  Will benefit from ongoing PT with goal for MICAH with basic mobility.      Impairment of cognition/language/swallow:  Noted to have impaired swallow and dysarthria will benefit from ongoing SLP to assess safety of swallow and provide ongoing safe swallow strategies, improve ability to communicate needs effectively.      Medical Conditions  Acute Left Pontine stroke  Intracranial atherosclerosis  Presented with right sided weakness, impaired coordination, CT head neg for acute stroke, MRI with left pontine stroke. Imaging with extensive intracranial atherosclerotic disease.  EF wnl, A1C 6.3%, . Seen by stroke neurology, \" in view of extensive intracranial atherosclerotic disease, I would recommend continuing baby aspirin and Plavix indefinitely\"   - HTN as below- long term goal 120-130/80 (next one week) with permissive HTN (3/3-3/10)  -HLD as below- LDL goal <70  -continue PT/OT/SLP  -follow up for 30 day event monitor  -continue asa 81 and plavix     Anterior communicating artery 2 mm aneurysm  Right tentorial leaf meningioma  Found incidentally during stroke workup  -routine surveillance imaging     HTN  Period of permissive HTN now completed, long term BP goal 120-130/80  -continue lisinopril 10 mg daily  -BPs now overall well controlled      HLD  -ldl 152, started on atorvastatin 80 mg daily, long term goal LDL <70  -continue atorvastatin 80 mg daily     Stage 3A CKD  Cr baseline appears to be around 1.1.  stable 1.14 3/10--> 1.09 3/13  -trend     Mild persistent asthma  -continue Singulair, albuterol prn     Invasive lobular carcinoma  Hormone positive HER2 neg, s/p lumpectomy, follows with oncology   -continue arimidex     Prediabetes        Lab " Results   Component Value Date     A1C 6.3 03/03/2025   -trend glucose with labs.     Fungal rash  -appreciate wocn recs- uche bid         Adjustment to disability:  Clinical psychology to eval and treat as indicated  FEN: reg  Bowel: monitor; on prn bowel meds  Bladder: monitor; PVRs x2 mildly elevated at 161 and 143  Continue to monitor bladder patterns.  No further scans obtained, but appears to be continent and voiding without difficulty  DVT Prophylaxis: ambulation  GI Prophylaxis: continue Protonix 40 mg daily - started due to DAPT and age  Code: full- confirmed on admission.   Disposition: goal for home  ELOS:  Tentative discharge date 3/18/25 with OP PT, OT, ?SLP  Rehab prognosis:  good  Follow up Appointments on Discharge: pcp, neuro,     Hong Wilson MD  Department of Rehabilitation Medicine    Time Spent on this Encounter   I spent a total of 25 minutes face-to-face or managing the care of Fang Estes. Over 50% of my time on the unit was spent counseling the patient and coordinating care. See note for details.

## 2025-03-15 NOTE — PLAN OF CARE
Pt is A/O x4,R thin whole.Upper body weakness, continent of B/B, CG/walker. LBM 3/14, continue with POC.

## 2025-03-15 NOTE — PLAN OF CARE
"Goal Outcome Evaluation:      Plan of Care Reviewed With: patient    Overall Patient Progress: no changeOverall Patient Progress: no change         VS: /65 (BP Location: Left arm)   Pulse 61   Temp 98.5  F (36.9  C) (Oral)   Resp 16   Ht 1.6 m (5' 3\")   Wt 54.1 kg (119 lb 4.3 oz)   SpO2 96%   BMI 21.13 kg/m       Output/Last BM: Contient of B&B, LBM 3/14 per pt report    Activity: SBA w/ walker    Skin/Dressing: Intact    Pain: Denies    CMS: A&Ox4    Diet: R/T/W    LDA: N/a    Equipment: Personal belongings    Plan: Continue POC    Additional Info:        "

## 2025-03-15 NOTE — PLAN OF CARE
"Goal Outcome Evaluation:      Plan of Care Reviewed With: patient    Overall Patient Progress: improvingOverall Patient Progress: improving    VS: /71 (BP Location: Left arm, Patient Position: Semi-Todd's, Cuff Size: Adult Regular)   Pulse 86   Temp 98.3  F (36.8  C) (Oral)   Resp 16   Ht 1.6 m (5' 3\")   Wt 54.1 kg (119 lb 4.3 oz)   SpO2 97%   BMI 21.13 kg/m     O2: SpO2 > 97 and stable on RA. LS clear and equal bilaterally. Denies chest pain and SOB.    Output: Voids spontaneously without difficulty to bathroom.   Last BM: 3/14, denies abdominal discomfort. BS active / passing flatus.    Activity: Up with CGA with a walker/ belt   Skin: WDL.   Pain: Denied pain.   CMS: Intact, AOx4. Denies numbness and tingling.   Dressing: None   Diet: Regular diet. Denies nausea/vomiting.    LDA: None.   Equipment: IV pole, personal belongings,    Plan: Fall precautions maintained / Continue with plan of care. Call light within reach, pt able to make needs known. Safety check completed and alarms.   Additional Info:  Pt wears AFO brace when OOB to RLE and wear a wear a splint to RUE overnight.          "

## 2025-03-16 ENCOUNTER — APPOINTMENT (OUTPATIENT)
Dept: PHYSICAL THERAPY | Facility: CLINIC | Age: 85
DRG: 057 | End: 2025-03-16
Attending: PHYSICAL MEDICINE & REHABILITATION
Payer: COMMERCIAL

## 2025-03-16 ENCOUNTER — APPOINTMENT (OUTPATIENT)
Dept: OCCUPATIONAL THERAPY | Facility: CLINIC | Age: 85
DRG: 057 | End: 2025-03-16
Attending: PHYSICAL MEDICINE & REHABILITATION
Payer: COMMERCIAL

## 2025-03-16 ENCOUNTER — APPOINTMENT (OUTPATIENT)
Dept: SPEECH THERAPY | Facility: CLINIC | Age: 85
DRG: 057 | End: 2025-03-16
Attending: PHYSICAL MEDICINE & REHABILITATION
Payer: COMMERCIAL

## 2025-03-16 PROCEDURE — 250N000013 HC RX MED GY IP 250 OP 250 PS 637: Performed by: PHYSICIAN ASSISTANT

## 2025-03-16 PROCEDURE — 128N000003 HC R&B REHAB

## 2025-03-16 PROCEDURE — 97530 THERAPEUTIC ACTIVITIES: CPT | Mod: GP

## 2025-03-16 PROCEDURE — 97150 GROUP THERAPEUTIC PROCEDURES: CPT | Mod: GO | Performed by: STUDENT IN AN ORGANIZED HEALTH CARE EDUCATION/TRAINING PROGRAM

## 2025-03-16 PROCEDURE — 92507 TX SP LANG VOICE COMM INDIV: CPT | Mod: GN | Performed by: SPEECH-LANGUAGE PATHOLOGIST

## 2025-03-16 PROCEDURE — 250N000013 HC RX MED GY IP 250 OP 250 PS 637: Performed by: PHYSICAL MEDICINE & REHABILITATION

## 2025-03-16 PROCEDURE — 97535 SELF CARE MNGMENT TRAINING: CPT | Mod: GO

## 2025-03-16 RX ADMIN — Medication 50 MCG: at 07:51

## 2025-03-16 RX ADMIN — ATORVASTATIN CALCIUM 80 MG: 80 TABLET, FILM COATED ORAL at 20:48

## 2025-03-16 RX ADMIN — Medication 5 MG: at 20:48

## 2025-03-16 RX ADMIN — ANASTROZOLE 1 MG: 1 TABLET, COATED ORAL at 07:52

## 2025-03-16 RX ADMIN — ALENDRONATE SODIUM 70 MG: 70 TABLET ORAL at 06:11

## 2025-03-16 RX ADMIN — LISINOPRIL 10 MG: 10 TABLET ORAL at 07:52

## 2025-03-16 RX ADMIN — ASPIRIN 81 MG: 81 TABLET, COATED ORAL at 07:52

## 2025-03-16 RX ADMIN — CLOPIDOGREL 75 MG: 75 TABLET ORAL at 07:52

## 2025-03-16 RX ADMIN — MONTELUKAST 10 MG: 10 TABLET, FILM COATED ORAL at 20:48

## 2025-03-16 RX ADMIN — PANTOPRAZOLE SODIUM 40 MG: 40 TABLET, DELAYED RELEASE ORAL at 06:11

## 2025-03-16 ASSESSMENT — ACTIVITIES OF DAILY LIVING (ADL)
ADLS_ACUITY_SCORE: 39

## 2025-03-16 NOTE — PLAN OF CARE
"OT: Pt participated in the established \"Transitions Group\" for stroke pts. Highlighting topics such as return to meaningful activities, rehab course, neuroplasticity, follow up therapy, fall prevention, health/wellness, fatigue, depression/anxiety, bowel/bladder considerations w/ community re-integration and caregiver wellness/support. Pt very receptive to class. Pt reports personal goals after discharge are to use her arm better.                          "

## 2025-03-16 NOTE — PROGRESS NOTES
"Discharge Plan: home w/ spouse assist. OP PT     Precautions: fall, R hemiparesis, R AFO for transfers and amb     Current Status:  Bed Mobility: Sarah  Transfer: CGA w/  FWW  Gait: Up to 300 w/ FWW CGA>SBA  Stairs: 6\" x12 w/ modA for RLE management  Balance: Stable dynamic sitting. Able to stand unsupported static, requires UE support for dynamic stepping.      Outcome Measures:   Bermeo               3/8: 26/56   3/16: **  10MWT               3/7: 0.31m/s self paced; 0.48m/s fast, Sarah w/ FWW   3/16: **      Assessment: Family training completed w/ spouse. Initial goals were for Tonya, pt still having R foot catching and gait instability ~20% of the time. Recommendations for CGA w/ all mobility, spouse able to provide and on track for Tuesday discharge.       Other Barriers to Discharge (DME, Family Training, etc):   2STE no rail + 14STI R railing     DME: owns FWW  "

## 2025-03-16 NOTE — PLAN OF CARE
Discharge Planner Post-Acute Rehab SLP:     Discharge Plan: home with spouse. No ongoing SLP    Precautions: fall, aspiration     Current Status:  Hearing: WFL  Vision: reading glasses  Communication: mild-mod dysarthria and suspect slight apraxia c/b slow, effortful, haulting speech with reduced loudness especially within the conversation level.  Cognition: RBANS scores grossly intact with exception to slightly impaired attention. Pt feels at baseline cognitively. Pt mostly concerned with intelligibility   Swallow: Regular Solids, Thin (0) liquids by SMALL single sip. Straws okay. Fully upright all PO. All dysphagia goals met 3/14.     Assessment: Pt seen with  Zoltan present for family training session. Pt and  edu in SLP course, including swallow (okay to consume regular/thin) and cognition. Pt and  edu in motor speech strategies,  trained to cue/remind pt to use if not in conversation. Pt also edu in self advocacy statements directing familiar and unfamiliar conversation partners to give her more time to respond. Facilitated disussion with pt regarding ongoing SLP, determined pt to work on activities given by ARU SLP, if no significant speech improvement in ~3 months, follow up with primary MD for outpatient SLP referral.     Other Barriers to Discharge (Family Training, etc): family training: dysarthria/communicaiton strategies to family- completed with  Zoltan 03/16.

## 2025-03-16 NOTE — PLAN OF CARE
FOCUS/GOAL  Medication management, Medical management, and Safety management    ASSESSMENT, INTERVENTIONS AND CONTINUING PLAN FOR GOAL:  Pt is sleeping.  Alert and oriented x4.  Continent x2.   Regular thin diet, takes pills whole.   No acute concerns overnight.  Pt upright after being given morning med  Goal Outcome Evaluation:

## 2025-03-16 NOTE — PLAN OF CARE
Discharge Planner Post-Acute Rehab OT:      Discharge Plan: Home with Spouse; OP OT     Precautions: Fall, R hemiparesis, R AFO for transfers and amb   RUE resting hand orthosis on overnight, off during day     Current Status:  ADLs:  Mobility:  CGA transfers/mobility FWW, AFO.  Grooming: SBA standing at sink.  Dressing: UB set up assist. LB CGA standing to pull over hips. Footwear min A  Bathing: Min A extended tub bench.  Toileting: CGA transfer. CGA clothing management and hygiene.  IADLs: PLOF IND. Spouse to assist at discharge.  Vision/Cognition: Dysarthria. Mild deficits attention.      Assessment: family training completed. Addressed all ADLs areas and level of A and equipment recommended.  demonstrated understanding.      Other Barriers to Discharge (DME, Family Training, etc):   Equipment: Recommend shower bench, walker, AFO.  Caregiver support: Spouse supportive, able to provide assist ADL at discharge prn. Training set up 3/16 1:15-3:15pm.  Home set up: 2 RASHAUN no rail + 14 STI R railing

## 2025-03-16 NOTE — PLAN OF CARE
"Goal Outcome Evaluation:      Plan of Care Reviewed With: patient    Overall Patient Progress: improvingOverall Patient Progress: improving    VS: /61 (BP Location: Left arm, Patient Position: Semi-Todd's, Cuff Size: Adult Regular)   Pulse 63   Temp 98  F (36.7  C) (Oral)   Resp 16   Ht 1.6 m (5' 3\")   Wt 54.1 kg (119 lb 4.3 oz)   SpO2 96%   BMI 21.13 kg/m     O2: SpO2 > 96 and stable on RA. LS clear and equal bilaterally. Denies chest pain and SOB.    Output: Voids spontaneously without difficulty to bathroom.   Last BM: 3/15, denies abdominal discomfort. BS active / passing flatus.    Activity: Up with CGA with a walker/ belt   Skin: WDL.   Pain: Denied pain.   CMS: Intact, AOx4. Denies numbness and tingling.   Dressing: None   Diet: Regular diet. Denies nausea/vomiting.    LDA: None.   Equipment: IV pole, personal belongings,    Plan: Fall precautions maintained / Continue with plan of care. Call light within reach, pt able to make needs known. Safety check completed and alarms.   Additional Info:  Pt wears AFO brace when OOB to RLE and wear a wear a splint to RUE overnight.              "

## 2025-03-17 ENCOUNTER — APPOINTMENT (OUTPATIENT)
Dept: PHYSICAL THERAPY | Facility: CLINIC | Age: 85
DRG: 057 | End: 2025-03-17
Attending: PHYSICAL MEDICINE & REHABILITATION
Payer: COMMERCIAL

## 2025-03-17 ENCOUNTER — APPOINTMENT (OUTPATIENT)
Dept: OCCUPATIONAL THERAPY | Facility: CLINIC | Age: 85
DRG: 057 | End: 2025-03-17
Attending: PHYSICAL MEDICINE & REHABILITATION
Payer: COMMERCIAL

## 2025-03-17 ENCOUNTER — APPOINTMENT (OUTPATIENT)
Dept: SPEECH THERAPY | Facility: CLINIC | Age: 85
DRG: 057 | End: 2025-03-17
Attending: PHYSICAL MEDICINE & REHABILITATION
Payer: COMMERCIAL

## 2025-03-17 LAB
ANION GAP SERPL CALCULATED.3IONS-SCNC: 12 MMOL/L (ref 7–15)
BUN SERPL-MCNC: 33.3 MG/DL (ref 8–23)
CALCIUM SERPL-MCNC: 9.3 MG/DL (ref 8.8–10.4)
CHLORIDE SERPL-SCNC: 103 MMOL/L (ref 98–107)
CREAT SERPL-MCNC: 1.05 MG/DL (ref 0.51–0.95)
EGFRCR SERPLBLD CKD-EPI 2021: 52 ML/MIN/1.73M2
ERYTHROCYTE [DISTWIDTH] IN BLOOD BY AUTOMATED COUNT: 12.3 % (ref 10–15)
GLUCOSE SERPL-MCNC: 107 MG/DL (ref 70–99)
HCO3 SERPL-SCNC: 22 MMOL/L (ref 22–29)
HCT VFR BLD AUTO: 41.2 % (ref 35–47)
HGB BLD-MCNC: 13.9 G/DL (ref 11.7–15.7)
MAGNESIUM SERPL-MCNC: 2.4 MG/DL (ref 1.7–2.3)
MCH RBC QN AUTO: 29.8 PG (ref 26.5–33)
MCHC RBC AUTO-ENTMCNC: 33.7 G/DL (ref 31.5–36.5)
MCV RBC AUTO: 88 FL (ref 78–100)
PLATELET # BLD AUTO: 221 10E3/UL (ref 150–450)
POTASSIUM SERPL-SCNC: 4.7 MMOL/L (ref 3.4–5.3)
RBC # BLD AUTO: 4.67 10E6/UL (ref 3.8–5.2)
SODIUM SERPL-SCNC: 137 MMOL/L (ref 135–145)
WBC # BLD AUTO: 8.6 10E3/UL (ref 4–11)

## 2025-03-17 PROCEDURE — 97535 SELF CARE MNGMENT TRAINING: CPT | Mod: GO | Performed by: OCCUPATIONAL THERAPIST

## 2025-03-17 PROCEDURE — 128N000003 HC R&B REHAB

## 2025-03-17 PROCEDURE — 250N000013 HC RX MED GY IP 250 OP 250 PS 637: Performed by: PHYSICAL MEDICINE & REHABILITATION

## 2025-03-17 PROCEDURE — 97530 THERAPEUTIC ACTIVITIES: CPT | Mod: GP

## 2025-03-17 PROCEDURE — 36415 COLL VENOUS BLD VENIPUNCTURE: CPT | Performed by: PHYSICIAN ASSISTANT

## 2025-03-17 PROCEDURE — 97112 NEUROMUSCULAR REEDUCATION: CPT | Mod: GO | Performed by: OCCUPATIONAL THERAPIST

## 2025-03-17 PROCEDURE — 250N000013 HC RX MED GY IP 250 OP 250 PS 637: Performed by: PHYSICIAN ASSISTANT

## 2025-03-17 PROCEDURE — 85041 AUTOMATED RBC COUNT: CPT | Performed by: PHYSICIAN ASSISTANT

## 2025-03-17 PROCEDURE — 82565 ASSAY OF CREATININE: CPT | Performed by: PHYSICIAN ASSISTANT

## 2025-03-17 PROCEDURE — 83735 ASSAY OF MAGNESIUM: CPT | Performed by: PHYSICIAN ASSISTANT

## 2025-03-17 PROCEDURE — 80048 BASIC METABOLIC PNL TOTAL CA: CPT | Performed by: PHYSICIAN ASSISTANT

## 2025-03-17 PROCEDURE — 85018 HEMOGLOBIN: CPT | Performed by: PHYSICIAN ASSISTANT

## 2025-03-17 PROCEDURE — 97750 PHYSICAL PERFORMANCE TEST: CPT | Mod: GP

## 2025-03-17 PROCEDURE — 99232 SBSQ HOSP IP/OBS MODERATE 35: CPT | Performed by: PHYSICIAN ASSISTANT

## 2025-03-17 PROCEDURE — 82435 ASSAY OF BLOOD CHLORIDE: CPT | Performed by: PHYSICIAN ASSISTANT

## 2025-03-17 PROCEDURE — 92507 TX SP LANG VOICE COMM INDIV: CPT | Mod: GN

## 2025-03-17 RX ORDER — PANTOPRAZOLE SODIUM 40 MG/1
40 TABLET, DELAYED RELEASE ORAL
Qty: 30 TABLET | Refills: 0 | Status: SHIPPED | OUTPATIENT
Start: 2025-03-18

## 2025-03-17 RX ORDER — ATORVASTATIN CALCIUM 80 MG/1
80 TABLET, FILM COATED ORAL EVERY EVENING
Qty: 30 TABLET | Refills: 0 | Status: SHIPPED | OUTPATIENT
Start: 2025-03-17

## 2025-03-17 RX ORDER — ASPIRIN 81 MG/1
81 TABLET ORAL DAILY
Qty: 30 TABLET | Refills: 0 | Status: SHIPPED | OUTPATIENT
Start: 2025-03-17

## 2025-03-17 RX ORDER — ACETAMINOPHEN 325 MG/1
650 TABLET ORAL EVERY 6 HOURS PRN
COMMUNITY
Start: 2025-03-17

## 2025-03-17 RX ORDER — CLOPIDOGREL BISULFATE 75 MG/1
75 TABLET ORAL DAILY
Qty: 30 TABLET | Refills: 0 | Status: SHIPPED | OUTPATIENT
Start: 2025-03-17

## 2025-03-17 RX ADMIN — PANTOPRAZOLE SODIUM 40 MG: 40 TABLET, DELAYED RELEASE ORAL at 05:31

## 2025-03-17 RX ADMIN — ANASTROZOLE 1 MG: 1 TABLET, COATED ORAL at 08:19

## 2025-03-17 RX ADMIN — MONTELUKAST 10 MG: 10 TABLET, FILM COATED ORAL at 19:53

## 2025-03-17 RX ADMIN — CLOPIDOGREL 75 MG: 75 TABLET ORAL at 08:17

## 2025-03-17 RX ADMIN — Medication 5 MG: at 21:18

## 2025-03-17 RX ADMIN — ATORVASTATIN CALCIUM 80 MG: 80 TABLET, FILM COATED ORAL at 19:53

## 2025-03-17 RX ADMIN — ASPIRIN 81 MG: 81 TABLET, COATED ORAL at 08:20

## 2025-03-17 RX ADMIN — LISINOPRIL 10 MG: 10 TABLET ORAL at 08:19

## 2025-03-17 RX ADMIN — Medication 50 MCG: at 08:18

## 2025-03-17 ASSESSMENT — ACTIVITIES OF DAILY LIVING (ADL)
ADLS_ACUITY_SCORE: 39

## 2025-03-17 NOTE — PLAN OF CARE
Goal Outcome Evaluation:      Plan of Care Reviewed With: patient    Overall Patient Progress: improving    Outcome Summary:  Working with therapies to improve mobility, no change to skin, continues to be safe using call light and waiting for assistance, asking questions about meds.     FOCUS/GOAL  Safety management    ASSESSMENT, INTERVENTIONS AND CONTINUING PLAN FOR GOAL:  Pt Aox4, slurred speech with work finding difficulty.  R side effected from stroke.  Denies pain, cough, sob, chest pain, dizziness, n/v.  Pt is cont of B&B, LBM on writers shift.  Reg/thin, taking pills whole with water.  Working with therapies.  Nursing will continue with POC.

## 2025-03-17 NOTE — PROGRESS NOTES
Occupational Therapy Discharge Summary    Reason for therapy discharge:    Discharged to home with outpatient therapy.    Progress towards therapy goal(s). See goals on Care Plan in [facility] electronic health record for goal details.  Goals partially met.  Barriers to achieving goals:   Patient meeting goals to discharge home with initial caregiver support, anticipate quick advancement to Mod IND ADLs and mobility in familiar home environment.    Therapy recommendation(s):    Continued therapy is recommended.  Rationale/Recommendations:  ADL/IADL retraining, equipment retraining, RUE sensorimotor retraining, functional mobility progress, community reintegration.    Summary: Fang Estes is a 84 year old right hand dominant woman with past medical history of HTN, CKD, breast CA, asthma, and osteoporosis who presented with right sided weakness found to have 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   Admitted to rehab 03/06/2025 in setting of impaired strength, impaired activity tolerance, impaired balance, impaired coordination, dysarthria, and dysphagia.      Discharge Plan: Home with Spouse; OP OT     Precautions: Fall, R hemiparesis, R AFO for transfers and amb   RUE resting hand orthosis on overnight, off during day     Current Status:  ADLs:  Mobility:  SBA transfers/mobility FWW, AFO.  Grooming: SBA standing at sink.  Dressing: UB set up assist. LB CGA standing to pull over hips. Footwear mod A donning shoe on affected side.  Bathing: CGA with walker and extended tub bench.  Toileting: SBA transfer. SBA clothing management and hygiene.  IADLs: PLOF IND. Spouse to assist at discharge.    Vision/Cognition: Dysarthria. Mild deficits attention.      Equipment: Recommend shower bench, walker, AFO, resting hand orthosis, and walker tray.    Caregiver support: Spouse supportive, able to provide assist ADL/IADL at discharge prn. Training completed 3/16 1:15-3:15pm.    Home set up: 2  RASHAUN no rail + 14 STI SANCHO case

## 2025-03-17 NOTE — PLAN OF CARE
"Physical Therapy Discharge Summary    Reason for therapy discharge:    All goals and outcomes met, no further needs identified.    Progress towards therapy goal(s). See goals on Care Plan in [facility] electronic health record for goal details.  Goals met    Therapy recommendation(s):    Continued therapy is recommended.  Rationale/Recommendations:  Progressed well during ARU stay. Pt near Tonya, but still poor pace regulation with ambulation and intermittent R foot catching. Not always consistent with sequencing for STS transfers, heavy reliance on LLE. Family training completed w/ spouse who is able to provide CGA for mobility and assist with stairs. Pt is highly motivated and at baseline IND w/ all mobility attending fitness classes 3x/week. Will benefit from ongoing PT to progress to goals of Tonya>IND w/ mobility and decreased caregiver burden.    Current Status:  Bed Mobility: Sarah  Transfer: CGA w/  FWW  Gait: Up to 300 w/ FWW CGA>SBA  Stairs: 6\" x12 w/ Sarah  Balance: Stable dynamic sitting. Able to stand unsupported static, requires UE support for dynamic stepping.      Outcome Measures:   Bermeo               3/8: 26/56              3/17: 35/56  10MWT               3/7: 0.31m/s self paced; 0.48m/s fast, Sarah w/ FWW              3/16: 0.66m/s self paced; 0.78m/s fast, SBA w/ FWW   "

## 2025-03-17 NOTE — PLAN OF CARE
FUGL-BROWN ASSESSMENT   UPPER EXTREMITY  Assessment of sensorimotor function   Stroke specific, performance-based impairment index. It is designed to assess motor functioning, sensation, and joint functioning in patients with post-stroke hemiplegia.    Scoring  The FMA is an impairment measure, consisting of 33 motor tasks and 30 sensory/pain observations.   Motor and sensory items are scored on a 0-2 scale: 0 none, 1 partial, and 2 full.  Motor Function   Upper Extremity 14/36   Wrist 4/10   Hand 1/14   Coordination/Speed 0/6   Total 19/66     Sensory Function   Sensation 12/12   Joint Pain 24/24   Passive Joint Motion 24/24     Motor Classification  Very Severe (0-12)  Severe-Moderate (13-30)  Moderate-Mild (31-47)  Mild (48-60)    Improvements from 3/10 15/55-19/66 3/17    Sensory/joint observations (light touch, proprioception, PROM, PMHx impacting UE function):      References  FMA-UE PROTOCOL [facility]  Approved by Theresal-Brown [place] 2010  Theresal-Brown [place], Priti L, Donte I, Gilbert S, Nicole S: The post-stroke hemiplegic patient. A method for evaluation of physical performance. [place] J Rehabil Med 1975, 7:13-31.  Flip gonzalez al.: Determining Levels of Upper Extremity Movement Impairment by Applying a Cluster Analysis to the Fugl-Brown Assessment of the Upper Extremity in Chronic Stroke. Archives of Physical Medicine and Rehabilitation 2016, 98: 456-462.

## 2025-03-17 NOTE — DISCHARGE INSTRUCTIONS
"Primary Care Provider   You are scheduled to see Dr. Sangeetha Juares on March 24 2025 at 10:50 AM.    Address [address] [facility] [address]  Phone  [phone]     Neurology  You are scheduled to see Dr. Charles on August 28 2025 at 9:45 am.    Address [address] [facility] [address]    [facility] [address]   Phone  [phone]       To reduce the risk of subsequent stroke there are several important factors including optimal management of anticoagulants, blood pressure, cholesterol, diabetes and smoking abstinence.    Anticoagulation:  You are on Aspirin and Clopidogrel    Blood Pressure:  Keeping your blood pressures less than 130/80 has been shown to reduce risk of recurrent stroke. Recording your blood pressure and heart rate once daily in a log book can help you and your providers make decisions on optimal management. You are encouraged to bring your log book with you to your primary physician and/or cardiology doctor visits.    You are currently on lisinopril to help control your blood pressure. Several lifestyle modifications have been associated with blood pressure reduction and are an important part of a comprehensive plan. These include: weight loss (if over-weight); a diet low in salt and cholesterol and rich in fruits and vegetables; regular aerobic physical activity and limited alcohol consumption.    Diabetes:  You do not have diabetes though it is important to continue monitoring for this in the future with your primary provider.    Diet:  Currently  you're on regular diet.  Diet can significantly affect your levels and should be part of your plan. Please see additional information from dietitian about this.    Cholesterol:  Traditional target levels for LDL cholesterol or \"bad cholesterol\" is less than 130 however once you have had a stroke, your target LDL level is now less than 70. Additional recommendations such as increasing your HDL or \"good\" cholesterol and lowering your triglyceride " level can also be important.    Your most recent lipid panel was   Lab Results   Component Value Date    CHOL 234 03/03/2025     Lab Results   Component Value Date    HDL 52 03/03/2025     Lab Results   Component Value Date     03/03/2025     Lab Results   Component Value Date    TRIG 146 03/03/2025     This should be followed up in 2-3 months with your primary provider.    Smoking:  Finally one of the most important modifiable risk factors is to not smoke. This includes cigarettes, pipes, cigars, chewing tobacco and second hand smoke. Support through counseling, nicotine replacement, and oral smoking-cessation medications may all be helpful. Often people have been able to quit during their hospitalization but once returning to their familiar environment, the urges can be stronger. If this is the case, we encourage you to get support. There are numerous options, start by talking with your doctor.

## 2025-03-17 NOTE — PROGRESS NOTES
Discharge Planner Post-Acute Rehab OT:      Discharge Plan: Home with Spouse; OP OT     Precautions: Fall, R hemiparesis, R AFO for transfers and amb   RUE resting hand orthosis on overnight, off during day     Current Status:  ADLs:  Mobility:  SBA transfers/mobility FWW, AFO.  Grooming: SBA standing at sink.  Dressing: UB set up assist. LB CGA standing to pull over hips. Footwear mod A donning shoe on affected side.  Bathing: CGA with walker and extended tub bench.  Toileting: SBA transfer. SBA clothing management and hygiene.  IADLs: PLOF IND. Spouse to assist at discharge.  Vision/Cognition: Dysarthria. Mild deficits attention.      Assessment:Completed IND day shower and ADLs with walker. Spouse to provide CGA with all ADLs and mobility with walker.      Other Barriers to Discharge (DME, Family Training, etc):   Equipment: Recommend shower bench, walker, AFO.  Caregiver support: Spouse supportive, able to provide assist ADL at discharge prn. Training set up 3/16 1:15-3:15pm.  Home set up: 2 RASHAUN no rail + 14 STI R railing

## 2025-03-17 NOTE — PROGRESS NOTES
[facility]   Acute Rehabilitation Unit  Daily progress note    INTERVAL HISTORY  Nataliya was seen sitting up in chair, reports she is feeling well, had better sleep last night.  Denies n/v/d, sob, fevers, and dizziness. Says family training with spouse went pretty well this weekend planning for home with spouse support and outpatient therapy.       We reviewed medications, and discharge follow up.  Nataliya asked appropriate questions and denied other concerns.      35/56 on HERNDON with PT    Per OT:   Current Status:  ADLs:  Mobility:  CGA transfers/mobility FWW, AFO.  Grooming: SBA standing at sink.  Dressing: UB set up assist. LB CGA standing to pull over hips. Footwear min A  Bathing: Min A extended tub bench.  Toileting: CGA transfer. CGA clothing management and hygiene.  IADLs: PLOF IND. Spouse to assist at discharge.  Vision/Cognition: Dysarthria. Mild deficits attention.      Assessment: family training completed. Addressed all ADLs areas and level of A and equipment recommended.  demonstrated understanding.        MEDICATIONS  Scheduled:  Current Facility-Administered Medications   Medication Dose Route Frequency Provider Last Rate Last Admin    alendronate (FOSAMAX) tablet 70 mg  70 mg Oral Weekly Ani Wilson MD   70 mg at 03/16/25 0611    anastrozole (ARIMIDEX) tablet 1 mg  1 mg Oral QAM Iris Bird PA   1 mg at 03/17/25 0819    aspirin EC tablet 81 mg  81 mg Oral Daily Iris Bird PA   81 mg at 03/17/25 0820    atorvastatin (LIPITOR) tablet 80 mg  80 mg Oral QPM Iris Bird PA   80 mg at 03/16/25 2048    clopidogrel (PLAVIX) tablet 75 mg  75 mg Oral Daily Iris Bird PA   75 mg at 03/17/25 0817    lisinopril (ZESTRIL) tablet 10 mg  10 mg Oral QAM Iris Bird PA   10 mg at 03/17/25 0819    melatonin tablet 5 mg  5 mg Oral QPM Iris Bird PA   5 mg at 03/16/25 2048    montelukast (SINGULAIR) tablet 10 mg  10 mg  Oral At Bedtime Iris Bird PA   10 mg at 03/16/25 2048    pantoprazole (PROTONIX) EC tablet 40 mg  40 mg Oral Ani Lainez MD   40 mg at 03/17/25 0531    Vitamin D3 (CHOLECALCIFEROL) tablet 50 mcg  50 mcg Oral Iris Anders PA   50 mcg at 03/17/25 0818      PRN:  Current Facility-Administered Medications   Medication Dose Route Frequency Provider Last Rate Last Admin    acetaminophen (TYLENOL) tablet 650 mg  650 mg Oral Q6H PRN Iris Bird PA        albuterol (PROVENTIL HFA/VENTOLIN HFA) inhaler  2 puff Inhalation Q4H PRN Iris Bird PA        hydrALAZINE (APRESOLINE) tablet 10 mg  10 mg Oral TID PRN Iris Bird PA        ondansetron (ZOFRAN ODT) ODT tab 4 mg  4 mg Oral Q6H PRN Lars Krause MBBS        senna-docusate (SENOKOT-S/PERICOLACE) 8.6-50 MG per tablet 1 tablet  1 tablet Oral BID PRN Iris Bird PA            PHYSICAL EXAM  Patient Vitals for the past 24 hrs:   BP Temp Temp src Pulse Resp SpO2 Weight   03/17/25 0656 128/64 98  F (36.7  C) Oral 60 16 -- 54 kg (119 lb)   03/16/25 1651 126/69 98.1  F (36.7  C) Oral 62 16 98 % --     GEN: NAD, pleasant and cooperative  HEENT: NC/AT.  Right facial droop.  CVS: RRR, S1+S2  PULM: Non-labored breathing on room air, CTA b/l  ABD: Non-distended non tender, soft, BS+  EXT: No LE edema, R AFO in place, no calf tenderness b/l  Neuro: Answers appropriately, follows commands, mildly dysarthric.  Rigth HF 4-/5 KE 3/5,  in AFO, trace at shoulder, EF near 2/5, Wrist 1/5,  1/5.      LABS  CBC RESULTS:   Recent Labs   Lab Test 03/17/25  0707 03/13/25  0609 03/10/25  0831 03/08/25  0651 03/04/25  0637   WBC 8.6  --  7.3  --  7.5   RBC 4.67  --  4.94  --  5.04   HGB 13.9  --  14.3  --  14.7   HCT 41.2  --  44.3  --  43.7   MCV 88  --  90  --  87   MCH 29.8  --  28.9  --  29.2   MCHC 33.7  --  32.3  --  33.6   RDW 12.3  --  12.7  --  12.5    222 255   < > 243    < > = values in this interval not  displayed.       Last Comprehensive Metabolic Panel:  Sodium   Date Value Ref Range Status   03/17/2025 137 135 - 145 mmol/L Final     Potassium   Date Value Ref Range Status   03/17/2025 4.7 3.4 - 5.3 mmol/L Final     Chloride   Date Value Ref Range Status   03/17/2025 103 98 - 107 mmol/L Final     Carbon Dioxide (CO2)   Date Value Ref Range Status   03/17/2025 22 22 - 29 mmol/L Final     Anion Gap   Date Value Ref Range Status   03/17/2025 12 7 - 15 mmol/L Final     Glucose   Date Value Ref Range Status   03/17/2025 107 (H) 70 - 99 mg/dL Final     GLUCOSE BY METER POCT   Date Value Ref Range Status   03/06/2025 109 (H) 70 - 99 mg/dL Final     Comment:     Dr/RN Notified     Urea Nitrogen   Date Value Ref Range Status   03/17/2025 33.3 (H) 8.0 - 23.0 mg/dL Final     Creatinine   Date Value Ref Range Status   03/17/2025 1.05 (H) 0.51 - 0.95 mg/dL Final     GFR Estimate   Date Value Ref Range Status   03/17/2025 52 (L) >60 mL/min/1.73m2 Final     Comment:     eGFR calculated using 2021 CKD-EPI equation.     Calcium   Date Value Ref Range Status   03/17/2025 9.3 8.8 - 10.4 mg/dL Final       Recent Labs   Lab 03/17/25  0707   *       IMPRESSION/PLAN:  Fang Estes is a 84 year old right hand dominant woman with past medical history of HTN, CKD, breast CA, asthma, and osteoporosis who presented with right sided weakness found to have 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   Admitted to rehab 03/06/2025 in setting of impaired strength, impaired activity tolerance, impaired balance, impaired coordination, dysarthria, and dysphagia.      Admission to acute inpatient rehab 03/06/2025.    Impairment group code: Stroke Ischemic 01.2 (R) Body Involvement (L) Brain; Acute left pontine stroke likely due to intracranial atherosclerosis      PT, OT and SLP 60 minutes of each on a daily basis up to 6 days per week, in addition to rehab nursing and close management of physiatrist x15  "days.       Impairment of ADL's: Noted to have impaired strength, impaired activity tolerance, impaired balance,  and impaired coordination leading to decreased ability to independently complete ADL's.  Will benefit from ongoing OT with goal for MOD I with basic ADLs.      Impairment of mobility:  Noted to have impaired strength, impaired activity tolerance,  and impaired balance leading to decreased mobility.  Will benefit from ongoing PT with goal for MICAH with basic mobility.      Impairment of cognition/language/swallow:  Noted to have impaired swallow and dysarthria will benefit from ongoing SLP to assess safety of swallow and provide ongoing safe swallow strategies, improve ability to communicate needs effectively.      Medical Conditions  Acute Left Pontine stroke  Intracranial atherosclerosis  Presented with right sided weakness, impaired coordination, CT head neg for acute stroke, MRI with left pontine stroke. Imaging with extensive intracranial atherosclerotic disease.  EF wnl, A1C 6.3%, . Seen by stroke neurology, \" in view of extensive intracranial atherosclerotic disease, I would recommend continuing baby aspirin and Plavix indefinitely\"   - HTN as below- long term goal 120-130/80 (next one week) with permissive HTN (3/3-3/10)  -HLD as below- LDL goal <70  -continue PT/OT/SLP- plan for outpatient PT/OT.   -follow up for 30 day event monitor  -continue asa 81 and plavix     Anterior communicating artery 2 mm aneurysm  Right tentorial leaf meningioma  Found incidentally during stroke workup  -routine surveillance imaging     HTN  Period of permissive HTN now completed, long term BP goal 120-130/80  -continue lisinopril 10 mg daily  -BPs  well controlled      HLD  -ldl 152, started on atorvastatin 80 mg daily, long term goal LDL <70  -continue atorvastatin 80 mg daily     Stage 3A CKD  Cr baseline appears to be around 1.1.  stable 1.14 3/10--> 1.09 3/13--> 1.05 3/17  -trend     Mild persistent " asthma  -continue Singulair, albuterol prn     Invasive lobular carcinoma  Hormone positive HER2 neg, s/p lumpectomy, follows with oncology   -continue arimidex     Prediabetes        Lab Results   Component Value Date     A1C 6.3 03/03/2025   -trend glucose with labs.     Fungal rash  -appreciate wocn recs- uche bid         Adjustment to disability:  Clinical psychology to eval and treat as indicated  FEN: reg  Bowel: monitor; on prn bowel meds  Bladder: monitor; PVRs x2 mildly elevated at 161 and 143  Continue to monitor bladder patterns.  No further scans obtained, but appears to be continent and voiding without difficulty  DVT Prophylaxis: ambulation  GI Prophylaxis: continue Protonix 40 mg daily - started due to DAPT and age  Code: full- confirmed on admission.   Disposition: goal for home  ELOS:  Tentative discharge date 3/18/25 with OP PT, OT, ?SLP  Rehab prognosis:  good  Follow up Appointments on Discharge: pcp, neuro,     Iris Bird PA-C  Department of Rehabilitation Medicine    Time Spent on this Encounter   I spent a total of 35 minutes face-to-face or managing the care of Fang Estes. Over 50% of my time on the unit was spent counseling the patient and coordinating care. See note for details.

## 2025-03-17 NOTE — PROGRESS NOTES
Speech Language Therapy Discharge Summary    Reason for therapy discharge:    Discharged to home.    Progress towards therapy goal(s). See goals on Care Plan in [facility] electronic health record for goal details.  Goals partially met.  Barriers to achieving goals:   discharge from facility.    Therapy recommendation(s):    No further therapy is recommended.  Patient's cognitive function within functional limits.  Diet is regular texture diet and thin liquids.  Primary area of deficit is mild dysarthria.  Patient has been educated regarding appropriate types of tasks and activities that she can complete in a more independent setting to continue maximizing her function.  Do not anticipate need for ongoing therapy though did discuss with patient that if further recovery is not being noted at home that outpatient therapy would be appropriate to pursue.

## 2025-03-17 NOTE — PROGRESS NOTES
03/17/25 0718   Signing Clinician's Name / Credentials   Signing clinician's name / credentials Martine Pagan DPT   [place] Balance Scale (KARTINA MURGUIA, ROSE S, HILLARY ORTIZ, IAN PABON: MEASURING BALANCE IN THE ELDERLY: VALIDATION OF AN INSTRUMENT. CAN. J. PUB. HEALTH, JULY/AUGUST SUPPLEMENT 2:S7-11, 1992.)   Sit To Stand 4   Standing Unsupported 3   Sitting Unsupported 4   Stand to Sit 4   Transfers 4   Standing with Eyes Closed 3   Standing Unsupported, Feet Together 3   Reach Forward With Outstretched Arm 2   Retrieve Object From Floor 3   Turning to Look Behind 2   Turn 360 Degrees 0   Placing Alternate Foot on Stool (4-6 inches) 0  (needs modA)   Unsupported Tandem Stand (Demonstrate to Subject) 2   One Leg Stand 1   Total Score (A score of 45 or less has been correlated with an increased risk of falls)   Total Score (out of 56) 35     [place] Balance Scale (BBS) Cutoff Scores for CVA Population:    The BBS is a measure of static and dynamic standing balance that has been validated in community dwelling elderly individuals and individuals who have Parkinson's Disease, MS, and those who are s/p CVA and TBI. The test is administered without an assistive device. Scores from the [place] are used to determine the probability of falling based on the patient's previous history of falls and their test performance.     0-20 High risk for falling- Corresponded with w/c bound status  21-40 Medium risk for falling- Able to walk with assistance  41-56 Low risk for falling- Able to walk independently  According to [facility].  Available at http://www.strokecenter.org/.  Accessibility verified April 10, 2013.  Minimal Detectable Change = 6.5 according to Fernie & Eliasey 2008    Assessment (rationale for performing, application to patient s function & care plan): Significant improvement in admission score of 26/56>35/56. Still has difficulty with tasks requiring R single limb stance and foot clearance on R.  "Needs cues for setup w/ STS at times. Score indicates medium fall risk, recommending ongoing assist w/ mobility.     10 Meter Walk Test:  Setup - using open 10 meter walkway. Can be setup individually or using marks along base board of East hallway between PT and OT gyms.  Instructions - comfortable/self chosen pace: \"Walk at your own comfortable walking pace and stop when you reach the end,\"   and fast pace: \"Walk as fast as you can safely walk and stop when you reach the end.\"   Time was started as the lead foot crossed the 2 meter markell and finished as the patient's lead foot crossed the 8 meter markell.    Assistive Device: FWW  Assist Provided: SBA    Average Speed for Comfortable Pace - 0.66m/s  Average Speed for Fast Pace - 0.78m/s    Assessment: Significant improvements in gait speed. At times pt functionally ambulates too fast leading to greater fall risk. Spouse trained to give cues for pace regulation upon discharge.       Normative Data:    Gait Speed for Community Dwellers - Susannah et al, 2005  Low Gait Speed - <0.7 m/sec  Mean Gait Speed - 0.7-1.0 m/sec  High Functioning Gait Speed - >1.1 m/sec        "

## 2025-03-18 VITALS
TEMPERATURE: 97.3 F | OXYGEN SATURATION: 96 % | RESPIRATION RATE: 16 BRPM | WEIGHT: 119 LBS | DIASTOLIC BLOOD PRESSURE: 65 MMHG | BODY MASS INDEX: 21.09 KG/M2 | HEART RATE: 66 BPM | HEIGHT: 63 IN | SYSTOLIC BLOOD PRESSURE: 129 MMHG

## 2025-03-18 PROCEDURE — 250N000013 HC RX MED GY IP 250 OP 250 PS 637: Performed by: PHYSICAL MEDICINE & REHABILITATION

## 2025-03-18 PROCEDURE — 250N000013 HC RX MED GY IP 250 OP 250 PS 637: Performed by: PHYSICIAN ASSISTANT

## 2025-03-18 PROCEDURE — 99239 HOSP IP/OBS DSCHRG MGMT >30: CPT | Performed by: PHYSICIAN ASSISTANT

## 2025-03-18 RX ADMIN — Medication 50 MCG: at 07:28

## 2025-03-18 RX ADMIN — ANASTROZOLE 1 MG: 1 TABLET, COATED ORAL at 07:28

## 2025-03-18 RX ADMIN — CLOPIDOGREL 75 MG: 75 TABLET ORAL at 07:28

## 2025-03-18 RX ADMIN — LISINOPRIL 10 MG: 10 TABLET ORAL at 07:28

## 2025-03-18 RX ADMIN — PANTOPRAZOLE SODIUM 40 MG: 40 TABLET, DELAYED RELEASE ORAL at 05:51

## 2025-03-18 RX ADMIN — ASPIRIN 81 MG: 81 TABLET, COATED ORAL at 07:28

## 2025-03-18 ASSESSMENT — ACTIVITIES OF DAILY LIVING (ADL)
ADLS_ACUITY_SCORE: 39

## 2025-03-18 NOTE — PLAN OF CARE
Goal Outcome Evaluation:      Plan of Care Reviewed With: patient    Overall Patient Progress: improvingOverall Patient Progress: improving     Orientation:alert andoriented x4; with word finding at times.   Resp: Denies of SOB  Pain: denies of pain  Diet:regular diet thin liquids. Medication whole   Bladder:continent of bladder  Bowel:continent. LBM 3/17/25  Ambulation/Transfer: SBA w/ FWW with AFO on right foot  Skin:intact. Declined to change to night time clothes tonight when offered  Safety: fall precaution  Others:  Discharging today to home

## 2025-03-18 NOTE — DISCHARGE SUMMARY
[facility]   Acute Rehabilitation Unit  Discharge summary     Date of Admission: 3/6/2025  Date of Discharge: 03/18/2025  Disposition: home  Primary Care Physician: Santiago Silverio  Attending physician: Ani Wilson MD    DISCHARGE DIAGNOSIS  Acute left pontine stroke  Anterior communicating artery aneurysm  HTN  HLD  Mild persistent asthma    BRIEF SUMMARY  Fang Estes is a 84 year old right hand dominant woman with past medical history of HTN, CKD, breast CA, asthma, and osteoporosis who presented with right sided weakness found to have 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 Admitted to rehab 03/06/2025 in setting of impaired strength, impaired activity tolerance, impaired balance, impaired coordination, dysarthria, and dysphagia.     Nataliya participated in therapy, remained medically stable for duration of rehab stay.  She underwent family training with spouse prior to discharge and was discharge home with family support and outpatient PT/OT.      REHABILITATION COURSE  Occupational Therapy Discharge Summary     Reason for therapy discharge:    Discharged to home with outpatient therapy.     Progress towards therapy goal(s). See goals on Care Plan in [facility] electronic health record for goal details.  Goals partially met.  Barriers to achieving goals:   Patient meeting goals to discharge home with initial caregiver support, anticipate quick advancement to Mod IND ADLs and mobility in familiar home environment.     Therapy recommendation(s):    Continued therapy is recommended.  Rationale/Recommendations:  ADL/IADL retraining, equipment retraining, RUE sensorimotor retraining, functional mobility progress, community reintegration.     Summary: Fang Estes is a 84 year old right hand dominant woman with past medical history of HTN, CKD, breast CA, asthma, and osteoporosis who presented with right sided weakness found to  have 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   Admitted to rehab 03/06/2025 in setting of impaired strength, impaired activity tolerance, impaired balance, impaired coordination, dysarthria, and dysphagia.      Discharge Plan: Home with Spouse; OP OT     Precautions: Fall, R hemiparesis, R AFO for transfers and amb   RUE resting hand orthosis on overnight, off during day     Current Status:  ADLs:  Mobility:  SBA transfers/mobility FWW, AFO.  Grooming: SBA standing at sink.  Dressing: UB set up assist. LB CGA standing to pull over hips. Footwear mod A donning shoe on affected side.  Bathing: CGA with walker and extended tub bench.  Toileting: SBA transfer. SBA clothing management and hygiene.  IADLs: PLOF IND. Spouse to assist at discharge.     Vision/Cognition: Dysarthria. Mild deficits attention.      Equipment: Recommend shower bench, walker, AFO, resting hand orthosis, and walker tray.     Caregiver support: Spouse supportive, able to provide assist ADL/IADL at discharge prn. Training completed 3/16 1:15-3:15pm.     Home set up: 2 RASHAUN no rail + 14 STI R railing    Speech Language Therapy Discharge Summary     Reason for therapy discharge:    Discharged to home.     Progress towards therapy goal(s). See goals on Care Plan in [facility] electronic health record for goal details.  Goals partially met.  Barriers to achieving goals:   discharge from facility.     Therapy recommendation(s):    No further therapy is recommended.  Patient's cognitive function within functional limits.  Diet is regular texture diet and thin liquids.  Primary area of deficit is mild dysarthria.  Patient has been educated regarding appropriate types of tasks and activities that she can complete in a more independent setting to continue maximizing her function.  Do not anticipate need for ongoing therapy though did discuss with patient that if further recovery is not being noted at home that outpatient  "therapy would be appropriate to pursue.               Physical Therapy Discharge Summary     Reason for therapy discharge:    All goals and outcomes met, no further needs identified.     Progress towards therapy goal(s). See goals on Care Plan in [facility] electronic health record for goal details.  Goals met     Therapy recommendation(s):    Continued therapy is recommended.  Rationale/Recommendations:  Progressed well during ARU stay. Pt near Tonya, but still poor pace regulation with ambulation and intermittent R foot catching. Not always consistent with sequencing for STS transfers, heavy reliance on LLE. Family training completed w/ spouse who is able to provide CGA for mobility and assist with stairs. Pt is highly motivated and at baseline IND w/ all mobility attending fitness classes 3x/week. Will benefit from ongoing PT to progress to goals of Tonya>IND w/ mobility and decreased caregiver burden.     Current Status:  Bed Mobility: Sarah  Transfer: CGA w/  FWW  Gait: Up to 300 w/ FWW CGA>SBA  Stairs: 6\" x12 w/ Sarah  Balance: Stable dynamic sitting. Able to stand unsupported static, requires UE support for dynamic stepping.      Outcome Measures:   [place]               3/8: 26/56              3/17: 35/56  10MWT               3/7: 0.31m/s self paced; 0.48m/s fast, Sarah w/ FWW              3/16: 0.66m/s self paced; 0.78m/s fast, SBA w/ FWW     MEDICAL COURSE  Acute Left Pontine stroke  Intracranial atherosclerosis  Presented with right sided weakness, impaired coordination, CT head neg for acute stroke, MRI with left pontine stroke. Imaging with extensive intracranial atherosclerotic disease.  EF wnl, A1C 6.3%, . Seen by stroke neurology, \" in view of extensive intracranial atherosclerotic disease, I would recommend continuing baby aspirin and Plavix indefinitely\"   - HTN as below- long term goal 120-130/80  -HLD as below- LDL goal <70  - plan for outpatient PT/OT.   -follow up for 30 day event monitor  -continue " asa 81 and plavix     Anterior communicating artery 2 mm aneurysm  Right tentorial leaf meningioma  Found incidentally during stroke workup  -routine surveillance imaging     HTN  Period of permissive HTN now completed, long term BP goal 120-130/80  -continue lisinopril 10 mg daily  -BPs  well controlled followed up primary care.      HLD  -ldl 152, started on atorvastatin 80 mg daily, long term goal LDL <70  -continue atorvastatin 80 mg daily     Stage 3A CKD  Cr baseline appears to be around 1.1.  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   DISCHARGE MEDICATIONS  Discharge Medication List as of 3/18/2025 10:27 AM        START taking these medications    Details   acetaminophen (TYLENOL) 325 MG tablet Take 2 tablets (650 mg) by mouth every 6 hours as needed for mild pain or headaches., OTC      pantoprazole (PROTONIX) 40 MG EC tablet Take 1 tablet (40 mg) by mouth every morning (before breakfast)., Disp-30 tablet, R-0, E-Prescribe           CONTINUE these medications which have CHANGED    Details   aspirin 81 MG EC tablet Take 1 tablet (81 mg) by mouth daily., Disp-30 tablet, R-0, E-Prescribe      atorvastatin (LIPITOR) 80 MG tablet Take 1 tablet (80 mg) by mouth every evening., Disp-30 tablet, R-0, E-Prescribe      clopidogrel (PLAVIX) 75 MG tablet Take 1 tablet (75 mg) by mouth daily., Disp-30 tablet, R-0, E-Prescribe           CONTINUE these medications which have NOT CHANGED    Details   anastrozole (ARIMIDEX) 1 MG tablet Take 1 mg by mouth every morning., Historical      montelukast (SINGULAIR) 10 MG tablet Take 10 mg by mouth at bedtime., Historical      albuterol (PROAIR HFA/PROVENTIL HFA/VENTOLIN HFA) 108 (90  Base) MCG/ACT inhaler Inhale 1-2 puffs into the lungs every 4 hours as needed for shortness of breath, wheezing or cough., Historical      alendronate (FOSAMAX) 70 MG tablet Take 70 mg by mouth every 7 days. Sunday(s), Historical      lisinopril (ZESTRIL) 10 MG tablet Take 10 mg by mouth every morning., Historical      vitamin D3 (CHOLECALCIFEROL) 50 mcg (2000 units) tablet Take 1 tablet by mouth every morning., Historical               DISCHARGE INSTRUCTIONS AND FOLLOW UP  Discharge Procedure Orders   Physical Therapy  Referral   Standing Status: Future   Referral Priority: Routine: Next available opening Referral Type: Rehab Therapy Physical Therapy   Number of Visits Requested: 1     Occupational Therapy  Referral   Standing Status: Future   Referral Priority: Routine: Next available opening Referral Type: Occupational Therapy   Number of Visits Requested: 1     Reason for your hospital stay   Order Comments: Admitted for rehabilitation following stroke.     Activity   Order Comments: Your activity upon discharge: up with assist from spouse with walker.  Assist as advised by therapy team, continue therapy to continue to improve independence.     Order Specific Question Answer Comments   Is discharge order? Yes      ADULT [facility] Specialty Follow-up and recommended labs and tests   Order Comments: Follow up: Follow up neurology- follow up stroke.     Appointments on [facility] and/or [facility] (with [facility] or [facility] provider or service). Call [phone] if you haven't heard regarding these appointments within 7 days of discharge.     Diet   Order Comments: Follow this diet upon discharge: Current Diet:Orders Placed This Encounter      Room Service      Combination Diet Regular Diet; Thin Liquids (level 0) (small single sip liquid)     Order Specific Question Answer Comments   Is discharge order? Yes      Hospital Follow-up with Existing Primary Care Provider (PCP)   Standing Status: Future  Standing Exp. Date: 04/16/25   Order Comments: Please see details below          Order Specific Question Answer Comments   Schedule Primary Care visit within 7 Days           PHYSICAL EXAMINATION    Most recent Vital Signs:   Vitals:    03/17/25 0656 03/17/25 1624 03/18/25 0726 03/18/25 0800   BP: 128/64 128/63 129/65    BP Location: Right arm Left arm Left arm    Patient Position:   Semi-Todd's    Cuff Size:   Adult Regular    Pulse: 60 62 66    Resp: 16 18 16    Temp: 98  F (36.7  C) 97.9  F (36.6  C)  97.3  F (36.3  C)   TempSrc: Oral Oral  Axillary   SpO2:  99% 96%    Weight: 54 kg (119 lb)      Height:       General: awake alert nad  CV: rrr  Resp: non labored clear  Ab: soft non distended non tender  Extremities: warm dry without edema  MSK/Neuro: mild dysarthria, right hemiparesis, SF 1/5, EF near 2/5, EE 2/5, WF 2/5,  2/5.       40 minutes spent in discharge, including >50% in counseling and coordination of care, medication review and plan of care recommended on follow up.     Patient was evaluated on day of discharge by attending physician, Ani Wilson MD, who agrees with plan of care.    Discharge summary was forwarded to Santiago Silverio (PCP) at the time of discharge, so as to bridge from hospital to outpatient care.     It was our pleasure to care for Fang Estes during this hospitalization. Please do not hesitate to contact me should there be questions regarding the hospital course or discharge plan.          Iris Bird PA-C  Physical Medicine and Rehabilitation

## 2025-03-18 NOTE — PROGRESS NOTES
Discharge Plan     Discharge Date: 03/18/2025    Discharge Disposition: Home       Discharge Services:  OP Therapies     ORDERS AND DISCHARGE SUMMARY WILL NEED TO BE FAXED TO      Discharge Supplies: All DME supplied by PT/OT       Discharge Transportation: Spouse will provide.      Queenie Leija [facility], Acute Inpatient Rehab Unit   [address] [place] [address], [address] [place] [address]  Phone: [phone]  Fax: [phone]

## 2025-03-18 NOTE — PLAN OF CARE
Goal Outcome Evaluation:      Plan of Care Reviewed With: patient  Overall Patient Progress: no change  Overall Patient Progress: no change        Orientation: AOx4  Bowel: Continent  LBM: 3-18-25  Bladder: Continent  Pain: Denies  Ambulation/Transfers: Min A w/ WW   Diet/Liquids: Regular Diet; Thin Liquids; Pills Whole  Tubes/Lines/Drains: N/A  Skin: WDL      Patient for discharge today. Discharge meds accounted for. All patient belongings packed and brought by patient. Given Discharge instructions. Nurse answered patient questions. Patient and family verbalized having no other additional questions. Patient accompanied by Nursing Assistant per wheelchair @ ~1100H. Patient helped into car by nursing assistant by ~1110H.

## 2025-03-19 ENCOUNTER — THERAPY VISIT (OUTPATIENT)
Dept: OCCUPATIONAL THERAPY | Facility: REHABILITATION | Age: 85
End: 2025-03-19
Payer: COMMERCIAL

## 2025-03-19 ENCOUNTER — PATIENT OUTREACH (OUTPATIENT)
Dept: CARE COORDINATION | Facility: CLINIC | Age: 85
End: 2025-03-19

## 2025-03-19 DIAGNOSIS — Z78.9 DECREASED ACTIVITIES OF DAILY LIVING (ADL): ICD-10-CM

## 2025-03-19 DIAGNOSIS — R27.9 INCOORDINATION: ICD-10-CM

## 2025-03-19 DIAGNOSIS — G81.91 RIGHT HEMIPARESIS (H): Primary | ICD-10-CM

## 2025-03-19 PROCEDURE — 97165 OT EVAL LOW COMPLEX 30 MIN: CPT | Mod: GO | Performed by: OCCUPATIONAL THERAPIST

## 2025-03-19 PROCEDURE — 97110 THERAPEUTIC EXERCISES: CPT | Mod: GO | Performed by: OCCUPATIONAL THERAPIST

## 2025-03-19 NOTE — PROGRESS NOTES
[facility]: [facility]    Background: Transitional Care Management program identified per system criteria and reviewed by [facility] [place] team for possible outreach.    Assessment: Upon chart review, [facility] Team member will not proceed with patient outreach related to this episode of Transitional Care Management program due to reason below:    Patient has a follow up appointment with an appropriate provider today for hospital discharge.    Plan: Transitional Care Management episode addressed appropriately per reason noted above.      Maile Fuchs MA  [facility] [place], [facility]    *Connected Care Resource Team does NOT follow patient ongoing. Referrals are identified based on internal discharge reports and the outreach is to ensure patient has an understanding of their discharge instructions.

## 2025-03-19 NOTE — PROGRESS NOTES
"OCCUPATIONAL THERAPY EVALUATION  Type of Visit: Evaluation       Fall Risk Screen:  Fall screen completed by: OT  Have you fallen 2 or more times in the past year?: No  Have you fallen and had an injury in the past year?: No  Is patient a fall risk?: Yes  Fall screen comments: will be evaluated by PT tomorrow    Subjective        Presenting condition or subjective complaint: Per hospital discharge note in EMR, \"Fang Estes is a 84 year old right hand dominant woman with past medical history of HTN, CKD, breast CA, asthma, and osteoporosis who presented with right sided weakness found to have 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 Admitted to rehab 03/06/2025 in setting of impaired strength, impaired activity tolerance, impaired balance, impaired coordination, dysarthria, and dysphagia.\"  Date of onset: 03/03/25    Relevant medical history:     Past Medical History:   Diagnosis Date    Hypertension     Left pontine CVA (H) 3/4/2025    Uncomplicated asthma        Dates & types of surgery:   See EMR    Prior diagnostic imaging/testing results:      see EMR  Prior therapy history for the same diagnosis, illness or injury: Yes March 7-17, OT, PT, and Speech at [facility].    Prior Level of Function  Transfers: Independent  Ambulation: Independent  ADL: Independent  IADL: Independent    Living Environment  Social support: With a significant other or spouse   Type of home: House , 2 story  Stairs to enter the home: Yes 2 Is there a railing: No     Ramp: No   Stairs inside the home: Yes 12 Is there a railing: Yes     Help at home: Self Cares (home health aide/personal care attendant, family, etc)  Equipment owned: Walker with wheels; Bath bench     Employment: No    Hobbies/Interests:      Patient goals for therapy: Regain normal use of right leg, arm, and hand.    Pain assessment: Pain denied     Objective     Cognitive Status Examination  Orientation: Oriented to person, place " and time   Level of Consciousness: Alert  Follows Commands and Answers Questions: 100% of the time  Personal Safety and Judgement: Intact  Memory: Intact  Attention: No deficits identified  Organization/Problem Solving: No deficits identified  Executive Function: No deficits identified    VISUAL SKILLS  Visual Acuity: No deficits identified  Visual Field: Appears normal  Visual Attention: Appears normal  Oculomotor: Tracking is not smooth but no symptoms    SENSATION: UE Sensation WNL, LE Sensation WNL    POSTURE: WNL  RANGE OF MOTION:   (Degrees) Right AROM   Shoulder Flexion 48 with signif substitution into abd   Shoulder Extension WNL   Elbow Flexion 123   Elbow Extension -29   Wrist Flexion 34   Wrist Extension 32   Supination 0   Pronation 60   Finger Flexion Near full   Finger Extension 60%   Finger Abd/add trace                 STRENGTH:   Pain: - none + mild ++ moderate +++ severe  Strength Scale: 0-5/5 Left Right   Shoulder Flexion 4/5    Shoulder Extension 4/5    Elbow Flexion 5 3-   Elbow Extension 5 3   Wrist Flexion 5 3-   Wrist Extension 5 3-   Supination 5 2+   Pronation 5 3    Strength (lbs)     Lateral Pinch (lbs)     3 Point Pinch (lbs)          Hand Dominance: Right  MUSCLE TONE: Hypotonic  COORDINATION: Left Hand, Nine Hole Peg Test (sec): NT  Right Hand, Nine Hole Peg Test (sec): unable  BALANCE: WFL    FUNCTIONAL MOBILITY  Assistive Device(s): Walker (front wheeled)  Ambulation: independent    BED MOBILITY: Independent    TRANSFERS: Independent    BATHING: Contact Guard  Equipment: Shower chair/Tub bench    UPPER BODY DRESSING: Independent  Equipment: none    LOWER BODY DRESSING: Mod Assist only for AFO  Equipment: none    TOILETING: SBA  Equipment: none    GROOMING: Independent  Equipment: none    EATING/SELF FEEDING: Independent , using eft hand  Equipment: none    ACTIVITY TOLERANCE: Fair    INSTRUMENTAL ACTIVITIES OF DAILY LIVING (IADL):   Meal Planning/Prep: Patient used do much of the  cooking and shopping and has not attempted yet as she just got home yesterday  Home/Financial Management: has not attempted yet as she just got home yesterday  Communication/Computer Use: has not attempted computer yet as she just got home yesterday. She is using smart phone  Community Mobility: Has not driven since stroke  Care of Others: N/A            Assessment & Plan   CLINICAL IMPRESSIONS  Medical Diagnosis: Cerebrovascular accident (CVA) due to stenosis of left middle cerebral artery    Treatment Diagnosis: right arm weakness, incoordination, decreased ADL and IADL function    Impression/Assessment: Pt is a 84 year old female presenting to Occupational Therapy due to recent stroke.  The following significant findings have been identified: Impaired activity tolerance, Impaired balance, Impaired coordination, Impaired ROM, and Impaired strength.  These identified deficits interfere with their ability to perform self care tasks, recreational activities, household chores, driving , community mobility, and meal planning and preparation as compared to previous level of function.     Clinical Decision Making (Complexity):  Assessment of Occupational Performance: 5 or more Performance Deficits  Occupational Performance Limitations: bathing/showering, dressing, feeding, functional mobility, hygiene and grooming, driving and community mobility, home establishment and management, meal preparation and cleanup, shopping, and leisure activities  Clinical Decision Making (Complexity): Low complexity    PLAN OF CARE  Treatment Interventions:  Interventions: Self-Care/Home Management, Therapeutic Activity, Therapeutic Exercise, Manual Therapy, Neuromuscular Re-education    Long Term Goals   OT Goal 1  Goal Identifier: right shoulder AROM  Goal Description: Patient to demonstrate independence with HEP for R UE AROM and measure improved R shoulder flexion by at least 10 degrees for increased ADL/IADL independence  Target Date:  06/11/25  OT Goal 2  Goal Identifier: right elbow AROM  Goal Description: Patient to demonstrate independence with HEP for R UE AROM and measure improved R elbow flexion by at least 15 degrees and extension by at least 5 degrees for increased ADL/IADL independence  Target Date: 06/11/25  OT Goal 3  Goal Identifier: right  strength  Goal Description: Patient to demonstrate independence with HEP for R UE strength and measure improved R  strength by at least 5#s for increased ADL/IADL independence (hanging onto objects during hygiene and grooming, cutting food, completing B UE tasks, etc.).  Target Date: 06/11/25  OT Goal 4  Goal Identifier: right pinch strength  Goal Description: Patient will demonstrate increased right hand pinch strength (both lateral and palmar) by 2# each for increased independence with ADL/IADLs such as opening containers, pull up pants, maintaining pinch to hold items, etc.  Target Date: 06/11/25  OT Goal 5  Goal Identifier: right fine motor coordination  Goal Description: Patient to demonstrate independence with HEP for R hand coordination and be able to perform 9 HPT in under 3 minutes for increased ADL/IADL independence  Target Date: 06/11/25      Frequency of Treatment: twice a week with ability to taper as clinically indicated  Duration of Treatment: 12 weeks     Recommended Referrals to Other Professionals: Physical Therapy, Speech Language Pathology  Education Assessment: Learner/Method: Patient     Risks and benefits of evaluation/treatment have been explained.   Patient/Family/caregiver agrees with Plan of Care.     Evaluation Time:    OT Eval, Low Complexity Minutes (20742): 30       Signing Clinician: Joycelyn Treivno OT        [facility]                                                                                   OUTPATIENT OCCUPATIONAL THERAPY      PLAN OF TREATMENT FOR OUTPATIENT REHABILITATION   Patient's Last Name, First Name,  NATHANAEL EstesFang  A YOB: 1940   Provider's Name   [facility]   Medical Record No.  [id number]     Onset Date: 03/03/25 Start of Care Date: 03/19/25     Medical Diagnosis:  Cerebrovascular accident (CVA) due to stenosis of left middle cerebral artery      OT Treatment Diagnosis:  right arm weakness, incoordination, decreased ADL and IADL function Plan of Treatment  Frequency/Duration:twice a week with ability to taper as clinically indicated/12 weeks    Certification date from 03/19/25   To 06/11/25        See note for plan of treatment details and functional goals     Joycelyn Trevino, OT                         I CERTIFY THE NEED FOR THESE SERVICES FURNISHED UNDER        THIS PLAN OF TREATMENT AND WHILE UNDER MY CARE     (Physician attestation of this document indicates review and certification of the therapy plan).              Referring Provider:  RUDY Jean    Initial Assessment  See Epic Evaluation- 03/19/25

## 2025-03-20 ENCOUNTER — THERAPY VISIT (OUTPATIENT)
Dept: PHYSICAL THERAPY | Facility: REHABILITATION | Age: 85
End: 2025-03-20
Attending: PHYSICIAN ASSISTANT
Payer: COMMERCIAL

## 2025-03-20 DIAGNOSIS — R26.81 GAIT INSTABILITY: Primary | ICD-10-CM

## 2025-03-20 DIAGNOSIS — I63.512 CEREBROVASCULAR ACCIDENT (CVA) DUE TO STENOSIS OF LEFT MIDDLE CEREBRAL ARTERY (H): ICD-10-CM

## 2025-03-20 DIAGNOSIS — Z74.09 DECREASED FUNCTIONAL MOBILITY AND ENDURANCE: ICD-10-CM

## 2025-03-20 NOTE — PROGRESS NOTES
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 Admitted to rehab 03/06/2025 in setting of impaired strength, impaired activity tolerance, impaired balance, impaired coordination, dysarthria, and dysphagia.\" She was at the [facility] from 3/7/25 to 3/17/25 and discharged to home with assistance           Presenting condition or subjective complaint: Results of a stroke.  Date of onset: 03/03/25    Relevant medical history:     Dates & types of surgery:      Prior diagnostic imaging/testing results:       Prior therapy history for the same diagnosis, illness or injury: Yes March 7-17, OT, PT, and Speech at [facility].    Prior Level of Function  Transfers: Independent, Assistive equipment  Ambulation: Independent, Assistive equipment  ADL: Independent, Assistive equipment  IADL: Driving, Housekeeping, Laundry, Meal preparation    Living Environment  Social support: With a significant other or spouse   Type of home: House   Stairs to enter the home: Yes 2 Is there a railing: No     Ramp: No   Stairs inside the home: Yes 12 Is there a railing: Yes     Help at home: Self Cares (home health aide/personal care attendant, family, etc)  Equipment owned: Walker with wheels; Bath bench   w  Employment: No    Hobbies/Interests:      Patient goals for therapy: Regain normal use of right leg, arm, and hand.    Pain " assessment: See objective evaluation for additional pain details     Objective      Cognitive Status Examination  Orientation: Oriented to person, place and time   Level of Consciousness: Alert  Follows Commands and Answers Questions: 100% of the time  Personal Safety and Judgement: Intact  Memory: Intact    INTEGUMENTARY: Intact  POSTURE: Sitting Posture: Rounded shoulders    BED MOBILITY: WFL, Independent    TRANSFERS: WFL, Contact Guard    WHEELCHAIR MOBILITY: NA    GAIT:   Level of [place]: WFL, with FWW  Assistive Device(s): Walker (front wheeled)  Gait Deviations: Base of support increased  Stride length decreased  Joanne decreased  Drop foot R  Knee extension decreased R  Glut max gait with lurch R  Gait Distance: see 6 mintute walk testing   Stairs: step tow B HHA on railings     BALANCE:     SPECIAL TESTS  10 Meter Walk Test (Comfortable)  0.63 m/s   Age norms: 0.94 m/s  Cut off for CVA:   - >0.8 m/s = community Ambulator    6 Minute Walk Test (6MWT)   212.5 meters    697 ft RPE 7-8/10  With FWW  Age norms: 392 meters (1286 ft)   Did not require standing rest break   [place] Balance Scale (BBS)  Last tested on 3/18 0 see flowsheet for details    5 Times Sit-to-Stand (5TSTS)  16.72 seconds, using B UE's on walker, occasional use of legs on chair to stabilize      30 Second Sit to Stand (reps/height) 9 reps   using B UE's on walker, occasional use of legs on chair to stabilize   From standard chair, unable to do without arm support        SENSATION: UE Sensation WNL, LE Sensation WNL    Assessment & Plan   CLINICAL IMPRESSIONS  Medical Diagnosis: Cerebrovascular accident (CVA) due to stenosis of left middle cerebral artery, gait instability, decreased functional mobility and endurance    Treatment Diagnosis: Cerebrovascular accident (CVA) due to stenosis of left middle cerebral artery, gait instability, decreased functional mobility and endurance   Impression/Assessment: Patient is a 84 year old female  with complaints of weakness and decreased mobility with having had a stroke on 3/3/2Per chart review the patient has improved in gait speed and strength since last session. he following significant findings have been identified: Pain, Decreased ROM/flexibility, Decreased joint mobility, Decreased strength, Impaired balance, Decreased proprioception, Impaired gait, Impaired muscle performance, and Decreased activity tolerance. These impairments interfere with their ability to perform self care tasks, recreational activities, household chores, and community mobility as compared to previous level of function.     Clinical Decision Making (Complexity):  Clinical Presentation: Stable/Uncomplicated  Clinical Presentation Rationale: based on medical and personal factors listed in PT evaluation  Clinical Decision Making (Complexity): Low complexity    PLAN OF CARE  Treatment Interventions:  Interventions: Gait Training, Manual Therapy, Neuromuscular Re-education, Therapeutic Activity, Therapeutic Exercise, Self-Care/Home Management    Long Term Goals     PT Goal 1  Goal Identifier: HEP  Goal Description: Patient with will be independent with a HEP fir ongoing symptom management in 90 days  Target Date: 06/18/25  PT Goal 2  Goal Identifier: Gait speed  Goal Description: Patient will increase her comfortable gait speed to > 0.8 m/s for increase in community ambulation, decrease falls risk and improvement in functional mobility in 90 days  Target Date: 06/18/25  PT Goal 3  Goal Identifier: sit to stand testing  Goal Description: Patient will increase 30 sec sit to stands to >10reps without UE support and decrease time on 5x STS to less than 15 seconds without UE support for improvement in falls risk and increase in functional endurance in 90 days  Target Date: 06/18/25  PT Goal 4  Goal Identifier: 6 minute walk test  Goal Description: Patient will increase distance on the 6 minute walk test to > 300 meters and improved right  LE mechanics for increase in functional mobility and acitivity tolerance in 90 days  Target Date: 06/18/25      Frequency of Treatment: 1-2 times per week  Duration of Treatment: up to 90 days    Recommended Referrals to Other Professionals: Occupational Therapy, Speech Language Pathology  Education Assessment:   Learner/Method: Patient;Listening;Reading;Pictures/Video    Risks and benefits of evaluation/treatment have been explained.   Patient/Family/caregiver agrees with Plan of Care.     Evaluation Time:     PT Eval, Low Complexity Minutes (86593): 33     Signing Clinician: TORI Medina [facility]                                                                                   OUTPATIENT PHYSICAL THERAPY      PLAN OF TREATMENT FOR OUTPATIENT REHABILITATION   Patient's Last Name, First Name, Fang Fried YOB: 1940   Provider's Name   [facility]   Medical Record No.  [id number]     Onset Date: 03/03/25  Start of Care Date: 03/20/25     Medical Diagnosis:  Cerebrovascular accident (CVA) due to stenosis of left middle cerebral artery, gait instability, decreased functional mobility and endurance      PT Treatment Diagnosis:  Cerebrovascular accident (CVA) due to stenosis of left middle cerebral artery, gait instability, decreased functional mobility and endurance Plan of Treatment  Frequency/Duration: 1-2 times per week/ up to 90 days    Certification date from 03/20/25 to 06/18/25         See note for plan of treatment details and functional goals     Queenie Tavares, PT                         I CERTIFY THE NEED FOR THESE SERVICES FURNISHED UNDER        THIS PLAN OF TREATMENT AND WHILE UNDER MY CARE     (Physician attestation of this document indicates review and certification of the therapy plan).              Referring Provider:  Iris Bird    Initial Assessment  See Epic Evaluation- Start of Care Date:  03/20/25

## 2025-03-24 ENCOUNTER — THERAPY VISIT (OUTPATIENT)
Dept: OCCUPATIONAL THERAPY | Facility: REHABILITATION | Age: 85
End: 2025-03-24
Payer: COMMERCIAL

## 2025-03-24 DIAGNOSIS — G81.91 RIGHT HEMIPARESIS (H): Primary | ICD-10-CM

## 2025-03-24 PROCEDURE — 97112 NEUROMUSCULAR REEDUCATION: CPT | Mod: GO | Performed by: OCCUPATIONAL THERAPIST

## 2025-03-24 PROCEDURE — 97110 THERAPEUTIC EXERCISES: CPT | Mod: GO | Performed by: OCCUPATIONAL THERAPIST

## 2025-03-26 ENCOUNTER — THERAPY VISIT (OUTPATIENT)
Dept: PHYSICAL THERAPY | Facility: REHABILITATION | Age: 85
End: 2025-03-26
Payer: COMMERCIAL

## 2025-03-26 DIAGNOSIS — I63.512 CEREBROVASCULAR ACCIDENT (CVA) DUE TO STENOSIS OF LEFT MIDDLE CEREBRAL ARTERY (H): Primary | ICD-10-CM

## 2025-03-26 DIAGNOSIS — R26.81 GAIT INSTABILITY: ICD-10-CM

## 2025-03-26 DIAGNOSIS — Z74.09 DECREASED FUNCTIONAL MOBILITY AND ENDURANCE: ICD-10-CM

## 2025-03-26 PROCEDURE — 97112 NEUROMUSCULAR REEDUCATION: CPT | Mod: GP | Performed by: PHYSICAL THERAPIST

## 2025-03-26 PROCEDURE — 97110 THERAPEUTIC EXERCISES: CPT | Mod: GP | Performed by: PHYSICAL THERAPIST

## 2025-03-31 DIAGNOSIS — C50.911 INVASIVE DUCTAL CARCINOMA OF BREAST, RIGHT (H): Primary | ICD-10-CM

## 2025-03-31 NOTE — TELEPHONE ENCOUNTER
Faxed refill request received from [facility] requesting a refill of Anastrozole. This was last refilled 12/9/24, #90, 0 refills. Will send this request to Dr. Suresh to review and refill if appropriate. Patient was in the hospital 3/3/25 - 3/6/25 after having a CVA.     Liz Hughes RN on 3/31/2025 at 2:40 PM

## 2025-04-01 RX ORDER — ANASTROZOLE 1 MG/1
1 TABLET ORAL EVERY MORNING
Qty: 90 TABLET | Refills: 3 | Status: SHIPPED | OUTPATIENT
Start: 2025-04-01

## 2025-04-02 ENCOUNTER — THERAPY VISIT (OUTPATIENT)
Dept: PHYSICAL THERAPY | Facility: REHABILITATION | Age: 85
End: 2025-04-02
Payer: COMMERCIAL

## 2025-04-02 ENCOUNTER — THERAPY VISIT (OUTPATIENT)
Dept: OCCUPATIONAL THERAPY | Facility: REHABILITATION | Age: 85
End: 2025-04-02
Payer: COMMERCIAL

## 2025-04-02 DIAGNOSIS — R26.81 GAIT INSTABILITY: ICD-10-CM

## 2025-04-02 DIAGNOSIS — Z78.9 DECREASED ACTIVITIES OF DAILY LIVING (ADL): ICD-10-CM

## 2025-04-02 DIAGNOSIS — R27.9 INCOORDINATION: ICD-10-CM

## 2025-04-02 DIAGNOSIS — R29.898 RIGHT HAND WEAKNESS: ICD-10-CM

## 2025-04-02 DIAGNOSIS — I63.512 CEREBROVASCULAR ACCIDENT (CVA) DUE TO STENOSIS OF LEFT MIDDLE CEREBRAL ARTERY (H): Primary | ICD-10-CM

## 2025-04-02 DIAGNOSIS — Z74.09 DECREASED FUNCTIONAL MOBILITY AND ENDURANCE: ICD-10-CM

## 2025-04-02 DIAGNOSIS — G81.91 RIGHT HEMIPARESIS (H): Primary | ICD-10-CM

## 2025-04-02 PROCEDURE — 97112 NEUROMUSCULAR REEDUCATION: CPT | Mod: GP | Performed by: PHYSICAL THERAPIST

## 2025-04-02 PROCEDURE — 97110 THERAPEUTIC EXERCISES: CPT | Mod: GP | Performed by: PHYSICAL THERAPIST

## 2025-04-02 PROCEDURE — 97110 THERAPEUTIC EXERCISES: CPT | Mod: GO | Performed by: OCCUPATIONAL THERAPIST

## 2025-04-02 PROCEDURE — 97116 GAIT TRAINING THERAPY: CPT | Mod: GP | Performed by: PHYSICAL THERAPIST

## 2025-04-07 PROBLEM — I63.9 STROKE (H): Status: RESOLVED | Noted: 2025-03-06 | Resolved: 2025-04-07

## 2025-04-07 NOTE — PROGRESS NOTES
NEUROLOGY FOLLOW UP VISIT  NOTE       [facility] NEUROLOGY [place]  [address] [place] [address]  Tel: [phone]  Fax: [phone]  [email or web address]     Fang Estes,  1940, MRN [id number]  PCP: Santiago Silverio  Date: 2025      ASSESSMENT & PLAN     Visit Diagnosis  Cerebral aneurysm, nonruptured  Left pontine CVA (H)  Tentorial leaf meningioma     Left pontine infarction  2 mm anterior communicating artery aneurysm  Right tentorial leaf meningioma  84-year-old female with history of HTN, HLD, breast cancer, CKD stage III and osteoporosis who was admitted to the hospital on 3/3/2025 with left pontine infarct.  She has noticed improvement with therapy.  Workup in the past included MRI brain that showed left pontine infarct.  Echocardiogram was normal.  She was on dual antiplatelet therapy.  Also LDL was 153 and she was started on Lipitor.  I have recommended:    1.  Continue baby aspirin and Plavix 75 mg daily for total of 90 days.  On 2025 she should switch to enteric-coated aspirin 325 mg daily  2.  Her LDL was 153 and started on Lipitor.  I have recommended continuing Lipitor 80 mg daily and to repeat a lipid panel in 3 months with a goal of LDL less than 70  3.  30-day event monitor was recommended during her hospitalization but not done and I have reordered it  4.  Incidentally on CTA and MRI, 2 mm ACOM aneurysm and right tentorial leaf meningioma was noted.  I have recommended repeating MRI brain with and without contrast and MRA in 6 months to ensure stability  5.  Follow-up in 6 months    Thank you again for this referral, please feel free to contact me if you have any questions.    Victoriano Charles MD  [facility] NEUROLOGY, [place]     HISTORY OF PRESENT ILLNESS     Patient is 84-year-old female with history of HTN, breast cancer, CKD stage III, osteoporosis who was admitted to the hospital on 3/3/2025 with acute onset of right facial droop  along with starting speech.  She was found to have left pontine infarct and additional intracranial atherosclerosis was noted.  Incidentally CT brain showed 2 mm ACOM aneurysm.  MRI brain showed left pontine infarct and a right tentorial leaf meningioma.  Echocardiogram was normal with no cardiac source of emboli.  Holter monitor was recommended but not done.  She was on dual antiplatelet therapy and went to physical therapy and has noticed improvement and is able to ambulate using a walker.  She still has some weakness in the arm.  She was noted to have an elevated LDL at 153 and started on Lipitor 80 mg daily.     PROBLEM LIST   Patient Active Problem List   Diagnosis    Invasive ductal carcinoma of breast, right (H)    Invasive lobular carcinoma of breast, stage 2, right (H)    Age-related osteoporosis without current pathological fracture    Aphasia    Benign essential hypertension    Asthma    Chronic kidney disease, stage III (moderate) (H)    Osteopetrosis    Left pontine CVA (H)    Prediabetes    Anterior communicating artery 2 mm aneurysm    Tentorial leaf meningioma         PAST MEDICAL & SURGICAL HISTORY     Past Medical History:   Patient  has a past medical history of Hypertension, Left pontine CVA (H) (3/4/2025), and Uncomplicated asthma.    Surgical History:  She  has a past surgical history that includes Breast Excisional Biopsy (Left, 01/01/1980); appendectomy; and Lumpectomy breast (Right, 5/24/2022).     SOCIAL HISTORY     Reviewed, and she  reports that she has never smoked. She has never used smokeless tobacco. She reports that she does not use drugs.     FAMILY HISTORY     Reviewed, and family history includes Breast Cancer (age of onset: 45.00) in her daughter.     ALLERGIES     Allergies   Allergen Reactions    Ace Inhibitors Cough    Losartan Other (See Comments)     Shortness of breath, worsening cough(only very mild with lisinopril, much worse with this)    No Clinical Screening - See  Comments Other (See Comments)     Cats, horses, dust.    Other (Do Not Use) Other (See Comments)     Cats, horses, dust.    Amoxicillin Rash         REVIEW OF SYSTEMS     A 12 point review of system was performed and was negative except as outlined in the history of present illness.     HOME MEDICATIONS     Current Outpatient Rx   Medication Sig Dispense Refill    acetaminophen (TYLENOL) 325 MG tablet Take 2 tablets (650 mg) by mouth every 6 hours as needed for mild pain or headaches.      albuterol (PROAIR HFA/PROVENTIL HFA/VENTOLIN HFA) 108 (90 Base) MCG/ACT inhaler Inhale 1-2 puffs into the lungs every 4 hours as needed for shortness of breath, wheezing or cough.      alendronate (FOSAMAX) 70 MG tablet Take 70 mg by mouth every 7 days. Sunday(s)      anastrozole (ARIMIDEX) 1 MG tablet Take 1 tablet (1 mg) by mouth every morning. 90 tablet 3    aspirin 81 MG EC tablet Take 1 tablet (81 mg) by mouth daily. 30 tablet 0    atorvastatin (LIPITOR) 80 MG tablet Take 1 tablet (80 mg) by mouth every evening. 30 tablet 0    clopidogrel (PLAVIX) 75 MG tablet Take 1 tablet (75 mg) by mouth daily. 30 tablet 0    lisinopril (ZESTRIL) 10 MG tablet Take 10 mg by mouth every morning.      montelukast (SINGULAIR) 10 MG tablet Take 10 mg by mouth at bedtime.      pantoprazole (PROTONIX) 40 MG EC tablet Take 1 tablet (40 mg) by mouth every morning (before breakfast). 30 tablet 0    vitamin D3 (CHOLECALCIFEROL) 50 mcg (2000 units) tablet Take 1 tablet by mouth every morning.           PHYSICAL EXAM     Vital signs  /78 (BP Location: Left arm, Patient Position: Sitting)   Pulse 72   Wt 53.1 kg (117 lb)   BMI 20.73 kg/m      Weight:   117 lbs 0 oz    Elderly female who is alert and oriented vital signs are reviewed and documented in electronic medical record.  Neck supple.  Neurologically speech is dysarthric.  Cranial nerves II through XII are intact she has increased tone on the right strength on the right 4+/5 on the left  5/5.  Reflexes 3+ on the right 2+ on the left toes upgoing on the right she is using for orthotics.  She has a hemiparetic gait with circumduction while walking.     PERTINENT DIAGNOSTIC STUDIES     Following studies were reviewed:     CT BRAIN 3/3/2025  HEAD CT:  1.  No acute intracranial hemorrhage identified.     2.  No CT acute infarct. Aspect score 10.     3.  Right cerebellopontine angle small extra-axial lesion nonspecific possibly schwannoma or meningioma. No significant mass effect on the brainstem.     4.  Left basal ganglia chronic infarct.     5.  Age-related changes described above.     HEAD CTA:   1.  No intracranial arterial large vessel occlusion.     2.  Multifocal intracranial arterial stenoses described above. Some of these intracranial arterial stenoses are focal high-grade severe stenoses with only a thin wisp of enhancement (including but not limited to a left proximal M2 branch artery).      3.  Findings likely reflect atherosclerotic vascular disease given age. Expanded differential includes reversible cerebral venous constrictive syndrome, drug-induced vasospasm, vasculitis. Please see above for discussion.     4.  Anterior communicating artery possible tiny focal outpouching measuring 2 mm concerning for aneurysm.        NECK CTA:  1.  Atherosclerotic plaque results in less than 50% stenosis in the right carotid bulb.     2.  No rate limiting stenosis or occlusion. No cervical artery dissection.     MRI BRAIN 3/3/2025  1.  Linear focus of diffusion restriction within the left armando-harish is likely indicative of a hyperacute infarction. No evidence of hemorrhagic conversion.  2.  Incidental presumable meningioma or schwannoma along the undersurface of the right tentorial leaflet.      ECHOCARDIOGRAM 3/3/2025  The left ventricle is normal in size.  The visual ejection fraction is 60-65%.  No regional wall motion abnormalities noted.  Diastolic Doppler findings (E/E' ratio and/or other  parameters) suggest left  ventricular filling pressures are increased.  Normal right ventricle size and systolic function.  The right ventricular systolic pressure is approximated at 19mmHg plus the  right atrial pressure.  IVC diameter <2.1 cm collapsing >50% with sniff suggests a normal RA pressure  of 3 mmHg.  Sinus rhythm was noted.  No hemodynamically significant valvular abnormalities on 2D or color flow  imaging. There is no comparison study available.     PERTINENT LABS  Following labs were reviewed:  Admission on 03/06/2025, Discharged on 03/18/2025   Component Date Value Ref Range Status    Platelet Count 03/08/2025 265  150 - 450 10e3/uL Final    Creatinine 03/08/2025 1.11 (H)  0.51 - 0.95 mg/dL Final    GFR Estimate 03/08/2025 49 (L)  >60 mL/min/1.73m2 Final    Magnesium 03/10/2025 2.0  1.7 - 2.3 mg/dL Final    Sodium 03/10/2025 136  135 - 145 mmol/L Final    Potassium 03/10/2025 4.4  3.4 - 5.3 mmol/L Final    Chloride 03/10/2025 101  98 - 107 mmol/L Final    Carbon Dioxide (CO2) 03/10/2025 23  22 - 29 mmol/L Final    Anion Gap 03/10/2025 12  7 - 15 mmol/L Final    Urea Nitrogen 03/10/2025 43.3 (H)  8.0 - 23.0 mg/dL Final    Creatinine 03/10/2025 1.14 (H)  0.51 - 0.95 mg/dL Final    GFR Estimate 03/10/2025 48 (L)  >60 mL/min/1.73m2 Final    Calcium 03/10/2025 9.5  8.8 - 10.4 mg/dL Final    Glucose 03/10/2025 146 (H)  70 - 99 mg/dL Final    WBC Count 03/10/2025 7.3  4.0 - 11.0 10e3/uL Final    RBC Count 03/10/2025 4.94  3.80 - 5.20 10e6/uL Final    Hemoglobin 03/10/2025 14.3  11.7 - 15.7 g/dL Final    Hematocrit 03/10/2025 44.3  35.0 - 47.0 % Final    MCV 03/10/2025 90  78 - 100 fL Final    MCH 03/10/2025 28.9  26.5 - 33.0 pg Final    MCHC 03/10/2025 32.3  31.5 - 36.5 g/dL Final    RDW 03/10/2025 12.7  10.0 - 15.0 % Final    Platelet Count 03/10/2025 255  150 - 450 10e3/uL Final    Creatinine 03/13/2025 1.09 (H)  0.51 - 0.95 mg/dL Final    GFR Estimate 03/13/2025 50 (L)  >60 mL/min/1.73m2 Final     Platelet Count 03/13/2025 222  150 - 450 10e3/uL Final    Magnesium 03/17/2025 2.4 (H)  1.7 - 2.3 mg/dL Final    Sodium 03/17/2025 137  135 - 145 mmol/L Final    Potassium 03/17/2025 4.7  3.4 - 5.3 mmol/L Final    Chloride 03/17/2025 103  98 - 107 mmol/L Final    Carbon Dioxide (CO2) 03/17/2025 22  22 - 29 mmol/L Final    Anion Gap 03/17/2025 12  7 - 15 mmol/L Final    Urea Nitrogen 03/17/2025 33.3 (H)  8.0 - 23.0 mg/dL Final    Creatinine 03/17/2025 1.05 (H)  0.51 - 0.95 mg/dL Final    GFR Estimate 03/17/2025 52 (L)  >60 mL/min/1.73m2 Final    Calcium 03/17/2025 9.3  8.8 - 10.4 mg/dL Final    Glucose 03/17/2025 107 (H)  70 - 99 mg/dL Final    WBC Count 03/17/2025 8.6  4.0 - 11.0 10e3/uL Final    RBC Count 03/17/2025 4.67  3.80 - 5.20 10e6/uL Final    Hemoglobin 03/17/2025 13.9  11.7 - 15.7 g/dL Final    Hematocrit 03/17/2025 41.2  35.0 - 47.0 % Final    MCV 03/17/2025 88  78 - 100 fL Final    MCH 03/17/2025 29.8  26.5 - 33.0 pg Final    MCHC 03/17/2025 33.7  31.5 - 36.5 g/dL Final    RDW 03/17/2025 12.3  10.0 - 15.0 % Final    Platelet Count 03/17/2025 221  150 - 450 10e3/uL Final   Admission on 03/03/2025, Discharged on 03/06/2025   Component Date Value Ref Range Status    Sodium 03/03/2025 138  135 - 145 mmol/L Final    Potassium 03/03/2025 4.1  3.4 - 5.3 mmol/L Final    Chloride 03/03/2025 100  98 - 107 mmol/L Final    Carbon Dioxide (CO2) 03/03/2025 27  22 - 29 mmol/L Final    Anion Gap 03/03/2025 11  7 - 15 mmol/L Final    Urea Nitrogen 03/03/2025 26.9 (H)  8.0 - 23.0 mg/dL Final    Creatinine 03/03/2025 1.11 (H)  0.51 - 0.95 mg/dL Final    GFR Estimate 03/03/2025 49 (L)  >60 mL/min/1.73m2 Final    Calcium 03/03/2025 9.7  8.8 - 10.4 mg/dL Final    Glucose 03/03/2025 121 (H)  70 - 99 mg/dL Final    INR 03/03/2025 0.95  0.85 - 1.15 Final    aPTT 03/03/2025 28  22 - 38 Seconds Final    Troponin T, High Sensitivity 03/03/2025 15 (H)  <=14 ng/L Final    Systolic Blood Pressure 03/03/2025 168  mmHg Final     Diastolic Blood Pressure 03/03/2025 98  mmHg Final    Ventricular Rate 03/03/2025 72  BPM Final    Atrial Rate 03/03/2025 72  BPM Final    IA Interval 03/03/2025 230  ms Final    QRS Duration 03/03/2025 78  ms Final    QT 03/03/2025 402  ms Final    QTc 03/03/2025 440  ms Final    P Axis 03/03/2025 37  degrees Final    R AXIS 03/03/2025 76  degrees Final    T [place] 03/03/2025 53  degrees Final    Interpretation ECG 03/03/2025    Final                    Value:Sinus rhythm with 1st degree A-V block  Low voltage QRS  Septal infarct , age undetermined  Abnormal ECG  No previous ECGs available  Confirmed by SEE ED PROVIDER NOTE FOR, ECG INTERPRETATION ([id number]),  FRANCESCO FAUSTIN ([id number]) on 3/5/2025 1:47:11 AM      WBC Count 03/03/2025 7.6  4.0 - 11.0 10e3/uL Final    RBC Count 03/03/2025 4.73  3.80 - 5.20 10e6/uL Final    Hemoglobin 03/03/2025 13.6  11.7 - 15.7 g/dL Final    Hematocrit 03/03/2025 41.3  35.0 - 47.0 % Final    MCV 03/03/2025 87  78 - 100 fL Final    MCH 03/03/2025 28.8  26.5 - 33.0 pg Final    MCHC 03/03/2025 32.9  31.5 - 36.5 g/dL Final    RDW 03/03/2025 12.4  10.0 - 15.0 % Final    Platelet Count 03/03/2025 225  150 - 450 10e3/uL Final    % Neutrophils 03/03/2025 61  % Final    % Lymphocytes 03/03/2025 23  % Final    % Monocytes 03/03/2025 8  % Final    % Eosinophils 03/03/2025 6  % Final    % Basophils 03/03/2025 2  % Final    % Immature Granulocytes 03/03/2025 0  % Final    NRBCs per 100 WBC 03/03/2025 0  <1 /100 Final    Absolute Neutrophils 03/03/2025 4.6  1.6 - 8.3 10e3/uL Final    Absolute Lymphocytes 03/03/2025 1.8  0.8 - 5.3 10e3/uL Final    Absolute Monocytes 03/03/2025 0.6  0.0 - 1.3 10e3/uL Final    Absolute Eosinophils 03/03/2025 0.4  0.0 - 0.7 10e3/uL Final    Absolute Basophils 03/03/2025 0.1  0.0 - 0.2 10e3/uL Final    Absolute Immature Granulocytes 03/03/2025 0.0  <=0.4 10e3/uL Final    Absolute NRBCs 03/03/2025 0.0  10e3/uL Final    Estimated Average Glucose 03/03/2025 134 (H)  <117  mg/dL Final    Hemoglobin A1C 03/03/2025 6.3 (H)  <5.7 % Final    Cholesterol 03/03/2025 234 (H)  <200 mg/dL Final    Triglycerides 03/03/2025 146  <150 mg/dL Final    Direct Measure HDL 03/03/2025 52  >=50 mg/dL Final    LDL Cholesterol Calculated 03/03/2025 153 (H)  <100 mg/dL Final    Non HDL Cholesterol 03/03/2025 182 (H)  <130 mg/dL Final    LVEF  03/04/2025 60-65%   Final    Troponin T, High Sensitivity 03/03/2025 11  <=14 ng/L Final    WBC Count 03/04/2025 7.5  4.0 - 11.0 10e3/uL Final    RBC Count 03/04/2025 5.04  3.80 - 5.20 10e6/uL Final    Hemoglobin 03/04/2025 14.7  11.7 - 15.7 g/dL Final    Hematocrit 03/04/2025 43.7  35.0 - 47.0 % Final    MCV 03/04/2025 87  78 - 100 fL Final    MCH 03/04/2025 29.2  26.5 - 33.0 pg Final    MCHC 03/04/2025 33.6  31.5 - 36.5 g/dL Final    RDW 03/04/2025 12.5  10.0 - 15.0 % Final    Platelet Count 03/04/2025 243  150 - 450 10e3/uL Final    Sodium 03/04/2025 139  135 - 145 mmol/L Final    Potassium 03/04/2025 4.3  3.4 - 5.3 mmol/L Final    Chloride 03/04/2025 102  98 - 107 mmol/L Final    Carbon Dioxide (CO2) 03/04/2025 23  22 - 29 mmol/L Final    Anion Gap 03/04/2025 14  7 - 15 mmol/L Final    Urea Nitrogen 03/04/2025 21.9  8.0 - 23.0 mg/dL Final    Creatinine 03/04/2025 1.03 (H)  0.51 - 0.95 mg/dL Final    GFR Estimate 03/04/2025 54 (L)  >60 mL/min/1.73m2 Final    Calcium 03/04/2025 9.6  8.8 - 10.4 mg/dL Final    Glucose 03/04/2025 149 (H)  70 - 99 mg/dL Final    Troponin T, High Sensitivity 03/04/2025 14  <=14 ng/L Final    Troponin T, High Sensitivity 03/04/2025 12  <=14 ng/L Final    GLUCOSE BY METER POCT 03/04/2025 128 (H)  70 - 99 mg/dL Final    GLUCOSE BY METER POCT 03/04/2025 121 (H)  70 - 99 mg/dL Final    Troponin T, High Sensitivity 03/04/2025 12  <=14 ng/L Final    GLUCOSE BY METER POCT 03/04/2025 116 (H)  70 - 99 mg/dL Final    GLUCOSE BY METER POCT 03/04/2025 124 (H)  70 - 99 mg/dL Final    GLUCOSE BY METER POCT 03/05/2025 105 (H)  70 - 99 mg/dL Final     GLUCOSE BY METER POCT 03/05/2025 109 (H)  70 - 99 mg/dL Final    Sodium 03/06/2025 139  135 - 145 mmol/L Final    Potassium 03/06/2025 4.6  3.4 - 5.3 mmol/L Final    Chloride 03/06/2025 103  98 - 107 mmol/L Final    Carbon Dioxide (CO2) 03/06/2025 29  22 - 29 mmol/L Final    Anion Gap 03/06/2025 7  7 - 15 mmol/L Final    Urea Nitrogen 03/06/2025 25.7 (H)  8.0 - 23.0 mg/dL Final    Creatinine 03/06/2025 1.14 (H)  0.51 - 0.95 mg/dL Final    GFR Estimate 03/06/2025 48 (L)  >60 mL/min/1.73m2 Final    Calcium 03/06/2025 9.6  8.8 - 10.4 mg/dL Final    Glucose 03/06/2025 117 (H)  70 - 99 mg/dL Final    GLUCOSE BY METER POCT 03/06/2025 109 (H)  70 - 99 mg/dL Final    Magnesium 03/06/2025 2.1  1.7 - 2.3 mg/dL Final         Total time spent for face to face visit, reviewing labs/imaging studies, counseling and coordination of care was: 45 Minutes spent on the date of the encounter doing chart review, review of outside records, review of test results, interpretation of tests, patient visit, and documentation     The longitudinal plan of care for the diagnosis(es)/condition(s) as documented were addressed during this visit. Due to the added complexity in care, I will continue to support Nataliya in the subsequent management and with ongoing continuity of care.    This note was dictated using voice recognition software.  Any grammatical or context distortions are unintentional and inherent to the software.    Orders Placed This Encounter   Procedures    MR Brain w/o & w Contrast    MRA Brain ([place]) w/o & w Contrast    Lipid Profile    Cardiac Event Monitor Adult Pediatric      New Prescriptions    No medications on file     Modified Medications    No medications on file

## 2025-04-09 ENCOUNTER — THERAPY VISIT (OUTPATIENT)
Dept: PHYSICAL THERAPY | Facility: REHABILITATION | Age: 85
End: 2025-04-09
Payer: COMMERCIAL

## 2025-04-09 DIAGNOSIS — R26.81 GAIT INSTABILITY: ICD-10-CM

## 2025-04-09 DIAGNOSIS — Z74.09 DECREASED FUNCTIONAL MOBILITY AND ENDURANCE: ICD-10-CM

## 2025-04-09 DIAGNOSIS — I63.512 CEREBROVASCULAR ACCIDENT (CVA) DUE TO STENOSIS OF LEFT MIDDLE CEREBRAL ARTERY (H): Primary | ICD-10-CM

## 2025-04-09 PROCEDURE — 97110 THERAPEUTIC EXERCISES: CPT | Mod: GP | Performed by: PHYSICAL THERAPIST

## 2025-04-09 PROCEDURE — 97112 NEUROMUSCULAR REEDUCATION: CPT | Mod: GP | Performed by: PHYSICAL THERAPIST

## 2025-04-09 PROCEDURE — 97116 GAIT TRAINING THERAPY: CPT | Mod: GP | Performed by: PHYSICAL THERAPIST

## 2025-04-11 ENCOUNTER — THERAPY VISIT (OUTPATIENT)
Dept: PHYSICAL THERAPY | Facility: REHABILITATION | Age: 85
End: 2025-04-11
Payer: COMMERCIAL

## 2025-04-11 DIAGNOSIS — R26.81 GAIT INSTABILITY: ICD-10-CM

## 2025-04-11 DIAGNOSIS — I63.512 CEREBROVASCULAR ACCIDENT (CVA) DUE TO STENOSIS OF LEFT MIDDLE CEREBRAL ARTERY (H): Primary | ICD-10-CM

## 2025-04-11 DIAGNOSIS — Z74.09 DECREASED FUNCTIONAL MOBILITY AND ENDURANCE: ICD-10-CM

## 2025-04-11 PROCEDURE — 97110 THERAPEUTIC EXERCISES: CPT | Mod: GP | Performed by: PHYSICAL THERAPIST

## 2025-04-11 PROCEDURE — 97116 GAIT TRAINING THERAPY: CPT | Mod: GP | Performed by: PHYSICAL THERAPIST

## 2025-04-14 ENCOUNTER — OFFICE VISIT (OUTPATIENT)
Dept: NEUROLOGY | Facility: CLINIC | Age: 85
End: 2025-04-14
Attending: PHYSICIAN ASSISTANT
Payer: COMMERCIAL

## 2025-04-14 ENCOUNTER — THERAPY VISIT (OUTPATIENT)
Dept: PHYSICAL THERAPY | Facility: REHABILITATION | Age: 85
End: 2025-04-14
Payer: COMMERCIAL

## 2025-04-14 VITALS
SYSTOLIC BLOOD PRESSURE: 120 MMHG | DIASTOLIC BLOOD PRESSURE: 78 MMHG | BODY MASS INDEX: 20.73 KG/M2 | HEART RATE: 72 BPM | WEIGHT: 117 LBS

## 2025-04-14 DIAGNOSIS — Z74.09 DECREASED FUNCTIONAL MOBILITY AND ENDURANCE: ICD-10-CM

## 2025-04-14 DIAGNOSIS — I63.512 CEREBROVASCULAR ACCIDENT (CVA) DUE TO STENOSIS OF LEFT MIDDLE CEREBRAL ARTERY (H): Primary | ICD-10-CM

## 2025-04-14 DIAGNOSIS — I63.512 CEREBROVASCULAR ACCIDENT (CVA) DUE TO STENOSIS OF LEFT MIDDLE CEREBRAL ARTERY (H): ICD-10-CM

## 2025-04-14 DIAGNOSIS — D32.9 MENINGIOMA (H): ICD-10-CM

## 2025-04-14 DIAGNOSIS — M62.81 GENERALIZED MUSCLE WEAKNESS: ICD-10-CM

## 2025-04-14 DIAGNOSIS — I67.1 CEREBRAL ANEURYSM, NONRUPTURED: Primary | ICD-10-CM

## 2025-04-14 DIAGNOSIS — I63.9 LEFT PONTINE CVA (H): ICD-10-CM

## 2025-04-14 DIAGNOSIS — R26.81 GAIT INSTABILITY: ICD-10-CM

## 2025-04-14 PROCEDURE — 3078F DIAST BP <80 MM HG: CPT | Performed by: PSYCHIATRY & NEUROLOGY

## 2025-04-14 PROCEDURE — 97112 NEUROMUSCULAR REEDUCATION: CPT | Mod: GP | Performed by: PHYSICAL THERAPY ASSISTANT

## 2025-04-14 PROCEDURE — 97110 THERAPEUTIC EXERCISES: CPT | Mod: GP | Performed by: PHYSICAL THERAPY ASSISTANT

## 2025-04-14 PROCEDURE — 3074F SYST BP LT 130 MM HG: CPT | Performed by: PSYCHIATRY & NEUROLOGY

## 2025-04-14 PROCEDURE — 99204 OFFICE O/P NEW MOD 45 MIN: CPT | Performed by: PSYCHIATRY & NEUROLOGY

## 2025-04-14 PROCEDURE — G2211 COMPLEX E/M VISIT ADD ON: HCPCS | Performed by: PSYCHIATRY & NEUROLOGY

## 2025-04-14 NOTE — NURSING NOTE
Chief Complaint   Patient presents with    Consult     Cerebrovascular accident (CVA) due to stenosis of left middle cerebral artery (H),  Referred by Iris Bird PA Kena Gemeda, MA on 4/14/2025 at 12:20 PM

## 2025-04-14 NOTE — LETTER
2025      Fang Estes  [address]  [place] [address]      Dear Colleague,    Thank you for referring your patient, Fang Estes, to the [facility] [place]. Please see a copy of my visit note below.    NEUROLOGY FOLLOW UP VISIT  NOTE       [facility] [place]  [address] [place] [address]  Tel: [phone]  Fax: [phone]  [email or web address]     Fang Estes,  1940, MRN [id number]  PCP: Santiago Silverio  Date: 2025      ASSESSMENT & PLAN     Visit Diagnosis  Cerebral aneurysm, nonruptured  Left pontine CVA (H)  Tentorial leaf meningioma     Left pontine infarction  2 mm anterior communicating artery aneurysm  Right tentorial leaf meningioma  84-year-old female with history of HTN, HLD, breast cancer, CKD stage III and osteoporosis who was admitted to the hospital on 3/3/2025 with left pontine infarct.  She has noticed improvement with therapy.  Workup in the past included MRI brain that showed left pontine infarct.  Echocardiogram was normal.  She was on dual antiplatelet therapy.  Also LDL was 153 and she was started on Lipitor.  I have recommended:    1.  Continue baby aspirin and Plavix 75 mg daily for total of 90 days.  On 2025 she should switch to enteric-coated aspirin 325 mg daily  2.  Her LDL was 153 and started on Lipitor.  I have recommended continuing Lipitor 80 mg daily and to repeat a lipid panel in 3 months with a goal of LDL less than 70  3.  30-day event monitor was recommended during her hospitalization but not done and I have reordered it  4.  Incidentally on CTA and MRI, 2 mm ACOM aneurysm and right tentorial leaf meningioma was noted.  I have recommended repeating MRI brain with and without contrast and MRA in 6 months to ensure stability  5.  Follow-up in 6 months    Thank you again for this referral, please feel free to contact me if you have any questions.    Victoriano Charles MD  [facility]  [place] [facility]     HISTORY OF PRESENT ILLNESS     Patient is 84-year-old female with history of HTN, breast cancer, CKD stage III, osteoporosis who was admitted to the hospital on 3/3/2025 with acute onset of right facial droop along with starting speech.  She was found to have left pontine infarct and additional intracranial atherosclerosis was noted.  Incidentally CT brain showed 2 mm ACOM aneurysm.  MRI brain showed left pontine infarct and a right tentorial leaf meningioma.  Echocardiogram was normal with no cardiac source of emboli.  Holter monitor was recommended but not done.  She was on dual antiplatelet therapy and went to physical therapy and has noticed improvement and is able to ambulate using a walker.  She still has some weakness in the arm.  She was noted to have an elevated LDL at 153 and started on Lipitor 80 mg daily.     PROBLEM LIST   Patient Active Problem List   Diagnosis     Invasive ductal carcinoma of breast, right (H)     Invasive lobular carcinoma of breast, stage 2, right (H)     Age-related osteoporosis without current pathological fracture     Aphasia     Benign essential hypertension     Asthma     Chronic kidney disease, stage III (moderate) (H)     Osteopetrosis     Left pontine CVA (H)     Prediabetes     Anterior communicating artery 2 mm aneurysm     Tentorial leaf meningioma         PAST MEDICAL & SURGICAL HISTORY     Past Medical History:   Patient  has a past medical history of Hypertension, Left pontine CVA (H) (3/4/2025), and Uncomplicated asthma.    Surgical History:  She  has a past surgical history that includes Breast Excisional Biopsy (Left, 01/01/1980); appendectomy; and Lumpectomy breast (Right, 5/24/2022).     SOCIAL HISTORY     Reviewed, and she  reports that she has never smoked. She has never used smokeless tobacco. She reports that she does not use drugs.     FAMILY HISTORY     Reviewed, and family history includes Breast Cancer (age of onset:  45.00) in her daughter.     ALLERGIES     Allergies   Allergen Reactions     Ace Inhibitors Cough     Losartan Other (See Comments)     Shortness of breath, worsening cough(only very mild with lisinopril, much worse with this)     No Clinical Screening - See Comments Other (See Comments)     Cats, horses, dust.     Other (Do Not Use) Other (See Comments)     Cats, horses, dust.     Amoxicillin Rash         REVIEW OF SYSTEMS     A 12 point review of system was performed and was negative except as outlined in the history of present illness.     HOME MEDICATIONS     Current Outpatient Rx   Medication Sig Dispense Refill     acetaminophen (TYLENOL) 325 MG tablet Take 2 tablets (650 mg) by mouth every 6 hours as needed for mild pain or headaches.       albuterol (PROAIR HFA/PROVENTIL HFA/VENTOLIN HFA) 108 (90 Base) MCG/ACT inhaler Inhale 1-2 puffs into the lungs every 4 hours as needed for shortness of breath, wheezing or cough.       alendronate (FOSAMAX) 70 MG tablet Take 70 mg by mouth every 7 days. Sunday(s)       anastrozole (ARIMIDEX) 1 MG tablet Take 1 tablet (1 mg) by mouth every morning. 90 tablet 3     aspirin 81 MG EC tablet Take 1 tablet (81 mg) by mouth daily. 30 tablet 0     atorvastatin (LIPITOR) 80 MG tablet Take 1 tablet (80 mg) by mouth every evening. 30 tablet 0     clopidogrel (PLAVIX) 75 MG tablet Take 1 tablet (75 mg) by mouth daily. 30 tablet 0     lisinopril (ZESTRIL) 10 MG tablet Take 10 mg by mouth every morning.       montelukast (SINGULAIR) 10 MG tablet Take 10 mg by mouth at bedtime.       pantoprazole (PROTONIX) 40 MG EC tablet Take 1 tablet (40 mg) by mouth every morning (before breakfast). 30 tablet 0     vitamin D3 (CHOLECALCIFEROL) 50 mcg (2000 units) tablet Take 1 tablet by mouth every morning.           PHYSICAL EXAM     Vital signs  /78 (BP Location: Left arm, Patient Position: Sitting)   Pulse 72   Wt 53.1 kg (117 lb)   BMI 20.73 kg/m      Weight:   117 lbs 0 oz    Elderly  female who is alert and oriented vital signs are reviewed and documented in electronic medical record.  Neck supple.  Neurologically speech is dysarthric.  Cranial nerves II through XII are intact she has increased tone on the right strength on the right 4+/5 on the left 5/5.  Reflexes 3+ on the right 2+ on the left toes upgoing on the right she is using for orthotics.  She has a hemiparetic gait with circumduction while walking.     PERTINENT DIAGNOSTIC STUDIES     Following studies were reviewed:     CT BRAIN 3/3/2025  HEAD CT:  1.  No acute intracranial hemorrhage identified.     2.  No CT acute infarct. Aspect score 10.     3.  Right cerebellopontine angle small extra-axial lesion nonspecific possibly schwannoma or meningioma. No significant mass effect on the brainstem.     4.  Left basal ganglia chronic infarct.     5.  Age-related changes described above.     HEAD CTA:   1.  No intracranial arterial large vessel occlusion.     2.  Multifocal intracranial arterial stenoses described above. Some of these intracranial arterial stenoses are focal high-grade severe stenoses with only a thin wisp of enhancement (including but not limited to a left proximal M2 branch artery).      3.  Findings likely reflect atherosclerotic vascular disease given age. Expanded differential includes reversible cerebral venous constrictive syndrome, drug-induced vasospasm, vasculitis. Please see above for discussion.     4.  Anterior communicating artery possible tiny focal outpouching measuring 2 mm concerning for aneurysm.        NECK CTA:  1.  Atherosclerotic plaque results in less than 50% stenosis in the right carotid bulb.     2.  No rate limiting stenosis or occlusion. No cervical artery dissection.     MRI BRAIN 3/3/2025  1.  Linear focus of diffusion restriction within the left armando-harish is likely indicative of a hyperacute infarction. No evidence of hemorrhagic conversion.  2.  Incidental presumable meningioma or schwannoma  along the undersurface of the right tentorial leaflet.      ECHOCARDIOGRAM 3/3/2025  The left ventricle is normal in size.  The visual ejection fraction is 60-65%.  No regional wall motion abnormalities noted.  Diastolic Doppler findings (E/E' ratio and/or other parameters) suggest left  ventricular filling pressures are increased.  Normal right ventricle size and systolic function.  The right ventricular systolic pressure is approximated at 19mmHg plus the  right atrial pressure.  IVC diameter <2.1 cm collapsing >50% with sniff suggests a normal RA pressure  of 3 mmHg.  Sinus rhythm was noted.  No hemodynamically significant valvular abnormalities on 2D or color flow  imaging. There is no comparison study available.     PERTINENT LABS  Following labs were reviewed:  Admission on 03/06/2025, Discharged on 03/18/2025   Component Date Value Ref Range Status     Platelet Count 03/08/2025 265  150 - 450 10e3/uL Final     Creatinine 03/08/2025 1.11 (H)  0.51 - 0.95 mg/dL Final     GFR Estimate 03/08/2025 49 (L)  >60 mL/min/1.73m2 Final     Magnesium 03/10/2025 2.0  1.7 - 2.3 mg/dL Final     Sodium 03/10/2025 136  135 - 145 mmol/L Final     Potassium 03/10/2025 4.4  3.4 - 5.3 mmol/L Final     Chloride 03/10/2025 101  98 - 107 mmol/L Final     Carbon Dioxide (CO2) 03/10/2025 23  22 - 29 mmol/L Final     Anion Gap 03/10/2025 12  7 - 15 mmol/L Final     Urea Nitrogen 03/10/2025 43.3 (H)  8.0 - 23.0 mg/dL Final     Creatinine 03/10/2025 1.14 (H)  0.51 - 0.95 mg/dL Final     GFR Estimate 03/10/2025 48 (L)  >60 mL/min/1.73m2 Final     Calcium 03/10/2025 9.5  8.8 - 10.4 mg/dL Final     Glucose 03/10/2025 146 (H)  70 - 99 mg/dL Final     WBC Count 03/10/2025 7.3  4.0 - 11.0 10e3/uL Final     RBC Count 03/10/2025 4.94  3.80 - 5.20 10e6/uL Final     Hemoglobin 03/10/2025 14.3  11.7 - 15.7 g/dL Final     Hematocrit 03/10/2025 44.3  35.0 - 47.0 % Final     MCV 03/10/2025 90  78 - 100 fL Final     MCH 03/10/2025 28.9  26.5 - 33.0 pg  Final     MCHC 03/10/2025 32.3  31.5 - 36.5 g/dL Final     RDW 03/10/2025 12.7  10.0 - 15.0 % Final     Platelet Count 03/10/2025 255  150 - 450 10e3/uL Final     Creatinine 03/13/2025 1.09 (H)  0.51 - 0.95 mg/dL Final     GFR Estimate 03/13/2025 50 (L)  >60 mL/min/1.73m2 Final     Platelet Count 03/13/2025 222  150 - 450 10e3/uL Final     Magnesium 03/17/2025 2.4 (H)  1.7 - 2.3 mg/dL Final     Sodium 03/17/2025 137  135 - 145 mmol/L Final     Potassium 03/17/2025 4.7  3.4 - 5.3 mmol/L Final     Chloride 03/17/2025 103  98 - 107 mmol/L Final     Carbon Dioxide (CO2) 03/17/2025 22  22 - 29 mmol/L Final     Anion Gap 03/17/2025 12  7 - 15 mmol/L Final     Urea Nitrogen 03/17/2025 33.3 (H)  8.0 - 23.0 mg/dL Final     Creatinine 03/17/2025 1.05 (H)  0.51 - 0.95 mg/dL Final     GFR Estimate 03/17/2025 52 (L)  >60 mL/min/1.73m2 Final     Calcium 03/17/2025 9.3  8.8 - 10.4 mg/dL Final     Glucose 03/17/2025 107 (H)  70 - 99 mg/dL Final     WBC Count 03/17/2025 8.6  4.0 - 11.0 10e3/uL Final     RBC Count 03/17/2025 4.67  3.80 - 5.20 10e6/uL Final     Hemoglobin 03/17/2025 13.9  11.7 - 15.7 g/dL Final     Hematocrit 03/17/2025 41.2  35.0 - 47.0 % Final     MCV 03/17/2025 88  78 - 100 fL Final     MCH 03/17/2025 29.8  26.5 - 33.0 pg Final     MCHC 03/17/2025 33.7  31.5 - 36.5 g/dL Final     RDW 03/17/2025 12.3  10.0 - 15.0 % Final     Platelet Count 03/17/2025 221  150 - 450 10e3/uL Final   Admission on 03/03/2025, Discharged on 03/06/2025   Component Date Value Ref Range Status     Sodium 03/03/2025 138  135 - 145 mmol/L Final     Potassium 03/03/2025 4.1  3.4 - 5.3 mmol/L Final     Chloride 03/03/2025 100  98 - 107 mmol/L Final     Carbon Dioxide (CO2) 03/03/2025 27  22 - 29 mmol/L Final     Anion Gap 03/03/2025 11  7 - 15 mmol/L Final     Urea Nitrogen 03/03/2025 26.9 (H)  8.0 - 23.0 mg/dL Final     Creatinine 03/03/2025 1.11 (H)  0.51 - 0.95 mg/dL Final     GFR Estimate 03/03/2025 49 (L)  >60 mL/min/1.73m2 Final      Calcium 03/03/2025 9.7  8.8 - 10.4 mg/dL Final     Glucose 03/03/2025 121 (H)  70 - 99 mg/dL Final     INR 03/03/2025 0.95  0.85 - 1.15 Final     aPTT 03/03/2025 28  22 - 38 Seconds Final     Troponin T, High Sensitivity 03/03/2025 15 (H)  <=14 ng/L Final     Systolic Blood Pressure 03/03/2025 168  mmHg Final     Diastolic Blood Pressure 03/03/2025 98  mmHg Final     Ventricular Rate 03/03/2025 72  BPM Final     Atrial Rate 03/03/2025 72  BPM Final     HI Interval 03/03/2025 230  ms Final     QRS Duration 03/03/2025 78  ms Final     QT 03/03/2025 402  ms Final     QTc 03/03/2025 440  ms Final     P Axis 03/03/2025 37  degrees Final     R AXIS 03/03/2025 76  degrees Final     T [place] 03/03/2025 53  degrees Final     Interpretation ECG 03/03/2025    Final                    Value:Sinus rhythm with 1st degree A-V block  Low voltage QRS  Septal infarct , age undetermined  Abnormal ECG  No previous ECGs available  Confirmed by SEE ED PROVIDER NOTE FOR, ECG INTERPRETATION ([id number]),  FRANCESCO FAUSTIN ([id number]) on 3/5/2025 1:47:11 AM       WBC Count 03/03/2025 7.6  4.0 - 11.0 10e3/uL Final     RBC Count 03/03/2025 4.73  3.80 - 5.20 10e6/uL Final     Hemoglobin 03/03/2025 13.6  11.7 - 15.7 g/dL Final     Hematocrit 03/03/2025 41.3  35.0 - 47.0 % Final     MCV 03/03/2025 87  78 - 100 fL Final     MCH 03/03/2025 28.8  26.5 - 33.0 pg Final     MCHC 03/03/2025 32.9  31.5 - 36.5 g/dL Final     RDW 03/03/2025 12.4  10.0 - 15.0 % Final     Platelet Count 03/03/2025 225  150 - 450 10e3/uL Final     % Neutrophils 03/03/2025 61  % Final     % Lymphocytes 03/03/2025 23  % Final     % Monocytes 03/03/2025 8  % Final     % Eosinophils 03/03/2025 6  % Final     % Basophils 03/03/2025 2  % Final     % Immature Granulocytes 03/03/2025 0  % Final     NRBCs per 100 WBC 03/03/2025 0  <1 /100 Final     Absolute Neutrophils 03/03/2025 4.6  1.6 - 8.3 10e3/uL Final     Absolute Lymphocytes 03/03/2025 1.8  0.8 - 5.3 10e3/uL Final     Absolute  Monocytes 03/03/2025 0.6  0.0 - 1.3 10e3/uL Final     Absolute Eosinophils 03/03/2025 0.4  0.0 - 0.7 10e3/uL Final     Absolute Basophils 03/03/2025 0.1  0.0 - 0.2 10e3/uL Final     Absolute Immature Granulocytes 03/03/2025 0.0  <=0.4 10e3/uL Final     Absolute NRBCs 03/03/2025 0.0  10e3/uL Final     Estimated Average Glucose 03/03/2025 134 (H)  <117 mg/dL Final     Hemoglobin A1C 03/03/2025 6.3 (H)  <5.7 % Final     Cholesterol 03/03/2025 234 (H)  <200 mg/dL Final     Triglycerides 03/03/2025 146  <150 mg/dL Final     Direct Measure HDL 03/03/2025 52  >=50 mg/dL Final     LDL Cholesterol Calculated 03/03/2025 153 (H)  <100 mg/dL Final     Non HDL Cholesterol 03/03/2025 182 (H)  <130 mg/dL Final     LVEF  03/04/2025 60-65%   Final     Troponin T, High Sensitivity 03/03/2025 11  <=14 ng/L Final     WBC Count 03/04/2025 7.5  4.0 - 11.0 10e3/uL Final     RBC Count 03/04/2025 5.04  3.80 - 5.20 10e6/uL Final     Hemoglobin 03/04/2025 14.7  11.7 - 15.7 g/dL Final     Hematocrit 03/04/2025 43.7  35.0 - 47.0 % Final     MCV 03/04/2025 87  78 - 100 fL Final     MCH 03/04/2025 29.2  26.5 - 33.0 pg Final     MCHC 03/04/2025 33.6  31.5 - 36.5 g/dL Final     RDW 03/04/2025 12.5  10.0 - 15.0 % Final     Platelet Count 03/04/2025 243  150 - 450 10e3/uL Final     Sodium 03/04/2025 139  135 - 145 mmol/L Final     Potassium 03/04/2025 4.3  3.4 - 5.3 mmol/L Final     Chloride 03/04/2025 102  98 - 107 mmol/L Final     Carbon Dioxide (CO2) 03/04/2025 23  22 - 29 mmol/L Final     Anion Gap 03/04/2025 14  7 - 15 mmol/L Final     Urea Nitrogen 03/04/2025 21.9  8.0 - 23.0 mg/dL Final     Creatinine 03/04/2025 1.03 (H)  0.51 - 0.95 mg/dL Final     GFR Estimate 03/04/2025 54 (L)  >60 mL/min/1.73m2 Final     Calcium 03/04/2025 9.6  8.8 - 10.4 mg/dL Final     Glucose 03/04/2025 149 (H)  70 - 99 mg/dL Final     Troponin T, High Sensitivity 03/04/2025 14  <=14 ng/L Final     Troponin T, High Sensitivity 03/04/2025 12  <=14 ng/L Final     GLUCOSE  BY METER POCT 03/04/2025 128 (H)  70 - 99 mg/dL Final     GLUCOSE BY METER POCT 03/04/2025 121 (H)  70 - 99 mg/dL Final     Troponin T, High Sensitivity 03/04/2025 12  <=14 ng/L Final     GLUCOSE BY METER POCT 03/04/2025 116 (H)  70 - 99 mg/dL Final     GLUCOSE BY METER POCT 03/04/2025 124 (H)  70 - 99 mg/dL Final     GLUCOSE BY METER POCT 03/05/2025 105 (H)  70 - 99 mg/dL Final     GLUCOSE BY METER POCT 03/05/2025 109 (H)  70 - 99 mg/dL Final     Sodium 03/06/2025 139  135 - 145 mmol/L Final     Potassium 03/06/2025 4.6  3.4 - 5.3 mmol/L Final     Chloride 03/06/2025 103  98 - 107 mmol/L Final     Carbon Dioxide (CO2) 03/06/2025 29  22 - 29 mmol/L Final     Anion Gap 03/06/2025 7  7 - 15 mmol/L Final     Urea Nitrogen 03/06/2025 25.7 (H)  8.0 - 23.0 mg/dL Final     Creatinine 03/06/2025 1.14 (H)  0.51 - 0.95 mg/dL Final     GFR Estimate 03/06/2025 48 (L)  >60 mL/min/1.73m2 Final     Calcium 03/06/2025 9.6  8.8 - 10.4 mg/dL Final     Glucose 03/06/2025 117 (H)  70 - 99 mg/dL Final     GLUCOSE BY METER POCT 03/06/2025 109 (H)  70 - 99 mg/dL Final     Magnesium 03/06/2025 2.1  1.7 - 2.3 mg/dL Final         Total time spent for face to face visit, reviewing labs/imaging studies, counseling and coordination of care was: 45 Minutes spent on the date of the encounter doing chart review, review of outside records, review of test results, interpretation of tests, patient visit, and documentation     The longitudinal plan of care for the diagnosis(es)/condition(s) as documented were addressed during this visit. Due to the added complexity in care, I will continue to support Nataliya in the subsequent management and with ongoing continuity of care.    This note was dictated using voice recognition software.  Any grammatical or context distortions are unintentional and inherent to the software.    Orders Placed This Encounter   Procedures     MR Brain w/o & w Contrast     MRA Brain ([facility]) w/o & w Contrast     Lipid  Profile     Cardiac Event Monitor Adult Pediatric      New Prescriptions    No medications on file     Modified Medications    No medications on file                 Again, thank you for allowing me to participate in the care of your patient.        Sincerely,        Victoriano Charles MD    Electronically signed

## 2025-04-16 ENCOUNTER — THERAPY VISIT (OUTPATIENT)
Dept: PHYSICAL THERAPY | Facility: REHABILITATION | Age: 85
End: 2025-04-16
Payer: COMMERCIAL

## 2025-04-16 DIAGNOSIS — M62.81 GENERALIZED MUSCLE WEAKNESS: Primary | ICD-10-CM

## 2025-04-16 DIAGNOSIS — R26.81 GAIT INSTABILITY: ICD-10-CM

## 2025-04-16 DIAGNOSIS — I63.512 CEREBROVASCULAR ACCIDENT (CVA) DUE TO STENOSIS OF LEFT MIDDLE CEREBRAL ARTERY (H): ICD-10-CM

## 2025-04-16 DIAGNOSIS — Z74.09 DECREASED FUNCTIONAL MOBILITY AND ENDURANCE: ICD-10-CM

## 2025-04-16 PROCEDURE — 97110 THERAPEUTIC EXERCISES: CPT | Mod: GP | Performed by: PHYSICAL THERAPY ASSISTANT

## 2025-04-16 PROCEDURE — 97116 GAIT TRAINING THERAPY: CPT | Mod: GP | Performed by: PHYSICAL THERAPY ASSISTANT

## 2025-04-16 PROCEDURE — 97112 NEUROMUSCULAR REEDUCATION: CPT | Mod: GP | Performed by: PHYSICAL THERAPY ASSISTANT

## 2025-04-23 ENCOUNTER — THERAPY VISIT (OUTPATIENT)
Dept: OCCUPATIONAL THERAPY | Facility: REHABILITATION | Age: 85
End: 2025-04-23
Payer: COMMERCIAL

## 2025-04-23 ENCOUNTER — THERAPY VISIT (OUTPATIENT)
Dept: PHYSICAL THERAPY | Facility: REHABILITATION | Age: 85
End: 2025-04-23
Payer: COMMERCIAL

## 2025-04-23 DIAGNOSIS — Z74.09 DECREASED FUNCTIONAL MOBILITY AND ENDURANCE: ICD-10-CM

## 2025-04-23 DIAGNOSIS — I63.512 CEREBROVASCULAR ACCIDENT (CVA) DUE TO STENOSIS OF LEFT MIDDLE CEREBRAL ARTERY (H): ICD-10-CM

## 2025-04-23 DIAGNOSIS — G81.91 RIGHT HEMIPARESIS (H): ICD-10-CM

## 2025-04-23 DIAGNOSIS — Z78.9 DECREASED ACTIVITIES OF DAILY LIVING (ADL): ICD-10-CM

## 2025-04-23 DIAGNOSIS — R29.898 RIGHT HAND WEAKNESS: ICD-10-CM

## 2025-04-23 DIAGNOSIS — R27.9 INCOORDINATION: Primary | ICD-10-CM

## 2025-04-23 DIAGNOSIS — M62.81 GENERALIZED MUSCLE WEAKNESS: Primary | ICD-10-CM

## 2025-04-23 DIAGNOSIS — R26.81 GAIT INSTABILITY: ICD-10-CM

## 2025-04-23 PROCEDURE — 97110 THERAPEUTIC EXERCISES: CPT | Mod: GO | Performed by: OCCUPATIONAL THERAPIST

## 2025-04-23 PROCEDURE — 97112 NEUROMUSCULAR REEDUCATION: CPT | Mod: GP | Performed by: PHYSICAL THERAPIST

## 2025-04-23 PROCEDURE — 97116 GAIT TRAINING THERAPY: CPT | Mod: GP | Performed by: PHYSICAL THERAPIST

## 2025-04-23 PROCEDURE — 97110 THERAPEUTIC EXERCISES: CPT | Mod: GP | Performed by: PHYSICAL THERAPIST

## 2025-04-23 NOTE — PROGRESS NOTES
Physical Therapy Progress Note     PLAN  Continue therapy per current plan of care.    Beginning/End Dates of Progress Note Reporting Period:  03/20/25 to 04/23/2025    Referring Provider:  Iris Bird     04/23/25 0500   Appointment Info   Signing clinician's name / credentials Queenie Tavares PT, DPT [facility]   Visits Used 10   Medical Diagnosis Cerebrovascular accident (CVA) due to stenosis of left middle cerebral artery, gait instability, decreased functional mobility and endurance   PT Tx Diagnosis Cerebrovascular accident (CVA) due to stenosis of left middle cerebral artery, gait instability, decreased functional mobility and endurance   Progress Note/Certification   Start of Care Date 03/20/25   Onset of illness/injury or Date of Surgery 03/03/25   Therapy Frequency 1-2 times per week   Predicted Duration up to 90 days   Certification date from 03/20/25   Certification date to 06/18/25   Progress Note Completed Date 03/20/25   Supervision   PT Assistant Visit Number 2       Present No   GOALS   PT Goals 2;3;4   PT Goal 1   Goal Identifier HEP   Goal Description Patient with will be independent with a HEP fir ongoing symptom management in 90 days   Goal Progress progressing towards goal   Target Date 06/18/25   PT Goal 2   Goal Identifier Gait speed   Goal Description Patient will increase her comfortable gait speed to > 0.8 m/s for increase in community ambulation, decrease falls risk and improvement in functional mobility in 90 days   Goal Progress ongoing, not measured in clinic today but progressing   Target Date 06/18/25   PT Goal 3   Goal Identifier sit to stand testing   Goal Description Patient will increase 30 sec sit to stands to >10reps without UE support and decrease time on 5x STS to less than 15 seconds without UE support for improvement in falls risk and increase in functional endurance in 90 days   Goal Progress progressing, not assessed   Target Date 06/18/25   PT Goal  4   Goal Identifier 6 minute walk test   Goal Description Patient will increase distance on the 6 minute walk test to > 300 meters and improved right LE mechanics for increase in functional mobility and acitivity tolerance in 90 days   Goal Progress ongoing and progressing towards goal   Target Date 06/18/25   Subjective Report   Subjective Report She reports she has been doing well, keeping busy with her exercises, walking at home without the cane. Denies falls. Patient reports she is suppose to be getting a heart monitor.   Treatment Interventions (PT)   Interventions Therapeutic Procedure/Exercise;Neuromuscular Re-education;Gait Training   Therapeutic Procedure/Exercise   Therapeutic Procedures: strength, endurance, ROM, flexibility minutes (96531) 35   Therapeutic Procedures Ther Proc 2;Ther Proc 3;Ther Proc 4;Ther Proc 5;Ther Proc 6   Ther Proc 1 seated L LE strengthening exercises   Ther Proc 1 - Details seated HS curs with foot on scooter and red band x 20 reps; LAQs 2# weight on ankle x 15 reps, seated marching on R LE 2# weight on ankle x 15 reps   Ther Proc 2 Supine LE strengthening exercises   Ther Proc 2 - Details not today   Ther Proc 3 Biased R LE sit to stands   Ther Proc 3 - Details x 12 reps from mat with feet on airex pad and no UE support; slight snap into extension on R knee, added chair squats with controlled sit to tap on chair and returns to stand x 12 reps, several reps with sitting more than tapping, from mat with airex pad under feet   Ther Proc 4 Standing exercises   Ther Proc 4 - Details standing marching with cue to hit the bar with knee x 10 reps each side, x 20 reps alternating sides, cues for hip flexor activation and decrease heel raise on L side and trunk flexion, Standing HS curls on R LE x 15 reps   Ther Proc 5 bridge   Ther Proc 5 - Details not today   Ther Proc 6 chair squats   Ther Proc 6 - Details standing on airex pad squating down to mat x 15 reps   Neuromuscular  "Re-education   Neuromuscular re-ed of mvmt, balance, coord, kinesthetic sense, posture, proprioception minutes (62670) 15   Neuromuscular Re-education Neuro Re-ed 2;Neuro Re-ed 3;Neuro Re-ed 4;Neuro Re-ed 5   Neuro Re-ed 1 Quadruped position for wt all four wt bearing/proprioceptive input   Neuro Re-ed 2 Tall kneeling and PNF   Neuro Re-ed 2 - Details not today   Neuro Re-ed 3 Step over hurdles   Neuro Re-ed 3 - Details 5 low hurdles in // bars 10 ft x 8 fwd and  x6 laterally each way   Neuro Re-ed 4 dynamic balance   Neuro Re-ed 4 - Details standing toe taps on 5 cones x 5 reps each way each foot   Neuro Re-ed 5 1/2 kneeling   Neuro Re-ed 5 - Details x60\"   Patient Response/Progress Tolerated well.   Gait Training   Gait Training Minutes, includes stair climbing (76959) 15   Gait 1 Treadmill training   Gait 1 - Details x12 min @ 1.0 - 1.4 mph with close SBA. incline 2; Cues to drive forward her R hip and to reach for heel strike. Pt wears R walk-on AFO   Gait 2 Gait training with SEC   Gait 2 - Details x 250' with cues to drive R LE forward. Pt is overall steady with cane and demos good control with turns. Pt demos a 2 pt gait pattern with good coordination.   Education   Learner/Method Patient;Listening;Reading;Pictures/Video   Plan   Home program foam blcok for hand, AAROm shouderl movements, added sit to stands to HEP   Plan for next session Treadmill training, LE strengthening, balance, gait mechanics, supine LE strengthening exercises, quadruped exercises, hip and core strengthening   Comments   Comments Patient returned today for her follow up appointment. Pt did well with the higher level balance drills, no LOB.  Pateint remains appripriate for skilled therapy.   Total Session Time   Timed Code Treatment Minutes 65   Total Treatment Time (sum of timed and untimed services) 65     Queenie Tavares, PT, DPT, [facility]   "

## 2025-04-30 ENCOUNTER — THERAPY VISIT (OUTPATIENT)
Dept: OCCUPATIONAL THERAPY | Facility: REHABILITATION | Age: 85
End: 2025-04-30
Payer: COMMERCIAL

## 2025-04-30 ENCOUNTER — THERAPY VISIT (OUTPATIENT)
Dept: PHYSICAL THERAPY | Facility: REHABILITATION | Age: 85
End: 2025-04-30
Payer: COMMERCIAL

## 2025-04-30 DIAGNOSIS — R29.898 RIGHT HAND WEAKNESS: ICD-10-CM

## 2025-04-30 DIAGNOSIS — I63.512 CEREBROVASCULAR ACCIDENT (CVA) DUE TO STENOSIS OF LEFT MIDDLE CEREBRAL ARTERY (H): ICD-10-CM

## 2025-04-30 DIAGNOSIS — Z74.09 DECREASED FUNCTIONAL MOBILITY AND ENDURANCE: ICD-10-CM

## 2025-04-30 DIAGNOSIS — R26.81 GAIT INSTABILITY: ICD-10-CM

## 2025-04-30 DIAGNOSIS — R27.9 INCOORDINATION: Primary | ICD-10-CM

## 2025-04-30 DIAGNOSIS — M62.81 GENERALIZED MUSCLE WEAKNESS: Primary | ICD-10-CM

## 2025-04-30 DIAGNOSIS — G81.91 RIGHT HEMIPARESIS (H): ICD-10-CM

## 2025-04-30 DIAGNOSIS — Z78.9 DECREASED ACTIVITIES OF DAILY LIVING (ADL): ICD-10-CM

## 2025-04-30 PROCEDURE — 97112 NEUROMUSCULAR REEDUCATION: CPT | Mod: GP | Performed by: PHYSICAL THERAPIST

## 2025-04-30 PROCEDURE — 97110 THERAPEUTIC EXERCISES: CPT | Mod: GP | Performed by: PHYSICAL THERAPIST

## 2025-04-30 PROCEDURE — 97110 THERAPEUTIC EXERCISES: CPT | Mod: GO | Performed by: OCCUPATIONAL THERAPIST

## 2025-04-30 PROCEDURE — 97112 NEUROMUSCULAR REEDUCATION: CPT | Mod: GO | Performed by: OCCUPATIONAL THERAPIST

## 2025-04-30 PROCEDURE — 97116 GAIT TRAINING THERAPY: CPT | Mod: GP | Performed by: PHYSICAL THERAPIST

## 2025-05-02 ENCOUNTER — THERAPY VISIT (OUTPATIENT)
Dept: PHYSICAL THERAPY | Facility: REHABILITATION | Age: 85
End: 2025-05-02
Payer: COMMERCIAL

## 2025-05-02 DIAGNOSIS — I63.512 CEREBROVASCULAR ACCIDENT (CVA) DUE TO STENOSIS OF LEFT MIDDLE CEREBRAL ARTERY (H): ICD-10-CM

## 2025-05-02 DIAGNOSIS — M62.81 GENERALIZED MUSCLE WEAKNESS: Primary | ICD-10-CM

## 2025-05-02 DIAGNOSIS — R26.81 GAIT INSTABILITY: ICD-10-CM

## 2025-05-02 DIAGNOSIS — Z74.09 DECREASED FUNCTIONAL MOBILITY AND ENDURANCE: ICD-10-CM

## 2025-05-02 PROCEDURE — 97116 GAIT TRAINING THERAPY: CPT | Mod: GP | Performed by: PHYSICAL THERAPIST

## 2025-05-02 PROCEDURE — 97112 NEUROMUSCULAR REEDUCATION: CPT | Mod: GP | Performed by: PHYSICAL THERAPIST

## 2025-05-02 PROCEDURE — 97110 THERAPEUTIC EXERCISES: CPT | Mod: GP | Performed by: PHYSICAL THERAPIST

## 2025-05-05 ENCOUNTER — THERAPY VISIT (OUTPATIENT)
Dept: OCCUPATIONAL THERAPY | Facility: REHABILITATION | Age: 85
End: 2025-05-05
Payer: COMMERCIAL

## 2025-05-05 DIAGNOSIS — G81.91 RIGHT HEMIPARESIS (H): ICD-10-CM

## 2025-05-05 DIAGNOSIS — Z78.9 DECREASED ACTIVITIES OF DAILY LIVING (ADL): ICD-10-CM

## 2025-05-05 DIAGNOSIS — R27.9 INCOORDINATION: Primary | ICD-10-CM

## 2025-05-05 DIAGNOSIS — I63.512 CEREBROVASCULAR ACCIDENT (CVA) DUE TO STENOSIS OF LEFT MIDDLE CEREBRAL ARTERY (H): ICD-10-CM

## 2025-05-05 PROCEDURE — 97110 THERAPEUTIC EXERCISES: CPT | Mod: GO | Performed by: OCCUPATIONAL THERAPIST

## 2025-05-06 ENCOUNTER — THERAPY VISIT (OUTPATIENT)
Dept: PHYSICAL THERAPY | Facility: REHABILITATION | Age: 85
End: 2025-05-06
Payer: COMMERCIAL

## 2025-05-06 DIAGNOSIS — R26.81 GAIT INSTABILITY: ICD-10-CM

## 2025-05-06 DIAGNOSIS — I63.512 CEREBROVASCULAR ACCIDENT (CVA) DUE TO STENOSIS OF LEFT MIDDLE CEREBRAL ARTERY (H): ICD-10-CM

## 2025-05-06 DIAGNOSIS — M62.81 GENERALIZED MUSCLE WEAKNESS: Primary | ICD-10-CM

## 2025-05-06 PROCEDURE — 97110 THERAPEUTIC EXERCISES: CPT | Mod: GP | Performed by: PHYSICAL THERAPIST

## 2025-05-06 PROCEDURE — 97535 SELF CARE MNGMENT TRAINING: CPT | Mod: GP | Performed by: PHYSICAL THERAPIST

## 2025-05-08 ENCOUNTER — THERAPY VISIT (OUTPATIENT)
Dept: PHYSICAL THERAPY | Facility: REHABILITATION | Age: 85
End: 2025-05-08
Payer: COMMERCIAL

## 2025-05-08 DIAGNOSIS — M62.81 GENERALIZED MUSCLE WEAKNESS: Primary | ICD-10-CM

## 2025-05-08 DIAGNOSIS — R26.81 GAIT INSTABILITY: ICD-10-CM

## 2025-05-08 DIAGNOSIS — Z74.09 DECREASED FUNCTIONAL MOBILITY AND ENDURANCE: ICD-10-CM

## 2025-05-08 DIAGNOSIS — I63.512 CEREBROVASCULAR ACCIDENT (CVA) DUE TO STENOSIS OF LEFT MIDDLE CEREBRAL ARTERY (H): ICD-10-CM

## 2025-05-12 ENCOUNTER — THERAPY VISIT (OUTPATIENT)
Dept: OCCUPATIONAL THERAPY | Facility: REHABILITATION | Age: 85
End: 2025-05-12
Payer: COMMERCIAL

## 2025-05-12 DIAGNOSIS — G81.91 RIGHT HEMIPARESIS (H): ICD-10-CM

## 2025-05-12 DIAGNOSIS — R27.9 INCOORDINATION: Primary | ICD-10-CM

## 2025-05-12 DIAGNOSIS — Z78.9 DECREASED ACTIVITIES OF DAILY LIVING (ADL): ICD-10-CM

## 2025-05-12 DIAGNOSIS — R29.898 RIGHT HAND WEAKNESS: ICD-10-CM

## 2025-05-12 PROCEDURE — 97112 NEUROMUSCULAR REEDUCATION: CPT | Mod: GO | Performed by: OCCUPATIONAL THERAPIST

## 2025-05-12 PROCEDURE — 97110 THERAPEUTIC EXERCISES: CPT | Mod: GO | Performed by: OCCUPATIONAL THERAPIST

## 2025-05-13 ENCOUNTER — THERAPY VISIT (OUTPATIENT)
Dept: PHYSICAL THERAPY | Facility: REHABILITATION | Age: 85
End: 2025-05-13
Payer: COMMERCIAL

## 2025-05-13 DIAGNOSIS — M62.81 GENERALIZED MUSCLE WEAKNESS: Primary | ICD-10-CM

## 2025-05-13 DIAGNOSIS — R26.81 GAIT INSTABILITY: ICD-10-CM

## 2025-05-13 DIAGNOSIS — I63.512 CEREBROVASCULAR ACCIDENT (CVA) DUE TO STENOSIS OF LEFT MIDDLE CEREBRAL ARTERY (H): ICD-10-CM

## 2025-05-13 PROCEDURE — 97116 GAIT TRAINING THERAPY: CPT | Mod: GP | Performed by: PHYSICAL THERAPIST

## 2025-05-13 PROCEDURE — 97112 NEUROMUSCULAR REEDUCATION: CPT | Mod: GP | Performed by: PHYSICAL THERAPIST

## 2025-05-14 ENCOUNTER — THERAPY VISIT (OUTPATIENT)
Dept: OCCUPATIONAL THERAPY | Facility: REHABILITATION | Age: 85
End: 2025-05-14
Payer: COMMERCIAL

## 2025-05-14 ENCOUNTER — THERAPY VISIT (OUTPATIENT)
Dept: PHYSICAL THERAPY | Facility: REHABILITATION | Age: 85
End: 2025-05-14
Payer: COMMERCIAL

## 2025-05-14 DIAGNOSIS — G81.91 RIGHT HEMIPARESIS (H): ICD-10-CM

## 2025-05-14 DIAGNOSIS — R29.898 RIGHT HAND WEAKNESS: ICD-10-CM

## 2025-05-14 DIAGNOSIS — R27.9 INCOORDINATION: Primary | ICD-10-CM

## 2025-05-14 DIAGNOSIS — Z78.9 DECREASED ACTIVITIES OF DAILY LIVING (ADL): ICD-10-CM

## 2025-05-14 DIAGNOSIS — I63.512 CEREBROVASCULAR ACCIDENT (CVA) DUE TO STENOSIS OF LEFT MIDDLE CEREBRAL ARTERY (H): ICD-10-CM

## 2025-05-14 DIAGNOSIS — R26.81 GAIT INSTABILITY: ICD-10-CM

## 2025-05-14 DIAGNOSIS — Z74.09 DECREASED FUNCTIONAL MOBILITY AND ENDURANCE: ICD-10-CM

## 2025-05-14 DIAGNOSIS — M62.81 GENERALIZED MUSCLE WEAKNESS: Primary | ICD-10-CM

## 2025-05-14 PROCEDURE — 97110 THERAPEUTIC EXERCISES: CPT | Mod: GP | Performed by: PHYSICAL THERAPIST

## 2025-05-14 PROCEDURE — 97112 NEUROMUSCULAR REEDUCATION: CPT | Mod: GO | Performed by: OCCUPATIONAL THERAPIST

## 2025-05-14 PROCEDURE — 97110 THERAPEUTIC EXERCISES: CPT | Mod: GO | Performed by: OCCUPATIONAL THERAPIST

## 2025-05-14 PROCEDURE — 97112 NEUROMUSCULAR REEDUCATION: CPT | Mod: GP | Performed by: PHYSICAL THERAPIST

## 2025-05-16 ENCOUNTER — ANCILLARY PROCEDURE (OUTPATIENT)
Dept: MAMMOGRAPHY | Facility: CLINIC | Age: 85
End: 2025-05-16
Attending: INTERNAL MEDICINE
Payer: COMMERCIAL

## 2025-05-16 DIAGNOSIS — C50.911 INVASIVE LOBULAR CARCINOMA OF BREAST, STAGE 2, RIGHT (H): ICD-10-CM

## 2025-05-16 DIAGNOSIS — Z12.31 ENCOUNTER FOR SCREENING MAMMOGRAM FOR BREAST CANCER: ICD-10-CM

## 2025-05-16 PROCEDURE — 77067 SCR MAMMO BI INCL CAD: CPT

## 2025-05-21 ENCOUNTER — THERAPY VISIT (OUTPATIENT)
Dept: OCCUPATIONAL THERAPY | Facility: REHABILITATION | Age: 85
End: 2025-05-21
Payer: COMMERCIAL

## 2025-05-21 DIAGNOSIS — I63.512 CEREBROVASCULAR ACCIDENT (CVA) DUE TO STENOSIS OF LEFT MIDDLE CEREBRAL ARTERY (H): ICD-10-CM

## 2025-05-21 DIAGNOSIS — R29.898 RIGHT HAND WEAKNESS: ICD-10-CM

## 2025-05-21 DIAGNOSIS — G81.91 RIGHT HEMIPARESIS (H): ICD-10-CM

## 2025-05-21 DIAGNOSIS — R27.9 INCOORDINATION: Primary | ICD-10-CM

## 2025-05-21 DIAGNOSIS — Z78.9 DECREASED ACTIVITIES OF DAILY LIVING (ADL): ICD-10-CM

## 2025-05-21 PROCEDURE — 97112 NEUROMUSCULAR REEDUCATION: CPT | Mod: GO | Performed by: OCCUPATIONAL THERAPIST

## 2025-05-21 PROCEDURE — 97140 MANUAL THERAPY 1/> REGIONS: CPT | Mod: GO | Performed by: OCCUPATIONAL THERAPIST

## 2025-05-28 ENCOUNTER — THERAPY VISIT (OUTPATIENT)
Dept: OCCUPATIONAL THERAPY | Facility: REHABILITATION | Age: 85
End: 2025-05-28
Payer: COMMERCIAL

## 2025-05-28 DIAGNOSIS — I63.512 CEREBROVASCULAR ACCIDENT (CVA) DUE TO STENOSIS OF LEFT MIDDLE CEREBRAL ARTERY (H): ICD-10-CM

## 2025-05-28 DIAGNOSIS — Z78.9 DECREASED ACTIVITIES OF DAILY LIVING (ADL): ICD-10-CM

## 2025-05-28 DIAGNOSIS — R27.9 INCOORDINATION: Primary | ICD-10-CM

## 2025-05-28 DIAGNOSIS — R29.898 RIGHT HAND WEAKNESS: ICD-10-CM

## 2025-05-28 DIAGNOSIS — G81.91 RIGHT HEMIPARESIS (H): ICD-10-CM

## 2025-05-28 PROCEDURE — 97140 MANUAL THERAPY 1/> REGIONS: CPT | Mod: GO | Performed by: OCCUPATIONAL THERAPIST

## 2025-05-28 PROCEDURE — 97110 THERAPEUTIC EXERCISES: CPT | Mod: GO | Performed by: OCCUPATIONAL THERAPIST

## 2025-05-28 PROCEDURE — 97112 NEUROMUSCULAR REEDUCATION: CPT | Mod: GO | Performed by: OCCUPATIONAL THERAPIST

## 2025-05-30 ENCOUNTER — THERAPY VISIT (OUTPATIENT)
Dept: PHYSICAL THERAPY | Facility: REHABILITATION | Age: 85
End: 2025-05-30
Payer: COMMERCIAL

## 2025-05-30 DIAGNOSIS — I63.512 CEREBROVASCULAR ACCIDENT (CVA) DUE TO STENOSIS OF LEFT MIDDLE CEREBRAL ARTERY (H): ICD-10-CM

## 2025-05-30 DIAGNOSIS — Z74.09 DECREASED FUNCTIONAL MOBILITY AND ENDURANCE: ICD-10-CM

## 2025-05-30 DIAGNOSIS — R26.81 GAIT INSTABILITY: ICD-10-CM

## 2025-05-30 DIAGNOSIS — M62.81 GENERALIZED MUSCLE WEAKNESS: Primary | ICD-10-CM

## 2025-05-30 PROCEDURE — 97110 THERAPEUTIC EXERCISES: CPT | Mod: GP | Performed by: PHYSICAL THERAPIST

## 2025-05-30 PROCEDURE — 97112 NEUROMUSCULAR REEDUCATION: CPT | Mod: GP | Performed by: PHYSICAL THERAPIST

## 2025-05-30 NOTE — PATIENT INSTRUCTIONS
Exercise additions and changes    Trying to do sidelying straight leg raise on the wall or front of couch - keep R heel touching wall the whole time and both hips and L foot on the wall x10 reps working up to 15-20 reps 1-2x/day      ADD single leg balance with 1 fingertip helping IDEALLY in the corner     Try to last 10-30 seconds x5-10 reps 1-2x/day    ADD toe tapping on bottom stop or STOOL  Alternating sides R and L x10 reps each - can work up to 15-20 reps as able

## 2025-05-30 NOTE — PROGRESS NOTES
[place] Balance Scale    #1 SITTING TO STANDING  INSTRUCTIONS: Please stand up. Try not to use your hands for support  4     able to stand without using hands and stabilize independently     4  3     able to stand independently using hands  2     able to stand using hands after several tries  1     needs minimal aid to stand or stabilize  0     needs moderate or maximal assist to stand    #2 STANDING UNSUPPORTED  INSTRUCTIONS: Please stand for two minutes without holding on  4     able to stand safely for 2 minutes   4  3     able to stand 2 minutes with supervision  2     able to stand 30 seconds unsupported  1     needs several tries to stand 30 seconds unsupported  0     unable to stand 30 seconds unsupported    *If subject is able to stand 2 minutes unsupported, proceed to item #4    #3 SITTING WITH BACK UNSUPPORTED BUT FEET SUPPORTED ON FLOOR OR ON STOOL  INSTRUCTIONS : Please sit with arms folded for 2 minutes  4     able to sit safely and securely for 2 minutes  4  3     able to sit 2 minutes under supervision  2     able to sit 30 seconds  1     able to sit 10 seconds  0     unable to sit without support 10 seconds    #4 SITTING TO STANDING  INSTRUCTIONS : Please sit down  4     sits safely with minimal use of hands  4  3     controls descent by using hands  2     uses back of legs against chair to control descent  1     sits independently but has uncontrolled descent  0     needs assist to sit    #5 TRANSFERS  INSTRUCTIONS: Arrange chair for pivot transfer.  Ask subject to transfer one way toward a seat with armrests and one way toward a seat without armrests. You may use two chairs ( one with and one without armrests) or a bed and a chair  4     able to transfer safely with minor use of hands  4  3     able to transfer safely definite use of hands  2     able to transfer with verbal cueing and/or supervision  1     needs one person to assist  0     needs assist to sit    #6 STANDING UNSUPPORTED WITH EYES  CLOSED  INSTRUCTIONS: Please close your eyes and stand still for 10 seconds  4     able to stand 10 seconds safely  4  3     able to stand 10 seconds with supervision  2     able to stand 3 seconds  1     unable to keep eyes closed 3 seconds but stays safely  0     needs help to keep from falling    #7 STANDING UNSUPPORTED WITH FEET TOGETHER  INSTRUCTIONS: Place your feet together and stand without holding on.  4    able to place feet together independently and stand 1 minute safely    4  3    able to place feet together independently and stand 1 minute with supervision  2    able to place feet together independently but unable to hold for 30 seconds  1    needs help to attain position but able to stand 15 seconds feet together  0    needs help to attain position and unable to hold for 15 seconds    #8 REACHING FORWARD WITH OUTSTRETCHED ARM WHILE STANDING  INSTRUCTIONS: Lift arm to 90 degrees. Stretch out your fingers and reach forward as far as you can. (Examiner places a ruler at the end of fingertips when arm is at 90 degrees. Fingers should not touch the ruler while reaching forward. The recorded measure is the distance forward that the fingers reach while the subject is in the most forward lean position. When possible, ask subject to use both arms when reaching to avoid rotation of the truck)  4   can reach forward confidently 25 cm (10 inches)  3   can reach forward 12 cm (5 inches)  3  2   can reach forward 5cm (2 inches)  1   reaches forward but needs supervision  0   loses balances while trying/requires external support.    #9  OBJECT FROM THE FLOOR FROM A STANDING POSITION  INSTRUCTIONS:    the shoe/slipper, which is in front of your feet.  4    able to  slipper safely and easily  4  3    able to  slipper but needs supervision  2    unable to  but reaches 2-5 cm (1-2 inches) from slipper and keeps balance    independently  1    unable to  and needs supervision  while trying  0    unable to try/need assist to keep from losing balance or falling    #10 TURNING TO LOOK BEHIND OVER LEFT AND RIGHT SHOULDERS WHILE STANDING  INSTRUCTIONS: Turn to look directly behind you over towards the left shoulder. Repeat to the right. (Examiner may pick an object to look at directly behind the subject to encourage a better twist turn. )  4    looks behind from both sides and weight shift well 4  3    looks behind one side only other side shows less weight shift   2    turns sideways only but maintains balance  1    needs supervision when turning  0    needs assist to keep from losing balance or falling    #11 TURN 360 DEGREES  INSTRUCTIONS:  Turn completely around in a full [place]. Pause. Then turn a full [place] in the other direction.  4   able to turn 360 degrees safely in 4 seconds or less 4  3   able to turn 360 degrees safely one side only 4 seconds or less  2   able to turn 360 degrees safely but slowly  1   needs close supervision or verbal cuing  0   needs assistance while turning    #12 PLACE ALTERNATE FOOT ON STEP OR STOOL WHILE STANDING UNSUPPORTED  INSTRUCTIONS: Place each foot alternately on the step/stool. Continue until each foot has touched the step/stool four times.  4   able to stand independently and safely and completed 8 steps in 20 seconds      3   able to stand independently and complete 8 in > 20 seconds   2   able to complete 4 steps without aid with supervision 2  1   able to complete > 2 steps needs minimal assist  0   needs assistance to keep from falling/unable to try    #13 STANDING UNSUPPORTED ONE FOOT IN FRONT  INSTRUCTIONS: (DEMONSTRATE TO SUBJECT) Place one foot directly in front of the other. If you feel that you cannot place your foot directing in front, try to step far enough ahead that the heel of your forward foot is ahead of the toes of the other foot. (to score 3 points, the length of the step should exceed the length of the other foot and width of  the stance should approximate the subject s normal stride width)   4   able to place foot tandem independently and hold 30 seconds       3   able to place foot ahead independently and hold 30 seconds  2   able to take small step independently and hold 30 seconds  1   needs help to step but can hold 15 seconds 1  0   loses balance while stepping or standing    #14 STANDING ON ONE LEG  INSTRUCTIONS: Stand on one leg as long as you can without holding on.  4   able to lift leg independently and hold > 10 seconds  3   able to lift leg independently and hold 5-10 seconds  2   able to lift leg independently and hold > 3 seconds  1   tries to lift leg unable to hold 3 seconds but remains standing independently.    1  0   unable to try of needs assist to prevent fall    TOTAL SCORE (MAXIMUM = 56)  47/56  41-56 = low fall risk  21-40 = medium fall risk  0-20   = high fall risk

## 2025-06-01 PROBLEM — I63.512 CEREBROVASCULAR ACCIDENT (CVA) DUE TO STENOSIS OF LEFT MIDDLE CEREBRAL ARTERY (H): Status: ACTIVE | Noted: 2025-03-04

## 2025-06-01 PROBLEM — R26.81 GAIT INSTABILITY: Status: ACTIVE | Noted: 2025-06-01

## 2025-06-01 PROBLEM — Z74.09 DECREASED FUNCTIONAL MOBILITY AND ENDURANCE: Status: ACTIVE | Noted: 2025-06-01

## 2025-06-01 PROBLEM — M62.81 GENERALIZED MUSCLE WEAKNESS: Status: ACTIVE | Noted: 2025-06-01

## 2025-06-04 ENCOUNTER — THERAPY VISIT (OUTPATIENT)
Dept: OCCUPATIONAL THERAPY | Facility: REHABILITATION | Age: 85
End: 2025-06-04
Payer: COMMERCIAL

## 2025-06-04 DIAGNOSIS — R27.9 INCOORDINATION: Primary | ICD-10-CM

## 2025-06-04 DIAGNOSIS — G81.91 RIGHT HEMIPARESIS (H): ICD-10-CM

## 2025-06-04 DIAGNOSIS — Z78.9 DECREASED ACTIVITIES OF DAILY LIVING (ADL): ICD-10-CM

## 2025-06-04 DIAGNOSIS — R29.898 RIGHT HAND WEAKNESS: ICD-10-CM

## 2025-06-04 DIAGNOSIS — I63.512 CEREBROVASCULAR ACCIDENT (CVA) DUE TO STENOSIS OF LEFT MIDDLE CEREBRAL ARTERY (H): ICD-10-CM

## 2025-06-04 PROCEDURE — 97110 THERAPEUTIC EXERCISES: CPT | Mod: GO | Performed by: OCCUPATIONAL THERAPIST

## 2025-06-04 PROCEDURE — 97112 NEUROMUSCULAR REEDUCATION: CPT | Mod: GO | Performed by: OCCUPATIONAL THERAPIST

## 2025-06-11 ENCOUNTER — THERAPY VISIT (OUTPATIENT)
Dept: OCCUPATIONAL THERAPY | Facility: REHABILITATION | Age: 85
End: 2025-06-11
Payer: COMMERCIAL

## 2025-06-11 DIAGNOSIS — R27.9 INCOORDINATION: Primary | ICD-10-CM

## 2025-06-11 PROCEDURE — 97110 THERAPEUTIC EXERCISES: CPT | Mod: GO | Performed by: OCCUPATIONAL THERAPY ASSISTANT

## 2025-06-11 PROCEDURE — 97112 NEUROMUSCULAR REEDUCATION: CPT | Mod: GO | Performed by: OCCUPATIONAL THERAPY ASSISTANT

## 2025-06-18 ENCOUNTER — THERAPY VISIT (OUTPATIENT)
Dept: OCCUPATIONAL THERAPY | Facility: REHABILITATION | Age: 85
End: 2025-06-18
Payer: COMMERCIAL

## 2025-06-18 DIAGNOSIS — G81.91 RIGHT HEMIPARESIS (H): ICD-10-CM

## 2025-06-18 DIAGNOSIS — R29.898 RIGHT HAND WEAKNESS: ICD-10-CM

## 2025-06-18 DIAGNOSIS — R27.9 INCOORDINATION: Primary | ICD-10-CM

## 2025-06-18 DIAGNOSIS — Z78.9 DECREASED ACTIVITIES OF DAILY LIVING (ADL): ICD-10-CM

## 2025-06-18 PROCEDURE — 97110 THERAPEUTIC EXERCISES: CPT | Mod: GO | Performed by: OCCUPATIONAL THERAPIST

## 2025-06-19 NOTE — PROGRESS NOTES
[facility]                                                                                   OUTPATIENT OCCUPATIONAL THERAPY    PLAN OF TREATMENT FOR OUTPATIENT REHABILITATION   Patient's Last Name, First Name, Fang Fried YOB: 1940   Provider's Name   [facility]   Medical Record No.  [id number]     Onset Date: 03/03/25 Start of Care Date: 03/19/25     Medical Diagnosis:  Cerebrovascular accident (CVA) due to stenosis of left middle cerebral artery      OT Treatment Diagnosis:  right arm weakness, incoordination, decreased ADL and IADL function Plan of Treatment  Frequency/Duration:twice a week with ability to taper as clinically indicated/12 weeks    Certification date from 06/12/25   To 09/04/25        See note for plan of treatment details and functional goals     Joycelyn Trevino OT                         I CERTIFY THE NEED FOR THESE SERVICES FURNISHED UNDER        THIS PLAN OF TREATMENT AND WHILE UNDER MY CARE .             Physician Signature               Date    X_____________________________________________________                  Referring Provider:  Santiago Silverio MD    Initial Assessment  See Epic Evaluation- 03/19/25          PLAN  Continue therapy per current plan of care.    Beginning/End Dates of Progress Note Reporting Period:  06/18/25 to 06/18/2025    Referring Provider:  Santiago Silverio MD       06/18/25 0500   Appointment Info   Treating Provider Raquel Trevino OTR/L   Visits Used 16   Medical Diagnosis Cerebrovascular accident (CVA) due to stenosis of left middle cerebral artery   OT Tx Diagnosis right arm weakness, incoordination, decreased ADL and IADL function   Progress Note/Certification   Start Of Care Date 03/19/25   Onset of Illness/Injury or Date of Surgery 03/03/25   Therapy Frequency twice a week with ability to taper as clinically indicated   Predicted Duration 12 weeks    Certification date from 06/12/25   Certification date to 09/04/25   KX Modifier Statement I certify the need for these services furnished under this plan of treatment and while under my care.  (Physician co-signature of this document indicates review and certification of the therapy plan)   Progress Note Due Date 09/04/25   Progress Note Completed Date 06/18/25   OT Goal 1   Goal Identifier right shoulder AROM   Goal Description Patient to demonstrate independence with HEP for R UE AROM and measure improved R shoulder flexion by at least 10 degrees for increased ADL/IADL independence   Target Date 09/04/25   OT Goal 2   Goal Identifier right elbow AROM   Goal Description Patient to demonstrate independence with HEP for R UE AROM and measure improved R elbow flexion by at least 15 degrees and extension by at least 5 degrees for increased ADL/IADL independence   Target Date 09/04/25   OT Goal 3   Goal Identifier right  strength   Goal Description Patient to demonstrate independence with HEP for R UE strength and measure improved R  strength by at least 5#s for increased ADL/IADL independence (hanging onto objects during hygiene and grooming, cutting food, completing B UE tasks, etc.).   Target Date 09/04/25   OT Goal 4   Goal Identifier right pinch strength   Goal Description Patient will demonstrate increased right hand pinch strength (both lateral and palmar) by 2# each for increased independence with ADL/IADLs such as opening containers, pull up pants, maintaining pinch to hold items, etc.   Target Date 09/04/25   OT Goal 5   Goal Identifier right fine motor coordination   Goal Description Patient to demonstrate independence with HEP for R hand coordination and be able to perform 9 HPT in under 3 minutes for increased ADL/IADL independence   Target Date 09/04/25   OT Goal 6   Goal Identifier IADL skills   Goal Description Pt will demonstrate visual skills, reaction time, Michael coordination, attention, and  cognition WFL for safe functional and community mobility tasks (navigating new environments, return to safe driving) by scoring WFLs/WNLs on 5/5 IADL tasks (e.g. Dynavision, Scan course, SDMT, trail-making, foot tap, etc.).   Target Date 09/04/25   Objective Measure 1   Objective Measure right POG   Details 6-6-25: 24#   Objective Measure 2   Objective Measure right lateral pinch   Details 6-6-25: 9#   Objective Measure 3   Objective Measure right 3 pt pinch   Details 6-6-25: 7#   Objective Measure 4   Objective Measure right shoulder flexion   Details 6-6-25: 122 w/o substitutions   Objective Measure 5   Objective Measure right elbow flexion/extension   Details 6-6-25: 141/-8   Objective Measure 6   Objective Measure right wrist flexion/extension   Details 6-6-25: 50/41   Objective Measure 7   Objective Measure right pronation/supination   Details 6-6-25: WNL/66   Objective Measure 9   Objective Measure finger flexion/extension   Details 6-6-25: full/ full   Objective Measure 10   Objective Measure finger abduction/adduction   Details 6-6-25: 75%   Objective Measure 11   Objective Measure 9 HPT-right   Details 6-6-25: 41.20 seconds   Neuromuscular Re-education   Neuro Re-ed 1 [facility]   Neuro Re-ed 1 - Details Kinesiotape did not seem to be beneficial   Skilled Intervention Instruct on HEP to improve fine motor coordination for increased ease and independence with ADL and IADL s   Therapeutic Procedure/Exercise   Therapeutic Procedure: strength, endurance, ROM, flexibillity minutes (85090) 40   Ther Proc 1 Right UE HEP   Ther Proc 1 - Details Patient continue HEP R UE. Progressed to full arm slides on tabletop, full shoulder extension, stretch in internal and external rotation. Reviewed entire HEP   Skilled Intervention Instruct on UE HEP to improve AROM/strength for increased ease and independence with ADL and IADL s   Manual Therapy   Manual Therapy 1 edema and soft tissue mgmt   Manual Therapy 1 - Details Patient to  continue wearing right 3/4 finger Colón compression glove size x-small every night. Will alternate using X-Span finger sleeve on all fingers as needed due to mild swelling. Continue edema massage to right hand and wrist. Will open up lymphatic areas at armpit and volar elbow.   Skilled Intervention For improved edema, soft tissue extensibility and neuromuscular reeducation to improve mobility and reducing pain with ADL, IADL, leisure and sleep.   Education   Learner/Method Patient   Plan   Plan for next session continue functional shoulder ex's(UBE, dynavision), continue mirror box, continue fm coordination in clinic,  f/u HEP for right UE, blaze pods, ball sitting with reaching for cones, start IADL box   Comments   Comments NMES need is unlikely at this point but consider as needed   Total Session Time   Timed Code Treatment Minutes 40   Total Treatment Time (sum of timed and untimed services) 40

## 2025-06-25 ENCOUNTER — THERAPY VISIT (OUTPATIENT)
Dept: OCCUPATIONAL THERAPY | Facility: REHABILITATION | Age: 85
End: 2025-06-25
Payer: COMMERCIAL

## 2025-06-25 DIAGNOSIS — I63.512 CEREBROVASCULAR ACCIDENT (CVA) DUE TO STENOSIS OF LEFT MIDDLE CEREBRAL ARTERY (H): ICD-10-CM

## 2025-06-25 DIAGNOSIS — Z78.9 DECREASED ACTIVITIES OF DAILY LIVING (ADL): ICD-10-CM

## 2025-06-25 DIAGNOSIS — G81.91 RIGHT HEMIPARESIS (H): ICD-10-CM

## 2025-06-25 DIAGNOSIS — R27.9 INCOORDINATION: Primary | ICD-10-CM

## 2025-06-25 DIAGNOSIS — R29.898 RIGHT HAND WEAKNESS: ICD-10-CM

## 2025-06-25 PROCEDURE — 97530 THERAPEUTIC ACTIVITIES: CPT | Mod: GO | Performed by: OCCUPATIONAL THERAPIST

## 2025-06-25 PROCEDURE — 97112 NEUROMUSCULAR REEDUCATION: CPT | Mod: GO | Performed by: OCCUPATIONAL THERAPIST

## 2025-07-02 ENCOUNTER — THERAPY VISIT (OUTPATIENT)
Dept: PHYSICAL THERAPY | Facility: REHABILITATION | Age: 85
End: 2025-07-02
Payer: COMMERCIAL

## 2025-07-02 ENCOUNTER — THERAPY VISIT (OUTPATIENT)
Dept: OCCUPATIONAL THERAPY | Facility: REHABILITATION | Age: 85
End: 2025-07-02
Payer: COMMERCIAL

## 2025-07-02 DIAGNOSIS — I63.512 CEREBROVASCULAR ACCIDENT (CVA) DUE TO STENOSIS OF LEFT MIDDLE CEREBRAL ARTERY (H): ICD-10-CM

## 2025-07-02 DIAGNOSIS — R29.898 RIGHT HAND WEAKNESS: ICD-10-CM

## 2025-07-02 DIAGNOSIS — R26.81 GAIT INSTABILITY: ICD-10-CM

## 2025-07-02 DIAGNOSIS — Z78.9 DECREASED ACTIVITIES OF DAILY LIVING (ADL): ICD-10-CM

## 2025-07-02 DIAGNOSIS — G81.91 RIGHT HEMIPARESIS (H): ICD-10-CM

## 2025-07-02 DIAGNOSIS — R27.9 INCOORDINATION: Primary | ICD-10-CM

## 2025-07-02 DIAGNOSIS — Z74.09 DECREASED FUNCTIONAL MOBILITY AND ENDURANCE: ICD-10-CM

## 2025-07-02 DIAGNOSIS — M62.81 GENERALIZED MUSCLE WEAKNESS: Primary | ICD-10-CM

## 2025-07-02 PROCEDURE — 97112 NEUROMUSCULAR REEDUCATION: CPT | Mod: GO | Performed by: OCCUPATIONAL THERAPIST

## 2025-07-02 PROCEDURE — 97535 SELF CARE MNGMENT TRAINING: CPT | Mod: GO | Performed by: OCCUPATIONAL THERAPIST

## 2025-07-02 PROCEDURE — 97530 THERAPEUTIC ACTIVITIES: CPT | Mod: GO | Performed by: OCCUPATIONAL THERAPIST

## 2025-07-02 PROCEDURE — 97110 THERAPEUTIC EXERCISES: CPT | Mod: GP | Performed by: PHYSICAL THERAPIST

## 2025-07-02 PROCEDURE — 97112 NEUROMUSCULAR REEDUCATION: CPT | Mod: GP | Performed by: PHYSICAL THERAPIST

## 2025-07-02 NOTE — PROGRESS NOTES
[facility]                                                                                   OUTPATIENT PHYSICAL THERAPY    PLAN OF TREATMENT FOR OUTPATIENT REHABILITATION   Patient's Last Name, First Name, Fang Fried YOB: 1940   Provider's Name   [facility]   Medical Record No.  [id number]     Onset Date: 03/03/25  Start of Care Date: 03/20/25     Medical Diagnosis:  Cerebrovascular accident (CVA) due to stenosis of left middle cerebral artery, gait instability, decreased functional mobility and endurance      PT Treatment Diagnosis:  Cerebrovascular accident (CVA) due to stenosis of left middle cerebral artery, gait instability, decreased functional mobility and endurance Plan of Treatment  Frequency/Duration: 1 time per week decreasing to every 2-4 weeks when appropriate/ up to 90 days    Certification date from 06/19/25 to 09/16/25         See note for plan of treatment details and functional goals     Queenie Tavares, PT                         I CERTIFY THE NEED FOR THESE SERVICES FURNISHED UNDER        THIS PLAN OF TREATMENT AND WHILE UNDER MY CARE     (Physician attestation of this document indicates review and certification of the therapy plan).              Referring Provider:  Iris Bird    Initial Assessment  See Epic Evaluation- Start of Care Date: 03/20/25        PLAN  Continue therapy per current plan of care.    Beginning/End Dates of Progress Note Reporting Period:  04/23/25 to 07/02/2025    Referring Provider:  Iris Bird     07/02/25 0500   Appointment Info   Signing clinician's name / credentials Queenie Tavares PT, DPT, CLT-LATOSHA   Visits Used 19   Medical Diagnosis Cerebrovascular accident (CVA) due to stenosis of left middle cerebral artery, gait instability, decreased functional mobility and endurance   PT Tx Diagnosis Cerebrovascular accident (CVA) due to stenosis of left middle cerebral  artery, gait instability, decreased functional mobility and endurance   Progress Note/Certification   Start of Care Date 03/20/25   Onset of illness/injury or Date of Surgery 03/03/25   Therapy Frequency 1 time per week decreasing to every 2-4 weeks when appropriate   Predicted Duration up to 90 days   Certification date from 06/19/25   Certification date to 09/16/25   Progress Note Due Date   (Visit 20)   Progress Note Completed Date 04/23/25   Supervision   PT Assistant Visit Number 2       Present No   GOALS   PT Goals 2;3;4   PT Goal 1   Goal Identifier HEP   Goal Description Patient with will be independent with a HEP fir ongoing symptom management in 90 days   Goal Progress goal still appropriate, she is I with exercises at home, HEP continues to advance with patient   Target Date 09/16/25   PT Goal 2   Goal Identifier Gait speed   Goal Description Patient will increase her comfortable gait speed to > 0.8 m/s for increase in community ambulation, decrease falls risk and improvement in functional mobility in 90 days   Goal Progress IMPROVING - increased gait speed comparing 6 minute walk distance today versus initial   Target Date 09/16/25   PT Goal 3   Goal Identifier sit to stand testing   Goal Description Patient will increase 30 sec sit to stands to >10reps without UE support and decrease time on 5x STS to less than 15 seconds without UE support for improvement in falls risk and increase in functional endurance in 90 days   Goal Progress IMPROVING - 17 reps in 30 seconds today without UE assist   Target Date 09/16/25   PT Goal 4   Goal Identifier 6 minute walk test   Goal Description Patient will increase distance on the 6 minute walk test to > 300 meters and improved right LE mechanics for increase in functional mobility and acitivity tolerance in 90 days   Goal Progress IMPROVING - improved endurance with distance today   Target Date 09/16/25   Subjective Report   Subjective Report  Patient has been busy staying active since last PT session, hard to get into the clinic for appoijntments She was stephanie to go hiking on [facility] with one walking stick,   Objective Measures   Objective Measures Objective Measure 1;Objective Measure 2;Objective Measure 3;Objective Measure 4;Objective Measure 5   Objective Measure 1   Objective Measure Strength   Details R hip flexor 3/5 - otherwise L hip flexor and B quad and DF 5/5, R hip abd 3+/5 with poor coordination of leg in space   Objective Measure 2   Objective Measure APTA sit to stand 30 seconds   Details 17 reps from standard chair with no hands (was unable to do first visit without UE support - 9 reps with UE)   Objective Measure 3   Objective Measure [place] Balance Scale   Details 47/56 - R SLS <3 seconds, R tandem back 17 seconds, L tandem back 30 seconds, L SLS 30 seconds, hard to toe tap with R hip weakness and challenge with SLS   Objective Measure 4   Objective Measure 6 minute walk test   Details 933 ft with RPE 6-7/10 (was 697 ft with RPE 7-8/10)   Treatment Interventions (PT)   Interventions Therapeutic Procedure/Exercise;Neuromuscular Re-education;Gait Training;Self Care/Home Management   Therapeutic Procedure/Exercise   Therapeutic Procedures Ther Proc 2;Ther Proc 3;Ther Proc 4;Ther Proc 5;Ther Proc 6   Ther Proc 1 NuStep for activity tolerance and LE strengthening  - not today   Ther Proc 1 - Details Leg press; B LE 90# 2 x 15 reps; R LE 40# x 12 reps, L LE 40# x 12 reps   Ther Proc 2 TM walking for endurance and improved step length and heel strike, 1.5mph incline 2 x 12 min   Ther Proc 2 - Details prone hip extension - alternating sides x 15 reps each   Ther Proc 3 prone hip and opposite arm extension alternating sides x 15 reps each side   PTRx Ther Proc 1 Bridging #5 Extended leg   PTRx Ther Proc 1 - Details alternating LE extension when bridged x 12 reps   PTRx Ther Proc 2 Sit to Stand - Staggered Stance Left Forward   PTRx Ther Proc 2 -  "Details HEP   PTRx Ther Proc 3 Sit to Stand - Staggered Stance Right Forward   PTRx Ther Proc 3 - Details HEP   PTRx Ther Proc 4 Prone Plank Modified Knees   PTRx Ther Proc 4 - Details HEP   PTRx Ther Proc 5 Squat With Chair   PTRx Ther Proc 5 - Details HEP   PTRx Ther Proc 6 Prone Knee Flexion   PTRx Ther Proc 6 - Details HEP   PTRx Ther Proc 7 Active Assisted Knee Flexion With a Belt   PTRx Ther Proc 7 - Details HEP   PTRx Ther Proc 8 s/L hip abd   PTRx Ther Proc 8 - Details Performed s/l on plinth x15 reps with cues for positioning and control as needed, done bilaterally   Skilled Intervention Assessed response to HEP and activity level over past 2.5 weeks. Reassessed 6 minute walk test, LE strength and sit to stand and discussed progress. Performed and modified HEP per pt instructions and printed AVS for home. Verbal, tactile and visual cues given for appropriate positioning and performance.   Patient Response/Progress Pt struggles to keep R hip abd in s/l in proper positioning - performing against wall assists pt in being in appropriate positioning.   Neuromuscular Re-education   Neuromuscular Re-education Neuro Re-ed 2;Neuro Re-ed 3;Neuro Re-ed 4;Neuro Re-ed 5   Neuro Re-ed 3 standing on BOSU x 30\"  x 3 reps   Neuro Re-ed 3 - Details Standing on BOSU mini squats x 12 reps   Neuro Re-ed 4 Performed tandem stance x17 seconds and 30 seconds   Neuro Re-ed 5 Performed SLS with 1 finger assist on bar - discussed doing at home with 1 finger and in corner of cabinets or wall for safety x5 reps R   PTRx Neuro Re-ed 1 Bridging Pelvic Floor    PTRx Neuro Re-ed 1 - Details cont   PTRx Neuro Re-ed 2 Performed toe taps on 5 cones in arc   PTRx Neuro Re-ed 2 - Details x6 reps B  going CW and CCW each rep, finger tip support for hand as needed   PTRx Neuro Re-ed 3 Star Exercise   PTRx Neuro Re-ed 3 - Details Not performed today - Balance test patient did this exercise today needed to have therapist correct when standing on " the right leg and reaching with the left leg especially when crossing behind or crossing in front when the weight gets too far over to the right patient had difficulty correcting that.  She ended up needing to either take a step or having to grab onto the railing.  Did not send this home with the patient but did work on this in the clinic both the right and left leg balance with touching with the opposite leg.   Skilled Intervention Reassessed [place] balance assessment and performed balance training and added to home program per   Patient Response/Progress Tolerated well.   Education   Learner/Method Patient;Listening;Reading;Pictures/Video   Plan   Home program see PTRX   Plan for next session Assess how patient is doing with performed s/l R hip abd against wall or couch and work on SLS and alternating toe tap at home as well as her prone knee flexion  Try increasing the speed slightly Treadmill training, LE strengthening, balance, gait mechanics, supine LE strengthening exercises, quadruped exercises, hip and core strengthening   Comments   Comments Pt demo's great improvement to 6 minute walk test, LE strength and sit to stand performance but continues to demo tandem and SLS challenges and R hip weakness. Pt remains good candidate for skilled PT services to address remaining impairments and reach goals.   Queenie Tavares, PT, DPT, CLT-LATOSHA

## 2025-07-09 ENCOUNTER — THERAPY VISIT (OUTPATIENT)
Dept: PHYSICAL THERAPY | Facility: REHABILITATION | Age: 85
End: 2025-07-09
Payer: COMMERCIAL

## 2025-07-09 ENCOUNTER — THERAPY VISIT (OUTPATIENT)
Dept: OCCUPATIONAL THERAPY | Facility: REHABILITATION | Age: 85
End: 2025-07-09
Payer: COMMERCIAL

## 2025-07-09 DIAGNOSIS — I63.512 CEREBROVASCULAR ACCIDENT (CVA) DUE TO STENOSIS OF LEFT MIDDLE CEREBRAL ARTERY (H): ICD-10-CM

## 2025-07-09 DIAGNOSIS — M62.81 GENERALIZED MUSCLE WEAKNESS: Primary | ICD-10-CM

## 2025-07-09 DIAGNOSIS — R26.81 GAIT INSTABILITY: ICD-10-CM

## 2025-07-09 DIAGNOSIS — Z78.9 DECREASED ACTIVITIES OF DAILY LIVING (ADL): ICD-10-CM

## 2025-07-09 DIAGNOSIS — G81.91 RIGHT HEMIPARESIS (H): ICD-10-CM

## 2025-07-09 DIAGNOSIS — R29.898 RIGHT HAND WEAKNESS: ICD-10-CM

## 2025-07-09 DIAGNOSIS — Z74.09 DECREASED FUNCTIONAL MOBILITY AND ENDURANCE: ICD-10-CM

## 2025-07-09 DIAGNOSIS — R27.9 INCOORDINATION: Primary | ICD-10-CM

## 2025-07-09 PROCEDURE — 97110 THERAPEUTIC EXERCISES: CPT | Mod: GP | Performed by: PHYSICAL THERAPIST

## 2025-07-09 PROCEDURE — 97110 THERAPEUTIC EXERCISES: CPT | Mod: GO | Performed by: OCCUPATIONAL THERAPIST

## 2025-07-16 ENCOUNTER — THERAPY VISIT (OUTPATIENT)
Dept: PHYSICAL THERAPY | Facility: REHABILITATION | Age: 85
End: 2025-07-16
Payer: COMMERCIAL

## 2025-07-16 ENCOUNTER — THERAPY VISIT (OUTPATIENT)
Dept: OCCUPATIONAL THERAPY | Facility: REHABILITATION | Age: 85
End: 2025-07-16
Payer: COMMERCIAL

## 2025-07-16 DIAGNOSIS — G81.91 RIGHT HEMIPARESIS (H): ICD-10-CM

## 2025-07-16 DIAGNOSIS — R26.81 GAIT INSTABILITY: ICD-10-CM

## 2025-07-16 DIAGNOSIS — I63.512 CEREBROVASCULAR ACCIDENT (CVA) DUE TO STENOSIS OF LEFT MIDDLE CEREBRAL ARTERY (H): ICD-10-CM

## 2025-07-16 DIAGNOSIS — Z74.09 DECREASED FUNCTIONAL MOBILITY AND ENDURANCE: ICD-10-CM

## 2025-07-16 DIAGNOSIS — M62.81 GENERALIZED MUSCLE WEAKNESS: Primary | ICD-10-CM

## 2025-07-16 DIAGNOSIS — R29.898 RIGHT HAND WEAKNESS: ICD-10-CM

## 2025-07-16 DIAGNOSIS — Z78.9 DECREASED ACTIVITIES OF DAILY LIVING (ADL): ICD-10-CM

## 2025-07-16 DIAGNOSIS — R27.9 INCOORDINATION: Primary | ICD-10-CM

## 2025-07-16 PROCEDURE — 97110 THERAPEUTIC EXERCISES: CPT | Mod: GO | Performed by: OCCUPATIONAL THERAPIST

## 2025-07-16 PROCEDURE — 97112 NEUROMUSCULAR REEDUCATION: CPT | Mod: GP | Performed by: PHYSICAL THERAPIST

## 2025-07-16 PROCEDURE — 97110 THERAPEUTIC EXERCISES: CPT | Mod: GP | Performed by: PHYSICAL THERAPIST

## 2025-07-22 ENCOUNTER — THERAPY VISIT (OUTPATIENT)
Dept: OCCUPATIONAL THERAPY | Facility: REHABILITATION | Age: 85
End: 2025-07-22
Payer: COMMERCIAL

## 2025-07-22 ENCOUNTER — THERAPY VISIT (OUTPATIENT)
Dept: PHYSICAL THERAPY | Facility: REHABILITATION | Age: 85
End: 2025-07-22
Payer: COMMERCIAL

## 2025-07-22 DIAGNOSIS — M62.81 GENERALIZED MUSCLE WEAKNESS: Primary | ICD-10-CM

## 2025-07-22 DIAGNOSIS — G81.91 RIGHT HEMIPARESIS (H): ICD-10-CM

## 2025-07-22 DIAGNOSIS — R29.898 RIGHT HAND WEAKNESS: Primary | ICD-10-CM

## 2025-07-22 DIAGNOSIS — Z74.09 DECREASED FUNCTIONAL MOBILITY AND ENDURANCE: ICD-10-CM

## 2025-07-22 DIAGNOSIS — R26.81 GAIT INSTABILITY: ICD-10-CM

## 2025-07-22 DIAGNOSIS — I63.512 CEREBROVASCULAR ACCIDENT (CVA) DUE TO STENOSIS OF LEFT MIDDLE CEREBRAL ARTERY (H): ICD-10-CM

## 2025-07-22 PROCEDURE — 97110 THERAPEUTIC EXERCISES: CPT | Mod: GP | Performed by: PHYSICAL THERAPIST

## 2025-07-22 PROCEDURE — 97530 THERAPEUTIC ACTIVITIES: CPT | Mod: GO | Performed by: OCCUPATIONAL THERAPY ASSISTANT

## 2025-07-22 PROCEDURE — 97110 THERAPEUTIC EXERCISES: CPT | Mod: GO | Performed by: OCCUPATIONAL THERAPY ASSISTANT

## 2025-07-22 PROCEDURE — 97112 NEUROMUSCULAR REEDUCATION: CPT | Mod: GO | Performed by: OCCUPATIONAL THERAPY ASSISTANT

## 2025-07-22 PROCEDURE — 97112 NEUROMUSCULAR REEDUCATION: CPT | Mod: GP | Performed by: PHYSICAL THERAPIST

## 2025-07-30 ENCOUNTER — THERAPY VISIT (OUTPATIENT)
Dept: PHYSICAL THERAPY | Facility: REHABILITATION | Age: 85
End: 2025-07-30
Payer: COMMERCIAL

## 2025-07-30 ENCOUNTER — THERAPY VISIT (OUTPATIENT)
Dept: OCCUPATIONAL THERAPY | Facility: REHABILITATION | Age: 85
End: 2025-07-30
Payer: COMMERCIAL

## 2025-07-30 DIAGNOSIS — Z78.9 DECREASED ACTIVITIES OF DAILY LIVING (ADL): ICD-10-CM

## 2025-07-30 DIAGNOSIS — R29.898 RIGHT HAND WEAKNESS: Primary | ICD-10-CM

## 2025-07-30 DIAGNOSIS — R27.9 INCOORDINATION: ICD-10-CM

## 2025-07-30 DIAGNOSIS — R26.81 GAIT INSTABILITY: ICD-10-CM

## 2025-07-30 DIAGNOSIS — I63.512 CEREBROVASCULAR ACCIDENT (CVA) DUE TO STENOSIS OF LEFT MIDDLE CEREBRAL ARTERY (H): ICD-10-CM

## 2025-07-30 DIAGNOSIS — M62.81 GENERALIZED MUSCLE WEAKNESS: Primary | ICD-10-CM

## 2025-07-30 DIAGNOSIS — Z74.09 DECREASED FUNCTIONAL MOBILITY AND ENDURANCE: ICD-10-CM

## 2025-07-30 PROCEDURE — 97110 THERAPEUTIC EXERCISES: CPT | Mod: GP | Performed by: PHYSICAL THERAPIST

## 2025-07-30 PROCEDURE — 97110 THERAPEUTIC EXERCISES: CPT | Mod: GO | Performed by: OCCUPATIONAL THERAPIST

## 2025-07-30 PROCEDURE — 97530 THERAPEUTIC ACTIVITIES: CPT | Mod: GO | Performed by: OCCUPATIONAL THERAPIST

## 2025-08-07 ENCOUNTER — THERAPY VISIT (OUTPATIENT)
Dept: PHYSICAL THERAPY | Facility: REHABILITATION | Age: 85
End: 2025-08-07
Payer: COMMERCIAL

## 2025-08-07 DIAGNOSIS — Z74.09 DECREASED FUNCTIONAL MOBILITY AND ENDURANCE: ICD-10-CM

## 2025-08-07 DIAGNOSIS — M62.81 GENERALIZED MUSCLE WEAKNESS: Primary | ICD-10-CM

## 2025-08-07 DIAGNOSIS — I63.512 CEREBROVASCULAR ACCIDENT (CVA) DUE TO STENOSIS OF LEFT MIDDLE CEREBRAL ARTERY (H): ICD-10-CM

## 2025-08-07 DIAGNOSIS — R26.81 GAIT INSTABILITY: ICD-10-CM

## 2025-08-10 ENCOUNTER — HEALTH MAINTENANCE LETTER (OUTPATIENT)
Age: 85
End: 2025-08-10

## 2025-08-14 ENCOUNTER — THERAPY VISIT (OUTPATIENT)
Dept: PHYSICAL THERAPY | Facility: REHABILITATION | Age: 85
End: 2025-08-14
Payer: COMMERCIAL

## 2025-08-14 DIAGNOSIS — R26.81 GAIT INSTABILITY: Primary | ICD-10-CM

## 2025-08-14 DIAGNOSIS — I63.512 CEREBROVASCULAR ACCIDENT (CVA) DUE TO STENOSIS OF LEFT MIDDLE CEREBRAL ARTERY (H): ICD-10-CM

## 2025-08-14 DIAGNOSIS — Z74.09 DECREASED FUNCTIONAL MOBILITY AND ENDURANCE: ICD-10-CM

## 2025-08-14 DIAGNOSIS — M62.81 GENERALIZED MUSCLE WEAKNESS: ICD-10-CM
